# Patient Record
Sex: MALE | Race: WHITE | NOT HISPANIC OR LATINO | Employment: OTHER | URBAN - METROPOLITAN AREA
[De-identification: names, ages, dates, MRNs, and addresses within clinical notes are randomized per-mention and may not be internally consistent; named-entity substitution may affect disease eponyms.]

---

## 2018-12-30 ENCOUNTER — APPOINTMENT (OUTPATIENT)
Dept: RADIOLOGY | Facility: CLINIC | Age: 58
End: 2018-12-30
Payer: COMMERCIAL

## 2018-12-30 ENCOUNTER — OFFICE VISIT (OUTPATIENT)
Dept: URGENT CARE | Facility: CLINIC | Age: 58
End: 2018-12-30
Payer: COMMERCIAL

## 2018-12-30 VITALS
DIASTOLIC BLOOD PRESSURE: 65 MMHG | RESPIRATION RATE: 18 BRPM | SYSTOLIC BLOOD PRESSURE: 138 MMHG | OXYGEN SATURATION: 99 % | HEART RATE: 70 BPM | TEMPERATURE: 100.9 F

## 2018-12-30 DIAGNOSIS — R05.9 COUGH: ICD-10-CM

## 2018-12-30 DIAGNOSIS — R68.89 FLU-LIKE SYMPTOMS: Primary | ICD-10-CM

## 2018-12-30 PROCEDURE — 87631 RESP VIRUS 3-5 TARGETS: CPT | Performed by: PHYSICIAN ASSISTANT

## 2018-12-30 PROCEDURE — 99204 OFFICE O/P NEW MOD 45 MIN: CPT | Performed by: PHYSICIAN ASSISTANT

## 2018-12-30 PROCEDURE — 71046 X-RAY EXAM CHEST 2 VIEWS: CPT

## 2018-12-30 RX ORDER — LOSARTAN POTASSIUM 50 MG/1
50 TABLET ORAL DAILY
COMMUNITY
End: 2019-02-13

## 2018-12-30 RX ORDER — OSELTAMIVIR PHOSPHATE 75 MG/1
75 CAPSULE ORAL 2 TIMES DAILY
Qty: 10 CAPSULE | Refills: 0 | Status: SHIPPED | OUTPATIENT
Start: 2018-12-30 | End: 2019-01-04

## 2018-12-30 NOTE — PROGRESS NOTES
[address] [facility]    NAME: Kristina Wilson is a 59 y o  male  : 1960    MRN: [id number]  DATE: 2018  TIME: 2:49 PM    Assessment and Plan   Flu-like symptoms [R68 89]  1  Flu-like symptoms  oseltamivir (TAMIFLU) 75 mg capsule    INFLUENZA A/B AND RSV, PCR   2  Cough  XR chest pa & lateral       Patient Instructions   Patient Instructions   Symptoms appear flu-like  Chest x-ray appears negative for any pulmonary disease  Will follow up with radiologist report when available  Start Tamiflu  Flu swab sent to the lab  Recommend symptomatic treatment with ibuprofen Tylenol for fever  Can take Mucinex or DayQuil NyQuil to help with symptoms  Follow up with PCP over the next week  If symptoms are worsening go to the emergency room  Chief Complaint     Chief Complaint   Patient presents with    Cough    Fever       History of Present Illness   69-year-old male here with cough, fever, body aches and chills for the last 2 days  Symptoms started yesterday afternoon  Patient reports that he has been exposed to a sick person for the last 4-6 days  Now feels like he is coming down with something himself  Cough is dry nonproductive  Review of Systems   Review of Systems   Constitutional: Positive for chills, fatigue and fever  Negative for activity change, appetite change, diaphoresis and unexpected weight change  HENT: Positive for congestion, sinus pressure and sore throat  Negative for ear pain, hearing loss, sneezing and tinnitus  Eyes: Negative for photophobia, redness and visual disturbance  Respiratory: Positive for cough  Negative for apnea, chest tightness, shortness of breath, wheezing and stridor  Cardiovascular: Negative for chest pain, palpitations and leg swelling  Gastrointestinal: Negative for abdominal distention, abdominal pain, blood in stool, constipation, diarrhea, nausea and vomiting     Endocrine: Negative for cold intolerance, heat intolerance, polydipsia, polyphagia and polyuria  Genitourinary: Negative for difficulty urinating, dysuria, flank pain, hematuria and urgency  Musculoskeletal: Positive for myalgias  Negative for arthralgias, back pain, gait problem, neck pain and neck stiffness  Skin: Negative for rash and wound  Neurological: Negative for dizziness, tremors, seizures, syncope, weakness, light-headedness, numbness and headaches  Hematological: Negative for adenopathy  Does not bruise/bleed easily  Psychiatric/Behavioral: Negative for agitation, behavioral problems, confusion and decreased concentration  The patient is not nervous/anxious  All other systems reviewed and are negative  Current Medications     Current Outpatient Prescriptions:     losartan (COZAAR) 50 mg tablet, Take 25 mg by mouth daily, Disp: , Rfl:     oseltamivir (TAMIFLU) 75 mg capsule, Take 1 capsule (75 mg total) by mouth 2 (two) times a day for 5 days, Disp: 10 capsule, Rfl: 0    Current Allergies     Allergies as of 12/30/2018 - Reviewed 12/30/2018   Allergen Reaction Noted    Penicillins Rash 12/30/2018          The following portions of the patient's history were reviewed and updated as appropriate: allergies, current medications, past family history, past medical history, past social history, past surgical history and problem list    No past medical history on file  No past surgical history on file  No family history on file  Social History     Social History    Marital status: /Civil Union     Spouse name: N/A    Number of children: N/A    Years of education: N/A     Occupational History    Not on file  Social History Main Topics    Smoking status: Not on file    Smokeless tobacco: Not on file    Alcohol use Not on file    Drug use: Unknown    Sexual activity: Not on file     Other Topics Concern    Not on file     Social History Narrative    No narrative on file     Medications have been verified      Objective   BP 138/65   Pulse 70   Temp (!) 100 9 °F (38 3 °C)   Resp 18   SpO2 99%      Physical Exam   Physical Exam   Constitutional: He appears well-developed and well-nourished  No distress  HENT:   Head: Normocephalic  Right Ear: Tympanic membrane and external ear normal    Left Ear: Tympanic membrane and external ear normal    Nose: Mucosal edema and rhinorrhea present  Mouth/Throat: Posterior oropharyngeal erythema present  No oropharyngeal exudate  Neck: Normal range of motion  Neck supple  Cardiovascular: Normal rate, regular rhythm and normal heart sounds  No murmur heard  Pulmonary/Chest: Effort normal  No respiratory distress  He has wheezes in the left lower field  He has no rales  Abdominal: Soft  Bowel sounds are normal  There is no tenderness  Musculoskeletal: Normal range of motion  Lymphadenopathy:     He has no cervical adenopathy  Skin: Skin is warm  No rash noted  Nursing note and vitals reviewed

## 2018-12-30 NOTE — PATIENT INSTRUCTIONS
Symptoms appear flu-like  Chest x-ray appears negative for any pulmonary disease  Will follow up with radiologist report when available  Start Tamiflu  Flu swab sent to the lab  Recommend symptomatic treatment with ibuprofen Tylenol for fever  Can take Mucinex or DayQuil NyQuil to help with symptoms  Follow up with PCP over the next week  If symptoms are worsening go to the emergency room

## 2018-12-31 LAB
FLUAV AG SPEC QL: DETECTED
FLUBV AG SPEC QL: ABNORMAL
RSV B RNA SPEC QL NAA+PROBE: ABNORMAL

## 2019-01-10 ENCOUNTER — HOSPITAL ENCOUNTER (INPATIENT)
Facility: HOSPITAL | Age: 59
LOS: 5 days | Discharge: HOME | DRG: 391 | End: 2019-01-16
Attending: EMERGENCY MEDICINE | Admitting: COLON & RECTAL SURGERY
Payer: COMMERCIAL

## 2019-01-10 ENCOUNTER — TRANSCRIBE ORDERS (OUTPATIENT)
Dept: LAB | Facility: HOSPITAL | Age: 59
End: 2019-01-10

## 2019-01-10 ENCOUNTER — TRANSCRIBE ORDERS (OUTPATIENT)
Dept: ADMINISTRATIVE | Facility: HOSPITAL | Age: 59
End: 2019-01-10

## 2019-01-10 ENCOUNTER — APPOINTMENT (OUTPATIENT)
Dept: LAB | Facility: HOSPITAL | Age: 59
DRG: 391 | End: 2019-01-10
Payer: COMMERCIAL

## 2019-01-10 ENCOUNTER — HOSPITAL ENCOUNTER (OUTPATIENT)
Dept: RADIOLOGY | Facility: HOSPITAL | Age: 59
Discharge: HOME | DRG: 391 | End: 2019-01-10
Attending: INTERNAL MEDICINE
Payer: COMMERCIAL

## 2019-01-10 DIAGNOSIS — R11.10 VOMITING, INTRACTABILITY OF VOMITING NOT SPECIFIED, PRESENCE OF NAUSEA NOT SPECIFIED, UNSPECIFIED VOMITING TYPE: ICD-10-CM

## 2019-01-10 DIAGNOSIS — R10.9 ABDOMINAL PAIN: ICD-10-CM

## 2019-01-10 DIAGNOSIS — R19.4 FREQUENT BOWEL MOVEMENTS: Primary | ICD-10-CM

## 2019-01-10 DIAGNOSIS — R10.9 ABDOMINAL PAIN, UNSPECIFIED ABDOMINAL LOCATION: Primary | ICD-10-CM

## 2019-01-10 DIAGNOSIS — K56.609 SBO (SMALL BOWEL OBSTRUCTION) (HCC): Primary | ICD-10-CM

## 2019-01-10 DIAGNOSIS — D72.829 LEUKOCYTOSIS: ICD-10-CM

## 2019-01-10 DIAGNOSIS — D64.9 ANEMIA, UNSPECIFIED TYPE: ICD-10-CM

## 2019-01-10 DIAGNOSIS — R11.2 NAUSEA & VOMITING: ICD-10-CM

## 2019-01-10 DIAGNOSIS — K52.9 COLITIS, ACUTE: ICD-10-CM

## 2019-01-10 DIAGNOSIS — R10.9 ABDOMINAL PAIN, UNSPECIFIED ABDOMINAL LOCATION: ICD-10-CM

## 2019-01-10 DIAGNOSIS — K63.0 ABSCESS OF SIGMOID COLON: ICD-10-CM

## 2019-01-10 DIAGNOSIS — K65.1 MESENTERIC ABSCESS (HCC): ICD-10-CM

## 2019-01-10 DIAGNOSIS — R19.4 CHANGE IN BOWEL HABIT: ICD-10-CM

## 2019-01-10 DIAGNOSIS — R19.4 FREQUENT BOWEL MOVEMENTS: ICD-10-CM

## 2019-01-10 DIAGNOSIS — D64.9 ANEMIA, UNSPECIFIED TYPE: Primary | ICD-10-CM

## 2019-01-10 LAB
ALBUMIN SERPL BCP-MCNC: 2.9 G/DL (ref 3.5–5)
ALP SERPL-CCNC: 86 U/L (ref 46–116)
ALT SERPL W P-5'-P-CCNC: 100 U/L (ref 12–78)
AMYLASE SERPL-CCNC: 36 IU/L (ref 25–115)
ANION GAP SERPL CALCULATED.3IONS-SCNC: 9 MMOL/L (ref 4–13)
AST SERPL W P-5'-P-CCNC: 40 U/L (ref 5–45)
BASOPHILS # BLD AUTO: 0.08 THOUSANDS/ΜL (ref 0–0.1)
BASOPHILS NFR BLD AUTO: 0 % (ref 0–1)
BILIRUB DIRECT SERPL-MCNC: 0.22 MG/DL (ref 0–0.2)
BILIRUB SERPL-MCNC: 0.67 MG/DL (ref 0.2–1)
BUN SERPL-MCNC: 8 MG/DL (ref 5–25)
CALCIUM SERPL-MCNC: 8.7 MG/DL (ref 8.3–10.1)
CHLORIDE SERPL-SCNC: 98 MMOL/L (ref 100–108)
CO2 SERPL-SCNC: 24 MMOL/L (ref 21–32)
CREAT SERPL-MCNC: 0.81 MG/DL (ref 0.6–1.3)
EOSINOPHIL # BLD AUTO: 0.07 THOUSAND/ΜL (ref 0–0.61)
EOSINOPHIL NFR BLD AUTO: 0 % (ref 0–6)
ERYTHROCYTE [DISTWIDTH] IN BLOOD BY AUTOMATED COUNT: 13.1 % (ref 11.6–15.1)
ERYTHROCYTE [DISTWIDTH] IN BLOOD BY AUTOMATED COUNT: 13.2 % (ref 11.6–15.1)
GFR SERPL CREATININE-BSD FRML MDRD: 97 ML/MIN/1.73SQ M
GLUCOSE P FAST SERPL-MCNC: 107 MG/DL (ref 65–99)
HCT VFR BLD AUTO: 39 % (ref 36.5–49.3)
HCT VFR BLD AUTO: 41.4 % (ref 36.5–49.3)
HGB BLD-MCNC: 13.3 G/DL (ref 12–17)
HGB BLD-MCNC: 14.4 G/DL (ref 12–17)
IMM GRANULOCYTES # BLD AUTO: 0.44 THOUSAND/UL (ref 0–0.2)
IMM GRANULOCYTES NFR BLD AUTO: 2 % (ref 0–2)
LIPASE SERPL-CCNC: 109 U/L (ref 73–393)
LYMPHOCYTES # BLD AUTO: 1.87 THOUSANDS/ΜL (ref 0.6–4.47)
LYMPHOCYTES NFR BLD AUTO: 6 % (ref 14–44)
MAGNESIUM SERPL-MCNC: 2.5 MG/DL (ref 1.6–2.6)
MCH RBC QN AUTO: 32.3 PG (ref 26.8–34.3)
MCH RBC QN AUTO: 33 PG (ref 26.8–34.3)
MCHC RBC AUTO-ENTMCNC: 34.1 G/DL (ref 31.4–37.4)
MCHC RBC AUTO-ENTMCNC: 34.8 G/DL (ref 31.4–37.4)
MCV RBC AUTO: 95 FL (ref 82–98)
MCV RBC AUTO: 95 FL (ref 82–98)
MONOCYTES # BLD AUTO: 2.05 THOUSAND/ΜL (ref 0.17–1.22)
MONOCYTES NFR BLD AUTO: 7 % (ref 4–12)
NEUTROPHILS # BLD AUTO: 25.28 THOUSANDS/ΜL (ref 1.85–7.62)
NEUTS SEG NFR BLD AUTO: 85 % (ref 43–75)
NRBC BLD AUTO-RTO: 0 /100 WBCS
PLATELET # BLD AUTO: 718 THOUSANDS/UL (ref 149–390)
PLATELET # BLD AUTO: 725 THOUSANDS/UL (ref 149–390)
PMV BLD AUTO: 8.6 FL (ref 8.9–12.7)
PMV BLD AUTO: 8.8 FL (ref 8.9–12.7)
POTASSIUM SERPL-SCNC: 3.5 MMOL/L (ref 3.5–5.3)
PROT SERPL-MCNC: 6.8 G/DL (ref 6.4–8.2)
RBC # BLD AUTO: 4.12 MILLION/UL (ref 3.88–5.62)
RBC # BLD AUTO: 4.37 MILLION/UL (ref 3.88–5.62)
SODIUM SERPL-SCNC: 131 MMOL/L (ref 136–145)
WBC # BLD AUTO: 29.79 THOUSAND/UL (ref 4.31–10.16)
WBC # BLD AUTO: 30.47 THOUSAND/UL (ref 4.31–10.16)

## 2019-01-10 PROCEDURE — 36415 COLL VENOUS BLD VENIPUNCTURE: CPT

## 2019-01-10 PROCEDURE — 71260 CT THORAX DX C+: CPT

## 2019-01-10 PROCEDURE — 83690 ASSAY OF LIPASE: CPT

## 2019-01-10 PROCEDURE — 85610 PROTHROMBIN TIME: CPT | Performed by: EMERGENCY MEDICINE

## 2019-01-10 PROCEDURE — 99285 EMERGENCY DEPT VISIT HI MDM: CPT

## 2019-01-10 PROCEDURE — 85025 COMPLETE CBC W/AUTO DIFF WBC: CPT | Performed by: EMERGENCY MEDICINE

## 2019-01-10 PROCEDURE — 83735 ASSAY OF MAGNESIUM: CPT

## 2019-01-10 PROCEDURE — 85730 THROMBOPLASTIN TIME PARTIAL: CPT | Performed by: EMERGENCY MEDICINE

## 2019-01-10 PROCEDURE — 82150 ASSAY OF AMYLASE: CPT

## 2019-01-10 PROCEDURE — 84145 PROCALCITONIN (PCT): CPT | Performed by: EMERGENCY MEDICINE

## 2019-01-10 PROCEDURE — 96374 THER/PROPH/DIAG INJ IV PUSH: CPT

## 2019-01-10 PROCEDURE — 80053 COMPREHEN METABOLIC PANEL: CPT

## 2019-01-10 PROCEDURE — 87040 BLOOD CULTURE FOR BACTERIA: CPT | Performed by: EMERGENCY MEDICINE

## 2019-01-10 PROCEDURE — 74177 CT ABD & PELVIS W/CONTRAST: CPT

## 2019-01-10 PROCEDURE — 80053 COMPREHEN METABOLIC PANEL: CPT | Performed by: EMERGENCY MEDICINE

## 2019-01-10 PROCEDURE — 83605 ASSAY OF LACTIC ACID: CPT | Performed by: EMERGENCY MEDICINE

## 2019-01-10 PROCEDURE — 96375 TX/PRO/DX INJ NEW DRUG ADDON: CPT

## 2019-01-10 PROCEDURE — 96361 HYDRATE IV INFUSION ADD-ON: CPT

## 2019-01-10 PROCEDURE — 85027 COMPLETE CBC AUTOMATED: CPT

## 2019-01-10 PROCEDURE — 82248 BILIRUBIN DIRECT: CPT

## 2019-01-10 RX ORDER — ONDANSETRON 2 MG/ML
4 INJECTION INTRAMUSCULAR; INTRAVENOUS ONCE
Status: COMPLETED | OUTPATIENT
Start: 2019-01-10 | End: 2019-01-10

## 2019-01-10 RX ORDER — MORPHINE SULFATE 4 MG/ML
4 INJECTION, SOLUTION INTRAMUSCULAR; INTRAVENOUS ONCE
Status: COMPLETED | OUTPATIENT
Start: 2019-01-10 | End: 2019-01-10

## 2019-01-10 RX ORDER — 0.9 % SODIUM CHLORIDE 0.9 %
3 VIAL (ML) INJECTION AS NEEDED
Status: DISCONTINUED | OUTPATIENT
Start: 2019-01-10 | End: 2019-01-16 | Stop reason: HOSPADM

## 2019-01-10 RX ADMIN — MORPHINE SULFATE 4 MG: 4 INJECTION INTRAVENOUS at 23:41

## 2019-01-10 RX ADMIN — ONDANSETRON 4 MG: 2 INJECTION INTRAMUSCULAR; INTRAVENOUS at 23:39

## 2019-01-10 RX ADMIN — SODIUM CHLORIDE 1000 ML: 0.9 INJECTION, SOLUTION INTRAVENOUS at 23:41

## 2019-01-10 RX ADMIN — IOHEXOL 100 ML: 350 INJECTION, SOLUTION INTRAVENOUS at 19:35

## 2019-01-11 ENCOUNTER — APPOINTMENT (INPATIENT)
Dept: RADIOLOGY | Facility: HOSPITAL | Age: 59
DRG: 391 | End: 2019-01-11
Payer: COMMERCIAL

## 2019-01-11 PROBLEM — K63.0 ABSCESS OF SIGMOID COLON: Status: ACTIVE | Noted: 2019-01-11

## 2019-01-11 LAB
ALBUMIN SERPL BCP-MCNC: 3 G/DL (ref 3.5–5)
ALP SERPL-CCNC: 82 U/L (ref 46–116)
ALT SERPL W P-5'-P-CCNC: 95 U/L (ref 12–78)
ANION GAP SERPL CALCULATED.3IONS-SCNC: 9 MMOL/L (ref 4–13)
ANION GAP SERPL CALCULATED.3IONS-SCNC: 9 MMOL/L (ref 4–13)
APTT PPP: 31 SECONDS (ref 26–38)
AST SERPL W P-5'-P-CCNC: 40 U/L (ref 5–45)
BASOPHILS # BLD AUTO: 0.08 THOUSANDS/ΜL (ref 0–0.1)
BASOPHILS NFR BLD AUTO: 0 % (ref 0–1)
BILIRUB SERPL-MCNC: 0.68 MG/DL (ref 0.2–1)
BUN SERPL-MCNC: 7 MG/DL (ref 5–25)
BUN SERPL-MCNC: 9 MG/DL (ref 5–25)
CALCIUM SERPL-MCNC: 7.7 MG/DL (ref 8.3–10.1)
CALCIUM SERPL-MCNC: 8.6 MG/DL (ref 8.3–10.1)
CHLORIDE SERPL-SCNC: 100 MMOL/L (ref 100–108)
CHLORIDE SERPL-SCNC: 95 MMOL/L (ref 100–108)
CO2 SERPL-SCNC: 24 MMOL/L (ref 21–32)
CO2 SERPL-SCNC: 26 MMOL/L (ref 21–32)
CREAT SERPL-MCNC: 0.8 MG/DL (ref 0.6–1.3)
CREAT SERPL-MCNC: 0.86 MG/DL (ref 0.6–1.3)
EOSINOPHIL # BLD AUTO: 0.11 THOUSAND/ΜL (ref 0–0.61)
EOSINOPHIL NFR BLD AUTO: 0 % (ref 0–6)
ERYTHROCYTE [DISTWIDTH] IN BLOOD BY AUTOMATED COUNT: 13.2 % (ref 11.6–15.1)
GFR SERPL CREATININE-BSD FRML MDRD: 94 ML/MIN/1.73SQ M
GFR SERPL CREATININE-BSD FRML MDRD: 97 ML/MIN/1.73SQ M
GLUCOSE SERPL-MCNC: 89 MG/DL (ref 65–140)
GLUCOSE SERPL-MCNC: 90 MG/DL (ref 65–140)
HCT VFR BLD AUTO: 35.7 % (ref 36.5–49.3)
HGB BLD-MCNC: 11.9 G/DL (ref 12–17)
IMM GRANULOCYTES # BLD AUTO: 0.37 THOUSAND/UL (ref 0–0.2)
IMM GRANULOCYTES NFR BLD AUTO: 1 % (ref 0–2)
INR PPP: 1.1 (ref 0.86–1.17)
LACTATE SERPL-SCNC: 1.3 MMOL/L (ref 0.5–2)
LYMPHOCYTES # BLD AUTO: 1.77 THOUSANDS/ΜL (ref 0.6–4.47)
LYMPHOCYTES NFR BLD AUTO: 7 % (ref 14–44)
MAGNESIUM SERPL-MCNC: 2.1 MG/DL (ref 1.6–2.6)
MCH RBC QN AUTO: 32.2 PG (ref 26.8–34.3)
MCHC RBC AUTO-ENTMCNC: 33.3 G/DL (ref 31.4–37.4)
MCV RBC AUTO: 97 FL (ref 82–98)
MONOCYTES # BLD AUTO: 2.23 THOUSAND/ΜL (ref 0.17–1.22)
MONOCYTES NFR BLD AUTO: 9 % (ref 4–12)
NEUTROPHILS # BLD AUTO: 21.64 THOUSANDS/ΜL (ref 1.85–7.62)
NEUTS SEG NFR BLD AUTO: 83 % (ref 43–75)
NRBC BLD AUTO-RTO: 0 /100 WBCS
PLATELET # BLD AUTO: 646 THOUSANDS/UL (ref 149–390)
PMV BLD AUTO: 9.1 FL (ref 8.9–12.7)
POTASSIUM SERPL-SCNC: 3.4 MMOL/L (ref 3.5–5.3)
POTASSIUM SERPL-SCNC: 3.5 MMOL/L (ref 3.5–5.3)
PROCALCITONIN SERPL-MCNC: 0.08 NG/ML
PROT SERPL-MCNC: 6.7 G/DL (ref 6.4–8.2)
PROTHROMBIN TIME: 14.3 SECONDS (ref 11.8–14.2)
RBC # BLD AUTO: 3.7 MILLION/UL (ref 3.88–5.62)
SODIUM SERPL-SCNC: 130 MMOL/L (ref 136–145)
SODIUM SERPL-SCNC: 133 MMOL/L (ref 136–145)
WBC # BLD AUTO: 26.2 THOUSAND/UL (ref 4.31–10.16)

## 2019-01-11 PROCEDURE — 87505 NFCT AGENT DETECTION GI: CPT | Performed by: SURGERY

## 2019-01-11 PROCEDURE — 0W9J30Z DRAINAGE OF PELVIC CAVITY WITH DRAINAGE DEVICE, PERCUTANEOUS APPROACH: ICD-10-PCS | Performed by: RADIOLOGY

## 2019-01-11 PROCEDURE — C1729 CATH, DRAINAGE: HCPCS

## 2019-01-11 PROCEDURE — 87205 SMEAR GRAM STAIN: CPT | Performed by: SURGERY

## 2019-01-11 PROCEDURE — 99152 MOD SED SAME PHYS/QHP 5/>YRS: CPT

## 2019-01-11 PROCEDURE — 49406 IMAGE CATH FLUID PERI/RETRO: CPT | Performed by: RADIOLOGY

## 2019-01-11 PROCEDURE — 99152 MOD SED SAME PHYS/QHP 5/>YRS: CPT | Performed by: RADIOLOGY

## 2019-01-11 PROCEDURE — 85025 COMPLETE CBC W/AUTO DIFF WBC: CPT | Performed by: SURGERY

## 2019-01-11 PROCEDURE — 87493 C DIFF AMPLIFIED PROBE: CPT | Performed by: SURGERY

## 2019-01-11 PROCEDURE — 87077 CULTURE AEROBIC IDENTIFY: CPT | Performed by: SURGERY

## 2019-01-11 PROCEDURE — 87186 SC STD MICRODIL/AGAR DIL: CPT | Performed by: SURGERY

## 2019-01-11 PROCEDURE — 87075 CULTR BACTERIA EXCEPT BLOOD: CPT | Performed by: SURGERY

## 2019-01-11 PROCEDURE — 87070 CULTURE OTHR SPECIMN AEROBIC: CPT | Performed by: SURGERY

## 2019-01-11 PROCEDURE — C1769 GUIDE WIRE: HCPCS

## 2019-01-11 PROCEDURE — 49406 IMAGE CATH FLUID PERI/RETRO: CPT

## 2019-01-11 PROCEDURE — 80048 BASIC METABOLIC PNL TOTAL CA: CPT | Performed by: SURGERY

## 2019-01-11 PROCEDURE — 99153 MOD SED SAME PHYS/QHP EA: CPT

## 2019-01-11 PROCEDURE — 83735 ASSAY OF MAGNESIUM: CPT | Performed by: SURGERY

## 2019-01-11 RX ORDER — POTASSIUM CHLORIDE 14.9 MG/ML
20 INJECTION INTRAVENOUS ONCE
Status: COMPLETED | OUTPATIENT
Start: 2019-01-11 | End: 2019-01-11

## 2019-01-11 RX ORDER — HYDROMORPHONE HCL/PF 1 MG/ML
0.5 SYRINGE (ML) INJECTION
Status: DISCONTINUED | OUTPATIENT
Start: 2019-01-11 | End: 2019-01-12

## 2019-01-11 RX ORDER — ONDANSETRON 2 MG/ML
4 INJECTION INTRAMUSCULAR; INTRAVENOUS EVERY 4 HOURS PRN
Status: DISCONTINUED | OUTPATIENT
Start: 2019-01-11 | End: 2019-01-16 | Stop reason: HOSPADM

## 2019-01-11 RX ORDER — FENTANYL CITRATE 50 UG/ML
INJECTION, SOLUTION INTRAMUSCULAR; INTRAVENOUS CODE/TRAUMA/SEDATION MEDICATION
Status: COMPLETED | OUTPATIENT
Start: 2019-01-11 | End: 2019-01-11

## 2019-01-11 RX ORDER — ASPIRIN 81 MG/1
81 TABLET ORAL DAILY
COMMUNITY
End: 2019-02-13

## 2019-01-11 RX ORDER — SODIUM CHLORIDE 9 MG/ML
125 INJECTION, SOLUTION INTRAVENOUS CONTINUOUS
Status: DISCONTINUED | OUTPATIENT
Start: 2019-01-11 | End: 2019-01-11

## 2019-01-11 RX ORDER — HYDROMORPHONE HCL/PF 1 MG/ML
1 SYRINGE (ML) INJECTION EVERY 4 HOURS PRN
Status: DISCONTINUED | OUTPATIENT
Start: 2019-01-11 | End: 2019-01-12

## 2019-01-11 RX ORDER — HYDROMORPHONE HYDROCHLORIDE 4 MG/ML
INJECTION, SOLUTION INTRAMUSCULAR; INTRAVENOUS; SUBCUTANEOUS CODE/TRAUMA/SEDATION MEDICATION
Status: COMPLETED | OUTPATIENT
Start: 2019-01-11 | End: 2019-01-11

## 2019-01-11 RX ORDER — CEFAZOLIN SODIUM 2 G/50ML
2000 SOLUTION INTRAVENOUS EVERY 8 HOURS
Status: DISCONTINUED | OUTPATIENT
Start: 2019-01-11 | End: 2019-01-12

## 2019-01-11 RX ORDER — HEPARIN SODIUM 5000 [USP'U]/ML
5000 INJECTION, SOLUTION INTRAVENOUS; SUBCUTANEOUS EVERY 8 HOURS SCHEDULED
Status: DISCONTINUED | OUTPATIENT
Start: 2019-01-11 | End: 2019-01-16 | Stop reason: HOSPADM

## 2019-01-11 RX ORDER — NICOTINE 21 MG/24HR
1 PATCH, TRANSDERMAL 24 HOURS TRANSDERMAL DAILY
Status: DISCONTINUED | OUTPATIENT
Start: 2019-01-11 | End: 2019-01-12

## 2019-01-11 RX ORDER — MIDAZOLAM HYDROCHLORIDE 1 MG/ML
INJECTION INTRAMUSCULAR; INTRAVENOUS CODE/TRAUMA/SEDATION MEDICATION
Status: COMPLETED | OUTPATIENT
Start: 2019-01-11 | End: 2019-01-11

## 2019-01-11 RX ORDER — DEXTROSE, SODIUM CHLORIDE, AND POTASSIUM CHLORIDE 5; .9; .15 G/100ML; G/100ML; G/100ML
75 INJECTION INTRAVENOUS CONTINUOUS
Status: DISCONTINUED | OUTPATIENT
Start: 2019-01-11 | End: 2019-01-16

## 2019-01-11 RX ADMIN — FENTANYL CITRATE 50 MCG: 50 INJECTION, SOLUTION INTRAMUSCULAR; INTRAVENOUS at 10:22

## 2019-01-11 RX ADMIN — HEPARIN SODIUM 5000 UNITS: 5000 INJECTION INTRAVENOUS; SUBCUTANEOUS at 13:49

## 2019-01-11 RX ADMIN — CEFAZOLIN SODIUM 2000 MG: 2 SOLUTION INTRAVENOUS at 08:06

## 2019-01-11 RX ADMIN — METRONIDAZOLE 500 MG: 500 INJECTION, SOLUTION INTRAVENOUS at 09:05

## 2019-01-11 RX ADMIN — CEFAZOLIN SODIUM 2000 MG: 2 SOLUTION INTRAVENOUS at 23:56

## 2019-01-11 RX ADMIN — CEFAZOLIN SODIUM 2000 MG: 2 SOLUTION INTRAVENOUS at 15:56

## 2019-01-11 RX ADMIN — HYDROMORPHONE HYDROCHLORIDE 1 MG: 4 INJECTION, SOLUTION INTRAMUSCULAR; INTRAVENOUS; SUBCUTANEOUS at 11:22

## 2019-01-11 RX ADMIN — METRONIDAZOLE 500 MG: 500 INJECTION, SOLUTION INTRAVENOUS at 16:39

## 2019-01-11 RX ADMIN — FENTANYL CITRATE 50 MCG: 50 INJECTION, SOLUTION INTRAMUSCULAR; INTRAVENOUS at 10:39

## 2019-01-11 RX ADMIN — MIDAZOLAM 1 MG: 1 INJECTION INTRAMUSCULAR; INTRAVENOUS at 10:31

## 2019-01-11 RX ADMIN — METRONIDAZOLE 500 MG: 500 INJECTION, SOLUTION INTRAVENOUS at 00:29

## 2019-01-11 RX ADMIN — MIDAZOLAM 1 MG: 1 INJECTION INTRAMUSCULAR; INTRAVENOUS at 10:22

## 2019-01-11 RX ADMIN — FENTANYL CITRATE 50 MCG: 50 INJECTION, SOLUTION INTRAMUSCULAR; INTRAVENOUS at 10:31

## 2019-01-11 RX ADMIN — SODIUM CHLORIDE 1000 ML: 0.9 INJECTION, SOLUTION INTRAVENOUS at 01:41

## 2019-01-11 RX ADMIN — POTASSIUM CHLORIDE 20 MEQ: 200 INJECTION, SOLUTION INTRAVENOUS at 12:30

## 2019-01-11 RX ADMIN — MIDAZOLAM 1 MG: 1 INJECTION INTRAMUSCULAR; INTRAVENOUS at 10:45

## 2019-01-11 RX ADMIN — HEPARIN SODIUM 5000 UNITS: 5000 INJECTION INTRAVENOUS; SUBCUTANEOUS at 21:22

## 2019-01-11 RX ADMIN — FENTANYL CITRATE 50 MCG: 50 INJECTION, SOLUTION INTRAMUSCULAR; INTRAVENOUS at 10:45

## 2019-01-11 RX ADMIN — MIDAZOLAM 1 MG: 1 INJECTION INTRAMUSCULAR; INTRAVENOUS at 10:39

## 2019-01-11 RX ADMIN — SODIUM CHLORIDE 125 ML/HR: 0.9 INJECTION, SOLUTION INTRAVENOUS at 00:30

## 2019-01-11 RX ADMIN — HYDROMORPHONE HYDROCHLORIDE 0.5 MG: 1 INJECTION, SOLUTION INTRAMUSCULAR; INTRAVENOUS; SUBCUTANEOUS at 05:11

## 2019-01-11 RX ADMIN — HEPARIN SODIUM 5000 UNITS: 5000 INJECTION INTRAVENOUS; SUBCUTANEOUS at 05:10

## 2019-01-11 RX ADMIN — DEXTROSE, SODIUM CHLORIDE, AND POTASSIUM CHLORIDE 125 ML/HR: 5; .9; .15 INJECTION INTRAVENOUS at 12:29

## 2019-01-11 RX ADMIN — CEFAZOLIN SODIUM 2000 MG: 2 SOLUTION INTRAVENOUS at 01:40

## 2019-01-11 NOTE — PROGRESS NOTES
- Hyponatremia being treated with maintenance IV fluids of D5Nss+Kcl  - Hypokalemia repleted  - Blood cxs pending  - C-diff and stool cxs pending  - Awaiting IR consult for drainage pelvic abscess

## 2019-01-11 NOTE — ED ATTENDING ATTESTATION
Andrea Hooker MD, saw and evaluated the patient  I have discussed the patient with the resident/non-physician practitioner and agree with the resident's/non-physician practitioner's findings, Plan of Care, and MDM as documented in the resident's/non-physician practitioner's note, except where noted  All available labs and Radiology studies were reviewed  At this point I agree with the current assessment done in the Emergency Department  I have conducted an independent evaluation of this patient a history and physical is as follows:      Critical Care Time  CritCare Time    Procedures     60 yo male with sbo, sigmoid abscess, colitis dx on outpatient ct scan  Pt with few weeks of cough, fever, bodyaches, started on tamiflu for influenza a then developed abdominal pain, n/v, constipation  Pt taking stool softeners  No fever currently  No hx of abdominal surgery  pmh htn  Vss, afebrile, lungs cta, rrr, abdomen soft tender diffusely, no rebound, no guarding  Labs, septic workup, pain meds, discuss with surgery

## 2019-01-11 NOTE — PROGRESS NOTES
Progress Note - Colorectal Surgery   Pierre Godfrey 59 y o  male MRN: [id number]  Unit/Bed#: MS [facility] Encounter: [id number]    Assessment:  60 yo M with robel-sigmoid abscess as well as soft tissue mass adjacent to collection, with secondary SBO    Plan:  F/U AM CBC  IR consult ordered- hopeful interventional drainage of abscess  NPO, NS @ 125  Continue Ancef, Flagyl  F/U stool studies, C diff  PRN pain control      Subjective/Objective   Chief Complaint:     Subjective: Feels somewhat better with pain medication  Still feels distended, denies flatus  No BMs since admission  No nausea  Objective:     Blood pressure 132/77, pulse 67, temperature 100 2 °F (37 9 °C), temperature source Oral, resp  rate 20, height 6' 3" (1 905 m), weight 82 4 kg (181 lb 10 5 oz), SpO2 98 %  ,Body mass index is 22 71 kg/m²  Intake/Output Summary (Last 24 hours) at 01/11/19 0601  Last data filed at 01/11/19 0437   Gross per 24 hour   Intake             3150 ml   Output              400 ml   Net             2750 ml       Invasive Devices     Peripheral Intravenous Line            Peripheral IV 01/10/19 Left Antecubital less than 1 day              Physical Exam:   Gen: A&O, NAD  Cardio: RRR  Lungs: CTAB  Abd: Soft, distended in lower abdomen, tender suprapubically       Lab, Imaging and other studies:  CBC:   Lab Results   Component Value Date    WBC 29 79 (H) 01/10/2019    HGB 13 3 01/10/2019    HCT 39 0 01/10/2019    MCV 95 01/10/2019     (H) 01/10/2019    MCH 32 3 01/10/2019    MCHC 34 1 01/10/2019    RDW 13 2 01/10/2019    MPV 8 8 (L) 01/10/2019    NRBC 0 01/10/2019   , CMP:   Lab Results   Component Value Date    SODIUM 130 (L) 01/10/2019    K 3 4 (L) 01/10/2019    CL 95 (L) 01/10/2019    CO2 26 01/10/2019    BUN 9 01/10/2019    CREATININE 0 86 01/10/2019    CALCIUM 8 6 01/10/2019    AST 40 01/10/2019    ALT 95 (H) 01/10/2019    ALKPHOS 82 01/10/2019    EGFR 94 01/10/2019     VTE Pharmacologic Prophylaxis: Heparin  VTE Mechanical Prophylaxis: sequential compression device

## 2019-01-11 NOTE — PLAN OF CARE
Problem: Nutrition/Hydration-ADULT  Goal: Nutrient/Hydration intake appropriate for improving, restoring or maintaining nutritional needs  Monitor and assess patient's nutrition/hydration status for malnutrition (ex- brittle hair, bruises, dry skin, pale skin and conjunctiva, muscle wasting, smooth red tongue, and disorientation)  Collaborate with interdisciplinary team and initiate plan and interventions as ordered  Monitor patient's weight and dietary intake as ordered or per policy  Utilize nutrition screening tool and intervene per policy  Determine patient's food preferences and provide high-protein, high-caloric foods as appropriate       INTERVENTIONS:  - Monitor oral intake, urinary output, labs, and treatment plans  - Assess nutrition and hydration status and recommend course of action  - Evaluate amount of meals eaten  - Assist patient with eating if necessary   - Allow adequate time for meals  - Recommend/ encourage appropriate diets, oral nutritional supplements, and vitamin/mineral supplements  - Order, calculate, and assess calorie counts as needed  - Recommend, monitor, and adjust tube feedings and TPN/PPN based on assessed needs  - Assess need for intravenous fluids  - Provide specific nutrition/hydration education as appropriate  - Include patient/family/caregiver in decisions related to nutrition   Outcome: Progressing      Problem: INFECTION - ADULT  Goal: Absence or prevention of progression during hospitalization  INTERVENTIONS:  - Assess and monitor for signs and symptoms of infection  - Monitor lab/diagnostic results  - Monitor all insertion sites, i e  indwelling lines, tubes, and drains  - Monitor endotracheal (as able) and nasal secretions for changes in amount and color  - [place] appropriate cooling/warming therapies per order  - Administer medications as ordered  - Instruct and encourage patient and family to use good hand hygiene technique  - Identify and instruct in appropriate isolation precautions for identified infection/condition  Outcome: Progressing    Goal: Absence of fever/infection during neutropenic period  INTERVENTIONS:  - Monitor WBC  - Implement neutropenic guidelines  Outcome: Progressing      Problem: GASTROINTESTINAL - ADULT  Goal: Maintains or returns to baseline bowel function  INTERVENTIONS:  - Assess bowel function  - Encourage oral fluids to ensure adequate hydration  - Administer IV fluids as ordered to ensure adequate hydration  - Administer ordered medications as needed  - Encourage mobilization and activity  - Nutrition services referral to assist patient with appropriate food choices  Outcome: Progressing    Goal: Maintains adequate nutritional intake  INTERVENTIONS:  - Monitor percentage of each meal consumed  - Identify factors contributing to decreased intake, treat as appropriate  - Assist with meals as needed  - Monitor I&O, WT and lab values  - Obtain nutrition services referral as needed  Outcome: Progressing

## 2019-01-11 NOTE — BRIEF OP NOTE (RAD/CATH)
Abscess Drainage Procedure Note    PATIENT NAME: Melchor Larsen  : 1960  MRN: [id number]     Pre-op Diagnosis:   1  SBO (small bowel obstruction) ([address] )    2  Colitis, acute    3  Mesenteric abscess ([address] )    4  Abdominal pain    5  Nausea & vomiting    6  Leukocytosis      Post-op Diagnosis:   1  SBO (small bowel obstruction) ([address] )    2  Colitis, acute    3  Mesenteric abscess ([facility] [address] )    4  Abdominal pain    5  Nausea & vomiting    6   Leukocytosis        Surgeon:   Dina Hicks MD  Assistants:     No qualified resident was available, Resident is only observing    Estimated Blood Loss: None  Findings: 10 Fr abscess drain placed into presacral collection with aspiration of 225 ml of purulent fluid    Specimens: Fluid for culture    Complications:  none    Anesthesia: Conscious sedation and Local    Dina Hicks MD     Date: 2019  Time: 10:55 AM

## 2019-01-11 NOTE — UTILIZATION REVIEW
[address]  Utilization Review Department  Phone: [phone]; Fax [phone]  [email or web address]  ATTENTION: Please call with any questions or concerns to [phone]  and carefully listen to the prompts so that you are directed to the right person  Send all requests for admission clinical reviews, approved or denied determinations and any other requests to fax [phone]  All voicemails are confidential   ===================================================================  Initial Clinical Review    Admission: Date/Time/Statement: 1/11/19 @ 0013  Orders Placed This Encounter   Procedures    Inpatient Admission     Standing Status:   Standing     Number of Occurrences:   1     Order Specific Question:   Admitting Physician     Answer:   Tasha Friend     Order Specific Question:   Level of Care     Answer:   Med Surg [16]     Order Specific Question:   Bed request comments     Answer:   MS 4 if possible     Order Specific Question:   Estimated length of stay     Answer:   More than 2 Midnights     Order Specific Question:   Certification     Answer:   I certify that inpatient services are medically necessary for this patient for a duration of greater than two midnights  See H&P and MD Progress Notes for additional information about the patient's course of treatment  ED: Date/Time/Mode of Arrival:   ED Arrival Information     Expected Arrival Acuity Means of Arrival Escorted By Service Admission Type    - 1/10/2019 22:26 Emergent Walk-In [facility] Colorectal Emergency    Arrival Complaint    Abnormal CT scan        Chief Complaint:   Chief Complaint   Patient presents with    Abdominal Pain     Pt reports abdominal pain, fever, cough, chills, body aches and nausea for 10 days   Pt had CT scan done today and PCP told to come to ED     History of Illness:   Hannah Tomas is a 59 y o  male with PMH HTN presents with intermittent abdominal pain, diarrhea, and generally feeling unwell for the past 10 days  He states that he was diagnosed with the flu on December 29th, along with several other members of his family, these members of his family got diarrhea with her illness, so when he had GI upset he was not surprised  However, he continued to feel unwell over the next 2 weeks, experiencing nausea, intermittent diarrhea, lower abdominal pain and bloating, loss of appetite, and low energy  He obtained an outpatient CAT scan and lab work today, and was instructed to come to the emergency department when he had a white count of 30, and CT scan showing 10 x 8 cm robel sigmoid abscess along with robel sigmoid soft tissue mass vs inflammation, and small-bowel obstruction with transition point in the pelvis, likely secondary to the inflammatory process there  ED Vital Signs:   ED Triage Vitals   Temperature Pulse Respirations Blood Pressure SpO2   01/10/19 2236 01/10/19 2236 01/10/19 2236 01/10/19 2236 01/10/19 2236   99 3 °F (37 4 °C) 74 16 144/72 96 %      Temp Source Heart Rate Source Patient Position - Orthostatic VS BP Location FiO2 (%)   01/10/19 2236 01/10/19 2236 01/11/19 0027 01/10/19 2236 --   Oral Monitor Lying Left arm       Pain Score       01/10/19 2236       6        Wt Readings from Last 1 Encounters:   01/11/19 82 4 kg (181 lb 10 5 oz)   Pertinent Labs/Diagnostic Test Results:     WBC 29 79 (H) 01/10/2019     HGB 13 3 01/10/2019     HCT 39 0 01/10/2019      (H) 01/10/2019       SODIUM 130 (L) 01/10/2019     K 3 4 (L) 01/10/2019     CL 95 (L) 01/10/2019     CO2 26 01/10/2019     BUN 9 01/10/2019     CREATININE 0 86 01/10/2019     CALCIUM 8 6 01/10/2019     AST 40 01/10/2019     ALT 95 (H) 01/10/2019     ALKPHOS 82 01/10/2019     EGFR 94 01/10/2019   CT abd/pel:  Findings suggest sigmoid colitis  10 3 x 8 4 cm rim-enhancing fluid collection with air-fluid level in the perisigmoidal mesenteric could suggest an abscess   There is an adjacent 5 7 x 3 9 cm anterior left-sided soft tissue mass which could be part of the inflamed sigmoid although neoplasm cannot be completely excluded    Dilated loops of small bowel suggesting small bowel obstruction with transition point in the pelvis    Surgical consult recommended  ED Treatment:   Medication Administration from 01/10/2019 2226 to 01/11/2019 0054       Date/Time Order Dose Route Action Action by Comments     01/11/2019 0029 sodium chloride 0 9 % bolus 1,000 mL 0 mL Intravenous Stopped Efren Pierce RN      01/10/2019 2341 sodium chloride 0 9 % bolus 1,000 mL 1,000 mL Intravenous [address] Efren Pierce RN      01/10/2019 2341 morphine (PF) 4 mg/mL injection 4 mg 4 mg Intravenous Given Efren Pierce RN      01/10/2019 2339 ondansetron (ZOFRAN) injection 4 mg 4 mg Intravenous Given Efren Pierce RN      01/11/2019 0030 sodium chloride 0 9 % infusion 125 mL/hr Intravenous [address] Efren Pierce RN      01/11/2019 0029 metroNIDAZOLE (FLAGYL) IVPB (premix) 500 mg 500 mg Intravenous New Bag Efren Pierce RN         Past Medical/Surgical History:   Past Medical History:   Diagnosis Date    Hypertension      Admitting Diagnosis: Leukocytosis [D72 829]  SBO (small bowel obstruction) ([facility] [address] ) [K56 609]  Abdominal pain [R10 9]  Nausea & vomiting [R11 2]  Mesenteric abscess (HCC) [K65 1]  Colitis, acute [K52 9]  Age/Sex: 59 y o  male  Assessment/Plan:   Assessment:  60 yo M with sigmoid colitis and large pelvic abscess, possibly due to perforated diverticulitis which has since sealed with resultant abscess formation  Additional soft tissue mass versus inflammation in pelvis concerning for malignancy, however may be associated with the inflammation in the area  Bowel obstruction likely secondary to pelvic process  Leukocytosis however other labs within normal limits and vitals stable      Plan:  1   Pelvic abscess              - IR consult              - IV antibiotics: Ancef/Flagyl              - Fluid resuscitation additional 1L bolus now              - NPO w/ IVF              - If fails to improve with above therapy, may need surgical intervention  Hopeful to avoid in this acutely inflamed state               - PRN pain control     2   Leukocytosis              - Antibiotics as above              - Check C diff, stool studies/culture     3  Pelvic mass/inflammation                 - Will need f/u c-scope in approx 4-6 wks  Admission Orders:  Scheduled Meds:   Current Facility-Administered Medications:  cefazolin 2,000 mg Intravenous Q8H Chayito Aiken MD Last Rate: 2,000 mg (01/11/19 0806)   dextrose 5 % and sodium chloride 0 9 % with KCl 20 mEq/L 125 mL/hr Intravenous Continuous Uma Nam PA-C    diphenhydrAMINE (BENADRYL) IVPB 50 mg Intravenous Once Chayito Aiken MD Last Rate: Stopped (01/11/19 0153)   heparin (porcine) 5,000 Units Subcutaneous Q8H [address] Chayito Aiken MD    HYDROmorphone 0 5 mg Intravenous Q3H PRN Chayito Aiken MD    HYDROmorphone 1 mg Intravenous Q4H PRN Chayito Aiken MD    metroNIDAZOLE 500 mg Intravenous Q8H Chayito Aiken MD Last Rate: 500 mg (01/11/19 0905)   nicotine 1 patch Transdermal Daily Chayito Aiken MD    ondansetron 4 mg Intravenous Q4H PRN Chayito Aiken MD    potassium chloride 20 mEq Intravenous Once Uma Nam PA-C    sodium chloride (PF) 3 mL Intravenous PRN James Leal MD      Continuous Infusions:   dextrose 5 % and sodium chloride 0 9 % with KCl 20 mEq/L 125 mL/hr     NPO  Up and OOB as tolerated  IR guided aspiration drainage with tube: pending  Angel SCDs

## 2019-01-11 NOTE — H&P
H&P Exam - Colorectal Surgery   Kristina Wilson 59 y o  male MRN: [id number]  Unit/Bed#: ED 03 Encounter: [id number]    Assessment/Plan     Assessment:  60 yo M with sigmoid colitis and large pelvic abscess, possibly due to perforated diverticulitis which has since sealed with resultant abscess formation  Additional soft tissue mass versus inflammation in pelvis concerning for malignancy, however may be associated with the inflammation in the area  Bowel obstruction likely secondary to pelvic process  Leukocytosis however other labs within normal limits and vitals stable  Plan:  1  Pelvic abscess   - IR consult   - IV antibiotics: Ancef/Flagyl   - Fluid resuscitation additional 1L bolus now   - NPO w/ IVF   - If fails to improve with above therapy, may need surgical intervention  Hopeful to avoid in this acutely inflamed state    - PRN pain control    2  Leukocytosis   - Antibiotics as above   - Check C diff, stool studies/culture    3  Pelvic mass/inflammation    - Will need f/u c-scope in approx 4-6 wks    History of Present Illness     HPI:  Kristina Wilson is a 59 y o  male with PMH HTN presents with intermittent abdominal pain, diarrhea, and generally feeling unwell for the past 10 days  He states that he was diagnosed with the flu on December 29th, along with several other members of his family, these members of his family got diarrhea with her illness, so when he had GI upset he was not surprised  However, he continued to feel unwell over the next 2 weeks, experiencing nausea, intermittent diarrhea, lower abdominal pain and bloating, loss of appetite, and low energy    He obtained an outpatient CAT scan and lab work today, and was instructed to come to the emergency department when he had a white count of 30, and CT scan showing 10 x 8 cm robel sigmoid abscess along with robel sigmoid soft tissue mass vs inflammation, and small-bowel obstruction with transition point in the pelvis, likely secondary to the inflammatory process there  Last C scope was approx 8-10 yrs ago as a normal screening scope  No family hx of colon ca or IBD  Review of Systems   Constitutional: Positive for activity change, appetite change, chills and fatigue  Negative for fever  HENT: Negative  Eyes: Negative  Respiratory: Positive for choking  Cardiovascular: Negative  Gastrointestinal: Positive for abdominal distention, abdominal pain, constipation, diarrhea, nausea and vomiting  Negative for blood in stool  Endocrine: Negative  Genitourinary: Negative for decreased urine volume and difficulty urinating  Musculoskeletal: Negative  Skin: Negative  Neurological: Positive for weakness  Negative for dizziness, light-headedness and headaches  Hematological: Negative  Historical Information   Past Medical History:   Diagnosis Date    Hypertension      History reviewed  No pertinent surgical history  Social History   History   Alcohol Use No     History   Drug Use No     History   Smoking Status    Current Every Day Smoker    Packs/day: 0 50    Types: Cigarettes   Smokeless Tobacco    Never Used     Family History: History reviewed  No pertinent family history  Meds/Allergies   PTA meds:   Prior to Admission Medications   Prescriptions Last Dose Informant Patient Reported?  Taking?   losartan (COZAAR) 50 mg tablet   Yes No   Sig: Take 25 mg by mouth daily      Facility-Administered Medications: None     Allergies   Allergen Reactions    Penicillins Rash       Objective   First Vitals:   Blood Pressure: 144/72 (01/10/19 2236)  Pulse: 74 (01/10/19 2236)  Temperature: 99 3 °F (37 4 °C) (01/10/19 2236)  Temp Source: Oral (01/10/19 2236)  Respirations: 16 (01/10/19 2236)  Height: 6' 3" (190 5 cm) (01/10/19 2239)  Weight - Scale: 79 4 kg (175 lb) (01/10/19 2239)  SpO2: 96 % (01/10/19 2236)    Current Vitals:   Blood Pressure: 153/76 (01/10/19 2311)  Pulse: 67 (01/10/19 2311)  Temperature: 99 3 °F (37 4 °C) (01/10/19 2236)  Temp Source: Oral (01/10/19 2236)  Respirations: 18 (01/10/19 2311)  Height: 6' 3" (190 5 cm) (01/10/19 2239)  Weight - Scale: 79 4 kg (175 lb) (01/10/19 2239)  SpO2: 98 % (01/10/19 2311)      Intake/Output Summary (Last 24 hours) at 01/11/19 0014  Last data filed at 01/10/19 2341   Gross per 24 hour   Intake             1000 ml   Output                0 ml   Net             1000 ml       Invasive Devices     Peripheral Intravenous Line            Peripheral IV 01/10/19 Left Antecubital less than 1 day                Physical Exam   Constitutional: Vital signs are normal  He appears ill  No distress  Eyes: Pupils are equal, round, and reactive to light  Neck: Normal range of motion  Cardiovascular: Normal rate, regular rhythm and intact distal pulses  Pulmonary/Chest: Effort normal and breath sounds normal  No respiratory distress  He has no wheezes  Abdominal: Soft  He exhibits distension  There is tenderness  There is no rebound and no guarding  Lower abdomen distension with tympany  Tender in this area but no peritoneal signs   Musculoskeletal: Normal range of motion  Skin: Skin is warm and dry  He is not diaphoretic  Psychiatric: He has a normal mood and affect   His behavior is normal        Lab Results:   CBC:   Lab Results   Component Value Date    WBC 29 79 (H) 01/10/2019    HGB 13 3 01/10/2019    HCT 39 0 01/10/2019    MCV 95 01/10/2019     (H) 01/10/2019    MCH 32 3 01/10/2019    MCHC 34 1 01/10/2019    RDW 13 2 01/10/2019    MPV 8 8 (L) 01/10/2019    NRBC 0 01/10/2019   , CMP:   Lab Results   Component Value Date    SODIUM 130 (L) 01/10/2019    K 3 4 (L) 01/10/2019    CL 95 (L) 01/10/2019    CO2 26 01/10/2019    BUN 9 01/10/2019    CREATININE 0 86 01/10/2019    CALCIUM 8 6 01/10/2019    AST 40 01/10/2019    ALT 95 (H) 01/10/2019    ALKPHOS 82 01/10/2019    EGFR 94 01/10/2019   , Coagulation: No results found for: PT, INR, APTT  Imaging: I have personally reviewed pertinent reports  No orders to display     FINDINGS:     CHEST     LUNGS:  Right middle lobe subpleural anterior interstitial change is possibly chronic  There is no tracheal or endobronchial lesion      PLEURA:  Unremarkable      HEART/GREAT VESSELS:  Unremarkable for patient's age      MEDIASTINUM AND LEON:  Unremarkable      CHEST WALL AND LOWER NECK:   Unremarkable      ABDOMEN     LIVER/BILIARY TREE:  Segment 7 indeterminate 9 mm hypodense structure, correlate with abdominal/liver ultrasound      GALLBLADDER:  No calcified gallstones  No pericholecystic inflammatory change      SPLEEN:  Unremarkable        PANCREAS:  Unremarkable      ADRENAL GLANDS:  Unremarkable      KIDNEYS/URETERS:  Unremarkable  No hydronephrosis      STOMACH AND BOWEL:  Dilated loops of small bowel suggesting small bowel obstruction with transition point in the pelvis      Findings suggest sigmoid colitis  10 3 x 8 4 cm fluid collection with air-fluid level in the perisigmoidal mesenteric could suggest an abscess  There is an adjacent 5 7 x 3 9 cm anterior left-sided soft tissue mass which could be part of the inflamed   sigmoid although neoplasm cannot be completely excluded         APPENDIX:  No findings to suggest appendicitis      ABDOMINOPELVIC CAVITY: No evidence of free air  Prominent retroperitoneal lymph nodes in the periaortic and aortocaval region  VESSELS:  Unremarkable for patient's age      PELVIS     REPRODUCTIVE ORGANS:  Unremarkable for patient's age      URINARY BLADDER:  Unremarkable      ABDOMINAL WALL/INGUINAL REGIONS:  Unremarkable      OSSEOUS STRUCTURES:  Bilateral L5 pars defect with grade 1 anterolisthesis of L5 on S1      IMPRESSION:     Findings suggest sigmoid colitis  10 3 x 8 4 cm rim-enhancing fluid collection with air-fluid level in the perisigmoidal mesenteric could suggest an abscess   There is an adjacent 5 7 x 3 9 cm anterior left-sided soft tissue mass which could be part of   the inflamed sigmoid although neoplasm cannot be completely excluded      Dilated loops of small bowel suggesting small bowel obstruction with transition point in the pelvis      Surgical consult recommended  EKG, Pathology, and Other Studies: I have personally reviewed pertinent reports  Code Status: No Order  Advance Directive and Living Will:      Power of :    POLST:      Counseling / Coordination of Care  Total floor / unit time spent today 45 minutes  Greater than 50% of total time was spent with the patient and / or family counseling and / or coordination of care  A description of the counseling / coordination of care: Nia Harmon

## 2019-01-11 NOTE — ED PROVIDER NOTES
History  Chief Complaint   Patient presents with    Abdominal Pain     Pt reports abdominal pain, fever, cough, chills, body aches and nausea for 10 days  Pt had CT scan done today and PCP told to come to ED     This is a 76-year-old male with a history of hypertension who presents with small-bowel obstruction, abscess, and sigmoid colitis found on CT scan  The patient states that right before [place], he began experiencing symptoms which included fever, cough, and diffuse muscle aches  He was ultimately diagnosed with the flu and started on Tamiflu  On December 31st, the patient began experiencing severe abdominal pain with intermittent nonbloody, nonbilious vomiting  The patient states that his last good bowel movement was prior to Christmas  Since this time, he has been experiencing small, watery bowel movements  The patient continued to have episodes of abdominal pain and vomiting  Patient's wife is a PA for Dr Gemma Way, will ultimately sent the patient for a CAT scan today  A CT scan revealed Findings suggest sigmoid colitis  10 3 x 8 4 cm rim-enhancing fluid collection with air-fluid level in the perisigmoidal mesenteric could suggest an abscess  There is an adjacent 5 7 x 3 9 cm anterior left-sided soft tissue mass which could be part of the inflamed sigmoid although neoplasm cannot be completely excluded  Dilated loops of small bowel suggesting small bowel obstruction with transition point in the pelvis    The radiologist called the patient told come to the emergency department  The patient denies any history of Crohn's disease, ulcerative colitis, abdominal surgeries  Denies fever/chills, lightheadedness/dizziness, numbness/weakness, headache, change in vision, URI symptoms, neck pain, chest pain, palpitations, shortness of breath, cough, back pain, flank pain, diarrhea, hematochezia, melena, dysuria, hematuria              Prior to Admission Medications   Prescriptions Last Dose Informant Patient Reported? Taking?   losartan (COZAAR) 50 mg tablet   Yes No   Sig: Take 25 mg by mouth daily      Facility-Administered Medications: None       Past Medical History:   Diagnosis Date    Hypertension        History reviewed  No pertinent surgical history  History reviewed  No pertinent family history  I have reviewed and agree with the history as documented  Social History   Substance Use Topics    Smoking status: Current Every Day Smoker     Packs/day: 0 50     Types: Cigarettes    Smokeless tobacco: Never Used    Alcohol use No        Review of Systems   Constitutional: Negative for chills, fatigue and fever  HENT: Negative for rhinorrhea, sore throat and trouble swallowing  Eyes: Negative for photophobia and visual disturbance  Respiratory: Negative for cough, chest tightness and shortness of breath  Cardiovascular: Negative for chest pain, palpitations and leg swelling  Gastrointestinal: Positive for abdominal pain, nausea and vomiting  Negative for blood in stool and diarrhea  Endocrine: Negative for polyuria  Genitourinary: Negative for dysuria, flank pain and hematuria  Musculoskeletal: Negative for back pain and neck pain  Skin: Negative for color change and rash  Allergic/Immunologic: Negative for immunocompromised state  Neurological: Negative for dizziness, weakness, light-headedness, numbness and headaches  All other systems reviewed and are negative  Physical Exam  ED Triage Vitals [01/10/19 2236]   Temperature Pulse Respirations Blood Pressure SpO2   99 3 °F (37 4 °C) 74 16 144/72 96 %      Temp Source Heart Rate Source Patient Position - Orthostatic VS BP Location FiO2 (%)   Oral Monitor -- Left arm --      Pain Score       6           Orthostatic Vital Signs  Vitals:    01/10/19 2236 01/10/19 2311   BP: 144/72 153/76   Pulse: 74 67       Physical Exam   Constitutional: Vital signs are normal  He appears well-developed and well-nourished   He is cooperative  No distress  HENT:   Mouth/Throat: Uvula is midline and oropharynx is clear and moist    Eyes: Pupils are equal, round, and reactive to light  Conjunctivae, EOM and lids are normal    Neck: Trachea normal  No thyroid mass and no thyromegaly present  Cardiovascular: Normal rate, regular rhythm, normal heart sounds, intact distal pulses and normal pulses  No murmur heard  Pulmonary/Chest: Effort normal and breath sounds normal    Abdominal: Soft  Normal appearance and bowel sounds are normal  He exhibits distension  There is generalized tenderness and tenderness in the left lower quadrant  There is no rebound, no guarding, no CVA tenderness and negative Tapia's sign  Neurological: He is alert  Skin: Skin is warm, dry and intact  Psychiatric: He has a normal mood and affect   His speech is normal and behavior is normal  Thought content normal        ED Medications  Medications   sodium chloride (PF) 0 9 % injection 3 mL (not administered)   sodium chloride 0 9 % bolus 1,000 mL (1,000 mL Intravenous New Bag 1/10/19 2341)   sodium chloride 0 9 % bolus 1,000 mL (not administered)   morphine (PF) 4 mg/mL injection 4 mg (4 mg Intravenous Given 1/10/19 2341)   ondansetron (ZOFRAN) injection 4 mg (4 mg Intravenous Given 1/10/19 2339)       Diagnostic Studies  Results Reviewed     Procedure Component Value Units Date/Time    CBC and differential [[id number]]  (Abnormal) Collected:  01/10/19 2336    Lab Status:  Final result Specimen:  Blood from Arm, Left Updated:  01/10/19 2351     WBC 29 79 (H) Thousand/uL      RBC 4 12 Million/uL      Hemoglobin 13 3 g/dL      Hematocrit 39 0 %      MCV 95 fL      MCH 32 3 pg      MCHC 34 1 g/dL      RDW 13 2 %      MPV 8 8 (L) fL      Platelets 557 (H) Thousands/uL      nRBC 0 /100 WBCs      Neutrophils Relative 85 (H) %      Immat GRANS % 2 %      Lymphocytes Relative 6 (L) %      Monocytes Relative 7 %      Eosinophils Relative 0 %      Basophils Relative 0 % Neutrophils Absolute 25 28 (H) Thousands/µL      Immature Grans Absolute 0 44 (H) Thousand/uL      Lymphocytes Absolute 1 87 Thousands/µL      Monocytes Absolute 2 05 (H) Thousand/µL      Eosinophils Absolute 0 07 Thousand/µL      Basophils Absolute 0 08 Thousands/µL     Lactic Acid x2 [[id number]] Collected:  01/10/19 2336    Lab Status: In process Specimen:  Blood from Arm, Left Updated:  01/10/19 2349    Procalcitonin [[id number]] Collected:  01/10/19 2336    Lab Status: In process Specimen:  Blood from Arm, Left Updated:  01/10/19 2348    Protime-INR [[id number]] Collected:  01/10/19 2336    Lab Status: In process Specimen:  Blood from Arm, Left Updated:  01/10/19 2347    APTT [[id number]] Collected:  01/10/19 2336    Lab Status: In process Specimen:  Blood from Arm, Left Updated:  01/10/19 2347    Comprehensive metabolic panel [[id number]] Collected:  01/10/19 2336    Lab Status: In process Specimen:  Blood from Arm, Left Updated:  01/10/19 2347    Blood culture #2 [[id number]] Collected:  01/10/19 2336    Lab Status: In process Specimen:  Blood from Arm, Left Updated:  01/10/19 2347    Blood culture #1 [[id number]] Collected:  01/10/19 2342    Lab Status: In process Specimen:  Blood from Arm, Right Updated:  01/10/19 2347    Lactic Acid x2 [[id number]]     Lab Status:  No result Specimen:  Blood                  No orders to display         Procedures  Procedures      Phone Consults  ED Phone Contact    ED Course                               MDM  Number of Diagnoses or Management Options  Diagnosis management comments: I will order the sepsis labs  IV fluids and morphine for pain  Zofran for nausea  Consult to Colorectal surgery      CritCare Time    Disposition  Final diagnoses:   SBO (small bowel obstruction) (HCC)   Colitis, acute   Mesenteric abscess (HCC)   Abdominal pain   Nausea & vomiting   Leukocytosis     Time reflects when diagnosis was documented in both MDM as applicable and the Disposition within this note     Time User Action Codes Description Comment    1/10/2019 11:59 PM Ankush Ubaldo Add [K56 609] SBO (small bowel obstruction) ([facility] [address] )     1/10/2019 11:59 PM Alvia Scarlet P Add [K52 9] Colitis, acute     1/10/2019 11:59 PM Ankush Ubaldo Add [T81 49XA] Postprocedural intraabdominal abscess     1/10/2019 11:59 PM Ankush Ubaldo Remove [T81 49XA] Postprocedural intraabdominal abscess     1/10/2019 11:59 PM Alvia Scarlet P Add [K65 1] Mesenteric abscess ([facility] [address] )     1/10/2019 11:59 PM Alvia Scarlet P Add [R10 9] Abdominal pain     1/10/2019 11:59 PM Alvia Scarlet P Add [R11 2] Nausea & vomiting     1/11/2019 12:00 AM Ankush Ubaldo Add [D72 829] Leukocytosis       ED Disposition     ED Disposition Condition Comment    Admit  Case was discussed with white surgery resident and the patient's admission status was agreed to be Admission Status: inpatient status to the service of Dr Pearl Luke   Follow-up Information    None         Patient's Medications   Discharge Prescriptions    No medications on file     No discharge procedures on file  ED Provider  Attending physically available and evaluated Barbra Gottron I managed the patient along with the ED Attending      Electronically Signed by         Judge Julito MD  01/11/19 2877

## 2019-01-12 LAB
ANION GAP SERPL CALCULATED.3IONS-SCNC: 9 MMOL/L (ref 4–13)
BASOPHILS # BLD AUTO: 0.1 THOUSANDS/ΜL (ref 0–0.1)
BASOPHILS # BLD MANUAL: 0 THOUSAND/UL (ref 0–0.1)
BASOPHILS NFR BLD AUTO: 0 % (ref 0–1)
BASOPHILS NFR MAR MANUAL: 0 % (ref 0–1)
BUN SERPL-MCNC: 10 MG/DL (ref 5–25)
C DIFF TOX GENS STL QL NAA+PROBE: NORMAL
CALCIUM SERPL-MCNC: 7.1 MG/DL (ref 8.3–10.1)
CAMPYLOBACTER DNA SPEC NAA+PROBE: NORMAL
CEA SERPL-MCNC: 2 NG/ML (ref 0–3)
CHLORIDE SERPL-SCNC: 102 MMOL/L (ref 100–108)
CO2 SERPL-SCNC: 22 MMOL/L (ref 21–32)
CREAT SERPL-MCNC: 0.93 MG/DL (ref 0.6–1.3)
EOSINOPHIL # BLD AUTO: 0.05 THOUSAND/ΜL (ref 0–0.61)
EOSINOPHIL # BLD MANUAL: 0 THOUSAND/UL (ref 0–0.4)
EOSINOPHIL NFR BLD AUTO: 0 % (ref 0–6)
EOSINOPHIL NFR BLD MANUAL: 0 % (ref 0–6)
ERYTHROCYTE [DISTWIDTH] IN BLOOD BY AUTOMATED COUNT: 13.5 % (ref 11.6–15.1)
ERYTHROCYTE [DISTWIDTH] IN BLOOD BY AUTOMATED COUNT: 13.8 % (ref 11.6–15.1)
GFR SERPL CREATININE-BSD FRML MDRD: 89 ML/MIN/1.73SQ M
GLUCOSE SERPL-MCNC: 129 MG/DL (ref 65–140)
HCT VFR BLD AUTO: 29.2 % (ref 36.5–49.3)
HCT VFR BLD AUTO: 30.9 % (ref 36.5–49.3)
HGB BLD-MCNC: 10.1 G/DL (ref 12–17)
HGB BLD-MCNC: 10.5 G/DL (ref 12–17)
IMM GRANULOCYTES # BLD AUTO: >0.5 THOUSAND/UL (ref 0–0.2)
IMM GRANULOCYTES NFR BLD AUTO: 2 % (ref 0–2)
LYMPHOCYTES # BLD AUTO: 0.8 THOUSAND/UL (ref 0.6–4.47)
LYMPHOCYTES # BLD AUTO: 1.03 THOUSANDS/ΜL (ref 0.6–4.47)
LYMPHOCYTES # BLD AUTO: 2 % (ref 14–44)
LYMPHOCYTES NFR BLD AUTO: 3 % (ref 14–44)
MAGNESIUM SERPL-MCNC: 1.8 MG/DL (ref 1.6–2.6)
MCH RBC QN AUTO: 32.1 PG (ref 26.8–34.3)
MCH RBC QN AUTO: 32.9 PG (ref 26.8–34.3)
MCHC RBC AUTO-ENTMCNC: 34 G/DL (ref 31.4–37.4)
MCHC RBC AUTO-ENTMCNC: 34.6 G/DL (ref 31.4–37.4)
MCV RBC AUTO: 95 FL (ref 82–98)
MCV RBC AUTO: 95 FL (ref 82–98)
MONOCYTES # BLD AUTO: 0.4 THOUSAND/UL (ref 0–1.22)
MONOCYTES # BLD AUTO: 1.31 THOUSAND/ΜL (ref 0.17–1.22)
MONOCYTES NFR BLD AUTO: 4 % (ref 4–12)
MONOCYTES NFR BLD: 1 % (ref 4–12)
NEUTROPHILS # BLD AUTO: 30.76 THOUSANDS/ΜL (ref 1.85–7.62)
NEUTROPHILS # BLD MANUAL: 38.85 THOUSAND/UL (ref 1.85–7.62)
NEUTS BAND NFR BLD MANUAL: 1 % (ref 0–8)
NEUTS SEG NFR BLD AUTO: 91 % (ref 43–75)
NEUTS SEG NFR BLD AUTO: 96 % (ref 43–75)
NRBC BLD AUTO-RTO: 0 /100 WBCS
NRBC BLD AUTO-RTO: 0 /100 WBCS
PHOSPHATE SERPL-MCNC: 2.2 MG/DL (ref 2.3–4.1)
PLATELET # BLD AUTO: 464 THOUSANDS/UL (ref 149–390)
PLATELET # BLD AUTO: 504 THOUSANDS/UL (ref 149–390)
PLATELET BLD QL SMEAR: ABNORMAL
PMV BLD AUTO: 8.8 FL (ref 8.9–12.7)
PMV BLD AUTO: 9 FL (ref 8.9–12.7)
POTASSIUM SERPL-SCNC: 3.5 MMOL/L (ref 3.5–5.3)
RBC # BLD AUTO: 3.07 MILLION/UL (ref 3.88–5.62)
RBC # BLD AUTO: 3.27 MILLION/UL (ref 3.88–5.62)
RBC MORPH BLD: NORMAL
SALMONELLA DNA SPEC QL NAA+PROBE: NORMAL
SHIGA TOXIN STX GENE SPEC NAA+PROBE: NORMAL
SHIGELLA DNA SPEC QL NAA+PROBE: NORMAL
SODIUM SERPL-SCNC: 133 MMOL/L (ref 136–145)
WBC # BLD AUTO: 33.94 THOUSAND/UL (ref 4.31–10.16)
WBC # BLD AUTO: 40.05 THOUSAND/UL (ref 4.31–10.16)

## 2019-01-12 PROCEDURE — 85007 BL SMEAR W/DIFF WBC COUNT: CPT | Performed by: PHYSICIAN ASSISTANT

## 2019-01-12 PROCEDURE — 99254 IP/OBS CNSLTJ NEW/EST MOD 60: CPT | Performed by: INTERNAL MEDICINE

## 2019-01-12 PROCEDURE — 80048 BASIC METABOLIC PNL TOTAL CA: CPT | Performed by: PHYSICIAN ASSISTANT

## 2019-01-12 PROCEDURE — 84100 ASSAY OF PHOSPHORUS: CPT | Performed by: PHYSICIAN ASSISTANT

## 2019-01-12 PROCEDURE — 82378 CARCINOEMBRYONIC ANTIGEN: CPT | Performed by: PHYSICIAN ASSISTANT

## 2019-01-12 PROCEDURE — 83735 ASSAY OF MAGNESIUM: CPT | Performed by: PHYSICIAN ASSISTANT

## 2019-01-12 PROCEDURE — 85027 COMPLETE CBC AUTOMATED: CPT | Performed by: PHYSICIAN ASSISTANT

## 2019-01-12 PROCEDURE — 85025 COMPLETE CBC W/AUTO DIFF WBC: CPT | Performed by: SURGERY

## 2019-01-12 RX ORDER — OXYCODONE HYDROCHLORIDE 5 MG/1
5 TABLET ORAL EVERY 4 HOURS PRN
Status: DISCONTINUED | OUTPATIENT
Start: 2019-01-12 | End: 2019-01-14

## 2019-01-12 RX ORDER — HYDROMORPHONE HCL/PF 1 MG/ML
0.5 SYRINGE (ML) INJECTION
Status: DISCONTINUED | OUTPATIENT
Start: 2019-01-12 | End: 2019-01-16 | Stop reason: HOSPADM

## 2019-01-12 RX ORDER — HYDROMORPHONE HCL/PF 1 MG/ML
1 SYRINGE (ML) INJECTION EVERY 4 HOURS PRN
Status: DISCONTINUED | OUTPATIENT
Start: 2019-01-12 | End: 2019-01-16 | Stop reason: HOSPADM

## 2019-01-12 RX ORDER — OXYCODONE HYDROCHLORIDE 10 MG/1
10 TABLET ORAL EVERY 4 HOURS PRN
Status: DISCONTINUED | OUTPATIENT
Start: 2019-01-12 | End: 2019-01-14

## 2019-01-12 RX ORDER — POTASSIUM CHLORIDE 20 MEQ/1
40 TABLET, EXTENDED RELEASE ORAL ONCE
Status: COMPLETED | OUTPATIENT
Start: 2019-01-12 | End: 2019-01-12

## 2019-01-12 RX ADMIN — HEPARIN SODIUM 5000 UNITS: 5000 INJECTION INTRAVENOUS; SUBCUTANEOUS at 13:49

## 2019-01-12 RX ADMIN — DEXTROSE, SODIUM CHLORIDE, AND POTASSIUM CHLORIDE 125 ML/HR: 5; .9; .15 INJECTION INTRAVENOUS at 02:15

## 2019-01-12 RX ADMIN — DEXTROSE, SODIUM CHLORIDE, AND POTASSIUM CHLORIDE 125 ML/HR: 5; .9; .15 INJECTION INTRAVENOUS at 22:03

## 2019-01-12 RX ADMIN — METRONIDAZOLE 500 MG: 500 INJECTION, SOLUTION INTRAVENOUS at 09:12

## 2019-01-12 RX ADMIN — DEXTROSE, SODIUM CHLORIDE, AND POTASSIUM CHLORIDE 125 ML/HR: 5; .9; .15 INJECTION INTRAVENOUS at 12:43

## 2019-01-12 RX ADMIN — HEPARIN SODIUM 5000 UNITS: 5000 INJECTION INTRAVENOUS; SUBCUTANEOUS at 05:40

## 2019-01-12 RX ADMIN — HEPARIN SODIUM 5000 UNITS: 5000 INJECTION INTRAVENOUS; SUBCUTANEOUS at 22:02

## 2019-01-12 RX ADMIN — CEFAZOLIN SODIUM 2000 MG: 2 SOLUTION INTRAVENOUS at 08:15

## 2019-01-12 RX ADMIN — VANCOMYCIN HYDROCHLORIDE 125 MG: 500 INJECTION, POWDER, LYOPHILIZED, FOR SOLUTION INTRAVENOUS at 22:02

## 2019-01-12 RX ADMIN — POTASSIUM CHLORIDE 40 MEQ: 1500 TABLET, EXTENDED RELEASE ORAL at 09:14

## 2019-01-12 RX ADMIN — OXYCODONE HYDROCHLORIDE 5 MG: 5 TABLET ORAL at 22:02

## 2019-01-12 RX ADMIN — OXYCODONE HYDROCHLORIDE 10 MG: 10 TABLET ORAL at 06:33

## 2019-01-12 RX ADMIN — METRONIDAZOLE 500 MG: 500 INJECTION, SOLUTION INTRAVENOUS at 00:48

## 2019-01-12 RX ADMIN — OXYCODONE HYDROCHLORIDE 5 MG: 5 TABLET ORAL at 12:57

## 2019-01-12 RX ADMIN — METRONIDAZOLE 500 MG: 500 INJECTION, SOLUTION INTRAVENOUS at 15:48

## 2019-01-12 RX ADMIN — CEFTRIAXONE SODIUM 1000 MG: 10 INJECTION, POWDER, FOR SOLUTION INTRAVENOUS at 16:42

## 2019-01-12 NOTE — PROGRESS NOTES
Progress Note - Colorectal Surgery   Darlyn Larson 59 y o  male MRN: [id number]  Unit/Bed#: MS [facility] Encounter: [id number]    Assessment:  58 yo M with robel-sigmoid abscess as well as soft tissue mass adjacent to collection, with secondary SBO    - WBC 40  - Cx gram + cocci in pairs and chains  - JUAN PABLO 120 ml since placement    Plan:  Continue clears w/ IVF  PRN pain control  On Ancef/Flagyl- tailor appropriate for culture data  May need to change if WBC continues to increase  Flush drain daily per IR- or more often if tubing appears occluded  Will need outpatient scope      Subjective/Objective   Chief Complaint:     Subjective: Having pain this AM- both at drain site and in lower abdomen  No nausea  Continues to have liquid BMs    Objective:     Blood pressure 107/56, pulse 76, temperature 99 °F (37 2 °C), temperature source Oral, resp  rate 18, height 6' 3" (1 905 m), weight 82 4 kg (181 lb 10 5 oz), SpO2 96 %  ,Body mass index is 22 71 kg/m²  Intake/Output Summary (Last 24 hours) at 01/12/19 1091  Last data filed at 01/12/19 0601   Gross per 24 hour   Intake             2200 ml   Output              715 ml   Net             1485 ml       Invasive Devices     Peripheral Intravenous Line            Peripheral IV 01/11/19 Right Forearm less than 1 day          Drain            Closed/Suction Drain Right Back Bulb 10 Fr  less than 1 day                Physical Exam:   Gen: A&O, NAD  Cardio: RRR  Lungs: CTAB  Abd: Soft but distended in lower abdomen   JUAN PABLO with brownish drainage in tubing, sango-purulent in bulb    Lab, Imaging and other studies:  CBC:   Lab Results   Component Value Date    WBC 40 05 (HH) 01/12/2019    HGB 10 5 (L) 01/12/2019    HCT 30 9 (L) 01/12/2019    MCV 95 01/12/2019     (H) 01/12/2019    MCH 32 1 01/12/2019    MCHC 34 0 01/12/2019    RDW 13 5 01/12/2019    MPV 9 0 01/12/2019   , CMP:   Lab Results   Component Value Date    SODIUM 133 (L) 01/12/2019    K 3 5 01/12/2019     01/12/2019    CO2 22 01/12/2019    BUN 10 01/12/2019    CREATININE 0 93 01/12/2019    CALCIUM 7 1 (L) 01/12/2019    EGFR 89 01/12/2019     VTE Pharmacologic Prophylaxis: Heparin  VTE Mechanical Prophylaxis: sequential compression device

## 2019-01-12 NOTE — CONSULTS
Consultation - Infectious Disease   Pelon Maximo 59 y o  male MRN: [id number]  Unit/Bed#: -01 Encounter: [id number]      IMPRESSION & RECOMMENDATIONS:   Impression/Recommendations: This is a 59 y o  male, otherwise healthy, developed influenza 2 weeks ago but has persistent GI symptoms, and now found to have sigmoid colitis with adjacent abscess  Patient is status post drainage of abscess in IR  1  Intra-abdominal/pelvic abscess, adjacent to sigmoid colon, with evidence of sigmoid colitis  Statistically, diverticulitis with abscess is most likely  However, patient has no known history of diverticulosis or prior episodes of diverticulitis  We need to consider the possibility of perforated colon mass or cancer  At this point, will continue systemic antibiotic and drainage  Once colonic inflammation is improved, patient will most likely need colon resection  Thus far, abscess culture is pending but Gram stain with GPC in pairs and chains  Patient has low risk of drug-resistant due to lack of antibiotic use  Cefazolin is probably adequate  However, given worsening leukocytosis, will broaden antibiotic regimen to ceftriaxone  Will continue Flagyl for anaerobic coverage  Hopefully, patient will do well with conservative approach with systemic antibiotic and drainage and not need urgent surgical drainage/resection  Change IV cefazolin to IV ceftriaxone  Continue Flagyl for anaerobic coverage  Monitor abdominal pain  Continue abscess drainage per surgery and IR  2  Worsening leukocytosis  Patient had abscess drained just 24 hours ago  I suspect his WBC will decrease her next few days  Regardless, will broaden antibiotic regimen, as in above, pending abscess culture  Patient remains systemically well, without systemic toxicity  Admission blood cultures remain negative  Antibiotic changes in above  Monitor WBC  3  Recent influenza  Patient completed Tamiflu course    At present, he has no respiratory symptoms  4  PCN allergy, with rash in the distant past   He is tolerating cephalosporin well  Will stay away from penicillin for now  However, if he really needs it, we may be able to challenge him with penicillin  Discussed with patient in detail regarding the above plan  Discussed with patient's wife in detail  Thank you for this consultation  We will follow along with you  HISTORY OF PRESENT ILLNESS:  Reason for Consult:  Pelvic abscess  HPI: Kai Cook is a 59 y o  male otherwise healthy, developed a fever with minimal respiratory symptoms but had lower abdominal pain and diarrhea approximately 2 weeks ago  He was diagnosed with influenza and treated with Tamiflu  Fever and respiratory symptoms resolved  However, patient has persistent lower abdominal pain and diarrhea with malaise  Outpatient abdomen/pelvis CT showed a pelvic abscess adjacent to sigmoid colon  The patient was directed to the ER on 01/10  He was admitted  Patient was placed on cefazolin/Flagyl  Yesterday, abscess was drained in IR  Despite drainage and antibiotic, patient's WBC continues to increase  For these reasons, we are asked to evaluate the patient  At present, patient has lower abdominal pain is unchanged  He continues to have low volume diarrhea  Low-grade fever persists but no further chills  Patient has no further respiratory symptoms  Patient denies chronic GI symptoms  He states that he thinks he has intolerance to meet tenderizer  He occasionally has diarrhea with eating meat at restaurants, but never at home  No prior history of diverticulitis or diverticulosis  Patient had colonoscopy approximately 10 years ago with polyps  No weight loss or anorexia prior to current illness  REVIEW OF SYSTEMS:  A complete 12 point system-based review was done  Except for what is noted in HPI above, ROS of systems is otherwise negative      PAST MEDICAL HISTORY:  Past Medical History:   Diagnosis Date    Hypertension      Past Surgical History:   Procedure Laterality Date    CT GUIDED PERC DRAINAGE CATHETER PLACEMENT  2019     Problem list reviewed  FAMILY HISTORY:  Non-contributory    SOCIAL HISTORY:  History   Alcohol Use No     History   Drug Use No     History   Smoking Status    Current Every Day Smoker    Packs/day: 0 50    Types: Cigarettes   Smokeless Tobacco    Never Used       ALLERGIES:  Allergies   Allergen Reactions    Penicillins Rash       MEDICATIONS:  All current active medications have been reviewed  Patient is currently on cefazolin/Flagyl  PHYSICAL EXAM:  Vitals:  Temp:  [98 5 °F (36 9 °C)-100 3 °F (37 9 °C)] 99 4 °F (37 4 °C)  HR:  [72-93] 72  Resp:  [18] 18  BP: (102-118)/(52-59) 105/52  SpO2:  [94 %-98 %] 96 %  Temp (24hrs), Av 3 °F (37 4 °C), Min:98 5 °F (36 9 °C), Max:100 3 °F (37 9 °C)  Current: Temperature: 99 4 °F (37 4 °C)     Physical Exam:  General:  Well-nourished, well-developed, in no acute distress  Awake, alert and oriented x 3  Eyes:  Conjunctive clear with no hemorrhages or effusions  Oropharynx:  No ulcers, no lesions, pharynx benign, no tonsillitis  Neck:  Supple, no lymphadenopathy, no mass, nontender  Lungs:  Expansion symmetric, no rales, no wheezing, no accessory muscle use  Cardiac:  Regular rate and rhythm, normal S1, normal S2, no murmurs  Abdomen:  Soft, mildly distended, mild to moderate LLQ tenderness, no HSM  Drain purulent  Extremities:  No edema, no erythema, nontender  No ulcers  Skin:  No rashes, no ulcers  Neurological:  Moves all four extremities spontaneously, sensation grossly intact    LABS, IMAGING, & OTHER STUDIES:  Lab Results:  I have personally reviewed pertinent labs      Results from last 7 days  Lab Units 19  0450 19  0501 01/10/19  2336 01/10/19  1712   POTASSIUM mmol/L 3 5 3 5 3 4* 3 5   CHLORIDE mmol/L 102 100 95* 98*   CO2 mmol/L 22 24 26 24   BUN mg/dL 10 7 9 8   CREATININE mg/dL 0  93 0 80 0 86 0 81   EGFR ml/min/1 73sq m 89 97 94 97   CALCIUM mg/dL 7 1* 7 7* 8 6 8 7   AST U/L  --   --  40 40   ALT U/L  --   --  95* 100*   ALK PHOS U/L  --   --  82 86       Results from last 7 days  Lab Units 01/12/19  1234 01/12/19  0450 01/11/19  0501   WBC Thousand/uL 33 94* 40 05* 26 20*   HEMOGLOBIN g/dL 10 1* 10 5* 11 9*   PLATELETS Thousands/uL 464* 504* 646*       Results from last 7 days  Lab Units 01/11/19  2029 01/11/19  1109 01/10/19  2342 01/10/19  2336   BLOOD CULTURE   --   --  No Growth at 24 hrs  No Growth at 24 hrs  GRAM STAIN RESULT   --  4+ Polys  4+ Gram positive cocci in pairs and chains  --   --    C DIFF TOXIN B  NEGATIVE for C difficle toxin by PCR    --   --   --        Imaging Studies:   I have personally reviewed pertinent imaging study reports and images in PACS  Chest/abdomen/pelvis CT reviewed personally  There is sigmoid colitis with adjacent abscess in soft tissue mass  Small bowel dilation  EKG, Pathology, and Other Studies:   I have personally reviewed pertinent reports

## 2019-01-13 LAB
ANION GAP SERPL CALCULATED.3IONS-SCNC: 9 MMOL/L (ref 4–13)
BASOPHILS # BLD AUTO: 0.03 THOUSANDS/ΜL (ref 0–0.1)
BASOPHILS NFR BLD AUTO: 0 % (ref 0–1)
BUN SERPL-MCNC: 6 MG/DL (ref 5–25)
CALCIUM SERPL-MCNC: 7.3 MG/DL (ref 8.3–10.1)
CHLORIDE SERPL-SCNC: 104 MMOL/L (ref 100–108)
CO2 SERPL-SCNC: 22 MMOL/L (ref 21–32)
CREAT SERPL-MCNC: 0.66 MG/DL (ref 0.6–1.3)
EOSINOPHIL # BLD AUTO: 0.13 THOUSAND/ΜL (ref 0–0.61)
EOSINOPHIL NFR BLD AUTO: 1 % (ref 0–6)
ERYTHROCYTE [DISTWIDTH] IN BLOOD BY AUTOMATED COUNT: 13.7 % (ref 11.6–15.1)
GFR SERPL CREATININE-BSD FRML MDRD: 105 ML/MIN/1.73SQ M
GLUCOSE SERPL-MCNC: 110 MG/DL (ref 65–140)
HCT VFR BLD AUTO: 30.2 % (ref 36.5–49.3)
HGB BLD-MCNC: 10.1 G/DL (ref 12–17)
IMM GRANULOCYTES # BLD AUTO: 0.19 THOUSAND/UL (ref 0–0.2)
IMM GRANULOCYTES NFR BLD AUTO: 1 % (ref 0–2)
LYMPHOCYTES # BLD AUTO: 0.92 THOUSANDS/ΜL (ref 0.6–4.47)
LYMPHOCYTES NFR BLD AUTO: 5 % (ref 14–44)
MAGNESIUM SERPL-MCNC: 2.2 MG/DL (ref 1.6–2.6)
MCH RBC QN AUTO: 31.7 PG (ref 26.8–34.3)
MCHC RBC AUTO-ENTMCNC: 33.4 G/DL (ref 31.4–37.4)
MCV RBC AUTO: 95 FL (ref 82–98)
MONOCYTES # BLD AUTO: 0.83 THOUSAND/ΜL (ref 0.17–1.22)
MONOCYTES NFR BLD AUTO: 5 % (ref 4–12)
NEUTROPHILS # BLD AUTO: 15.38 THOUSANDS/ΜL (ref 1.85–7.62)
NEUTS SEG NFR BLD AUTO: 88 % (ref 43–75)
NRBC BLD AUTO-RTO: 0 /100 WBCS
PLATELET # BLD AUTO: 449 THOUSANDS/UL (ref 149–390)
PMV BLD AUTO: 9 FL (ref 8.9–12.7)
POTASSIUM SERPL-SCNC: 3.4 MMOL/L (ref 3.5–5.3)
RBC # BLD AUTO: 3.19 MILLION/UL (ref 3.88–5.62)
SODIUM SERPL-SCNC: 135 MMOL/L (ref 136–145)
WBC # BLD AUTO: 17.48 THOUSAND/UL (ref 4.31–10.16)

## 2019-01-13 PROCEDURE — 83735 ASSAY OF MAGNESIUM: CPT | Performed by: SURGERY

## 2019-01-13 PROCEDURE — 80048 BASIC METABOLIC PNL TOTAL CA: CPT | Performed by: SURGERY

## 2019-01-13 PROCEDURE — 99232 SBSQ HOSP IP/OBS MODERATE 35: CPT | Performed by: INTERNAL MEDICINE

## 2019-01-13 PROCEDURE — 85025 COMPLETE CBC W/AUTO DIFF WBC: CPT | Performed by: SURGERY

## 2019-01-13 RX ORDER — POTASSIUM CHLORIDE 20 MEQ/1
40 TABLET, EXTENDED RELEASE ORAL 2 TIMES DAILY
Status: COMPLETED | OUTPATIENT
Start: 2019-01-13 | End: 2019-01-13

## 2019-01-13 RX ADMIN — DEXTROSE, SODIUM CHLORIDE, AND POTASSIUM CHLORIDE 125 ML/HR: 5; .9; .15 INJECTION INTRAVENOUS at 10:00

## 2019-01-13 RX ADMIN — HEPARIN SODIUM 5000 UNITS: 5000 INJECTION INTRAVENOUS; SUBCUTANEOUS at 05:29

## 2019-01-13 RX ADMIN — HEPARIN SODIUM 5000 UNITS: 5000 INJECTION INTRAVENOUS; SUBCUTANEOUS at 14:21

## 2019-01-13 RX ADMIN — SODIUM CHLORIDE 1000 ML: 0.9 INJECTION, SOLUTION INTRAVENOUS at 14:21

## 2019-01-13 RX ADMIN — HEPARIN SODIUM 5000 UNITS: 5000 INJECTION INTRAVENOUS; SUBCUTANEOUS at 21:46

## 2019-01-13 RX ADMIN — DEXTROSE, SODIUM CHLORIDE, AND POTASSIUM CHLORIDE 125 ML/HR: 5; .9; .15 INJECTION INTRAVENOUS at 01:37

## 2019-01-13 RX ADMIN — POTASSIUM CHLORIDE 40 MEQ: 1500 TABLET, EXTENDED RELEASE ORAL at 17:39

## 2019-01-13 RX ADMIN — OXYCODONE HYDROCHLORIDE 5 MG: 5 TABLET ORAL at 16:18

## 2019-01-13 RX ADMIN — CEFTRIAXONE SODIUM 1000 MG: 10 INJECTION, POWDER, FOR SOLUTION INTRAVENOUS at 14:20

## 2019-01-13 RX ADMIN — OXYCODONE HYDROCHLORIDE 5 MG: 5 TABLET ORAL at 08:30

## 2019-01-13 RX ADMIN — VANCOMYCIN HYDROCHLORIDE 125 MG: 500 INJECTION, POWDER, LYOPHILIZED, FOR SOLUTION INTRAVENOUS at 21:46

## 2019-01-13 RX ADMIN — DEXTROSE, SODIUM CHLORIDE, AND POTASSIUM CHLORIDE 125 ML/HR: 5; .9; .15 INJECTION INTRAVENOUS at 19:29

## 2019-01-13 RX ADMIN — METRONIDAZOLE 500 MG: 500 INJECTION, SOLUTION INTRAVENOUS at 08:29

## 2019-01-13 RX ADMIN — METRONIDAZOLE 500 MG: 500 INJECTION, SOLUTION INTRAVENOUS at 16:12

## 2019-01-13 RX ADMIN — VANCOMYCIN HYDROCHLORIDE 125 MG: 500 INJECTION, POWDER, LYOPHILIZED, FOR SOLUTION INTRAVENOUS at 08:30

## 2019-01-13 RX ADMIN — METRONIDAZOLE 500 MG: 500 INJECTION, SOLUTION INTRAVENOUS at 00:44

## 2019-01-13 RX ADMIN — POTASSIUM CHLORIDE 40 MEQ: 1500 TABLET, EXTENDED RELEASE ORAL at 08:29

## 2019-01-13 NOTE — PROGRESS NOTES
Progress Note - Colorectal Surgery   Rebeca Weldon 59 y o  male MRN: [id number]  [facility] Encounter: [id number]    Assessment:  58 yo M with robel-sigmoid abscess as well as soft tissue mass adjacent to collection, with secondary SBO  S/P IR drainage    - WBC down to 17 5  - JUAN PABLO 75     Plan:  Clears w/ IVF  Switched to Ceftriaxone per ID- recs appreciated  F/U culture finalization- prelim gram + cocci pairs/chains  Flush drain q8 instead of daily- low ouput given size of collection, may be getting clogged in tubing  OOB, ambulation  PRN pain control    Subjective/Objective   Chief Complaint:     Subjective: Feels better than yesterday  Still with lower abdominal pain and distension but improved  No flatus but no nausea  Still having liquid BMs but becoming more "regular"    Objective:     Blood pressure 101/59, pulse 74, temperature 99 3 °F (37 4 °C), temperature source Oral, resp  rate 20, height 6' 3" (1 905 m), weight 79 4 kg (175 lb), SpO2 96 %  ,Body mass index is 21 87 kg/m²  Intake/Output Summary (Last 24 hours) at 01/13/19 0735  Last data filed at 01/13/19 0501   Gross per 24 hour   Intake          2568 25 ml   Output              305 ml   Net          2263 25 ml       Invasive Devices     Peripheral Intravenous Line            Peripheral IV 01/13/19 Right; Lower Forearm less than 1 day          Drain            Closed/Suction Drain Right Back Bulb 10 Fr  1 day                Physical Exam:   Gen: Resting, NAD  Cardio: RRR  Lungs: CTAB  Abd: Soft, distended in lower abdomen, moderately tender   JUAN PABLO with seropurulent fluid      Lab, Imaging and other studies:  CBC:   Lab Results   Component Value Date    WBC 17 48 (H) 01/13/2019    HGB 10 1 (L) 01/13/2019    HCT 30 2 (L) 01/13/2019    MCV 95 01/13/2019     (H) 01/13/2019    MCH 31 7 01/13/2019    MCHC 33 4 01/13/2019    RDW 13 7 01/13/2019    MPV 9 0 01/13/2019    NRBC 0 01/13/2019   , CMP:   Lab Results   Component Value Date    SODIUM 135 (L) 01/13/2019    K 3 4 (L) 01/13/2019     01/13/2019    CO2 22 01/13/2019    BUN 6 01/13/2019    CREATININE 0 66 01/13/2019    CALCIUM 7 3 (L) 01/13/2019    EGFR 105 01/13/2019     VTE Pharmacologic Prophylaxis: Heparin  VTE Mechanical Prophylaxis: sequential compression device

## 2019-01-13 NOTE — PLAN OF CARE
GASTROINTESTINAL - ADULT     Maintains or returns to baseline bowel function Progressing     Maintains adequate nutritional intake Progressing        INFECTION - ADULT     Absence or prevention of progression during hospitalization Progressing     Absence of fever/infection during neutropenic period Progressing        Nutrition/Hydration-ADULT     Nutrient/Hydration intake appropriate for improving, restoring or maintaining nutritional needs Progressing

## 2019-01-13 NOTE — PROGRESS NOTES
Progress Note - Infectious Disease   Pierre Godfrey 59 y o  male MRN: [id number]  Unit/Bed#: [facility] 824-01 Encounter: [id number]      Impression/Recommendations: This is a 59 y o  male, otherwise healthy, developed influenza 2 weeks ago but has persistent GI symptoms, and now found to have sigmoid colitis with adjacent abscess  Patient is status post drainage of abscess in IR      1  Intra-abdominal/pelvic abscess, adjacent to sigmoid colon, with evidence of sigmoid colitis  Statistically, diverticulitis with abscess is most likely  However, patient has no known history of diverticulosis or prior episodes of diverticulitis  We need to consider the possibility of perforated colon mass or cancer  At this point, will continue systemic antibiotic and drainage  Once colonic inflammation is improved, patient will most likely need colon resection  Patient is clinically improved on IV antibiotics  Drainage culture appears polymicrobial, with final results pending  Hopefully, patient will do well with conservative approach with systemic antibiotic and drainage and not need urgent surgical drainage/resection  Continue IV ceftriaxone/p o  Flagyl  Follow-up on abscess culture  Monitor abdominal pain  Continue abscess drainage per surgery and IR      2  Worsening leukocytosis  WBC finally decreasing  Patient remains systemically well, without systemic toxicity  Admission blood cultures remain negative  Antibiotic changes in above  Monitor WBC      3  Recent influenza  Patient completed Tamiflu course  At present, he has no respiratory symptoms      4  PCN allergy, with rash in the distant past   He is tolerating cephalosporin well  Will stay away from penicillin for now  However, if he really needs it, we may be able to challenge him with penicillin      Discussed with patient in detail regarding the above plan      Antibiotics:  Ceftriaxone/Flagyl  Post drainage # 2     Subjective:  Patient feels better  Abdominal pain improved  Temperature is down  No further chills  He is tolerating antibiotics well  No nausea, vomiting or diarrhea  Objective:  Vitals:  Temp:  [98 4 °F (36 9 °C)-99 3 °F (37 4 °C)] 98 6 °F (37 °C)  HR:  [65-74] 65  Resp:  [16-20] 18  BP: ()/(53-60) 90/53  SpO2:  [96 %-97 %] 96 %  Temp (24hrs), Av 8 °F (37 1 °C), Min:98 4 °F (36 9 °C), Max:99 3 °F (37 4 °C)  Current: Temperature: 98 6 °F (37 °C)    Physical Exam:     General: Awake, alert, cooperative, no distress  Neck:  Supple  No mass  No lymphadenopathy  Lungs: Expansion symmetric, no rales, no wheezing, respirations unlabored  Heart:  Regular rate and rhythm, S1 and S2 normal, no murmur  Abdomen: Soft, nondistended, improved LLQ tenderness, bowel sounds active all four quadrants,        no masses, no organomegaly  Drain seropurulent  Extremities: No edema  No erythema/warmth  No ulcer  Nontender to palpation  Skin:  No rash  Neuro: Moves all extremities  Invasive Devices     Peripheral Intravenous Line            Peripheral IV 19 Right; Lower Forearm less than 1 day          Drain            Closed/Suction Drain Right Back Bulb 10 Fr  2 days                Labs studies:   I have personally reviewed pertinent labs      Results from last 7 days  Lab Units 19  0611 19  0450 19  0501 01/10/19  2336 01/10/19  1712   POTASSIUM mmol/L 3 4* 3 5 3 5 3 4* 3 5   CHLORIDE mmol/L 104 102 100 95* 98*   CO2 mmol/L 22 22 24 26 24   BUN mg/dL 6 10 7 9 8   CREATININE mg/dL 0 66 0 93 0 80 0 86 0 81   EGFR ml/min/1 73sq m 105 89 97 94 97   CALCIUM mg/dL 7 3* 7 1* 7 7* 8 6 8 7   AST U/L  --   --   --  40 40   ALT U/L  --   --   --  95* 100*   ALK PHOS U/L  --   --   --  82 86       Results from last 7 days  Lab Units 19  0611 19  1234 19  0450   WBC Thousand/uL 17 48* 33 94* 40 05*   HEMOGLOBIN g/dL 10 1* 10 1* 10 5*   PLATELETS Thousands/uL 449* 464* 504*       Results from last 7 days  Lab Units 01/11/19 2029 01/11/19  1109 01/10/19  2342 01/10/19  2336   BLOOD CULTURE   --   --  No Growth at 48 hrs  No Growth at 48 hrs  GRAM STAIN RESULT   --  4+ Polys  4+ Gram positive cocci in pairs and chains  --   --    BODY FLUID CULTURE, STERILE   --  4+ Growth of Escherichia coli*  4+ Growth of Enterobacter aerogenes*  4+ Growth of   --   --    C DIFF TOXIN B  NEGATIVE for C difficle toxin by PCR    --   --   --        Imaging Studies:   I have personally reviewed pertinent imaging study reports and images in PACS  EKG, Pathology, and Other Studies:   I have personally reviewed pertinent reports

## 2019-01-13 NOTE — SOCIAL WORK
CM met with patient and explained cm role  Pt alert and oriented  Pt reports he lives with his wife and   in a 2 story home w/4 zain  Prior to admission pt was independent all ADL" s  Pt denies  And DME's  VNA, and SNF   Pt reports being independent PTA, reports good support, he drives, and will transport home with family for d/c  Pts pharmacy is [facility]    Pt denies hx/admission for drug/etoh and psych/mental health  When medically clear family will transport home  Cm offered vna for drain care and declined at this time        CM reviewed d/c planning process including the following: identifying help at home, patient preference for d/c planning needs, Discharge Lounge, [facility] Meds to Bed program, availability of treatment team to discuss questions or concerns patient and/or family may have regarding understanding medications and recognizing signs and symptoms once discharged   CM also encouraged patient to follow up with all recommended appointments after discharge   Patient advised of importance for patient and family to participate in managing patients medical well being

## 2019-01-14 LAB
ANION GAP SERPL CALCULATED.3IONS-SCNC: 7 MMOL/L (ref 4–13)
BASOPHILS # BLD AUTO: 0.04 THOUSANDS/ΜL (ref 0–0.1)
BASOPHILS NFR BLD AUTO: 0 % (ref 0–1)
BUN SERPL-MCNC: 4 MG/DL (ref 5–25)
CALCIUM SERPL-MCNC: 7.5 MG/DL (ref 8.3–10.1)
CHLORIDE SERPL-SCNC: 106 MMOL/L (ref 100–108)
CO2 SERPL-SCNC: 23 MMOL/L (ref 21–32)
CREAT SERPL-MCNC: 0.62 MG/DL (ref 0.6–1.3)
EOSINOPHIL # BLD AUTO: 0.1 THOUSAND/ΜL (ref 0–0.61)
EOSINOPHIL NFR BLD AUTO: 1 % (ref 0–6)
ERYTHROCYTE [DISTWIDTH] IN BLOOD BY AUTOMATED COUNT: 13.9 % (ref 11.6–15.1)
GFR SERPL CREATININE-BSD FRML MDRD: 108 ML/MIN/1.73SQ M
GLUCOSE SERPL-MCNC: 114 MG/DL (ref 65–140)
HCT VFR BLD AUTO: 31.3 % (ref 36.5–49.3)
HGB BLD-MCNC: 10.4 G/DL (ref 12–17)
IMM GRANULOCYTES # BLD AUTO: 0.11 THOUSAND/UL (ref 0–0.2)
IMM GRANULOCYTES NFR BLD AUTO: 1 % (ref 0–2)
LYMPHOCYTES # BLD AUTO: 1.45 THOUSANDS/ΜL (ref 0.6–4.47)
LYMPHOCYTES NFR BLD AUTO: 12 % (ref 14–44)
MAGNESIUM SERPL-MCNC: 2.1 MG/DL (ref 1.6–2.6)
MCH RBC QN AUTO: 31.7 PG (ref 26.8–34.3)
MCHC RBC AUTO-ENTMCNC: 33.2 G/DL (ref 31.4–37.4)
MCV RBC AUTO: 95 FL (ref 82–98)
MONOCYTES # BLD AUTO: 0.91 THOUSAND/ΜL (ref 0.17–1.22)
MONOCYTES NFR BLD AUTO: 7 % (ref 4–12)
NEUTROPHILS # BLD AUTO: 9.87 THOUSANDS/ΜL (ref 1.85–7.62)
NEUTS SEG NFR BLD AUTO: 79 % (ref 43–75)
NRBC BLD AUTO-RTO: 0 /100 WBCS
PLATELET # BLD AUTO: 421 THOUSANDS/UL (ref 149–390)
PMV BLD AUTO: 8.9 FL (ref 8.9–12.7)
POTASSIUM SERPL-SCNC: 4.1 MMOL/L (ref 3.5–5.3)
RBC # BLD AUTO: 3.28 MILLION/UL (ref 3.88–5.62)
SODIUM SERPL-SCNC: 136 MMOL/L (ref 136–145)
WBC # BLD AUTO: 12.48 THOUSAND/UL (ref 4.31–10.16)

## 2019-01-14 PROCEDURE — 80048 BASIC METABOLIC PNL TOTAL CA: CPT | Performed by: SURGERY

## 2019-01-14 PROCEDURE — 85025 COMPLETE CBC W/AUTO DIFF WBC: CPT | Performed by: SURGERY

## 2019-01-14 PROCEDURE — 99232 SBSQ HOSP IP/OBS MODERATE 35: CPT | Performed by: INTERNAL MEDICINE

## 2019-01-14 PROCEDURE — 83735 ASSAY OF MAGNESIUM: CPT | Performed by: SURGERY

## 2019-01-14 RX ORDER — OXYCODONE HYDROCHLORIDE 5 MG/1
2.5 TABLET ORAL EVERY 4 HOURS PRN
Status: DISCONTINUED | OUTPATIENT
Start: 2019-01-14 | End: 2019-01-16 | Stop reason: HOSPADM

## 2019-01-14 RX ORDER — ACETAMINOPHEN 325 MG/1
650 TABLET ORAL EVERY 6 HOURS SCHEDULED
Status: DISCONTINUED | OUTPATIENT
Start: 2019-01-14 | End: 2019-01-16 | Stop reason: HOSPADM

## 2019-01-14 RX ADMIN — METRONIDAZOLE 500 MG: 500 INJECTION, SOLUTION INTRAVENOUS at 01:11

## 2019-01-14 RX ADMIN — ACETAMINOPHEN 650 MG: 325 TABLET, FILM COATED ORAL at 15:11

## 2019-01-14 RX ADMIN — METRONIDAZOLE 500 MG: 500 INJECTION, SOLUTION INTRAVENOUS at 08:19

## 2019-01-14 RX ADMIN — DEXTROSE, SODIUM CHLORIDE, AND POTASSIUM CHLORIDE 125 ML/HR: 5; .9; .15 INJECTION INTRAVENOUS at 04:03

## 2019-01-14 RX ADMIN — HEPARIN SODIUM 5000 UNITS: 5000 INJECTION INTRAVENOUS; SUBCUTANEOUS at 05:18

## 2019-01-14 RX ADMIN — HEPARIN SODIUM 5000 UNITS: 5000 INJECTION INTRAVENOUS; SUBCUTANEOUS at 15:11

## 2019-01-14 RX ADMIN — DEXTROSE, SODIUM CHLORIDE, AND POTASSIUM CHLORIDE 100 ML/HR: 5; .9; .15 INJECTION INTRAVENOUS at 14:03

## 2019-01-14 RX ADMIN — CEFTRIAXONE SODIUM 1000 MG: 10 INJECTION, POWDER, FOR SOLUTION INTRAVENOUS at 15:11

## 2019-01-14 RX ADMIN — VANCOMYCIN HYDROCHLORIDE 125 MG: 500 INJECTION, POWDER, LYOPHILIZED, FOR SOLUTION INTRAVENOUS at 08:19

## 2019-01-14 RX ADMIN — ACETAMINOPHEN 650 MG: 325 TABLET, FILM COATED ORAL at 17:26

## 2019-01-14 RX ADMIN — OXYCODONE HYDROCHLORIDE 5 MG: 5 TABLET ORAL at 05:15

## 2019-01-14 RX ADMIN — HEPARIN SODIUM 5000 UNITS: 5000 INJECTION INTRAVENOUS; SUBCUTANEOUS at 21:36

## 2019-01-14 RX ADMIN — METRONIDAZOLE 500 MG: 500 INJECTION, SOLUTION INTRAVENOUS at 16:28

## 2019-01-14 RX ADMIN — VANCOMYCIN HYDROCHLORIDE 125 MG: 500 INJECTION, POWDER, LYOPHILIZED, FOR SOLUTION INTRAVENOUS at 21:36

## 2019-01-14 NOTE — PLAN OF CARE
DISCHARGE PLANNING     Discharge to home or other facility with appropriate resources Progressing        DISCHARGE PLANNING - CARE MANAGEMENT     Discharge to post-acute care or home with appropriate resources Progressing        GASTROINTESTINAL - ADULT     Maintains or returns to baseline bowel function Progressing     Maintains adequate nutritional intake Progressing        INFECTION - ADULT     Absence or prevention of progression during hospitalization Progressing     Absence of fever/infection during neutropenic period Progressing        Knowledge Deficit     Patient/family/caregiver demonstrates understanding of disease process, treatment plan, medications, and discharge instructions Progressing        Nutrition/Hydration-ADULT     Nutrient/Hydration intake appropriate for improving, restoring or maintaining nutritional needs Progressing        PAIN - ADULT     Verbalizes/displays adequate comfort level or baseline comfort level Progressing        Potential for Falls     Patient will remain free of falls Progressing

## 2019-01-14 NOTE — UTILIZATION REVIEW
Initial Clinical Review     Admission: Date/Time/Statement: 1/11/19 @ 0013        Orders Placed This Encounter   Procedures    Inpatient Admission       Standing Status:   Standing       Number of Occurrences:   1       Order Specific Question:   Admitting Physician       Answer:   Tayler Copeland [[id number]]       Order Specific Question:   Level of Care       Answer:   Med Surg [16]       Order Specific Question:   Bed request comments       Answer:   MS 4 if possible       Order Specific Question:   Estimated length of stay       Answer:   More than 2 Midnights       Order Specific Question:   Certification       Answer:   I certify that inpatient services are medically necessary for this patient for a duration of greater than two midnights  See H&P and MD Progress Notes for additional information about the patient's course of treatment       ED: Date/Time/Mode of Arrival:             ED Arrival Information      Expected Arrival Acuity Means of Arrival Escorted By Service Admission Type     - 1/10/2019 22:26 Emergent Walk-In Family [facility] Colorectal Emergency     Arrival Complaint     Abnormal CT scan          Chief Complaint:        Chief Complaint   Patient presents with    Abdominal Pain       Pt reports abdominal pain, fever, cough, chills, body aches and nausea for 10 days  Pt had CT scan done today and PCP told to come to ED      History of Illness:   Kirill Georges a 59 y  o  male with PMH HTN presents with intermittent abdominal pain, diarrhea, and generally feeling unwell for the past 10 days  [facility] states that he was diagnosed with the flu on December 29th, along with several other members of his family, these members of his family got diarrhea with her illness, so when he had GI upset he was not surprised  Moreno Stevenson, he continued to feel unwell over the next 2 weeks, experiencing nausea, intermittent diarrhea, lower abdominal pain and bloating, loss of appetite, and low energy   He obtained an outpatient CAT scan and lab work today, and was instructed to come to the emergency department when he had a white count of 30, and CT scan showing 10 x 8 cm robel sigmoid abscess along with robel sigmoid soft tissue mass vs inflammation, and small-bowel obstruction with transition point in the pelvis, likely secondary to the inflammatory process there  ED Vital Signs:            ED Triage Vitals   Temperature Pulse Respirations Blood Pressure SpO2   01/10/19 2236 01/10/19 2236 01/10/19 2236 01/10/19 2236 01/10/19 2236   99 3 °F (37 4 °C) 74 16 144/72 96 %       Temp Source Heart Rate Source Patient Position - Orthostatic VS BP Location FiO2 (%)   01/10/19 2236 01/10/19 2236 01/11/19 0027 01/10/19 2236 --   Oral Monitor Lying Left arm         Pain Score           01/10/19 2236           6                Wt Readings from Last 1 Encounters:   01/11/19 82 4 kg (181 lb 10 5 oz)   Pertinent Labs/Diagnostic Test Results:     WBC 29 79 (H) 01/10/2019     HGB 13 3 01/10/2019     HCT 39 0 01/10/2019      (H) 01/10/2019        SODIUM 130 (L) 01/10/2019     K 3 4 (L) 01/10/2019     CL 95 (L) 01/10/2019     CO2 26 01/10/2019     BUN 9 01/10/2019     CREATININE 0 86 01/10/2019     CALCIUM 8 6 01/10/2019     AST 40 01/10/2019     ALT 95 (H) 01/10/2019     ALKPHOS 82 01/10/2019     EGFR 94 01/10/2019   CT abd/pel:  Findings suggest sigmoid colitis  10 3 x 8 4 cm rim-enhancing fluid collection with air-fluid level in the perisigmoidal mesenteric could suggest an abscess  There is an adjacent 5 7 x 3 9 cm anterior left-sided soft tissue mass which could be part of the inflamed sigmoid although neoplasm cannot be completely excluded    Dilated loops of small bowel suggesting small bowel obstruction with transition point in the pelvis    Surgical consult recommended    ED Treatment:   Medication Administration from 01/10/2019 2226 to 01/11/2019 0054        Date/Time Order Dose Route Action Action by Comments       01/11/2019 9759 sodium chloride 0 9 % bolus 1,000 mL 0 mL Intravenous Stopped Tanya Mcintosh, ROSE MARY         01/10/2019 2341 sodium chloride 0 9 % bolus 1,000 mL 1,000 mL Intravenous New Bag Tanya Mcintosh, ROSE MARY         01/10/2019 2341 morphine (PF) 4 mg/mL injection 4 mg 4 mg Intravenous Given Tanya Mcintosh, ROSE MARY         01/10/2019 2339 ondansetron (ZOFRAN) injection 4 mg 4 mg Intravenous Given Tanya Mcintosh, ROSE MARY         01/11/2019 0030 sodium chloride 0 9 % infusion 125 mL/hr Intravenous New Bag Tanya Mcintosh, ROSE MARY         01/11/2019 0029 metroNIDAZOLE (FLAGYL) IVPB (premix) 500 mg 500 mg Intravenous New Bag Tanya Mcintosh RN            Past Medical/Surgical History:        Past Medical History:   Diagnosis Date    Hypertension        Admitting Diagnosis: Leukocytosis [D72 829]  SBO (small bowel obstruction) (HCC) [K56 609]  Abdominal pain [R10 9]  Nausea & vomiting [R11 2]  Mesenteric abscess (HCC) [K65 1]  Colitis, acute [K52 9]  Age/Sex: 59 y o  male  Assessment/Plan:   Assessment:  60 yo M with sigmoid colitis and large pelvic abscess, possibly due to perforated diverticulitis which has since sealed with resultant abscess formation   Additional soft tissue mass versus inflammation in pelvis concerning for malignancy, however may be associated with the inflammation in the area   Bowel obstruction likely secondary to pelvic process   Leukocytosis however other labs within normal limits and vitals stable      Plan:  1  Pelvic abscess              - IR consult              - IV antibiotics: Ancef/Flagyl              - Fluid resuscitation additional 1L bolus now              - NPO w/ IVF              - If fails to improve with above therapy, may need surgical intervention  Hopeful to avoid in this acutely inflamed state               - PRN pain control     2   Leukocytosis              - Antibiotics as above              - Check C diff, stool studies/culture     3  Pelvic mass/inflammation                 - Will need f/u c-scope in approx 4-6 wks  Admission Orders:  Scheduled Meds:   Current Facility-Administered Medications:  cefazolin 2,000 mg Intravenous Q8H Sascha Leach MD Last Rate: 2,000 mg (01/11/19 0806)   dextrose 5 % and sodium chloride 0 9 % with KCl 20 mEq/L 125 mL/hr Intravenous Continuous Uma Nam PA-C     diphenhydrAMINE (BENADRYL) IVPB 50 mg Intravenous Once Sascha Leach MD Last Rate: Stopped (01/11/19 0153)   heparin (porcine) 5,000 Units Subcutaneous Q8H [address] Sascha Leach MD     HYDROmorphone 0 5 mg Intravenous Q3H PRN Sascha Leach MD     HYDROmorphone 1 mg Intravenous Q4H PRN Sascha Leach MD     metroNIDAZOLE 500 mg Intravenous Q8H Sascha Leach MD Last Rate: 500 mg (01/11/19 0905)   nicotine 1 patch Transdermal Daily Sascha Leach MD     ondansetron 4 mg Intravenous Q4H PRN Sascha Leach MD     potassium chloride 20 mEq Intravenous Once mUa Nam PA-C     sodium chloride (PF) 3 mL Intravenous PRN Guerda Turner MD        Continuous Infusions:   dextrose 5 % and sodium chloride 0 9 % with KCl 20 mEq/L 125 mL/hr      NPO  Up and OOB as tolerated  IR guided aspiration drainage with tube: pending  Angel SCDs

## 2019-01-14 NOTE — PROGRESS NOTES
Progress Note - Infectious Disease   Papa Mclaughlin 59 y o  male MRN: [id number]  Unit/Bed#: [facility] 824-01 Encounter: [id number]      Impression/Recommendations:  1  Intra-abdominal/pelvic abscess, adjacent to sigmoid colon, with evidence of sigmoid colitis   Statistically, diverticulitis with abscess is most likely  [place], patient has no known history of diverticulosis or prior episodes of diverticulitis   We need to consider the possibility of perforated colon mass or cancer   At this point, will continue systemic antibiotic and drainage   Once colonic inflammation is improved, patient will most likely need colon resection  Patient is clinically improved on IV antibiotics  Drainage culture appears polymicrobial, with final results pending, thus far growing E coli and enterobacter   Hopefully, patient will do well with conservative approach with systemic antibiotic and drainage and not need urgent surgical drainage/resection  Continue IV ceftriaxone/p o  Flagyl  Follow-up on final abscess culture  Monitor abdominal pain  Continue abscess drainage per surgery and IR  Anticipate transition to p o  Antibiotic in 24 hours, if susceptibilities supports this decision  Treat x 14 days total, another 11 days      2  Worsening leukocytosis  WBC decreasing, near normalized   Patient remains systemically well, without systemic toxicity   Admission blood cultures remain negative  Antibiotic changes in above  Monitor WBC      3  Recent influenza   Patient completed Tamiflu course   At present, he has no respiratory symptoms      4  PCN allergy, with rash in the distant past  [facility] is tolerating cephalosporin well   Will stay away from penicillin for now  [place] Null, if he really needs it, we may be able to challenge him with penicillin      Discussed with patient in detail regarding the above plan      Antibiotics:  Ceftriaxone/Flagyl  Post drainage # 3     Subjective:  Patient feels reasonably well    Abdominal pain improved, well controlled now  Temperature is down, low-grade overnight  No further chills  He is tolerating antibiotics well  No nausea, vomiting or diarrhea  Objective:  Vitals:  Temp:  [98 7 °F (37 1 °C)-99 6 °F (37 6 °C)] 99 6 °F (37 6 °C)  HR:  [57-71] 57  Resp:  [18-20] 18  BP: (115-123)/(72-79) 116/79  SpO2:  [95 %-96 %] 96 %  Temp (24hrs), Av °F (37 2 °C), Min:98 7 °F (37 1 °C), Max:99 6 °F (37 6 °C)  Current: Temperature: 99 6 °F (37 6 °C)    Physical Exam:     General: Awake, alert, cooperative, no distress  Neck:  Supple  No mass  No lymphadenopathy  Lungs: Expansion symmetric, no rales, no wheezing, respirations unlabored  Heart:  Regular rate and rhythm, S1 and S2 normal, no murmur  Abdomen: Soft, nondistended, improved lower abdominal tenderness, bowel sounds active all four quadrants,        no masses, no organomegaly  Drain serous  Extremities: No edema  No erythema/warmth  No ulcer  Nontender to palpation  Skin:  No rash  Neuro: Moves all extremities  Invasive Devices     Peripheral Intravenous Line            Peripheral IV 19 Right; Lower Forearm 1 day          Drain            Closed/Suction Drain Right Back Bulb 10 Fr  3 days                Labs studies:   I have personally reviewed pertinent labs  Results from last 7 days  Lab Units 19  0534 19  0611 19  0450  01/10/19  2336 01/10/19  1712   POTASSIUM mmol/L 4 1 3 4* 3 5  < > 3 4* 3 5   CHLORIDE mmol/L 106 104 102  < > 95* 98*   CO2 mmol/L 23 22 22  < > 26 24   BUN mg/dL 4* 6 10  < > 9 8   CREATININE mg/dL 0 62 0 66 0 93  < > 0 86 0 81   EGFR ml/min/1 73sq m 108 105 89  < > 94 97   CALCIUM mg/dL 7 5* 7 3* 7 1*  < > 8 6 8 7   AST U/L  --   --   --   --  40 40   ALT U/L  --   --   --   --  95* 100*   ALK PHOS U/L  --   --   --   --  82 86   < > = values in this interval not displayed      Results from last 7 days  Lab Units 19  0534 19  0611 19  1234   WBC Thousand/uL 12 48* 17 48* 33 94*   HEMOGLOBIN g/dL 10 4* 10 1* 10 1*   PLATELETS Thousands/uL 421* 449* 464*       Results from last 7 days  Lab Units 01/11/19 2029 01/11/19  1109 01/10/19  2342 01/10/19  2336   BLOOD CULTURE   --   --  No Growth at 72 hrs  No Growth at 72 hrs  GRAM STAIN RESULT   --  4+ Polys  4+ Gram positive cocci in pairs and chains  --   --    BODY FLUID CULTURE, STERILE   --  4+ Growth of Escherichia coli*  4+ Growth of Enterobacter aerogenes*  4+ Growth of   --   --    C DIFF TOXIN B  NEGATIVE for C difficle toxin by PCR    --   --   --        Imaging Studies:   I have personally reviewed pertinent imaging study reports and images in PACS  EKG, Pathology, and Other Studies:   I have personally reviewed pertinent reports

## 2019-01-14 NOTE — PROGRESS NOTES
Progress Note - Colorectal   Rebeca Weldon 59 y o  male MRN: [id number]  [facility] Encounter: [id number]      Objective:  Patient miserable this morning  Still having lower abdominal pain  Did have a bowel movement  Denies nausea, vomiting  Still with 7 to 8/10 mid and lower abdominal pain  Awaiting a m  Labs, white count did come down to 17,000 yesterday  Patient is out of bed, ambulating  IR drain serosanguineous  Patient appears to be on oral vancomycin along with the ceftriaxone and Flagyl  His C diff and stool cultures are negative, and his CEA level was 2 0    3600 + 2 UA's  35 IR drain  3 bowel movements    Blood pressure 123/76, pulse 71, temperature 98 7 °F (37 1 °C), temperature source Oral, resp  rate 20, height 6' 3" (1 905 m), weight 79 4 kg (175 lb), SpO2 95 %  ,Body mass index is 21 87 kg/m²  Intake/Output Summary (Last 24 hours) at 01/14/19 0520  Last data filed at 01/14/19 0432   Gross per 24 hour   Intake             3250 ml   Output             3635 ml   Net             -385 ml       Invasive Devices     Peripheral Intravenous Line            Peripheral IV 01/13/19 Right; Lower Forearm 1 day          Drain            Closed/Suction Drain Right Back Bulb 10 Fr  2 days                Physical Exam:   Abdomen:  Soft, appears slightly distended, generalized abdominal tenderness more in the lower quadrants, no masses palpable, IR drain seropurulent  Extremities:  No pedal edema, no calf tenderness    Lab, Imaging and other studies:  Pending  VTE Pharmacologic Prophylaxis: Heparin  VTE Mechanical Prophylaxis: sequential compression device    Assessment:  Sigmoid colitis  Pelvic abscess   Small-bowel obstruction    Plan:  1  Check am labs, follow WBC  2  OOB and ambulating  3  Continue antibiotics per Dr Sousa Mems  4  Continue SQH for DVT prophylaxis  5  ? Add toast/crackers   6   Decrease IV fluids to 100 ml/hr since urine output > 3 5 liters

## 2019-01-15 LAB
ANION GAP SERPL CALCULATED.3IONS-SCNC: 8 MMOL/L (ref 4–13)
BACTERIA SPEC ANAEROBE CULT: NORMAL
BACTERIA SPEC BFLD CULT: ABNORMAL
BASOPHILS # BLD AUTO: 0.07 THOUSANDS/ΜL (ref 0–0.1)
BASOPHILS NFR BLD AUTO: 1 % (ref 0–1)
BUN SERPL-MCNC: 5 MG/DL (ref 5–25)
CALCIUM SERPL-MCNC: 7.8 MG/DL (ref 8.3–10.1)
CHLORIDE SERPL-SCNC: 109 MMOL/L (ref 100–108)
CO2 SERPL-SCNC: 22 MMOL/L (ref 21–32)
CREAT SERPL-MCNC: 0.61 MG/DL (ref 0.6–1.3)
EOSINOPHIL # BLD AUTO: 0.17 THOUSAND/ΜL (ref 0–0.61)
EOSINOPHIL NFR BLD AUTO: 2 % (ref 0–6)
ERYTHROCYTE [DISTWIDTH] IN BLOOD BY AUTOMATED COUNT: 13.9 % (ref 11.6–15.1)
GFR SERPL CREATININE-BSD FRML MDRD: 109 ML/MIN/1.73SQ M
GLUCOSE SERPL-MCNC: 107 MG/DL (ref 65–140)
GRAM STN SPEC: ABNORMAL
GRAM STN SPEC: ABNORMAL
HCT VFR BLD AUTO: 33.2 % (ref 36.5–49.3)
HGB BLD-MCNC: 11.2 G/DL (ref 12–17)
IMM GRANULOCYTES # BLD AUTO: 0.11 THOUSAND/UL (ref 0–0.2)
IMM GRANULOCYTES NFR BLD AUTO: 1 % (ref 0–2)
LYMPHOCYTES # BLD AUTO: 1.88 THOUSANDS/ΜL (ref 0.6–4.47)
LYMPHOCYTES NFR BLD AUTO: 18 % (ref 14–44)
MCH RBC QN AUTO: 32.5 PG (ref 26.8–34.3)
MCHC RBC AUTO-ENTMCNC: 33.7 G/DL (ref 31.4–37.4)
MCV RBC AUTO: 96 FL (ref 82–98)
MONOCYTES # BLD AUTO: 0.94 THOUSAND/ΜL (ref 0.17–1.22)
MONOCYTES NFR BLD AUTO: 9 % (ref 4–12)
NEUTROPHILS # BLD AUTO: 7.32 THOUSANDS/ΜL (ref 1.85–7.62)
NEUTS SEG NFR BLD AUTO: 69 % (ref 43–75)
NRBC BLD AUTO-RTO: 0 /100 WBCS
PLATELET # BLD AUTO: 482 THOUSANDS/UL (ref 149–390)
PMV BLD AUTO: 9.9 FL (ref 8.9–12.7)
POTASSIUM SERPL-SCNC: 4.2 MMOL/L (ref 3.5–5.3)
RBC # BLD AUTO: 3.45 MILLION/UL (ref 3.88–5.62)
SODIUM SERPL-SCNC: 139 MMOL/L (ref 136–145)
WBC # BLD AUTO: 10.49 THOUSAND/UL (ref 4.31–10.16)

## 2019-01-15 PROCEDURE — 80048 BASIC METABOLIC PNL TOTAL CA: CPT | Performed by: PHYSICIAN ASSISTANT

## 2019-01-15 PROCEDURE — 85025 COMPLETE CBC W/AUTO DIFF WBC: CPT | Performed by: PHYSICIAN ASSISTANT

## 2019-01-15 PROCEDURE — 99232 SBSQ HOSP IP/OBS MODERATE 35: CPT | Performed by: INTERNAL MEDICINE

## 2019-01-15 RX ADMIN — VANCOMYCIN HYDROCHLORIDE 125 MG: 500 INJECTION, POWDER, LYOPHILIZED, FOR SOLUTION INTRAVENOUS at 21:26

## 2019-01-15 RX ADMIN — ACETAMINOPHEN 650 MG: 325 TABLET, FILM COATED ORAL at 06:42

## 2019-01-15 RX ADMIN — DEXTROSE, SODIUM CHLORIDE, AND POTASSIUM CHLORIDE 100 ML/HR: 5; .9; .15 INJECTION INTRAVENOUS at 02:16

## 2019-01-15 RX ADMIN — ACETAMINOPHEN 650 MG: 325 TABLET, FILM COATED ORAL at 23:29

## 2019-01-15 RX ADMIN — DEXTROSE, SODIUM CHLORIDE, AND POTASSIUM CHLORIDE 75 ML/HR: 5; .9; .15 INJECTION INTRAVENOUS at 14:09

## 2019-01-15 RX ADMIN — ACETAMINOPHEN 650 MG: 325 TABLET, FILM COATED ORAL at 11:30

## 2019-01-15 RX ADMIN — METRONIDAZOLE 500 MG: 500 INJECTION, SOLUTION INTRAVENOUS at 08:14

## 2019-01-15 RX ADMIN — HEPARIN SODIUM 5000 UNITS: 5000 INJECTION INTRAVENOUS; SUBCUTANEOUS at 06:36

## 2019-01-15 RX ADMIN — VANCOMYCIN HYDROCHLORIDE 125 MG: 500 INJECTION, POWDER, LYOPHILIZED, FOR SOLUTION INTRAVENOUS at 08:15

## 2019-01-15 RX ADMIN — METRONIDAZOLE 500 MG: 500 INJECTION, SOLUTION INTRAVENOUS at 23:33

## 2019-01-15 RX ADMIN — METRONIDAZOLE 500 MG: 500 INJECTION, SOLUTION INTRAVENOUS at 00:24

## 2019-01-15 RX ADMIN — ACETAMINOPHEN 650 MG: 325 TABLET, FILM COATED ORAL at 00:23

## 2019-01-15 RX ADMIN — CEFTRIAXONE SODIUM 1000 MG: 10 INJECTION, POWDER, FOR SOLUTION INTRAVENOUS at 14:09

## 2019-01-15 RX ADMIN — HEPARIN SODIUM 5000 UNITS: 5000 INJECTION INTRAVENOUS; SUBCUTANEOUS at 14:12

## 2019-01-15 RX ADMIN — HEPARIN SODIUM 5000 UNITS: 5000 INJECTION INTRAVENOUS; SUBCUTANEOUS at 21:26

## 2019-01-15 RX ADMIN — ACETAMINOPHEN 650 MG: 325 TABLET, FILM COATED ORAL at 17:01

## 2019-01-15 RX ADMIN — METRONIDAZOLE 500 MG: 500 INJECTION, SOLUTION INTRAVENOUS at 16:55

## 2019-01-15 NOTE — PROGRESS NOTES
Spoke with Dr Juan Samayoa that patient saying his antibiotics were going to be switched to oral form but is still ordered IV  His note from today says "Continue IV ceftriaxone/p o   Flagyl "  Per Dr Juan Samayoa, continue antibiotics as IV for now until patient discharged

## 2019-01-15 NOTE — UTILIZATION REVIEW
Continued Stay Review    Date: 1/15/19    Vital Signs: /79 (BP Location: Left arm)   Pulse 55   Temp (!) 97 4 °F (36 3 °C) (Oral)   Resp 16   Ht 6' 3" (1 905 m)   Wt 79 4 kg (175 lb)   SpO2 98%   BMI 21 87 kg/m²      Assessment/Plan:     1/15 Colorectal Surgery Progress Note  Sigmoid colitis  Pelvic abscess   Small-bowel obstruction  Less tender, less distended, still with some LLQ and mid abd tenderness,  IR drain = serosanguineous drainage        Plan:  1  May have jelly, butter, peanut butter on the toast  2  Continue IV fluids until he is taking more in by mouth  3  Continue drain care  4  Continue recommendations per Dr Ceron Force  5  Out of bed and ambulate halls more  6  Check labs in white count this morning  ___________________________  1/15 ID Progress Note  1  Intra-abdominal/pelvic abscess, adjacent to sigmoid colon, with evidence of sigmoid colitis   Statistically, diverticulitis with abscess is most likely  Liudmila Males, patient has no known history of diverticulosis or prior episodes of diverticulitis   We need to consider the possibility of perforated colon mass or cancer   At this point, will continue systemic antibiotic and drainage   Once colonic inflammation is improved, patient will most likely need colon resection   Patient is clinically improved on IV antibiotics   Drainage culture appears polymicrobial, with final results pending, thus far growing E coli and enterobacter   Hopefully, patient will do well with conservative approach with systemic antibiotic and drainage and not need urgent surgical drainage/resection  Continue IV ceftriaxone/p o   Flagyl  Follow-up on final abscess culture  Monitor abdominal pain  Continue abscess drainage per surgery and IR  Transition to p  O  Cefdinir/Flagyl at discharge   Treat x 14 days total, another 10 days      2   Worsening leukocytosis   WBC has normalized   Patient remains systemically well, without systemic toxicity   Admission blood cultures remain negative  Antibiotic changes in above  Monitor WBC      Medications:   Scheduled Meds:   Current Facility-Administered Medications:  acetaminophen 650 mg Oral Q6H [address]    cefTRIAXone 1,000 mg Intravenous Q24H Last Rate: 1,000 mg (01/15/19 1409)   dextrose 5 % and sodium chloride 0 9 % with KCl 20 mEq/L 75 mL/hr Intravenous Continuous Last Rate: 75 mL/hr (01/15/19 1409)   diphenhydrAMINE (BENADRYL) IVPB 50 mg Intravenous Once Last Rate: Stopped (01/11/19 0153)   heparin (porcine) 5,000 Units Subcutaneous Q8H [address]    HYDROmorphone 0 5 mg Intravenous Q3H PRN    HYDROmorphone 1 mg Intravenous Q4H PRN    metroNIDAZOLE 500 mg Intravenous Q8H Last Rate: Stopped (01/15/19 0844)   ondansetron 4 mg Intravenous Q4H PRN    oxyCODONE 2 5 mg Oral Q4H PRN    sodium chloride (PF) 3 mL Intravenous PRN    vancomycin 125 mg Oral Q12H [address]      Continuous Infusions:   dextrose 5 % and sodium chloride 0 9 % with KCl 20 mEq/L 75 mL/hr Last Rate: 75 mL/hr (01/15/19 1409)     PRN Meds: HYDROmorphone    HYDROmorphone    ondansetron    oxyCODONE    sodium chloride (PF)     Pertinent Labs/Diagnostic Results:   Cl 109  Calixto 7 8  WBC 10 49  RBC 3 45  H/H 11 2/33 2  Platelets 747    Age/Sex: 59 y o  male     Discharge Plan: should be able to return home with wife      [address] Utilization Review Department  Phone: [phone]; Fax [phone]  [email or web address]  ATTENTION: Please call with any questions or concerns to [phone]  and carefully listen to the prompts so that you are directed to the right person  Send all requests for admission clinical reviews, approved or denied determinations and any other requests to fax [phone]   All voicemails are confidential

## 2019-01-15 NOTE — PROGRESS NOTES
Progress Note - Infectious Disease   Ro Warner 59 y o  male MRN: [id number]  Unit/Bed#: [facility] 824-01 Encounter: [id number]      Impression/Recommendations:  1  Intra-abdominal/pelvic abscess, adjacent to sigmoid colon, with evidence of sigmoid colitis   Statistically, diverticulitis with abscess is most likely  [place], patient has no known history of diverticulosis or prior episodes of diverticulitis   We need to consider the possibility of perforated colon mass or cancer   At this point, will continue systemic antibiotic and drainage   Once colonic inflammation is improved, patient will most likely need colon resection   Patient is clinically improved on IV antibiotics   Drainage culture appears polymicrobial, with final results pending, thus far growing E coli and enterobacter   Hopefully, patient will do well with conservative approach with systemic antibiotic and drainage and not need urgent surgical drainage/resection  Continue IV ceftriaxone/p o   Flagyl  Follow-up on final abscess culture  Monitor abdominal pain  Continue abscess drainage per surgery and IR  Transition to p  O  Cefdinir/Flagyl at discharge   Treat x 14 days total, another 10 days      2  Worsening leukocytosis   WBC has normalized   Patient remains systemically well, without systemic toxicity   Admission blood cultures remain negative  Antibiotic changes in above  Monitor WBC      3  Recent influenza   Patient completed Tamiflu course   At present, he has no respiratory symptoms      4  PCN allergy, with rash in the distant past  [facility] is tolerating cephalosporin well   Will stay away from penicillin for now  [place], if he really needs it, we may be able to challenge him with penicillin      Discussed with patient in detail regarding the above plan  Discharge plan per surgery service      Antibiotics:  Ceftriaxone/Flagyl  Post drainage # 4     Subjective:  Patient feels better    Abdominal pain improved, well controlled now   Temperature is down, low-grade overnight   No further chills  He is tolerating antibiotics well   No nausea, vomiting or diarrhea  Objective:  Vitals:  Temp:  [97 4 °F (36 3 °C)-99 2 °F (37 3 °C)] 97 4 °F (36 3 °C)  HR:  [55-62] 55  Resp:  [16-18] 16  BP: (115-136)/(66-79) 136/79  SpO2:  [97 %-98 %] 98 %  Temp (24hrs), Av 1 °F (36 7 °C), Min:97 4 °F (36 3 °C), Max:99 2 °F (37 3 °C)  Current: Temperature: (!) 97 4 °F (36 3 °C)    Physical Exam:     General: Awake, alert, cooperative, no distress  Neck:  Supple  No mass  No lymphadenopathy  Lungs: Expansion symmetric, no rales, no wheezing, respirations unlabored  Heart:  Regular rate and rhythm, S1 and S2 normal, no murmur  Abdomen: Soft, nondistended, non-tender, bowel sounds active all four quadrants,        no masses, no organomegaly  Drain not grossly purulent  Extremities: No edema  No erythema/warmth  No ulcer  Nontender to palpation  Skin:  No rash  Neuro: Moves all extremities  Invasive Devices     Peripheral Intravenous Line            Peripheral IV 19 Right; Lower Forearm 2 days    Peripheral IV 19 Left Forearm less than 1 day          Drain            Closed/Suction Drain Right Back Bulb 10 Fr  4 days                Labs studies:   I have personally reviewed pertinent labs  Results from last 7 days  Lab Units 01/15/19  0534 19  0534 19  0611  01/10/19  2336 01/10/19  1712   POTASSIUM mmol/L 4 2 4 1 3 4*  < > 3 4* 3 5   CHLORIDE mmol/L 109* 106 104  < > 95* 98*   CO2 mmol/L 22 23 22  < > 26 24   BUN mg/dL 5 4* 6  < > 9 8   CREATININE mg/dL 0 61 0 62 0 66  < > 0 86 0 81   EGFR ml/min/1 73sq m 109 108 105  < > 94 97   CALCIUM mg/dL 7 8* 7 5* 7 3*  < > 8 6 8 7   AST U/L  --   --   --   --  40 40   ALT U/L  --   --   --   --  95* 100*   ALK PHOS U/L  --   --   --   --  82 86   < > = values in this interval not displayed      Results from last 7 days  Lab Units 01/15/19  0534 19  0534 01/13/19  0611   WBC Thousand/uL 10 49* 12 48* 17 48*   HEMOGLOBIN g/dL 11 2* 10 4* 10 1*   PLATELETS Thousands/uL 482* 421* 449*       Results from last 7 days  Lab Units 01/11/19 2029 01/11/19  1109 01/10/19  2342 01/10/19  2336   BLOOD CULTURE   --   --  No Growth After 4 Days  No Growth After 4 Days  GRAM STAIN RESULT   --  4+ Polys  4+ Gram positive cocci in pairs and chains  --   --    BODY FLUID CULTURE, STERILE   --  4+ Growth of Escherichia coli*  4+ Growth of Enterobacter aerogenes*  4+ Growth of   --   --    C DIFF TOXIN B  NEGATIVE for C difficle toxin by PCR    --   --   --        Imaging Studies:   I have personally reviewed pertinent imaging study reports and images in PACS  EKG, Pathology, and Other Studies:   I have personally reviewed pertinent reports

## 2019-01-15 NOTE — PROGRESS NOTES
Progress Note - Colorectal   Zackery Hernandez 59 y o  male MRN: [id number]  Unit/Bed#: [facility] 824-01 Encounter: [id number]      Objective:  Patient states he feels slightly better at this morning  Only had toast 1 time yesterday otherwise stayed on clear liquids  Oral intake only 120 mL  Having bowel movements, states abdominal pain is less, prefers to Tylenol 650 mg Q 6 as scheduled hardly using the oxycodone  There is no nausea, no vomiting  The IR drain is stays basically a serosanguineous  Patient is ambulating the halls not often though  No real appetite  2700 UA  30 drain  3 BM's      Blood pressure 115/66, pulse 59, temperature 99 2 °F (37 3 °C), temperature source Oral, resp  rate 16, height 6' 3" (1 905 m), weight 79 4 kg (175 lb), SpO2 98 %  ,Body mass index is 21 87 kg/m²  Intake/Output Summary (Last 24 hours) at 01/15/19 0517  Last data filed at 01/15/19 0321   Gross per 24 hour   Intake             1130 ml   Output             2730 ml   Net            -1600 ml       Invasive Devices     Peripheral Intravenous Line            Peripheral IV 01/13/19 Right; Lower Forearm 2 days    Peripheral IV 01/14/19 Left Forearm less than 1 day          Drain            Closed/Suction Drain Right Back Bulb 10 Fr  3 days                Physical Exam:   Abdomen:  Appears slightly less distended this morning, feels less tender, still with left lower quadrant and mid abdominal tenderness, IR drain serosanguineous  Extremities:  No pedal edema, no calf tenderness    Lab, Imaging and other studies:  Pending  VTE Pharmacologic Prophylaxis: Heparin  VTE Mechanical Prophylaxis: sequential compression device    Assessment:  Sigmoid colitis  Pelvic abscess   Small-bowel obstruction    Plan:  1  May have jelly, butter, peanut butter on the toast  2  Continue IV fluids until he is taking more in by mouth  3  Continue drain care  4  Continue recommendations per Dr Ronna Monsivais  5  Out of bed and ambulate halls more  6   Check labs in white count this morning

## 2019-01-16 VITALS
HEART RATE: 51 BPM | OXYGEN SATURATION: 98 % | HEIGHT: 75 IN | WEIGHT: 175 LBS | BODY MASS INDEX: 21.76 KG/M2 | TEMPERATURE: 97.8 F | DIASTOLIC BLOOD PRESSURE: 72 MMHG | RESPIRATION RATE: 16 BRPM | SYSTOLIC BLOOD PRESSURE: 157 MMHG

## 2019-01-16 PROBLEM — K63.0 ABSCESS OF SIGMOID COLON: Status: RESOLVED | Noted: 2019-01-11 | Resolved: 2019-01-16

## 2019-01-16 LAB
BACTERIA BLD CULT: NORMAL
BACTERIA BLD CULT: NORMAL

## 2019-01-16 PROCEDURE — 99232 SBSQ HOSP IP/OBS MODERATE 35: CPT | Performed by: INTERNAL MEDICINE

## 2019-01-16 RX ORDER — CEFDINIR 300 MG/1
300 CAPSULE ORAL EVERY 12 HOURS SCHEDULED
Qty: 18 CAPSULE | Refills: 0 | Status: SHIPPED | OUTPATIENT
Start: 2019-01-16 | End: 2019-01-25

## 2019-01-16 RX ORDER — TRAMADOL HYDROCHLORIDE 50 MG/1
50 TABLET ORAL EVERY 6 HOURS PRN
Qty: 20 TABLET | Refills: 0
Start: 2019-01-16 | End: 2019-01-16

## 2019-01-16 RX ORDER — ACETAMINOPHEN 325 MG/1
TABLET ORAL
Qty: 30 TABLET | Refills: 0
Start: 2019-01-16 | End: 2021-11-05 | Stop reason: ALTCHOICE

## 2019-01-16 RX ORDER — CEFDINIR 300 MG/1
300 CAPSULE ORAL EVERY 12 HOURS SCHEDULED
Status: DISCONTINUED | OUTPATIENT
Start: 2019-01-16 | End: 2019-01-16 | Stop reason: HOSPADM

## 2019-01-16 RX ORDER — CEFDINIR 300 MG/1
1000 CAPSULE ORAL EVERY 24 HOURS
Qty: 27 CAPSULE | Refills: 0 | Status: SHIPPED | OUTPATIENT
Start: 2019-01-16 | End: 2019-01-16

## 2019-01-16 RX ORDER — OXYCODONE HYDROCHLORIDE 5 MG/1
2.5 TABLET ORAL EVERY 4 HOURS PRN
Qty: 20 TABLET | Refills: 0
Start: 2019-01-16 | End: 2019-01-16 | Stop reason: HOSPADM

## 2019-01-16 RX ORDER — TRAMADOL HYDROCHLORIDE 50 MG/1
50 TABLET ORAL EVERY 6 HOURS PRN
Qty: 20 TABLET | Refills: 0 | Status: SHIPPED | OUTPATIENT
Start: 2019-01-16 | End: 2019-01-26

## 2019-01-16 RX ORDER — METRONIDAZOLE 500 MG/1
500 TABLET ORAL EVERY 8 HOURS SCHEDULED
Qty: 27 TABLET | Refills: 0 | Status: SHIPPED | OUTPATIENT
Start: 2019-01-16 | End: 2019-01-25

## 2019-01-16 RX ORDER — VANCOMYCIN HYDROCHLORIDE 125 MG/1
125 CAPSULE ORAL EVERY 12 HOURS
Qty: 24 CAPSULE | Refills: 0 | Status: SHIPPED | OUTPATIENT
Start: 2019-01-16 | End: 2019-01-16 | Stop reason: HOSPADM

## 2019-01-16 RX ADMIN — VANCOMYCIN HYDROCHLORIDE 125 MG: 500 INJECTION, POWDER, LYOPHILIZED, FOR SOLUTION INTRAVENOUS at 08:15

## 2019-01-16 RX ADMIN — ACETAMINOPHEN 650 MG: 325 TABLET, FILM COATED ORAL at 11:58

## 2019-01-16 RX ADMIN — HEPARIN SODIUM 5000 UNITS: 5000 INJECTION INTRAVENOUS; SUBCUTANEOUS at 05:28

## 2019-01-16 RX ADMIN — DEXTROSE, SODIUM CHLORIDE, AND POTASSIUM CHLORIDE 75 ML/HR: 5; .9; .15 INJECTION INTRAVENOUS at 05:26

## 2019-01-16 RX ADMIN — METRONIDAZOLE 500 MG: 500 INJECTION, SOLUTION INTRAVENOUS at 08:15

## 2019-01-16 RX ADMIN — ACETAMINOPHEN 650 MG: 325 TABLET, FILM COATED ORAL at 05:28

## 2019-01-16 RX ADMIN — CEFDINIR 300 MG: 300 CAPSULE ORAL at 11:58

## 2019-01-16 NOTE — PLAN OF CARE
DISCHARGE PLANNING     Discharge to home or other facility with appropriate resources Completed        DISCHARGE PLANNING - CARE MANAGEMENT     Discharge to post-acute care or home with appropriate resources Completed        GASTROINTESTINAL - ADULT     Maintains or returns to baseline bowel function Completed     Maintains adequate nutritional intake Completed        INFECTION - ADULT     Absence or prevention of progression during hospitalization Completed     Absence of fever/infection during neutropenic period Completed        Knowledge Deficit     Patient/family/caregiver demonstrates understanding of disease process, treatment plan, medications, and discharge instructions Completed        Nutrition/Hydration-ADULT     Nutrient/Hydration intake appropriate for improving, restoring or maintaining nutritional needs Completed        PAIN - ADULT     Verbalizes/displays adequate comfort level or baseline comfort level Completed        Potential for Falls     Patient will remain free of falls Completed

## 2019-01-16 NOTE — PROGRESS NOTES
Progress Note - Infectious Disease   Maritza Lantigua 59 y o  male MRN: [id number]  Unit/Bed#: [facility] 824-01 Encounter: [id number]      Impression/Recommendations:  1  Intra-abdominal/pelvic abscess, adjacent to sigmoid colon, with evidence of sigmoid colitis   Statistically, diverticulitis with abscess is most likely  Liudmila Males, patient has no known history of diverticulosis or prior episodes of diverticulitis   We need to consider the possibility of perforated colon mass or cancer   At this point, will continue systemic antibiotic and drainage   Once colonic inflammation is improved, patient will most likely need colon resection   Patient is clinically improved on IV antibiotics   Drainage culture appears polymicrobial, with final results pending, thus far growing E coli and enterobacter   Hopefully, patient will do well with conservative approach with systemic antibiotic and drainage and not need urgent surgical drainage/resection  Change antibiotic to p o  Cefdinir/Flagyl  Monitor abdominal pain  Continue abscess drainage per surgery and IR  Treat x 14 days total, another 9 days      2  Worsening leukocytosis   WBC has normalized   Patient remains systemically well, without systemic toxicity   Admission blood cultures remain negative  Antibiotic changes in above  Monitor WBC      3  Recent influenza   Patient completed Tamiflu course   At present, he has no respiratory symptoms      4  PCN allergy, with rash in the distant past  [facility] Poser is tolerating cephalosporin well   Will stay away from penicillin for now  Liudmila Males, if he really needs it, we may be able to challenge him with penicillin      5  Diarrhea on admission  This has resolved  This is most likely secondary to pelvic abscess above  Although epic flag patient for history of C diff colitis, patient denies it  Therefore, no further need for p o  Vancomycin  Discontinue p o   Vancomycin    Discussed with patient in detail regarding the above plan   Discussed with surgery service  Plan for discharge today noted      Antibiotics:  Ceftriaxone/Flagyl  Post drainage # 5     Subjective:  Patient feels better  Abdominal pain improved, minimal, mainly at drain site  Temperature is down, low-grade overnight   No further chills  He is tolerating antibiotics well   No nausea, vomiting or diarrhea  Objective:  Vitals:  Temp:  [97 8 °F (36 6 °C)-98 5 °F (36 9 °C)] 97 8 °F (36 6 °C)  HR:  [51-54] 51  Resp:  [16] 16  BP: (133-157)/(72-79) 157/72  SpO2:  [98 %] 98 %  Temp (24hrs), Av 1 °F (36 7 °C), Min:97 8 °F (36 6 °C), Max:98 5 °F (36 9 °C)  Current: Temperature: 97 8 °F (36 6 °C)    Physical Exam:     General: Awake, alert, cooperative, no distress  Neck:  Supple  No mass  No lymphadenopathy  Lungs: Expansion symmetric, no rales, no wheezing, respirations unlabored  Heart:  Regular rate and rhythm, S1 and S2 normal, no murmur  Abdomen: Soft, nondistended, minimal RLQ tenderness, bowel sounds active all four quadrants,        no masses, no organomegaly  Drain serous  Extremities: No edema  No erythema/warmth  No ulcer  Nontender to palpation  Skin:  No rash  Neuro: Moves all extremities  Invasive Devices     Peripheral Intravenous Line            Peripheral IV 19 Right; Lower Forearm 3 days    Peripheral IV 19 Left Forearm 1 day          Drain            Closed/Suction Drain Right Back Bulb 10 Fr  4 days                Labs studies:   I have personally reviewed pertinent labs      Results from last 7 days  Lab Units 01/15/19  0534 19  0534 19  0611  01/10/19  2336 01/10/19  1712   POTASSIUM mmol/L 4 2 4 1 3 4*  < > 3 4* 3 5   CHLORIDE mmol/L 109* 106 104  < > 95* 98*   CO2 mmol/L 22 23 22  < > 26 24   BUN mg/dL 5 4* 6  < > 9 8   CREATININE mg/dL 0 61 0 62 0 66  < > 0 86 0 81   EGFR ml/min/1 73sq m 109 108 105  < > 94 97   CALCIUM mg/dL 7 8* 7 5* 7 3*  < > 8 6 8 7   AST U/L  --   --   --   --  40 40   ALT U/L  -- --   --   --  95* 100*   ALK PHOS U/L  --   --   --   --  82 86   < > = values in this interval not displayed  Results from last 7 days  Lab Units 01/15/19  0534 01/14/19  0534 01/13/19  0611   WBC Thousand/uL 10 49* 12 48* 17 48*   HEMOGLOBIN g/dL 11 2* 10 4* 10 1*   PLATELETS Thousands/uL 482* 421* 449*       Results from last 7 days  Lab Units 01/11/19 2029 01/11/19  1109 01/10/19  2342 01/10/19  2336   BLOOD CULTURE   --   --  No Growth After 5 Days  No Growth After 5 Days  GRAM STAIN RESULT   --  4+ Polys  4+ Gram positive cocci in pairs and chains  --   --    BODY FLUID CULTURE, STERILE   --  4+ Growth of Escherichia coli*  4+ Growth of Enterobacter aerogenes*  4+ Growth of   --   --    C DIFF TOXIN B  NEGATIVE for C difficle toxin by PCR    --   --   --        Imaging Studies:   I have personally reviewed pertinent imaging study reports and images in PACS  EKG, Pathology, and Other Studies:   I have personally reviewed pertinent reports

## 2019-01-16 NOTE — PROGRESS NOTES
Pt instructed on how to empty ir drain; pt verbally understands at this time; also, notified waylon hernandez rn from ir regarding flushes; waylon will send up prescription for flushes;

## 2019-01-16 NOTE — NURSING NOTE
Dc instructions reviewed with patient; re reviewed ir drain care; printed out information given to patient on newly prescribed meds and drain care; supplies given to patient for site care; pt verbally understands at this time; per Kaveh Leslie, wife will be in for teaching of site care; wife currently not present at this time, informed on coming [address] [facility] rn

## 2019-01-16 NOTE — PROGRESS NOTES
Progress Note - Colorectal   Rebeca Weldon 59 y o  male MRN: [id number]  Unit/Bed#: [facility] 824-01 Encounter: [id number]      Assessment:  59 y o  M with perisigmoid abscess and soft tissue mass with secondary SBO  Feeling "good" this morning  Tolerated regular diet well yesterday  Felt "really full" all day, but denies any nausea or vomiting  Mild pedal edema, as per wife, yesterday  Compression stockings applied last p m  This morning, no edema noted  Advised pt to get out of bed and walk more  IR drain - 30 mL     Plan:  Discharge to home today with IR drain  Case management for drain care - wife will do it  Transition to PO Cefdinir/Flagyl through 01/25          Objective:    Blood pressure 141/72, pulse (!) 54, temperature 98 1 °F (36 7 °C), temperature source Oral, resp  rate 16, height 6' 3" (1 905 m), weight 79 4 kg (175 lb), SpO2 98 %  ,Body mass index is 21 87 kg/m²  Intake/Output Summary (Last 24 hours) at 01/16/19 0528  Last data filed at 01/16/19 0300   Gross per 24 hour   Intake          2389 17 ml   Output             2905 ml   Net          -515 83 ml       Invasive Devices     Peripheral Intravenous Line            Peripheral IV 01/13/19 Right; Lower Forearm 3 days    Peripheral IV 01/14/19 Left Forearm 1 day          Drain            Closed/Suction Drain Right Back Bulb 10 Fr  4 days                Physical Exam: General appearance: alert and oriented, in no acute distress  Head: Normocephalic, without obvious abnormality, atraumatic  Lungs: clear to auscultation bilaterally  Heart: regular rate and rhythm, S1, S2 normal, no murmur, click, rub or gallop  Abdomen: normal findings: bowel sounds normal and soft, no guarding or rebound tenderness   and abnormal findings:  mildly TTP and distension improved but still present  Extremities: extremities normal, warm and well-perfused; no cyanosis, clubbing, or edema  Skin: Skin color, texture, turgor normal  No rashes or lesions    Lab, Imaging and other studies:  CBC: No results found for: WBC, HGB, HCT, MCV, PLT, ADJUSTEDWBC, MCH, MCHC, RDW, MPV, NRBC  VTE Pharmacologic Prophylaxis: Heparin  VTE Mechanical Prophylaxis: sequential compression device

## 2019-01-16 NOTE — DISCHARGE SUMMARY
Discharge Summary - Colorectal Surgery   Emilia Lemus 59 y o  male MRN: [id number]  [facility] Encounter: [id number]        Admitting Diagnosis: Sigmoid colitis, pelvic abscess, small bowel obstruction    Admit Date: 01/10/2019    Discharge Diagnosis: Sigmoid colitis, pelvic abscess, small bowel obstruction    Discharge Date: 01/16/2019    HPI: 59 y o  M presented on 01/10/2019 to the emergency department at [facility] after a CT suspicious for pelvic abscess and small bowel obstruction and a white count of 30, which were done due to him experiencing abdominal pain, nausea, diarrhea, and generally feeling unwell for the past 10 days  Procedures Performed: 01/11/2019 CT-guided 10 [place] drain placement per IR      Hospital Course: Drain placement on 01/11/2019 went without complication  He tolerated a clear liquids diet well after the procedure, so was progressed to toast and crackers on 01/14/2019, then low-residue diet on 01/15/2019  No nausea or vomiting  He started passing flatus and having bowel movements, and his abdominal pain was well controlled  Infectious disease is managing his antibiotics after discharge  The IR drain will remain in place at discharge, and patient will follow up with Edwin Way MD for another colonoscopy  Complications: none      Condition at Discharge: good     Discharge instructions/Information to patient and family:   See after visit summary for information provided to patient and family  Provisions for Follow-Up Care:  See after visit summary for information related to follow-up care and any pertinent home health orders  Disposition: Home    Planned Readmission: No    Discharge Statement   I spent 30 minutes discharging the patient  This time was spent on the day of discharge  I had direct contact with the patient on the day of discharge  Additional documentation is required if more than 30 minutes were spent on discharge       Discharge Medications:  See after visit summary for reconciled discharge medications provided to patient and family

## 2019-01-16 NOTE — DISCHARGE INSTRUCTIONS
TUBE CARE INSTRUCTIONS    Care after your procedure:    Resume your normal diet  Small sips of flat soda will help with nausea  1  The properly functioning catheter should be forward flushed once (1x) daily with 10ml of normal saline using clean technique  You will be given a prescription for flushes  To flush the tube, clean both connections with alcohol swab  Twist off the drainage bag/ bulb  tubing and twist the saline syringe into the drainage tube and flush  Remove the syringe and twist the drainage bag / bulb tubing tubing back on     2  The drainage bag/bulb may be emptied as necessary  Keep a record of the amount of fluid you drain from your tube  This should be done with clean technique as well  3  A fresh dressing should be applied daily over the tube insertion site  4  As the tube is secured to the skin with only a suture,try not to pull on your tube  Tub baths are not permitted  Showers are permitted if the patient's skin entry site is prevented from getting wet  Similarly, washcloth "baths" are acceptable  Contact Interventional Radiology at [phone] Felisa PATIENTS: Contact Interventional Radiology at [phone]) Prasanna Johnson PATIENTS: Contact Interventional Radiology at [phone]) if:    1  Leakage or large amounts of liquid around the catheter  2  Fever of 101 degrees lasting several hours without other obvious cause (such as sore throat, flu, etc)  3  Persistent nausea or vomiting  4  Diminished drainage, which may be associated with pressure or pain  Or when the     drainage from your tube is less than 10mls for 48 hours  5  Catheter pulled back or falls out  The following pharmacies carry the flush syringes         [facility]                     [facility]  [address]                         [address] [facility]  Phone [phone]            Phone [phone]   [place] [address]                                [phone]  [address] [facility] [address]KS                      [address] [facility]  Phone [phone]            Phone [phone]                      Keenan Brian                                                                                                          [phone]  [facility]  [facility] [address]    [address]  Phone [phone]

## 2019-01-16 NOTE — PROGRESS NOTES
Pt wanting to shower at home; instructed by Jacob lópez, he may shower at home; pt is to remove the dressing first and replace it after he is finished; pt instructed and verbally understands

## 2019-01-17 ENCOUNTER — HOSPITAL ENCOUNTER (OUTPATIENT)
Dept: RADIOLOGY | Facility: HOSPITAL | Age: 59
Discharge: HOME | End: 2019-01-17
Admitting: RADIOLOGY
Payer: COMMERCIAL

## 2019-01-17 DIAGNOSIS — L02.91 ABSCESS: ICD-10-CM

## 2019-01-17 PROCEDURE — 76080 X-RAY EXAM OF FISTULA: CPT | Performed by: RADIOLOGY

## 2019-01-17 PROCEDURE — 76080 X-RAY EXAM OF FISTULA: CPT

## 2019-01-17 PROCEDURE — 49424 ASSESS CYST CONTRAST INJECT: CPT

## 2019-01-17 PROCEDURE — 49424 ASSESS CYST CONTRAST INJECT: CPT | Performed by: RADIOLOGY

## 2019-01-17 RX ADMIN — IOHEXOL 5 ML: 300 INJECTION, SOLUTION INTRAVENOUS at 12:03

## 2019-01-25 NOTE — PROGRESS NOTES
Colon and Rectal Surgery   Darlyn Larson 59 y o  male MRN: [id number]   Encounter: [id number]  01/28/19   9:22 AM      HPI  Darlyn Larson is a 59 y o  male who presents today for evaluation of sigmoid colitis, pelvic abscess, small bowel obstruction  He was seen in the ER on 1/10/2019 after a CT suspicious for pelvic abscess and small bowel obstruction  The reason for the CT was because the patient experiencing abdominal pain, nausea, and diarrhea  IR placed a drain for the pelvic abscess on 1/11/2019  He had an IR tube check on 1/17/2019, this showed significant improvement in pelvic abscess  There is a small residual cavity but no fistula to the bowel is seen and output has been clear  He states since leaving the hospital he has had minimal drainage  He states on Friday he started experiencing right upper quandrant abdominal pain  On Saturday the abdominal pain worsened and he decided to go the emergency room  He had a CT of the abdomen and pelvis on 1/26/2019 this showed interval complete drainage of the pelvic abscess with percutaneous catheter  Minimal/subtle pericolonic fat stranding at the splenic flecture of uncertain significance  No visible intrinsic bowel abnormality in that area  He states the abdominal pain is now has subsided, but still uncomfortable  He has daily bowel movements with soft formed stool   He denies rectal bleeding, change in bowel, or nausea/vomiting       Historical Information   Past Medical History:   Diagnosis Date    Hypertension      Past Surgical History:   Procedure Laterality Date    CT GUIDED PERC DRAINAGE CATHETER PLACEMENT  1/11/2019       Meds/Allergies       Current Outpatient Prescriptions:     acetaminophen (TYLENOL) 325 mg tablet, OTC, Disp: 30 tablet, Rfl: 0    ascorbic acid (VITAMIN C) 500 mg tablet, Take 500 mg by mouth daily, Disp: , Rfl:     aspirin (ECOTRIN LOW STRENGTH) 81 mg EC tablet, Take 81 mg by mouth daily, Disp: , Rfl:     losartan (COZAAR) 50 mg tablet, Take 50 mg by mouth daily  , Disp: , Rfl:     multivitamin (THERAGRAN) TABS, Take 1 tablet by mouth daily, Disp: , Rfl:       Allergies   Allergen Reactions    Penicillins Rash         Social History   History   Alcohol Use No     History   Drug Use No     History   Smoking Status    Former Smoker    Packs/day: 0 50    Types: Cigarettes   Smokeless Tobacco    Never Used         Family History: No family history on file  Review of Systems   Constitutional: Negative  HENT: Negative  Eyes: Negative  Respiratory: Negative  Cardiovascular: Negative  Gastrointestinal: Positive for abdominal pain  Endocrine: Negative  Genitourinary: Negative  Musculoskeletal: Negative  Skin: Negative  Allergic/Immunologic: Negative  Neurological: Negative  Hematological: Negative  Psychiatric/Behavioral: Negative  Objective     Current Vitals:   Vitals:    01/28/19 0846   Weight: 80 3 kg (177 lb)   Height: 6' 3" (1 905 m)         Physical Exam:  General:no distress  Eyes:perrla/eomi  ENT:moist mucus membranes  Neck:supple  Pulm:no increased work of breathing, clear bilateral  CV:sinus rhythm  Abdomen:soft,nontender,drain in place, scant  Rectal:deferred to colonoscopy  Extremities:no edema  Lymphatics:no neck/axillary/groin lymphadenopathy    ASSESSMENT:    Diverticulitis abscess, cannot rule out colonic mass or cancer process as discussed with him at hospitalization, complicated abscess by size parameters even if benign, recommended/discussed laparoscopic sigmoid colectomy, possible low anterior resection      We dicussed laparoscopic possible open sigmoid colectomy, possible low anterior resection in a face-to-face, personal, informed consent process, the benefits, alternatives,   risks including not limited to bleeding, infection, risks of anesthesia, open surgery, DVT/PE, heart attack, stroke, death, damage to local structures(e g  ureter, duodenum),anastomotic leak requiring reoperation, temporary versus permanent stoma  He understood these risks and wished to proceed        PLAN:  -Laparoscopic possible open sigmoid colectomy, possible low anterior resection, tentative 2/21/19 with day prior colonoscopy as overdue, will request Urology stents  -Boost/Ensure 2-3x/day, continue IR drain, continue tobacco cessation  -Will request PCP clearance

## 2019-01-25 NOTE — H&P (VIEW-ONLY)
Colon and Rectal Surgery   Melchor Larsen 59 y o  male MRN: [id number]   Encounter: [id number]  01/28/19   9:22 AM      HPI  Melchor Larsen is a 59 y o  male who presents today for evaluation of sigmoid colitis, pelvic abscess, small bowel obstruction  He was seen in the ER on 1/10/2019 after a CT suspicious for pelvic abscess and small bowel obstruction  The reason for the CT was because the patient experiencing abdominal pain, nausea, and diarrhea  IR placed a drain for the pelvic abscess on 1/11/2019  He had an IR tube check on 1/17/2019, this showed significant improvement in pelvic abscess  There is a small residual cavity but no fistula to the bowel is seen and output has been clear  He states since leaving the hospital he has had minimal drainage  He states on Friday he started experiencing right upper quandrant abdominal pain  On Saturday the abdominal pain worsened and he decided to go the emergency room  He had a CT of the abdomen and pelvis on 1/26/2019 this showed interval complete drainage of the pelvic abscess with percutaneous catheter  Minimal/subtle pericolonic fat stranding at the splenic flecture of uncertain significance  No visible intrinsic bowel abnormality in that area  He states the abdominal pain is now has subsided, but still uncomfortable  He has daily bowel movements with soft formed stool   He denies rectal bleeding, change in bowel, or nausea/vomiting       Historical Information   Past Medical History:   Diagnosis Date    Hypertension      Past Surgical History:   Procedure Laterality Date    CT GUIDED PERC DRAINAGE CATHETER PLACEMENT  1/11/2019       Meds/Allergies       Current Outpatient Prescriptions:     acetaminophen (TYLENOL) 325 mg tablet, OTC, Disp: 30 tablet, Rfl: 0    ascorbic acid (VITAMIN C) 500 mg tablet, Take 500 mg by mouth daily, Disp: , Rfl:     aspirin (ECOTRIN LOW STRENGTH) 81 mg EC tablet, Take 81 mg by mouth daily, Disp: , Rfl:     losartan (COZAAR) 50 mg tablet, Take 50 mg by mouth daily  , Disp: , Rfl:     multivitamin (THERAGRAN) TABS, Take 1 tablet by mouth daily, Disp: , Rfl:       Allergies   Allergen Reactions    Penicillins Rash         Social History   History   Alcohol Use No     History   Drug Use No     History   Smoking Status    Former Smoker    Packs/day: 0 50    Types: Cigarettes   Smokeless Tobacco    Never Used         Family History: No family history on file  Review of Systems   Constitutional: Negative  HENT: Negative  Eyes: Negative  Respiratory: Negative  Cardiovascular: Negative  Gastrointestinal: Positive for abdominal pain  Endocrine: Negative  Genitourinary: Negative  Musculoskeletal: Negative  Skin: Negative  Allergic/Immunologic: Negative  Neurological: Negative  Hematological: Negative  Psychiatric/Behavioral: Negative  Objective     Current Vitals:   Vitals:    01/28/19 0846   Weight: 80 3 kg (177 lb)   Height: 6' 3" (1 905 m)         Physical Exam:  General:no distress  Eyes:perrla/eomi  ENT:moist mucus membranes  Neck:supple  Pulm:no increased work of breathing, clear bilateral  CV:sinus rhythm  Abdomen:soft,nontender,drain in place, scant  Rectal:deferred to colonoscopy  Extremities:no edema  Lymphatics:no neck/axillary/groin lymphadenopathy    ASSESSMENT:    Diverticulitis abscess, cannot rule out colonic mass or cancer process as discussed with him at hospitalization, complicated abscess by size parameters even if benign, recommended/discussed laparoscopic sigmoid colectomy, possible low anterior resection      We dicussed laparoscopic possible open sigmoid colectomy, possible low anterior resection in a face-to-face, personal, informed consent process, the benefits, alternatives,   risks including not limited to bleeding, infection, risks of anesthesia, open surgery, DVT/PE, heart attack, stroke, death, damage to local structures(e g  ureter, duodenum),anastomotic leak requiring reoperation, temporary versus permanent stoma  He understood these risks and wished to proceed        PLAN:  -Laparoscopic possible open sigmoid colectomy, possible low anterior resection, tentative 2/21/19 with day prior colonoscopy as overdue, will request Urology stents  -Boost/Ensure 2-3x/day, continue IR drain, continue tobacco cessation  -Will request PCP clearance

## 2019-01-25 NOTE — H&P (VIEW-ONLY)
Colon and Rectal Surgery   Kai Cook 59 y o  male MRN: [id number]   Encounter: [id number]  01/28/19   9:22 AM      HPI  Kai Cook is a 59 y o  male who presents today for evaluation of sigmoid colitis, pelvic abscess, small bowel obstruction  He was seen in the ER on 1/10/2019 after a CT suspicious for pelvic abscess and small bowel obstruction  The reason for the CT was because the patient experiencing abdominal pain, nausea, and diarrhea  IR placed a drain for the pelvic abscess on 1/11/2019  He had an IR tube check on 1/17/2019, this showed significant improvement in pelvic abscess  There is a small residual cavity but no fistula to the bowel is seen and output has been clear  He states since leaving the hospital he has had minimal drainage  He states on Friday he started experiencing right upper quandrant abdominal pain  On Saturday the abdominal pain worsened and he decided to go the emergency room  He had a CT of the abdomen and pelvis on 1/26/2019 this showed interval complete drainage of the pelvic abscess with percutaneous catheter  Minimal/subtle pericolonic fat stranding at the splenic flecture of uncertain significance  No visible intrinsic bowel abnormality in that area  He states the abdominal pain is now has subsided, but still uncomfortable  He has daily bowel movements with soft formed stool   He denies rectal bleeding, change in bowel, or nausea/vomiting       Historical Information   Past Medical History:   Diagnosis Date    Hypertension      Past Surgical History:   Procedure Laterality Date    CT GUIDED PERC DRAINAGE CATHETER PLACEMENT  1/11/2019       Meds/Allergies       Current Outpatient Prescriptions:     acetaminophen (TYLENOL) 325 mg tablet, OTC, Disp: 30 tablet, Rfl: 0    ascorbic acid (VITAMIN C) 500 mg tablet, Take 500 mg by mouth daily, Disp: , Rfl:     aspirin (ECOTRIN LOW STRENGTH) 81 mg EC tablet, Take 81 mg by mouth daily, Disp: , Rfl:     losartan (COZAAR) 50 mg tablet, Take 50 mg by mouth daily  , Disp: , Rfl:     multivitamin (THERAGRAN) TABS, Take 1 tablet by mouth daily, Disp: , Rfl:       Allergies   Allergen Reactions    Penicillins Rash         Social History   History   Alcohol Use No     History   Drug Use No     History   Smoking Status    Former Smoker    Packs/day: 0 50    Types: Cigarettes   Smokeless Tobacco    Never Used         Family History: No family history on file  Review of Systems   Constitutional: Negative  HENT: Negative  Eyes: Negative  Respiratory: Negative  Cardiovascular: Negative  Gastrointestinal: Positive for abdominal pain  Endocrine: Negative  Genitourinary: Negative  Musculoskeletal: Negative  Skin: Negative  Allergic/Immunologic: Negative  Neurological: Negative  Hematological: Negative  Psychiatric/Behavioral: Negative  Objective     Current Vitals:   Vitals:    01/28/19 0846   Weight: 80 3 kg (177 lb)   Height: 6' 3" (1 905 m)         Physical Exam:  General:no distress  Eyes:perrla/eomi  ENT:moist mucus membranes  Neck:supple  Pulm:no increased work of breathing, clear bilateral  CV:sinus rhythm  Abdomen:soft,nontender,drain in place, scant  Rectal:deferred to colonoscopy  Extremities:no edema  Lymphatics:no neck/axillary/groin lymphadenopathy    ASSESSMENT:    Diverticulitis abscess, cannot rule out colonic mass or cancer process as discussed with him at hospitalization, complicated abscess by size parameters even if benign, recommended/discussed laparoscopic sigmoid colectomy, possible low anterior resection      We dicussed laparoscopic possible open sigmoid colectomy, possible low anterior resection in a face-to-face, personal, informed consent process, the benefits, alternatives,   risks including not limited to bleeding, infection, risks of anesthesia, open surgery, DVT/PE, heart attack, stroke, death, damage to local structures(e g  ureter, duodenum),anastomotic leak requiring reoperation, temporary versus permanent stoma  He understood these risks and wished to proceed        PLAN:  -Laparoscopic possible open sigmoid colectomy, possible low anterior resection, tentative 2/21/19 with day prior colonoscopy as overdue, will request Urology stents  -Boost/Ensure 2-3x/day, continue IR drain, continue tobacco cessation  -Will request PCP clearance

## 2019-01-26 ENCOUNTER — HOSPITAL ENCOUNTER (EMERGENCY)
Facility: HOSPITAL | Age: 59
Discharge: HOME | End: 2019-01-26
Attending: EMERGENCY MEDICINE | Admitting: EMERGENCY MEDICINE
Payer: COMMERCIAL

## 2019-01-26 ENCOUNTER — APPOINTMENT (EMERGENCY)
Dept: RADIOLOGY | Facility: HOSPITAL | Age: 59
End: 2019-01-26
Payer: COMMERCIAL

## 2019-01-26 VITALS
BODY MASS INDEX: 22.13 KG/M2 | HEIGHT: 75 IN | DIASTOLIC BLOOD PRESSURE: 80 MMHG | RESPIRATION RATE: 16 BRPM | HEART RATE: 58 BPM | OXYGEN SATURATION: 97 % | TEMPERATURE: 97.9 F | SYSTOLIC BLOOD PRESSURE: 132 MMHG | WEIGHT: 178 LBS

## 2019-01-26 DIAGNOSIS — R10.9 RIGHT SIDED ABDOMINAL PAIN: Primary | ICD-10-CM

## 2019-01-26 LAB
ALBUMIN SERPL BCP-MCNC: 3 G/DL (ref 3.5–5)
ALP SERPL-CCNC: 75 U/L (ref 46–116)
ALT SERPL W P-5'-P-CCNC: 22 U/L (ref 12–78)
ANION GAP SERPL CALCULATED.3IONS-SCNC: 9 MMOL/L (ref 4–13)
AST SERPL W P-5'-P-CCNC: 11 U/L (ref 5–45)
BASOPHILS # BLD AUTO: 0.1 THOUSANDS/ΜL (ref 0–0.1)
BASOPHILS NFR BLD AUTO: 1 % (ref 0–1)
BILIRUB SERPL-MCNC: 0.38 MG/DL (ref 0.2–1)
BILIRUB UR QL STRIP: NEGATIVE
BUN SERPL-MCNC: 8 MG/DL (ref 5–25)
CALCIUM SERPL-MCNC: 8.5 MG/DL (ref 8.3–10.1)
CHLORIDE SERPL-SCNC: 103 MMOL/L (ref 100–108)
CLARITY UR: CLEAR
CO2 SERPL-SCNC: 25 MMOL/L (ref 21–32)
COLOR UR: YELLOW
COLOR, POC: YELLOW
CREAT SERPL-MCNC: 0.75 MG/DL (ref 0.6–1.3)
EOSINOPHIL # BLD AUTO: 0.18 THOUSAND/ΜL (ref 0–0.61)
EOSINOPHIL NFR BLD AUTO: 2 % (ref 0–6)
ERYTHROCYTE [DISTWIDTH] IN BLOOD BY AUTOMATED COUNT: 14.7 % (ref 11.6–15.1)
GFR SERPL CREATININE-BSD FRML MDRD: 100 ML/MIN/1.73SQ M
GLUCOSE SERPL-MCNC: 91 MG/DL (ref 65–140)
GLUCOSE UR STRIP-MCNC: NEGATIVE MG/DL
HCT VFR BLD AUTO: 35.2 % (ref 36.5–49.3)
HGB BLD-MCNC: 11.5 G/DL (ref 12–17)
HGB UR QL STRIP.AUTO: NEGATIVE
IMM GRANULOCYTES # BLD AUTO: 0.03 THOUSAND/UL (ref 0–0.2)
IMM GRANULOCYTES NFR BLD AUTO: 0 % (ref 0–2)
KETONES UR STRIP-MCNC: ABNORMAL MG/DL
LEUKOCYTE ESTERASE UR QL STRIP: NEGATIVE
LIPASE SERPL-CCNC: 146 U/L (ref 73–393)
LYMPHOCYTES # BLD AUTO: 2.44 THOUSANDS/ΜL (ref 0.6–4.47)
LYMPHOCYTES NFR BLD AUTO: 29 % (ref 14–44)
MCH RBC QN AUTO: 31.8 PG (ref 26.8–34.3)
MCHC RBC AUTO-ENTMCNC: 32.7 G/DL (ref 31.4–37.4)
MCV RBC AUTO: 97 FL (ref 82–98)
MONOCYTES # BLD AUTO: 0.82 THOUSAND/ΜL (ref 0.17–1.22)
MONOCYTES NFR BLD AUTO: 10 % (ref 4–12)
NEUTROPHILS # BLD AUTO: 4.92 THOUSANDS/ΜL (ref 1.85–7.62)
NEUTS SEG NFR BLD AUTO: 58 % (ref 43–75)
NITRITE UR QL STRIP: NEGATIVE
NRBC BLD AUTO-RTO: 0 /100 WBCS
PH UR STRIP.AUTO: 6 [PH] (ref 4.5–8)
PLATELET # BLD AUTO: 641 THOUSANDS/UL (ref 149–390)
PMV BLD AUTO: 8.6 FL (ref 8.9–12.7)
POTASSIUM SERPL-SCNC: 3.8 MMOL/L (ref 3.5–5.3)
PROT SERPL-MCNC: 6.4 G/DL (ref 6.4–8.2)
PROT UR STRIP-MCNC: NEGATIVE MG/DL
RBC # BLD AUTO: 3.62 MILLION/UL (ref 3.88–5.62)
SODIUM SERPL-SCNC: 137 MMOL/L (ref 136–145)
SP GR UR STRIP.AUTO: <=1.005 (ref 1–1.03)
UROBILINOGEN UR QL STRIP.AUTO: 0.2 E.U./DL
WBC # BLD AUTO: 8.49 THOUSAND/UL (ref 4.31–10.16)

## 2019-01-26 PROCEDURE — 85025 COMPLETE CBC W/AUTO DIFF WBC: CPT | Performed by: EMERGENCY MEDICINE

## 2019-01-26 PROCEDURE — 74177 CT ABD & PELVIS W/CONTRAST: CPT

## 2019-01-26 PROCEDURE — 80053 COMPREHEN METABOLIC PANEL: CPT | Performed by: EMERGENCY MEDICINE

## 2019-01-26 PROCEDURE — 96360 HYDRATION IV INFUSION INIT: CPT

## 2019-01-26 PROCEDURE — 81003 URINALYSIS AUTO W/O SCOPE: CPT

## 2019-01-26 PROCEDURE — 83690 ASSAY OF LIPASE: CPT | Performed by: EMERGENCY MEDICINE

## 2019-01-26 PROCEDURE — 96361 HYDRATE IV INFUSION ADD-ON: CPT

## 2019-01-26 PROCEDURE — 36415 COLL VENOUS BLD VENIPUNCTURE: CPT | Performed by: EMERGENCY MEDICINE

## 2019-01-26 PROCEDURE — 99284 EMERGENCY DEPT VISIT MOD MDM: CPT

## 2019-01-26 RX ORDER — ASCORBIC ACID 500 MG
500 TABLET ORAL DAILY
COMMUNITY

## 2019-01-26 RX ORDER — DIPHENOXYLATE HYDROCHLORIDE AND ATROPINE SULFATE 2.5; .025 MG/1; MG/1
1 TABLET ORAL DAILY
COMMUNITY
End: 2021-11-05 | Stop reason: ALTCHOICE

## 2019-01-26 RX ADMIN — SODIUM CHLORIDE 1000 ML: 0.9 INJECTION, SOLUTION INTRAVENOUS at 19:43

## 2019-01-26 RX ADMIN — IOHEXOL 100 ML: 350 INJECTION, SOLUTION INTRAVENOUS at 19:27

## 2019-01-26 NOTE — ED ATTENDING ATTESTATION
Maria Elena Devi MD, saw and evaluated the patient  I have discussed the patient with the resident/non-physician practitioner and agree with the resident's/non-physician practitioner's findings, Plan of Care, and MDM as documented in the resident's/non-physician practitioner's note, except where noted  All available labs and Radiology studies were reviewed  At this point I agree with the current assessment done in the Emergency Department  I have conducted an independent evaluation of this patient a history and physical is as follows:  Pt co r lower abd pain Pt has drain of abscess of unknown source Pt had r sided abd pain  10 days later seen here and abscess noted Pt had drain and iv antibiotics Pt finished antibiotics at home today  Past 4 - 5 days of worsening pain very localized to r Pt states pain is intermittent worse with movement and walking Pt has had sweats no measured fevers  Urine is dark past couple of days no dysuria   PE: alert nad heart reg lungs clear abd soft tender r side ext nad neuro nonfocal MDM: will do ct abd check labs    Critical Care Time  CritCare Time    Procedures

## 2019-01-27 NOTE — DISCHARGE INSTRUCTIONS
Abdominal Pain   WHAT YOU NEED TO KNOW:   Abdominal pain can be dull, achy, or sharp  You may have pain in one area of your abdomen, or in your entire abdomen  Your pain may be caused by a condition such as constipation, food sensitivity or poisoning, infection, or a blockage  Abdominal pain can also be from a hernia, appendicitis, or an ulcer  Liver, gallbladder, or kidney conditions can also cause abdominal pain  The cause of your abdominal pain may be unknown  DISCHARGE INSTRUCTIONS:   Return to the emergency department if:   · You have new chest pain or shortness of breath  · You have pulsing pain in your upper abdomen or lower back that suddenly becomes constant  · Your pain is in the right lower abdominal area and worsens with movement  · You have a fever over 100 4°F (38°C) or shaking chills  · You are vomiting and cannot keep food or liquids down  · Your pain does not improve or gets worse over the next 8 to 12 hours  · You see blood in your vomit or bowel movements, or they look black and tarry  · Your skin or the whites of your eyes turn yellow  · You are a woman and have a large amount of vaginal bleeding that is not your monthly period  Contact your healthcare provider if:   · You have pain in your lower back  · You are a man and have pain in your testicles  · You have pain when you urinate  · You have questions or concerns about your condition or care  Follow up with your healthcare provider within 24 hours or as directed:  Write down your questions so you remember to ask them during your visits  Medicines:   · Medicines  may be given to calm your stomach and prevent vomiting or to decrease pain  Ask how to take pain medicine safely  · Take your medicine as directed  Contact your healthcare provider if you think your medicine is not helping or if you have side effects  Tell him of her if you are allergic to any medicine   Keep a list of the medicines, vitamins, and herbs you take  Include the amounts, and when and why you take them  Bring the list or the pill bottles to follow-up visits  Carry your medicine list with you in case of an emergency  © 2017 [address] [employer] Information is for End User's use only and may not be sold, redistributed or otherwise used for commercial purposes  All illustrations and images included in CareNotes® are the copyrighted property of [employer]  or [employer]  The above information is an  only  It is not intended as medical advice for individual conditions or treatments  Talk to your doctor, nurse or pharmacist before following any medical regimen to see if it is safe and effective for you

## 2019-01-27 NOTE — CONSULTS
Consultation - Colorectal Surgery   Chani Briscoe 59 y o  male MRN: [id number]  Unit/Bed#: [facility] Encounter: [id number]    Assessment/Plan     Assessment:  58M w/sigmoid colitis and associated pelvic abscess s/p IR drain placement who presents with abdominal pain  Plan:  - CT shows resolution of abscess, no source for increased pain  - prn analgesia, patient still has tramadol at home  - will follow up as scheduled with Dr Montse Reynoso in the office on Monday morning      History of Present Illness     HPI:  Chani Briscoe is a 59 y o  male who presents with abdominal pain in the right lower quadrant  Patient was admitted to the colorectal surgery service from 1/10-1/16 with a pelvic abscess and associated small bowel obstruction  IR was consulted and performed a CT-guided drain placement on 01/11  Patient did well immediately following this and was discharged home in stable condition to follow up with Dr Ajay Manuel in the office  He presents today for evaluation of continued abdominal pain  He describes the pain as being located in the right lower quadrant  He recently finished his antibiotic course as instructed by Infectious Disease  His drain has been putting out very minimal purulent fluid  He denies fever/chills, chest pain, shortness of breath, nausea/vomiting, intolerance to p o , constipation, diarrhea, blood in stool, lightheadedness, dizziness  He has otherwise been well  Consults    Review of Systems   Constitutional: Negative for chills and fever  HENT: Negative  Eyes: Negative  Respiratory: Negative  Negative for cough and shortness of breath  Cardiovascular: Negative  Negative for chest pain and palpitations  Gastrointestinal: Positive for abdominal pain  Negative for blood in stool, constipation, diarrhea, nausea and vomiting  Endocrine: Negative  Genitourinary: Negative  Musculoskeletal: Negative  Skin: Negative  Allergic/Immunologic: Negative  Neurological: Negative  Hematological: Negative  Psychiatric/Behavioral: Negative  Historical Information   Past Medical History:   Diagnosis Date    Hypertension      Past Surgical History:   Procedure Laterality Date    CT GUIDED PERC DRAINAGE CATHETER PLACEMENT  1/11/2019     Social History   History   Alcohol Use No     History   Drug Use No     History   Smoking Status    Former Smoker    Packs/day: 0 50    Types: Cigarettes   Smokeless Tobacco    Never Used     Family History: non-contributory    Meds/Allergies   all current active meds have been reviewed  Allergies   Allergen Reactions    Penicillins Rash       Objective   First Vitals:   Blood Pressure: 155/82 (01/26/19 1822)  Pulse: 60 (01/26/19 1822)  Temperature: 97 9 °F (36 6 °C) (01/26/19 1822)  Temp Source: Oral (01/26/19 1822)  Respirations: 18 (01/26/19 1822)  Height: 6' 3" (190 5 cm) (01/26/19 1822)  Weight - Scale: 80 7 kg (178 lb) (01/26/19 1822)  SpO2: 99 % (01/26/19 1822)    Current Vitals:   Blood Pressure: 155/82 (01/26/19 1822)  Pulse: 60 (01/26/19 1822)  Temperature: 97 9 °F (36 6 °C) (01/26/19 1822)  Temp Source: Oral (01/26/19 1822)  Respirations: 18 (01/26/19 1822)  Height: 6' 3" (190 5 cm) (01/26/19 1822)  Weight - Scale: 80 7 kg (178 lb) (01/26/19 1822)  SpO2: 99 % (01/26/19 1822)    No intake or output data in the 24 hours ending 01/26/19 2143    Invasive Devices     Peripheral Intravenous Line            Peripheral IV 01/26/19 Left Antecubital less than 1 day          Drain            Closed/Suction Drain Right Back Bulb 10 Fr  15 days                Physical Exam   Constitutional: He is oriented to person, place, and time  He appears well-developed and well-nourished  No distress  HENT:   Head: Normocephalic and atraumatic  Eyes: Pupils are equal, round, and reactive to light  EOM are normal  No scleral icterus  Neck: No JVD present  Cardiovascular: Normal rate, regular rhythm and normal heart sounds  Pulmonary/Chest: Effort normal and breath sounds normal  No stridor  No respiratory distress  Abdominal: Soft  He exhibits no distension  There is tenderness (RLQ)  There is no rebound and no guarding  JUAN PABLO in place with minimal dark brown fluid in bulb   Musculoskeletal: He exhibits no edema  Neurological: He is alert and oriented to person, place, and time  No cranial nerve deficit  Skin: Skin is warm and dry  He is not diaphoretic  Psychiatric: He has a normal mood and affect  Lab Results:   I have personally reviewed pertinent lab results  , CBC:   Lab Results   Component Value Date    WBC 8 49 01/26/2019    HGB 11 5 (L) 01/26/2019    HCT 35 2 (L) 01/26/2019    MCV 97 01/26/2019     (H) 01/26/2019    MCH 31 8 01/26/2019    MCHC 32 7 01/26/2019    RDW 14 7 01/26/2019    MPV 8 6 (L) 01/26/2019    NRBC 0 01/26/2019   , CMP:   Lab Results   Component Value Date    SODIUM 137 01/26/2019    K 3 8 01/26/2019     01/26/2019    CO2 25 01/26/2019    BUN 8 01/26/2019    CREATININE 0 75 01/26/2019    CALCIUM 8 5 01/26/2019    AST 11 01/26/2019    ALT 22 01/26/2019    ALKPHOS 75 01/26/2019    EGFR 100 01/26/2019     Imaging: I have personally reviewed pertinent reports  Ct Abdomen Pelvis W Contrast    Result Date: 1/26/2019  Impression: Interval complete drainage of the pelvic abscess with percutaneous catheter  Minimal/subtle pericolonic fat stranding at the splenic flecture of uncertain significance  No visible intrinsic bowel abnormality in that area  Workstation performed: [id number]     EKG, Pathology, and Other Studies: I have personally reviewed pertinent reports  Counseling / Coordination of Care  Total floor / unit time spent today 30 minutes  Greater than 50% of total time was spent with the patient and / or family counseling and / or coordination of care

## 2019-01-27 NOTE — ED PROVIDER NOTES
History  Chief Complaint   Patient presents with    Abdominal Pain     Pt c/o right quadrant pain  Pt with drain in place and scheduled for colon resection and recently had an abscess in abdomen     58 y/o male presents to the ED for right lower abd pain  Patient states that he was seen here a month ago for similar pain - diagnosed with SBO with sigmoid colitis and abscess of unknown source- he had IR drainage at that time and was discharged home with cefdinir and flagyl, which he completed today  He reports that he has had worsening right lower abd pain for the last 5 days  States that pain is intermittent, sharp, and worse with movement  Lying still improves the pain  He has taken tylenol without any relief  He states that he still has the IR drain, which has not been draining anything out for over 1 week  He states that there is mild purulent drainage around the site, which is unchanged from prior  Denies any redness or swelling of the site  He denies any n/v, d/c, blood in his stool, cp, sob, or f/c  States that his urine is tea colored and foul smelling  No dysuria or frequency  He states that he is scheduled to see his colorectal surgeon on Monday for possible colonoscopy and surgery  History provided by:  Patient  Abdominal Pain   Pain location:  RLQ  Pain quality: sharp    Pain radiates to:  Does not radiate  Pain severity:  Moderate  Onset quality:  Gradual  Timing:  Constant  Progression:  Worsening  Chronicity:  New  Context: previous surgery    Relieved by:  None tried  Worsened by:  Nothing  Ineffective treatments:  None tried  Associated symptoms: no chest pain, no chills, no constipation, no cough, no diarrhea, no dysuria, no fever, no hematuria, no nausea, no shortness of breath, no sore throat and no vomiting    Risk factors: has not had multiple surgeries        Prior to Admission Medications   Prescriptions Last Dose Informant Patient Reported?  Taking?   acetaminophen (TYLENOL) 325 mg tablet   No No   Sig: OTC   ascorbic acid (VITAMIN C) 500 mg tablet   Yes Yes   Sig: Take 500 mg by mouth daily   aspirin (ECOTRIN LOW STRENGTH) 81 mg EC tablet Past Week at Unknown time  Yes Yes   Sig: Take 81 mg by mouth daily   cefdinir (OMNICEF) 300 mg capsule   No No   Sig: Take 1 capsule (300 mg total) by mouth every 12 (twelve) hours for 9 days   losartan (COZAAR) 50 mg tablet Past Week at Unknown time  Yes Yes   Sig: Take 50 mg by mouth daily     metroNIDAZOLE (FLAGYL) 500 mg tablet   No No   Sig: Take 1 tablet (500 mg total) by mouth every 8 (eight) hours for 9 days   multivitamin (THERAGRAN) TABS   Yes Yes   Sig: Take 1 tablet by mouth daily   traMADol (ULTRAM) 50 mg tablet   No No   Sig: Take 1 tablet (50 mg total) by mouth every 6 (six) hours as needed for moderate pain for up to 10 days      Facility-Administered Medications: None       Past Medical History:   Diagnosis Date    Hypertension        Past Surgical History:   Procedure Laterality Date    CT GUIDED PERC DRAINAGE CATHETER PLACEMENT  1/11/2019       History reviewed  No pertinent family history  I have reviewed and agree with the history as documented  Social History   Substance Use Topics    Smoking status: Former Smoker     Packs/day: 0 50     Types: Cigarettes    Smokeless tobacco: Never Used    Alcohol use No        Review of Systems   Constitutional: Negative for chills and fever  HENT: Negative for congestion, ear pain and sore throat  Eyes: Negative for pain and visual disturbance  Respiratory: Negative for cough, shortness of breath and wheezing  Cardiovascular: Negative for chest pain and leg swelling  Gastrointestinal: Positive for abdominal pain  Negative for constipation, diarrhea, nausea and vomiting  Genitourinary: Negative for dysuria, frequency, hematuria and urgency  Musculoskeletal: Negative for neck pain and neck stiffness  Skin: Negative for rash and wound     Neurological: Negative for weakness, numbness and headaches  Psychiatric/Behavioral: Negative for agitation and confusion  All other systems reviewed and are negative  Physical Exam  ED Triage Vitals [01/26/19 1822]   Temperature Pulse Respirations Blood Pressure SpO2   97 9 °F (36 6 °C) 60 18 155/82 99 %      Temp Source Heart Rate Source Patient Position - Orthostatic VS BP Location FiO2 (%)   Oral Monitor Sitting Left arm --      Pain Score       9           Orthostatic Vital Signs  Vitals:    01/26/19 1822 01/26/19 2200   BP: 155/82 132/80   Pulse: 60 58   Patient Position - Orthostatic VS: Sitting        Physical Exam   Constitutional: He is oriented to person, place, and time  He appears well-developed and well-nourished  HENT:   Head: Normocephalic and atraumatic  Mouth/Throat: Oropharynx is clear and moist    Eyes: Pupils are equal, round, and reactive to light  EOM are normal    Neck: Normal range of motion  Neck supple  Cardiovascular: Normal rate and regular rhythm  Pulmonary/Chest: Effort normal and breath sounds normal  No respiratory distress  He has no wheezes  He has no rales  He exhibits no tenderness  Abdominal: Soft  Bowel sounds are normal  He exhibits no distension  There is tenderness in the right lower quadrant and left lower quadrant  There is no rigidity, no rebound and no guarding  IR drain located in R buttocks-  No drainage noted to site- no erythema or swelling noted  Musculoskeletal: Normal range of motion  Neurological: He is alert and oriented to person, place, and time  No focal deficits   Skin: Skin is warm and dry  Nursing note and vitals reviewed        ED Medications  Medications   sodium chloride 0 9 % bolus 1,000 mL (0 mL Intravenous Stopped 1/26/19 2143)   iohexol (OMNIPAQUE) 350 MG/ML injection (MULTI-DOSE) 100 mL (100 mL Intravenous Given 1/26/19 1927)       Diagnostic Studies  Results Reviewed     Procedure Component Value Units Date/Time    POCT urinalysis dipstick [[id number]] (Normal) Resulted:  01/26/19 2052    Lab Status:  Final result Specimen:  Urine Updated:  01/26/19 2124     Color, UA yellow    ED Urine Macroscopic [[id number]]  (Abnormal) Collected:  01/26/19 2054    Lab Status:  Final result Specimen:  Urine Updated:  01/26/19 2052     Color, UA Yellow     Clarity, UA Clear     pH, UA 6 0     Leukocytes, UA Negative     Nitrite, UA Negative     Protein, UA Negative mg/dl      Glucose, UA Negative mg/dl      Ketones, UA Trace (A) mg/dl      Urobilinogen, UA 0 2 E U /dl      Bilirubin, UA Negative     Blood, UA Negative     Specific Gravity, UA <=1 005    NarrativeGloriann Ast RESULT    CMP [[id number]]  (Abnormal) Collected:  01/26/19 1854    Lab Status:  Final result Specimen:  Blood from Arm, Left Updated:  01/26/19 1916     Sodium 137 mmol/L      Potassium 3 8 mmol/L      Chloride 103 mmol/L      CO2 25 mmol/L      ANION GAP 9 mmol/L      BUN 8 mg/dL      Creatinine 0 75 mg/dL      Glucose 91 mg/dL      Calcium 8 5 mg/dL      AST 11 U/L      ALT 22 U/L      Alkaline Phosphatase 75 U/L      Total Protein 6 4 g/dL      Albumin 3 0 (L) g/dL      Total Bilirubin 0 38 mg/dL      eGFR 100 ml/min/1 73sq m     Narrative:         National Kidney Disease Education Program recommendations are as follows:  GFR calculation is accurate only with a steady state creatinine  Chronic Kidney disease less than 60 ml/min/1 73 sq  meters  Kidney failure less than 15 ml/min/1 73 sq  meters      Lipase [[id number]]  (Normal) Collected:  01/26/19 1854    Lab Status:  Final result Specimen:  Blood from Arm, Left Updated:  01/26/19 1916     Lipase 146 u/L     CBC and differential [[id number]]  (Abnormal) Collected:  01/26/19 1854    Lab Status:  Final result Specimen:  Blood from Arm, Left Updated:  01/26/19 1901     WBC 8 49 Thousand/uL      RBC 3 62 (L) Million/uL      Hemoglobin 11 5 (L) g/dL      Hematocrit 35 2 (L) %      MCV 97 fL      MCH 31 8 pg      MCHC 32 7 g/dL      RDW 14 7 %      MPV 8 6 (L) fL      Platelets 425 (H) Thousands/uL      nRBC 0 /100 WBCs      Neutrophils Relative 58 %      Immat GRANS % 0 %      Lymphocytes Relative 29 %      Monocytes Relative 10 %      Eosinophils Relative 2 %      Basophils Relative 1 %      Neutrophils Absolute 4 92 Thousands/µL      Immature Grans Absolute 0 03 Thousand/uL      Lymphocytes Absolute 2 44 Thousands/µL      Monocytes Absolute 0 82 Thousand/µL      Eosinophils Absolute 0 18 Thousand/µL      Basophils Absolute 0 10 Thousands/µL                  CT abdomen pelvis w contrast   Final Result by Kalie Musa MD (01/26 1948)      Interval complete drainage of the pelvic abscess with percutaneous catheter  Minimal/subtle pericolonic fat stranding at the splenic flecture of uncertain significance  No visible intrinsic bowel abnormality in that area  Workstation performed: [id number]               Procedures  Procedures      Phone Consults  ED Phone Contact    ED Course  ED Course as of Jan 28 1951   Sat Jan 26, 2019 1911 Shukri machuca paged and will evaluate patient                                 MDM  Number of Diagnoses or Management Options  Right sided abdominal pain: new and requires workup  Diagnosis management comments: Patient with R lower abd pain- recent hx of abscess with IR drainage- will get labs, ua, ct abd/ pel  Patient offered pain meds but does not want any at this time  Spoke with Shukri machuca, who saw patient- states he is okay for discharge  Patient reevaluated and feels improved  Patient updated on results of tests  Discharge instructions given including follow-up, and return precautions  Patient demonstrates verbal understanding and agrees with plan         Amount and/or Complexity of Data Reviewed  Clinical lab tests: ordered and reviewed  Tests in the radiology section of CPT®: ordered and reviewed  Tests in the medicine section of CPT®: ordered and reviewed  Discussion of test results with the performing providers: yes  Decide to obtain previous medical records or to obtain history from someone other than the patient: yes  Obtain history from someone other than the patient: yes  Review and summarize past medical records: yes  Discuss the patient with other providers: yes  Independent visualization of images, tracings, or specimens: yes    Patient Progress  Patient progress: improved    CritCare Time    Disposition  Final diagnoses:   Right sided abdominal pain     Time reflects when diagnosis was documented in both MDM as applicable and the Disposition within this note     Time User Action Codes Description Comment    1/26/2019 10:03 PM Alhaji Smith Add [R10 9] Right sided abdominal pain       ED Disposition     ED Disposition Condition Comment    Discharge  [facility] discharge to home/self care  Condition at discharge: Good        Follow-up Information     Follow up With Specialties Details Why Contact Info Additional Information    Junior Haley MD Colon and Rectal Surgery Go to on Monday, as scheduled  [address] [facility]  [facility] Emergency Medicine Go to immediately for any new or worsening symptoms    [address]  [phone]  ED, [address], [address] [place] [address], [phone]          Discharge Medication List as of 1/26/2019 10:05 PM      CONTINUE these medications which have NOT CHANGED    Details   acetaminophen (TYLENOL) 325 mg tablet OTC, No Print      ascorbic acid (VITAMIN C) 500 mg tablet Take 500 mg by mouth daily, Historical Med      aspirin (ECOTRIN LOW STRENGTH) 81 mg EC tablet Take 81 mg by mouth daily, Historical Med      losartan (COZAAR) 50 mg tablet Take 50 mg by mouth daily  , Historical Med      multivitamin (THERAGRAN) TABS Take 1 tablet by mouth daily, Historical Med      traMADol (ULTRAM) 50 mg tablet Take 1 tablet (50 mg total) by mouth every 6 (six) hours as needed for moderate pain for up to 10 days, Starting Wed 1/16/2019, Until Sat 1/26/2019, Print         STOP taking these medications       cefdinir (OMNICEF) 300 mg capsule Comments:   Reason for Stopping:         metroNIDAZOLE (FLAGYL) 500 mg tablet Comments:   Reason for Stopping:             No discharge procedures on file  ED Provider  Attending physically available and evaluated Rebeca Weldon I managed the patient along with the ED Attending      Electronically Signed by         Kiah Carey DO  01/1960

## 2019-01-28 ENCOUNTER — OFFICE VISIT (OUTPATIENT)
Dept: SURGERY | Facility: HOSPITAL | Age: 59
End: 2019-01-28
Payer: COMMERCIAL

## 2019-01-28 VITALS — HEIGHT: 75 IN | WEIGHT: 177 LBS | BODY MASS INDEX: 22.01 KG/M2

## 2019-01-28 DIAGNOSIS — K57.20 DIVERTICULITIS OF LARGE INTESTINE WITH ABSCESS WITHOUT BLEEDING: ICD-10-CM

## 2019-01-28 DIAGNOSIS — K65.1 INTRA-ABDOMINAL ABSCESS (HCC): Primary | ICD-10-CM

## 2019-01-28 PROCEDURE — 99215 OFFICE O/P EST HI 40 MIN: CPT | Performed by: COLON & RECTAL SURGERY

## 2019-01-28 RX ORDER — NEOMYCIN SULFATE 500 MG/1
TABLET ORAL
Qty: 2 TABLET | Refills: 0 | Status: SHIPPED | OUTPATIENT
Start: 2019-01-28 | End: 2019-02-24 | Stop reason: HOSPADM

## 2019-01-28 RX ORDER — CEFAZOLIN SODIUM 2 G/50ML
2000 SOLUTION INTRAVENOUS ONCE
Status: CANCELLED | OUTPATIENT
Start: 2019-01-28 | End: 2019-01-28

## 2019-01-28 RX ORDER — HEPARIN SODIUM 5000 [USP'U]/ML
5000 INJECTION, SOLUTION INTRAVENOUS; SUBCUTANEOUS ONCE
Status: CANCELLED | OUTPATIENT
Start: 2019-02-21

## 2019-01-28 RX ORDER — POLYETHYLENE GLYCOL 3350 17 G/17G
255 POWDER, FOR SOLUTION ORAL ONCE
Status: CANCELLED | OUTPATIENT
Start: 2019-01-28 | End: 2019-01-28

## 2019-01-28 RX ORDER — METRONIDAZOLE 500 MG/1
TABLET ORAL
Qty: 2 TABLET | Refills: 0 | Status: SHIPPED | OUTPATIENT
Start: 2019-01-28 | End: 2019-02-24 | Stop reason: HOSPADM

## 2019-01-28 NOTE — PATIENT INSTRUCTIONS
ASSESSMENT:    Diverticulitis abscess, cannot rule out colonic mass or cancer process as discussed with him at hospitalization, complicated abscess by size parameters even if benign, recommended/discussed laparoscopic sigmoid colectomy, possible low anterior resection  We dicussed laparoscopic possible open sigmoid colectomy, possible low anterior resection in a face-to-face, personal, informed consent process, the benefits, alternatives,   risks including not limited to bleeding, infection, risks of anesthesia, open surgery, DVT/PE, heart attack, stroke, death, damage to local structures(e g  ureter, duodenum),anastomotic leak requiring reoperation, temporary versus permanent stoma  He understood these risks and wished to proceed        PLAN:  -Laparoscopic possible open sigmoid colectomy, possible low anterior resection, tentative 2/21/19 with day prior colonoscopy as overdue, will request Urology stents  -Boost/Ensure 2-3x/day, continue IR drain, continue tobacco cessation  -Will request PCP clearance

## 2019-02-08 ENCOUNTER — CLINICAL SUPPORT (OUTPATIENT)
Dept: URGENT CARE | Facility: CLINIC | Age: 59
End: 2019-02-08
Payer: COMMERCIAL

## 2019-02-08 ENCOUNTER — APPOINTMENT (OUTPATIENT)
Dept: LAB | Facility: CLINIC | Age: 59
End: 2019-02-08
Payer: COMMERCIAL

## 2019-02-08 ENCOUNTER — TELEPHONE (OUTPATIENT)
Dept: PREADMISSION TESTING | Facility: HOSPITAL | Age: 59
End: 2019-02-08

## 2019-02-08 DIAGNOSIS — K65.1 INTRA-ABDOMINAL ABSCESS (HCC): ICD-10-CM

## 2019-02-08 LAB
ABO GROUP BLD: NORMAL
BLD GP AB SCN SERPL QL: NEGATIVE
EST. AVERAGE GLUCOSE BLD GHB EST-MCNC: 120 MG/DL
HBA1C MFR BLD: 5.8 % (ref 4.2–6.3)
RH BLD: POSITIVE
SPECIMEN EXPIRATION DATE: NORMAL

## 2019-02-08 PROCEDURE — 93005 ELECTROCARDIOGRAM TRACING: CPT

## 2019-02-08 PROCEDURE — 83036 HEMOGLOBIN GLYCOSYLATED A1C: CPT

## 2019-02-08 PROCEDURE — 86901 BLOOD TYPING SEROLOGIC RH(D): CPT

## 2019-02-08 PROCEDURE — 86900 BLOOD TYPING SEROLOGIC ABO: CPT

## 2019-02-08 PROCEDURE — 86850 RBC ANTIBODY SCREEN: CPT

## 2019-02-08 PROCEDURE — 36415 COLL VENOUS BLD VENIPUNCTURE: CPT

## 2019-02-11 LAB
ATRIAL RATE: 54 BPM
P AXIS: 68 DEGREES
PR INTERVAL: 198 MS
QRS AXIS: 1 DEGREES
QRSD INTERVAL: 88 MS
QT INTERVAL: 414 MS
QTC INTERVAL: 392 MS
T WAVE AXIS: 55 DEGREES
VENTRICULAR RATE: 54 BPM

## 2019-02-11 PROCEDURE — 93010 ELECTROCARDIOGRAM REPORT: CPT | Performed by: INTERNAL MEDICINE

## 2019-02-13 ENCOUNTER — TELEPHONE (OUTPATIENT)
Dept: PREADMISSION TESTING | Facility: HOSPITAL | Age: 59
End: 2019-02-13

## 2019-02-13 NOTE — PRE-PROCEDURE INSTRUCTIONS
Pre-Surgery Instructions:   Medication Instructions    acetaminophen (TYLENOL) 325 mg tablet Instructed patient per Anesthesia Guidelines   ascorbic acid (VITAMIN C) 500 mg tablet Instructed patient per Anesthesia Guidelines   metroNIDAZOLE (FLAGYL) 500 mg tablet Instructed patient per Anesthesia Guidelines   multivitamin (THERAGRAN) TABS Instructed patient per Anesthesia Guidelines   neomycin (MYCIFRADIN) 500 mg tablet Instructed patient per Anesthesia Guidelines  Continue to take this medication on your normal schedule  If this is an oral medication and you take it in the morning, then you may take this medicine with a sip of water

## 2019-02-13 NOTE — PRE-PROCEDURE INSTRUCTIONS
Pre-Surgery Instructions:   Medication Instructions    acetaminophen (TYLENOL) 325 mg tablet Instructed patient per Anesthesia Guidelines   ascorbic acid (VITAMIN C) 500 mg tablet Instructed patient per Anesthesia Guidelines   metroNIDAZOLE (FLAGYL) 500 mg tablet Instructed patient per Anesthesia Guidelines   multivitamin (THERAGRAN) TABS Instructed patient per Anesthesia Guidelines   neomycin (MYCIFRADIN) 500 mg tablet Instructed patient per Anesthesia Guidelines

## 2019-02-14 ENCOUNTER — ANESTHESIA EVENT (OUTPATIENT)
Dept: PERIOP | Facility: HOSPITAL | Age: 59
DRG: 329 | End: 2019-02-14
Payer: COMMERCIAL

## 2019-02-19 ENCOUNTER — TELEPHONE (OUTPATIENT)
Dept: UROLOGY | Facility: AMBULATORY SURGERY CENTER | Age: 59
End: 2019-02-19

## 2019-02-19 ENCOUNTER — ANESTHESIA EVENT (OUTPATIENT)
Dept: GASTROENTEROLOGY | Facility: HOSPITAL | Age: 59
End: 2019-02-19
Payer: COMMERCIAL

## 2019-02-19 NOTE — ANESTHESIA PREPROCEDURE EVALUATION
Review of Systems/Medical History  Patient summary reviewed  Chart reviewed  No history of anesthetic complications     Cardiovascular  Exercise tolerance (METS): >4,  Hypertension (discontinued medications after weight loss) controlled, Valvular heart disease (h/o murmur as a child) , H/O Heart Murmur,    Pulmonary  Smoker (quit 1/2/19) ex-smoker  ,        GI/Hepatic    Intestinal obstruction (h/o obstruction in January), Bowel prep  Comment: diverticulitis          Endo/Other     GYN       Hematology   Musculoskeletal       Neurology   Psychology           Physical Exam    Airway    Mallampati score: I  TM Distance: >3 FB  Neck ROM: full     Dental   No notable dental hx     Cardiovascular  Rhythm: regular, Rate: normal, Cardiovascular exam normal    Pulmonary  Pulmonary exam normal Breath sounds clear to auscultation,     Other Findings        Anesthesia Plan  ASA Score- 2     Anesthesia Type- IV sedation with anesthesia with ASA Monitors  Additional Monitors:   Airway Plan:         Plan Factors-    Induction- intravenous  Postoperative Plan-     Informed Consent- Anesthetic plan and risks discussed with patient  I personally reviewed this patient with the CRNA  Discussed and agreed on the Anesthesia Plan with the CRNA  Jarred Butcher

## 2019-02-20 ENCOUNTER — ANESTHESIA (OUTPATIENT)
Dept: GASTROENTEROLOGY | Facility: HOSPITAL | Age: 59
End: 2019-02-20
Payer: COMMERCIAL

## 2019-02-20 ENCOUNTER — HOSPITAL ENCOUNTER (OUTPATIENT)
Facility: HOSPITAL | Age: 59
Setting detail: OUTPATIENT SURGERY
Discharge: HOME | End: 2019-02-20
Attending: COLON & RECTAL SURGERY | Admitting: COLON & RECTAL SURGERY
Payer: COMMERCIAL

## 2019-02-20 VITALS
TEMPERATURE: 97.1 F | WEIGHT: 170 LBS | BODY MASS INDEX: 21.14 KG/M2 | HEIGHT: 75 IN | DIASTOLIC BLOOD PRESSURE: 82 MMHG | SYSTOLIC BLOOD PRESSURE: 138 MMHG | HEART RATE: 55 BPM | RESPIRATION RATE: 20 BRPM | OXYGEN SATURATION: 100 %

## 2019-02-20 DIAGNOSIS — K57.20 DIVERTICULITIS OF LARGE INTESTINE WITH ABSCESS WITHOUT BLEEDING: ICD-10-CM

## 2019-02-20 PROCEDURE — 45380 COLONOSCOPY AND BIOPSY: CPT | Performed by: COLON & RECTAL SURGERY

## 2019-02-20 PROCEDURE — 88305 TISSUE EXAM BY PATHOLOGIST: CPT | Performed by: PATHOLOGY

## 2019-02-20 RX ORDER — LIDOCAINE HYDROCHLORIDE 10 MG/ML
INJECTION, SOLUTION INFILTRATION; PERINEURAL AS NEEDED
Status: DISCONTINUED | OUTPATIENT
Start: 2019-02-20 | End: 2019-02-20 | Stop reason: SURG

## 2019-02-20 RX ORDER — SODIUM CHLORIDE 9 MG/ML
125 INJECTION, SOLUTION INTRAVENOUS CONTINUOUS
Status: DISCONTINUED | OUTPATIENT
Start: 2019-02-20 | End: 2019-02-20 | Stop reason: HOSPADM

## 2019-02-20 RX ORDER — PROPOFOL 10 MG/ML
INJECTION, EMULSION INTRAVENOUS AS NEEDED
Status: DISCONTINUED | OUTPATIENT
Start: 2019-02-20 | End: 2019-02-20 | Stop reason: SURG

## 2019-02-20 RX ORDER — LOSARTAN POTASSIUM 50 MG/1
40 TABLET ORAL DAILY
COMMUNITY
End: 2021-11-05 | Stop reason: ALTCHOICE

## 2019-02-20 RX ADMIN — PROPOFOL 20 MG: 10 INJECTION, EMULSION INTRAVENOUS at 07:58

## 2019-02-20 RX ADMIN — PROPOFOL 40 MG: 10 INJECTION, EMULSION INTRAVENOUS at 07:45

## 2019-02-20 RX ADMIN — LIDOCAINE HYDROCHLORIDE 50 MG: 10 INJECTION, SOLUTION INFILTRATION; PERINEURAL at 07:45

## 2019-02-20 RX ADMIN — PROPOFOL 10 MG: 10 INJECTION, EMULSION INTRAVENOUS at 08:02

## 2019-02-20 RX ADMIN — PROPOFOL 30 MG: 10 INJECTION, EMULSION INTRAVENOUS at 07:46

## 2019-02-20 RX ADMIN — SODIUM CHLORIDE 125 ML/HR: 0.9 INJECTION, SOLUTION INTRAVENOUS at 07:11

## 2019-02-20 RX ADMIN — PROPOFOL 20 MG: 10 INJECTION, EMULSION INTRAVENOUS at 07:53

## 2019-02-20 NOTE — DISCHARGE INSTRUCTIONS
OPERATIVE REPORT  PATIENT NAME: Hannah Tomas    :  1960  MRN: [id number]  Pt Location: [facility] ROOM 03    SURGERY DATE: 2019    Surgeon(s) and Role:     * Lorin Oconnor MD - Primary    Operative Findings:  -Diminutive descending and rectal polyps removed cold biopsy  -Extraluminal phlegmon at sigmoid, no intraluminal disease/tumor or lesion    Recommendations:  -Light toast/eggs, ensure okay early today, PO preop antibiotics, NPO after midnight  -3 year recall colonoscopy    Preop Diagnosis:  Diverticulitis of large intestine with abscess without bleeding [K57 20]  Personal history polyps    Post-Op Diagnosis Codes:     * Diverticulitis of large intestine with abscess without bleeding [K57 20]    Procedure(s) (LRB):  COLONOSCOPY (N/A)w/cold biopsy    Specimen(s):  ID Type Source Tests Collected by Time Destination   1 : descending; cold forcep Tissue Polyp, Colorectal TISSUE EXAM Lorin Oconnor MD 2019 0802    2 : rectal; cold forcep Tissue Polyp, Colorectal TISSUE EXAM Lorin Oconnor MD 2019 0805        Estimated Blood Loss:   Minimal    Drains:  Closed/Suction Drain Right Back Bulb 10 Fr  (Active)   Number of days: 40       Anesthesia Type:   IV Sedation with Anesthesia    Operative Indications:  Diverticulitis of large intestine with abscess without bleeding [A75 78]    Complications:   None    Procedure and Technique:  After appropriate identification, patient was placed in left lateral decubitus position, timeout was taken per protocol and after mac sedation was induced digital rectal examination revealed normal rectal examination  Pediatric colonoscope was introduced and advanced without difficulty to cecum identified by appendiceal orifice and ileocecal valve, photo taken  Scope was then removed with careful mucosal evaluation and visualization, retroflexion rectum  Prep was adequate with moderate amounts of suctionable fluid     Cecal intubation time 7 minutes withdrawal time 11 minutes       I was present for the entire procedure    Patient Disposition:  PACU     SIGNATURE: Abrahan Pearl MD  DATE: February 20, 2019  TIME: 8:07 AM

## 2019-02-20 NOTE — OP NOTE
OPERATIVE REPORT  PATIENT NAME: Luz Blas    :  1960  MRN: [id number]  Pt Location: [facility] ROOM 03    SURGERY DATE: 2019    Surgeon(s) and Role:     * Lizett Land MD - Primary    Operative Findings:  -Diminutive descending and rectal polyps removed cold biopsy  -Extraluminal phlegmon at sigmoid, no intraluminal disease/tumor or lesion    Recommendations:  -Light toast/eggs, ensure okay early today, PO preop antibiotics, NPO after midnight  -3 year recall colonoscopy    Preop Diagnosis:  Diverticulitis of large intestine with abscess without bleeding [K57 20]  Personal history polyps    Post-Op Diagnosis Codes:     * Diverticulitis of large intestine with abscess without bleeding [K57 20]    Procedure(s) (LRB):  COLONOSCOPY (N/A)w/cold biopsy    Specimen(s):  ID Type Source Tests Collected by Time Destination   1 : descending; cold forcep Tissue Polyp, Colorectal TISSUE EXAM Lizett Land MD 2019 0802    2 : rectal; cold forcep Tissue Polyp, Colorectal TISSUE EXAM Lizett Land MD 2019 0805        Estimated Blood Loss:   Minimal    Drains:  Closed/Suction Drain Right Back Bulb 10 Fr  (Active)   Number of days: 40       Anesthesia Type:   IV Sedation with Anesthesia    Operative Indications:  Diverticulitis of large intestine with abscess without bleeding [O93 68]    Complications:   None    Procedure and Technique:  After appropriate identification, patient was placed in left lateral decubitus position, timeout was taken per protocol and after mac sedation was induced digital rectal examination revealed normal rectal examination  Pediatric colonoscope was introduced and advanced without difficulty to cecum identified by appendiceal orifice and ileocecal valve, photo taken  Scope was then removed with careful mucosal evaluation and visualization, retroflexion rectum  Prep was adequate with moderate amounts of suctionable fluid     Cecal intubation time 7 minutes withdrawal time 11 minutes       I was present for the entire procedure    Patient Disposition:  PACU     SIGNATURE: Lizett Land MD  DATE: February 20, 2019  TIME: 8:07 AM

## 2019-02-20 NOTE — ANESTHESIA POSTPROCEDURE EVALUATION
Post-Op Assessment Note    CV Status:  Stable  Pain Score: 0    Pain management: adequate     Mental Status:  Sleepy   PONV Controlled:  None   Airway Patency:  Patent   Post Op Vitals Reviewed: Yes      Staff: CRNA           /73 (02/20/19 0810)    Temp     Pulse 58 (02/20/19 0810)   Resp 20 (02/20/19 0810)    SpO2 98 % (02/20/19 0810)

## 2019-02-21 ENCOUNTER — HOSPITAL ENCOUNTER (INPATIENT)
Facility: HOSPITAL | Age: 59
LOS: 3 days | Discharge: HOME | DRG: 329 | End: 2019-02-24
Attending: COLON & RECTAL SURGERY | Admitting: COLON & RECTAL SURGERY
Payer: COMMERCIAL

## 2019-02-21 ENCOUNTER — ANESTHESIA (OUTPATIENT)
Dept: PERIOP | Facility: HOSPITAL | Age: 59
DRG: 329 | End: 2019-02-21
Payer: COMMERCIAL

## 2019-02-21 DIAGNOSIS — K57.20 DIVERTICULITIS OF LARGE INTESTINE WITH ABSCESS WITHOUT BLEEDING: ICD-10-CM

## 2019-02-21 DIAGNOSIS — K65.1 INTRA-ABDOMINAL ABSCESS (HCC): ICD-10-CM

## 2019-02-21 DIAGNOSIS — Z90.49 S/P LAPAROSCOPIC-ASSISTED SIGMOIDECTOMY: Primary | ICD-10-CM

## 2019-02-21 LAB
ABO GROUP BLD: NORMAL
BLD GP AB SCN SERPL QL: NEGATIVE
GLUCOSE SERPL-MCNC: 135 MG/DL (ref 65–140)
RH BLD: POSITIVE
SPECIMEN EXPIRATION DATE: NORMAL

## 2019-02-21 PROCEDURE — 15860 IV NJX TST VASC FLO FLAP/GRF: CPT | Performed by: COLON & RECTAL SURGERY

## 2019-02-21 PROCEDURE — 94760 N-INVAS EAR/PLS OXIMETRY 1: CPT

## 2019-02-21 PROCEDURE — 44603 SUTURE SMALL INTESTINE: CPT | Performed by: COLON & RECTAL SURGERY

## 2019-02-21 PROCEDURE — 45330 DIAGNOSTIC SIGMOIDOSCOPY: CPT | Performed by: COLON & RECTAL SURGERY

## 2019-02-21 PROCEDURE — 52005 CYSTO W/URTRL CATHJ: CPT | Performed by: UROLOGY

## 2019-02-21 PROCEDURE — 4A1BXSH MONITORING OF GASTROINTESTINAL VASCULAR PERFUSION USING INDOCYANINE GREEN DYE, EXTERNAL APPROACH: ICD-10-PCS | Performed by: COLON & RECTAL SURGERY

## 2019-02-21 PROCEDURE — 94762 N-INVAS EAR/PLS OXIMTRY CONT: CPT

## 2019-02-21 PROCEDURE — C1769 GUIDE WIRE: HCPCS | Performed by: UROLOGY

## 2019-02-21 PROCEDURE — 88307 TISSUE EXAM BY PATHOLOGIST: CPT | Performed by: PATHOLOGY

## 2019-02-21 PROCEDURE — 82948 REAGENT STRIP/BLOOD GLUCOSE: CPT

## 2019-02-21 PROCEDURE — 86850 RBC ANTIBODY SCREEN: CPT | Performed by: COLON & RECTAL SURGERY

## 2019-02-21 PROCEDURE — 0DJD8ZZ INSPECTION OF LOWER INTESTINAL TRACT, VIA NATURAL OR ARTIFICIAL OPENING ENDOSCOPIC: ICD-10-PCS | Performed by: COLON & RECTAL SURGERY

## 2019-02-21 PROCEDURE — 0DTN0ZZ RESECTION OF SIGMOID COLON, OPEN APPROACH: ICD-10-PCS | Performed by: COLON & RECTAL SURGERY

## 2019-02-21 PROCEDURE — 0DQ80ZZ REPAIR SMALL INTESTINE, OPEN APPROACH: ICD-10-PCS | Performed by: COLON & RECTAL SURGERY

## 2019-02-21 PROCEDURE — 86901 BLOOD TYPING SEROLOGIC RH(D): CPT | Performed by: COLON & RECTAL SURGERY

## 2019-02-21 PROCEDURE — 87086 URINE CULTURE/COLONY COUNT: CPT | Performed by: UROLOGY

## 2019-02-21 PROCEDURE — 86900 BLOOD TYPING SEROLOGIC ABO: CPT | Performed by: COLON & RECTAL SURGERY

## 2019-02-21 RX ORDER — ONDANSETRON 2 MG/ML
4 INJECTION INTRAMUSCULAR; INTRAVENOUS ONCE AS NEEDED
Status: DISCONTINUED | OUTPATIENT
Start: 2019-02-21 | End: 2019-02-21 | Stop reason: HOSPADM

## 2019-02-21 RX ORDER — OXYCODONE HYDROCHLORIDE AND ACETAMINOPHEN 5; 325 MG/1; MG/1
1 TABLET ORAL EVERY 4 HOURS PRN
Status: DISCONTINUED | OUTPATIENT
Start: 2019-02-21 | End: 2019-02-23

## 2019-02-21 RX ORDER — SODIUM CHLORIDE 9 MG/ML
INJECTION, SOLUTION INTRAVENOUS CONTINUOUS PRN
Status: DISCONTINUED | OUTPATIENT
Start: 2019-02-21 | End: 2019-02-21 | Stop reason: SURG

## 2019-02-21 RX ORDER — POLYETHYLENE GLYCOL 3350 17 G/17G
255 POWDER, FOR SOLUTION ORAL ONCE
Status: DISCONTINUED | OUTPATIENT
Start: 2019-02-21 | End: 2019-02-21

## 2019-02-21 RX ORDER — FENTANYL CITRATE/PF 50 MCG/ML
50 SYRINGE (ML) INJECTION
Status: COMPLETED | OUTPATIENT
Start: 2019-02-21 | End: 2019-02-21

## 2019-02-21 RX ORDER — MEPERIDINE HYDROCHLORIDE 25 MG/ML
12.5 INJECTION INTRAMUSCULAR; INTRAVENOUS; SUBCUTANEOUS ONCE AS NEEDED
Status: DISCONTINUED | OUTPATIENT
Start: 2019-02-21 | End: 2019-02-21 | Stop reason: HOSPADM

## 2019-02-21 RX ORDER — PROPOFOL 10 MG/ML
INJECTION, EMULSION INTRAVENOUS AS NEEDED
Status: DISCONTINUED | OUTPATIENT
Start: 2019-02-21 | End: 2019-02-21 | Stop reason: SURG

## 2019-02-21 RX ORDER — ACETAMINOPHEN 325 MG/1
650 TABLET ORAL EVERY 6 HOURS PRN
Status: DISCONTINUED | OUTPATIENT
Start: 2019-02-21 | End: 2019-02-23

## 2019-02-21 RX ORDER — ONDANSETRON 2 MG/ML
4 INJECTION INTRAMUSCULAR; INTRAVENOUS EVERY 4 HOURS PRN
Status: DISCONTINUED | OUTPATIENT
Start: 2019-02-21 | End: 2019-02-24 | Stop reason: HOSPADM

## 2019-02-21 RX ORDER — SODIUM CHLORIDE, SODIUM LACTATE, POTASSIUM CHLORIDE, CALCIUM CHLORIDE 600; 310; 30; 20 MG/100ML; MG/100ML; MG/100ML; MG/100ML
75 INJECTION, SOLUTION INTRAVENOUS CONTINUOUS
Status: DISCONTINUED | OUTPATIENT
Start: 2019-02-21 | End: 2019-02-21

## 2019-02-21 RX ORDER — ALBUMIN, HUMAN INJ 5% 5 %
SOLUTION INTRAVENOUS CONTINUOUS PRN
Status: DISCONTINUED | OUTPATIENT
Start: 2019-02-21 | End: 2019-02-21 | Stop reason: SURG

## 2019-02-21 RX ORDER — HYDROMORPHONE HCL/PF 1 MG/ML
0.5 SYRINGE (ML) INJECTION
Status: DISCONTINUED | OUTPATIENT
Start: 2019-02-21 | End: 2019-02-21 | Stop reason: HOSPADM

## 2019-02-21 RX ORDER — MORPHINE SULFATE 4 MG/ML
2 INJECTION, SOLUTION INTRAMUSCULAR; INTRAVENOUS
Status: DISCONTINUED | OUTPATIENT
Start: 2019-02-21 | End: 2019-02-21

## 2019-02-21 RX ORDER — MAGNESIUM HYDROXIDE 1200 MG/15ML
LIQUID ORAL AS NEEDED
Status: DISCONTINUED | OUTPATIENT
Start: 2019-02-21 | End: 2019-02-21 | Stop reason: HOSPADM

## 2019-02-21 RX ORDER — ONDANSETRON 2 MG/ML
INJECTION INTRAMUSCULAR; INTRAVENOUS AS NEEDED
Status: DISCONTINUED | OUTPATIENT
Start: 2019-02-21 | End: 2019-02-21 | Stop reason: SURG

## 2019-02-21 RX ORDER — DEXMEDETOMIDINE HYDROCHLORIDE 100 UG/ML
INJECTION, SOLUTION INTRAVENOUS AS NEEDED
Status: DISCONTINUED | OUTPATIENT
Start: 2019-02-21 | End: 2019-02-21 | Stop reason: SURG

## 2019-02-21 RX ORDER — LOSARTAN POTASSIUM 50 MG/1
50 TABLET ORAL DAILY
Status: DISCONTINUED | OUTPATIENT
Start: 2019-02-21 | End: 2019-02-24 | Stop reason: HOSPADM

## 2019-02-21 RX ORDER — HEPARIN SODIUM 5000 [USP'U]/ML
5000 INJECTION, SOLUTION INTRAVENOUS; SUBCUTANEOUS ONCE
Status: COMPLETED | OUTPATIENT
Start: 2019-02-21 | End: 2019-02-21

## 2019-02-21 RX ORDER — SODIUM CHLORIDE, SODIUM LACTATE, POTASSIUM CHLORIDE, CALCIUM CHLORIDE 600; 310; 30; 20 MG/100ML; MG/100ML; MG/100ML; MG/100ML
100 INJECTION, SOLUTION INTRAVENOUS CONTINUOUS
Status: DISCONTINUED | OUTPATIENT
Start: 2019-02-21 | End: 2019-02-21

## 2019-02-21 RX ORDER — SODIUM CHLORIDE 9 MG/ML
125 INJECTION, SOLUTION INTRAVENOUS CONTINUOUS
Status: DISCONTINUED | OUTPATIENT
Start: 2019-02-21 | End: 2019-02-22

## 2019-02-21 RX ORDER — EPHEDRINE SULFATE 50 MG/ML
INJECTION, SOLUTION INTRAVENOUS AS NEEDED
Status: DISCONTINUED | OUTPATIENT
Start: 2019-02-21 | End: 2019-02-21 | Stop reason: SURG

## 2019-02-21 RX ORDER — MAGNESIUM SULFATE 500 MG/ML
VIAL (ML) INJECTION AS NEEDED
Status: DISCONTINUED | OUTPATIENT
Start: 2019-02-21 | End: 2019-02-21 | Stop reason: SURG

## 2019-02-21 RX ORDER — LIDOCAINE HYDROCHLORIDE 10 MG/ML
INJECTION, SOLUTION INFILTRATION; PERINEURAL AS NEEDED
Status: DISCONTINUED | OUTPATIENT
Start: 2019-02-21 | End: 2019-02-21 | Stop reason: SURG

## 2019-02-21 RX ORDER — DOCUSATE SODIUM 100 MG/1
100 CAPSULE, LIQUID FILLED ORAL 2 TIMES DAILY PRN
Status: DISCONTINUED | OUTPATIENT
Start: 2019-02-21 | End: 2019-02-24 | Stop reason: HOSPADM

## 2019-02-21 RX ORDER — GLYCOPYRROLATE 0.2 MG/ML
INJECTION INTRAMUSCULAR; INTRAVENOUS AS NEEDED
Status: DISCONTINUED | OUTPATIENT
Start: 2019-02-21 | End: 2019-02-21 | Stop reason: SURG

## 2019-02-21 RX ORDER — HYDROMORPHONE HCL/PF 1 MG/ML
0.5 SYRINGE (ML) INJECTION ONCE
Status: COMPLETED | OUTPATIENT
Start: 2019-02-21 | End: 2019-02-21

## 2019-02-21 RX ORDER — HYDROMORPHONE HCL/PF 1 MG/ML
SYRINGE (ML) INJECTION AS NEEDED
Status: DISCONTINUED | OUTPATIENT
Start: 2019-02-21 | End: 2019-02-21 | Stop reason: SURG

## 2019-02-21 RX ORDER — FENTANYL CITRATE 50 UG/ML
INJECTION, SOLUTION INTRAMUSCULAR; INTRAVENOUS AS NEEDED
Status: DISCONTINUED | OUTPATIENT
Start: 2019-02-21 | End: 2019-02-21 | Stop reason: SURG

## 2019-02-21 RX ORDER — HEPARIN SODIUM 5000 [USP'U]/ML
5000 INJECTION, SOLUTION INTRAVENOUS; SUBCUTANEOUS EVERY 8 HOURS SCHEDULED
Status: DISCONTINUED | OUTPATIENT
Start: 2019-02-21 | End: 2019-02-24 | Stop reason: HOSPADM

## 2019-02-21 RX ORDER — ROCURONIUM BROMIDE 10 MG/ML
INJECTION, SOLUTION INTRAVENOUS AS NEEDED
Status: DISCONTINUED | OUTPATIENT
Start: 2019-02-21 | End: 2019-02-21 | Stop reason: SURG

## 2019-02-21 RX ORDER — KETAMINE HYDROCHLORIDE 50 MG/ML
INJECTION, SOLUTION, CONCENTRATE INTRAMUSCULAR; INTRAVENOUS AS NEEDED
Status: DISCONTINUED | OUTPATIENT
Start: 2019-02-21 | End: 2019-02-21 | Stop reason: SURG

## 2019-02-21 RX ORDER — MIDAZOLAM HYDROCHLORIDE 1 MG/ML
INJECTION INTRAMUSCULAR; INTRAVENOUS AS NEEDED
Status: DISCONTINUED | OUTPATIENT
Start: 2019-02-21 | End: 2019-02-21 | Stop reason: SURG

## 2019-02-21 RX ADMIN — DEXMEDETOMIDINE HYDROCHLORIDE 0.2 MCG/KG/HR: 100 INJECTION, SOLUTION INTRAVENOUS at 08:01

## 2019-02-21 RX ADMIN — ROCURONIUM BROMIDE 50 MG: 10 INJECTION INTRAVENOUS at 07:37

## 2019-02-21 RX ADMIN — KETAMINE HYDROCHLORIDE 30 MG: 50 INJECTION, SOLUTION INTRAMUSCULAR; INTRAVENOUS at 07:37

## 2019-02-21 RX ADMIN — ROCURONIUM BROMIDE 20 MG: 10 INJECTION INTRAVENOUS at 08:35

## 2019-02-21 RX ADMIN — FENTANYL CITRATE 50 MCG: 50 INJECTION, SOLUTION INTRAMUSCULAR; INTRAVENOUS at 11:05

## 2019-02-21 RX ADMIN — Medication 1000 MG: at 07:30

## 2019-02-21 RX ADMIN — HYDROMORPHONE HYDROCHLORIDE 0.5 MG: 1 INJECTION, SOLUTION INTRAMUSCULAR; INTRAVENOUS; SUBCUTANEOUS at 08:46

## 2019-02-21 RX ADMIN — ROCURONIUM BROMIDE 10 MG: 10 INJECTION INTRAVENOUS at 10:12

## 2019-02-21 RX ADMIN — LIDOCAINE HYDROCHLORIDE 50 MG: 10 INJECTION, SOLUTION INFILTRATION; PERINEURAL at 07:37

## 2019-02-21 RX ADMIN — GLYCOPYRROLATE 0.6 MG: 0.2 INJECTION, SOLUTION INTRAMUSCULAR; INTRAVENOUS at 10:34

## 2019-02-21 RX ADMIN — SODIUM CHLORIDE, SODIUM LACTATE, POTASSIUM CHLORIDE, AND CALCIUM CHLORIDE: .6; .31; .03; .02 INJECTION, SOLUTION INTRAVENOUS at 07:30

## 2019-02-21 RX ADMIN — HYDROMORPHONE HYDROCHLORIDE: 10 INJECTION, SOLUTION INTRAMUSCULAR; INTRAVENOUS; SUBCUTANEOUS at 11:28

## 2019-02-21 RX ADMIN — SODIUM CHLORIDE, SODIUM LACTATE, POTASSIUM CHLORIDE, AND CALCIUM CHLORIDE 75 ML/HR: .6; .31; .03; .02 INJECTION, SOLUTION INTRAVENOUS at 07:06

## 2019-02-21 RX ADMIN — HYDROMORPHONE HYDROCHLORIDE 0.5 MG: 1 INJECTION, SOLUTION INTRAMUSCULAR; INTRAVENOUS; SUBCUTANEOUS at 10:47

## 2019-02-21 RX ADMIN — EPHEDRINE SULFATE 5 MG: 50 INJECTION, SOLUTION INTRAMUSCULAR; INTRAVENOUS; SUBCUTANEOUS at 10:19

## 2019-02-21 RX ADMIN — SODIUM CHLORIDE 125 ML/HR: 0.9 INJECTION, SOLUTION INTRAVENOUS at 12:45

## 2019-02-21 RX ADMIN — HEPARIN SODIUM 5000 UNITS: 5000 INJECTION INTRAVENOUS; SUBCUTANEOUS at 16:19

## 2019-02-21 RX ADMIN — NEOSTIGMINE METHYLSULFATE 4 MG: 1 INJECTION, SOLUTION INTRAMUSCULAR; INTRAVENOUS; SUBCUTANEOUS at 10:34

## 2019-02-21 RX ADMIN — Medication 1000 MG: at 08:30

## 2019-02-21 RX ADMIN — KETAMINE HYDROCHLORIDE 10 MG: 50 INJECTION, SOLUTION INTRAMUSCULAR; INTRAVENOUS at 10:13

## 2019-02-21 RX ADMIN — HEPARIN SODIUM 5000 UNITS: 5000 INJECTION INTRAVENOUS; SUBCUTANEOUS at 21:11

## 2019-02-21 RX ADMIN — FENTANYL CITRATE 50 MCG: 50 INJECTION, SOLUTION INTRAMUSCULAR; INTRAVENOUS at 09:02

## 2019-02-21 RX ADMIN — PROPOFOL 30 MG: 10 INJECTION, EMULSION INTRAVENOUS at 10:12

## 2019-02-21 RX ADMIN — HEPARIN SODIUM 5000 UNITS: 5000 INJECTION, SOLUTION INTRAVENOUS; SUBCUTANEOUS at 07:01

## 2019-02-21 RX ADMIN — HYDROMORPHONE HYDROCHLORIDE 0.5 MG: 1 INJECTION, SOLUTION INTRAMUSCULAR; INTRAVENOUS; SUBCUTANEOUS at 11:40

## 2019-02-21 RX ADMIN — PROPOFOL 160 MG: 10 INJECTION, EMULSION INTRAVENOUS at 07:37

## 2019-02-21 RX ADMIN — HYDROMORPHONE HYDROCHLORIDE 0.5 MG: 1 INJECTION, SOLUTION INTRAMUSCULAR; INTRAVENOUS; SUBCUTANEOUS at 12:15

## 2019-02-21 RX ADMIN — FENTANYL CITRATE 150 MCG: 50 INJECTION, SOLUTION INTRAMUSCULAR; INTRAVENOUS at 07:37

## 2019-02-21 RX ADMIN — ROCURONIUM BROMIDE 30 MG: 10 INJECTION INTRAVENOUS at 08:59

## 2019-02-21 RX ADMIN — ONDANSETRON 4 MG: 2 INJECTION INTRAMUSCULAR; INTRAVENOUS at 10:14

## 2019-02-21 RX ADMIN — MAGNESIUM SULFATE HEPTAHYDRATE 2 G: 500 INJECTION, SOLUTION INTRAMUSCULAR; INTRAVENOUS at 08:55

## 2019-02-21 RX ADMIN — SODIUM CHLORIDE: 0.9 INJECTION, SOLUTION INTRAVENOUS at 07:46

## 2019-02-21 RX ADMIN — METRONIDAZOLE 500 MG: 500 INJECTION, SOLUTION INTRAVENOUS at 07:43

## 2019-02-21 RX ADMIN — DEXAMETHASONE SODIUM PHOSPHATE 5 MG: 10 INJECTION INTRAMUSCULAR; INTRAVENOUS at 08:03

## 2019-02-21 RX ADMIN — FENTANYL CITRATE 50 MCG: 50 INJECTION, SOLUTION INTRAMUSCULAR; INTRAVENOUS at 11:13

## 2019-02-21 RX ADMIN — EPHEDRINE SULFATE 5 MG: 50 INJECTION, SOLUTION INTRAMUSCULAR; INTRAVENOUS; SUBCUTANEOUS at 07:38

## 2019-02-21 RX ADMIN — DEXMEDETOMIDINE HYDROCHLORIDE 4 MCG: 100 INJECTION, SOLUTION INTRAVENOUS at 07:37

## 2019-02-21 RX ADMIN — MIDAZOLAM 2 MG: 1 INJECTION INTRAMUSCULAR; INTRAVENOUS at 07:30

## 2019-02-21 RX ADMIN — ALBUMIN (HUMAN): 12.5 SOLUTION INTRAVENOUS at 08:29

## 2019-02-21 RX ADMIN — PHENYLEPHRINE HYDROCHLORIDE 20 MCG/MIN: 10 INJECTION INTRAVENOUS at 08:07

## 2019-02-21 RX ADMIN — KETAMINE HYDROCHLORIDE 10 MG: 50 INJECTION, SOLUTION INTRAMUSCULAR; INTRAVENOUS at 09:18

## 2019-02-21 RX ADMIN — SODIUM CHLORIDE 125 ML/HR: 0.9 INJECTION, SOLUTION INTRAVENOUS at 21:11

## 2019-02-21 NOTE — ANESTHESIA POSTPROCEDURE EVALUATION
Post-Op Assessment Note    CV Status:  Stable  Pain Score: 6    Pain management: adequate     Mental Status:  Alert and awake   Hydration Status:  Stable   PONV Controlled:  None   Airway Patency:  Patent   Post Op Vitals Reviewed: Yes      Staff: Anesthesiologist           /86 (02/21/19 1055)    Temp 98 5 °F (36 9 °C) (02/21/19 1055)    Pulse 70 (02/21/19 1055)   Resp 16 (02/21/19 1055)    SpO2 100 % (02/21/19 1055)

## 2019-02-21 NOTE — PLAN OF CARE
Problem: Potential for Falls  Goal: Patient will remain free of falls  Description  INTERVENTIONS:  - Assess patient frequently for physical needs  -  Identify cognitive and physical deficits and behaviors that affect risk of falls    -  [place] fall precautions as indicated by assessment   - Educate patient/family on patient safety including physical limitations  - Instruct patient to call for assistance with activity based on assessment  - Modify environment to reduce risk of injury  - Consider OT/PT consult to assist with strengthening/mobility  Outcome: Progressing

## 2019-02-21 NOTE — ANESTHESIA PROCEDURE NOTES
Arterial Line Insertion  Performed by: Yefri Sanabria MD  Authorized by: Yefri Sanabria MD   Consent: Verbal consent obtained  Written consent obtained  Risks and benefits: risks, benefits and alternatives were discussed  Consent given by: patient  Patient identity confirmed: arm band  Preparation: Patient was prepped and draped in the usual sterile fashion  Indications: hemodynamic monitoring  Orientation:  Right  Location: radial artery  Sedation:  Patient sedated: procedure performed under GA      Procedure Details:  Needle gauge: 20  Seldinger technique: Seldinger technique used  Number of attempts: 1    Post-procedure:  Post-procedure: dressing applied  Waveform: good waveform and waveform confirmed  Post-procedure CNS: normal  Patient tolerance: Patient tolerated the procedure well with no immediate complications

## 2019-02-21 NOTE — PERIOPERATIVE NURSING NOTE
Pt c/o earlier of severe urgency to void  Haines intact and draining sm amts bloody urine  Dr Yanet Evans here and made aware and also OR nurse here and made Dr Natalie Jackson also aware

## 2019-02-21 NOTE — OP NOTE
OPERATIVE REPORT  PATIENT NAME: Barbra Gottron    :  1960  MRN: [id number]  Pt Location: [facility] ROOM 10    SURGERY DATE: 2019    Surgeon(s) and Role:  Panel 1:     * Sharri Dutton MD - Primary  Panel 2:     * Stephanie Blunt MD - Primary     * Grey George - Assisting    Preop Diagnosis:  Intra-abdominal abscess ([address] ) [K65 1]  Diverticulitis of large intestine with abscess without bleeding [K57 20]    Post-Op Diagnosis Codes:     * Intra-abdominal abscess ([address] ) [K65 1]     * Diverticulitis of large intestine with abscess without bleeding [K57 20]    Procedure(s)   CYSTOSCOPY, INSERTION BILATERAL STENTS URETERAL (N/A)  DIAGNOSTIC LAPAROSCOPY  LAPAROSCOPIC HAND-ASSISTED SIGMOID COLECTOMY,  Lysis of small bowel adhesions  REPAIR OF UNAVOIDABLE SEROSAL ADHESION to Pelvic phlegmon  INTRAOPERATIVE FLUORESCENCE ANGIOGRAPHY (SPY)  FLEXIBLE SIGMOIDOSCOPY    Specimen(s):  ID Type Source Tests Collected by Time Destination   1 : sigmoid colectomy Tissue Large Intestine, Sigmoid Colon TISSUE EXAM Stephanie Blunt MD 2019 0259    A :  Urine Urine, Cystoscopic URINE CULTURE Sharri Dutton MD 2019 0801        Estimated Blood Loss:   50 mL    Drains:  NG/OG/Enteral Tube Orogastric 16 Fr Center mouth (Active)   Number of days: 0       Urethral Catheter Latex 18 Fr  (Active)   Number of days: 0       Ureteral Drain/Stent Left ureter 5 Fr  (Active)   Number of days: 0       [REMOVED] Closed/Suction Drain Right Back Bulb 10 Fr  (Removed)   Dressing Status Clean;Dry; Intact 2019  7:13 AM   Drainage Appearance None 2019  7:13 AM   Status To bulb suction 2019  7:13 AM   Number of days: 41       [REMOVED] Ureteral Drain/Stent Right ureter 5 Fr   (Removed)   Number of days: 0     Anesthesia Type:   General    Operative Indications:  Intra-abdominal abscess ([address] ) [K65 1]  Diverticulitis of large intestine with abscess without bleeding     Operative Findings:  -EEA 29 colorectal anastomosis to approximately 15 cm, negative bubble leak test, excellent SPY angiographic imaging   -Phlegmon in pelvis with adherence to small bowel, IR drain located  Unavoidable serosal defect from small bowel adhesions to pelvis repaired with 3-0 Vicryl, partial thickness  Complications:   None     Procedure and Technique:  Domonique Baird is a 24-year-old male with perforated diverticulitis status post abscess drainage, discussed in scheduled for elective surgery as he did well with non operative management      We discussed/recommended a laparoscopic left hemicolectomy, possible anterior resection and risks including not limited to bleeding, infection, risks of anesthesia, open surgery, DVT/PE, heart attack, stroke, death, damage to local structures including ureter and duodenum, and anastomotic leak requiring reoperation, temporary versus permanent stoma  He understood these in a face-to-face, personal, informed consent process detailing the benefits, alternatives, and risks, signed informed consent wished to proceed      He was brought to the operating room where general endotracheal anesthesia was induced without event  Haines was placed under sterile conditions per Dr Jorgito Wood after cysto/stent placement separately dictated,placed in modified lithotomy position with attention to joints and bony extremities on PINK PAD,received 5000 units of subcutaneous heparin prior to operation, Venodynes were on and running throughout the procedure,received cefazolin and Flagyl prior to skin incision      He was bottom betadine prepped, abdomen chlorhexidine sterile prepped after electric clipping, and after appropriate drying time Ioban and sterile draped   After timeout was taken per protocol procedure began       5mm port was placed in the right upper quadrant after incision with 15 blade, placed with optiview technique visualizing all layers of abdomen, entering peritoneal cavity without injury, 15mm CO2 pneumoperitoneum introduced      Diagnostic laparoscopy revealed no obvious peritoneal or liver disease, omental adhesions to small bowel  2 additional 5 mm trocars were placed in the right lower quadrant and through suprapubic line, noting that this was incorporated for eventual incision      All thin clear and omental adhesions were dissected free with Enseal energy device  Bowel was swept cephalad with patient in Trendelenburg position to expose left colon  There were obvious small bowel adhesions to pelvis and hand port assistance was required for extraction at the end so this was performed at this point      Hand port was placed in the Pfannenstiel location for eventual extraction incision and continuation      7 cm transverse incision suprapubic in a Pfannenstiel fashion  Subcutaneous tissues were entered with electrocautery to the fascia  The fascia was incised transversely and scored avoiding the rectus sheath  Fascial flaps were raised off of the rectus sheath musculature  The sheath was entered in the midline exposing the peritoneal cavity which was entered      GelPort device was placed and 15 mm CO2 pneumoperitoneum was insufflated       Using hand assistance, the dissection was continued cephalad along the white line of Toldt, there was redundant descending colon and the splenic flexure was not formally mobilized  Distal in the pelvis the IR drain was located with adherent small bowel, this was gently freed, and the previously prepped out IR drain was removed off the field by circulating nurse and documented intact with its suture  EnSeal energy device was then used under direct visualization and palpation of the bilateral ureters to take the mesentery that was directly related to the phlegmon and area of sigmoid perforation from previous/scar    Once this was freed up the GelPort cap was removed and a blue load contour was used to resect the descending/sigmoid junction and again at the sacral promontory where the tenia were splayed on obvious proximal rectum, soft area, with green load  The SPY fluorescence angiography was also brought onto the field after being sterilely prepped  The descending colon for proposed anastomosis was prepped with focused lasers and indocyanine green injected by anesthesia  Fluorescence angiography was then performed with lights off and showed good blood flow to the distal segment  This was incised proximal to the end      The 2-0 Prolene pursestring suture was then placed and the EEA 29 anvil was placed and before being doubly tied      I then went below as below perineal  placed the EEA 29 handle after placing sizers to the proximal rectum under laparoscopic visualization  Once this was deployed with pin through the middle of the staple line with orange flash showing, above  with hand assistance placed anvil with audible click and without twist   Closed and fired after appropriate compression time under direct laparoscopic vision  The above  then placed irrigation and proximal colon finger clamp in pelvis while I placed flexible sigmoidoscope while pelvis was filled with irrigation  The anastomosis was visualized at approximately 15 cm with good circumferential bleeding points and no hemorrhage  This was insufflated and desufflated and 3 cycles with no bubbles and good insufflation and desufflation on laparoscopic view  I then went above after changing gown and gloves as per the colon bundle, irrigation undertaken and ran clean       Redraped the bottom, changed gown and gloves to provide a clean field for closure which was undertaken      Fascia was then closed after correct counts in the following fashion, peritoneum was closed vertically with running locked 2-0 Vicryl suture  This was followed by 0 Vicryl interrupted sutures to reapproximate the rectus sheath  Followed by #1 PDS from either side of the fascial closure   4-0 Monocryl on the skin with Histoacryl for the incision and remaining port sites after additional glove change      All sponge needle instrument, RF Wand counts were correct I was present and scrubbed for the entirety of the procedure  Ureteral stents were removed intact by me at conclusion of case       Patient Disposition:  PACU     SIGNATURE: Ivett Bush MD  DATE: February 21, 2019  TIME: 10:45 AM

## 2019-02-21 NOTE — H&P
H/o sigmoid polyps and colits with pelvic abscess s/p draining  Now for sigmoid colectomy with Dr Aparna Stephens  Cystoscopy with pre-op ureteral stents requested

## 2019-02-21 NOTE — ANESTHESIA PREPROCEDURE EVALUATION
Review of Systems/Medical History  Patient summary reviewed  Chart reviewed  No history of anesthetic complications     Cardiovascular  EKG reviewed, Exercise tolerance (METS): >4,  Hypertension ,    Pulmonary  Smoker (quit 1/2/19) ex-smoker  , Recent URI (influenza 12/2018) resolved,        GI/Hepatic    Bowel prep (colonoscopy 2/20)  Comment: Confirmed NPO appropriate  Diverticulitis with intraabdominal abscess, s/p IR drainage     Negative  ROS        Endo/Other  Negative endo/other ROS      GYN       Hematology  Anemia ,     Musculoskeletal  Negative musculoskeletal ROS        Neurology    Headaches (migraines),    Psychology   Negative psychology ROS              Physical Exam    Airway    Mallampati score: II  TM Distance: <3 FB  Neck ROM: full     Dental   No notable dental hx     Cardiovascular  Rhythm: regular, Rate: normal, Cardiovascular exam normal    Pulmonary  Pulmonary exam normal Breath sounds clear to auscultation,     Other Findings        Anesthesia Plan  ASA Score- 2     Anesthesia Type- general with ASA Monitors  Additional Monitors: arterial line  Airway Plan: ETT  Plan Factors-    Induction- intravenous  Postoperative Plan- Plan for postoperative opioid use  Planned trial extubation    Informed Consent- Anesthetic plan and risks discussed with patient and spouse              Lab Results   Component Value Date    WBC 8 49 01/26/2019    HGB 11 5 (L) 01/26/2019    HCT 35 2 (L) 01/26/2019    MCV 97 01/26/2019     (H) 01/26/2019     Lab Results   Component Value Date    CALCIUM 8 5 01/26/2019    K 3 8 01/26/2019    CO2 25 01/26/2019     01/26/2019    BUN 8 01/26/2019    CREATININE 0 75 01/26/2019     Lab Results   Component Value Date    ALT 22 01/26/2019    AST 11 01/26/2019    ALKPHOS 75 01/26/2019     Lab Results   Component Value Date    INR 1 10 01/10/2019    PROTIME 14 3 (H) 01/10/2019     Lab Results   Component Value Date    HGBA1C 5 8 02/08/2019

## 2019-02-21 NOTE — RESPIRATORY THERAPY NOTE
RT Protocol Note  Junior Sigala 62 y o  male MRN: [id number]  Unit/Bed#: [facility] 801-01 Encounter: [id number]    Assessment    Active Problems:    Intra-abdominal abscess ([address] )    Diverticulitis of large intestine with abscess without bleeding      Home Pulmonary Medications:    Home Devices/Therapy: (none)    Past Medical History:   Diagnosis Date    Hypertension      Social History     Socioeconomic History    Marital status: /Civil Union     Spouse name: None    Number of children: None    Years of education: None    Highest education level: None   Occupational History    None   Social Needs    Financial resource strain: None    Food insecurity:     Worry: None     Inability: None    Transportation needs:     Medical: None     Non-medical: None   Tobacco Use    Smoking status: Former Smoker     Packs/day: 0 50     Types: Cigarettes     Last attempt to quit: 2019     Years since quittin 1    Smokeless tobacco: Never Used   Substance and Sexual Activity    Alcohol use: No    Drug use: No    Sexual activity: None   Lifestyle    Physical activity:     Days per week: None     Minutes per session: None    Stress: None   Relationships    Social connections:     Talks on phone: None     Gets together: None     Attends [employer] service: None     Active member of club or organization: None     Attends meetings of clubs or organizations: None     Relationship status: None    Intimate partner violence:     Fear of current or ex partner: None     Emotionally abused: None     Physically abused: None     Forced sexual activity: None   Other Topics Concern    None   Social History Narrative    None       Subjective         Objective    Physical Exam:   Assessment Type: Assess only  General Appearance: Awake, Alert  Respiratory Pattern: Normal  Chest Assessment: Chest expansion symmetrical  Bilateral Breath Sounds: Clear(few crackles)  Cough: None  O2 Device: ETCO2 NC    Vitals:  Blood pressure 148/70, pulse 61, temperature 98 2 °F (36 8 °C), temperature source Oral, resp  rate 16, height 6' 3" (1 905 m), weight 77 1 kg (170 lb), SpO2 97 %  Imaging and other studies: I have personally reviewed pertinent reports        O2 Device: ETCO2 NC     Plan    Respiratory Plan: No distress/Pulmonary history, Discontinue Protocol        Resp Comments: pt assessed at bedside for respiratory protocol, dx; abdominal abscess, pt appears comfortable and in no distress, has not had respiratory medications greater than 20years, O2 sat on 2lpm 98%, no pulmonary history, 40pack year smoker, quit 1 month ago, no respiratory intervention needed at this time, will order IS Q1 hour wihile awake and DC respiratory protocol

## 2019-02-22 LAB
ANION GAP SERPL CALCULATED.3IONS-SCNC: 7 MMOL/L (ref 4–13)
BASOPHILS # BLD AUTO: 0.04 THOUSANDS/ΜL (ref 0–0.1)
BASOPHILS NFR BLD AUTO: 0 % (ref 0–1)
BUN SERPL-MCNC: 10 MG/DL (ref 5–25)
CALCIUM SERPL-MCNC: 8 MG/DL (ref 8.3–10.1)
CHLORIDE SERPL-SCNC: 105 MMOL/L (ref 100–108)
CO2 SERPL-SCNC: 26 MMOL/L (ref 21–32)
CREAT SERPL-MCNC: 0.85 MG/DL (ref 0.6–1.3)
EOSINOPHIL # BLD AUTO: 0.01 THOUSAND/ΜL (ref 0–0.61)
EOSINOPHIL NFR BLD AUTO: 0 % (ref 0–6)
ERYTHROCYTE [DISTWIDTH] IN BLOOD BY AUTOMATED COUNT: 14.3 % (ref 11.6–15.1)
GFR SERPL CREATININE-BSD FRML MDRD: 94 ML/MIN/1.73SQ M
GLUCOSE SERPL-MCNC: 95 MG/DL (ref 65–140)
HCT VFR BLD AUTO: 37.6 % (ref 36.5–49.3)
HGB BLD-MCNC: 12.2 G/DL (ref 12–17)
IMM GRANULOCYTES # BLD AUTO: 0.04 THOUSAND/UL (ref 0–0.2)
IMM GRANULOCYTES NFR BLD AUTO: 0 % (ref 0–2)
LYMPHOCYTES # BLD AUTO: 1.91 THOUSANDS/ΜL (ref 0.6–4.47)
LYMPHOCYTES NFR BLD AUTO: 16 % (ref 14–44)
MAGNESIUM SERPL-MCNC: 2.3 MG/DL (ref 1.6–2.6)
MCH RBC QN AUTO: 31 PG (ref 26.8–34.3)
MCHC RBC AUTO-ENTMCNC: 32.4 G/DL (ref 31.4–37.4)
MCV RBC AUTO: 96 FL (ref 82–98)
MONOCYTES # BLD AUTO: 1.35 THOUSAND/ΜL (ref 0.17–1.22)
MONOCYTES NFR BLD AUTO: 11 % (ref 4–12)
NEUTROPHILS # BLD AUTO: 8.61 THOUSANDS/ΜL (ref 1.85–7.62)
NEUTS SEG NFR BLD AUTO: 73 % (ref 43–75)
NRBC BLD AUTO-RTO: 0 /100 WBCS
PLATELET # BLD AUTO: 296 THOUSANDS/UL (ref 149–390)
PMV BLD AUTO: 9.9 FL (ref 8.9–12.7)
POTASSIUM SERPL-SCNC: 3.7 MMOL/L (ref 3.5–5.3)
RBC # BLD AUTO: 3.93 MILLION/UL (ref 3.88–5.62)
SODIUM SERPL-SCNC: 138 MMOL/L (ref 136–145)
WBC # BLD AUTO: 11.96 THOUSAND/UL (ref 4.31–10.16)

## 2019-02-22 PROCEDURE — G8988 SELF CARE GOAL STATUS: HCPCS

## 2019-02-22 PROCEDURE — 99232 SBSQ HOSP IP/OBS MODERATE 35: CPT | Performed by: NURSE PRACTITIONER

## 2019-02-22 PROCEDURE — G8978 MOBILITY CURRENT STATUS: HCPCS

## 2019-02-22 PROCEDURE — 97166 OT EVAL MOD COMPLEX 45 MIN: CPT

## 2019-02-22 PROCEDURE — 85025 COMPLETE CBC W/AUTO DIFF WBC: CPT | Performed by: SURGERY

## 2019-02-22 PROCEDURE — 83735 ASSAY OF MAGNESIUM: CPT | Performed by: SURGERY

## 2019-02-22 PROCEDURE — 94760 N-INVAS EAR/PLS OXIMETRY 1: CPT

## 2019-02-22 PROCEDURE — G8987 SELF CARE CURRENT STATUS: HCPCS

## 2019-02-22 PROCEDURE — 80048 BASIC METABOLIC PNL TOTAL CA: CPT | Performed by: SURGERY

## 2019-02-22 PROCEDURE — G8989 SELF CARE D/C STATUS: HCPCS

## 2019-02-22 PROCEDURE — G8979 MOBILITY GOAL STATUS: HCPCS

## 2019-02-22 PROCEDURE — 97163 PT EVAL HIGH COMPLEX 45 MIN: CPT

## 2019-02-22 RX ORDER — DEXTROSE, SODIUM CHLORIDE, AND POTASSIUM CHLORIDE 5; .45; .15 G/100ML; G/100ML; G/100ML
125 INJECTION INTRAVENOUS CONTINUOUS
Status: DISCONTINUED | OUTPATIENT
Start: 2019-02-22 | End: 2019-02-24

## 2019-02-22 RX ORDER — POTASSIUM CHLORIDE 14.9 MG/ML
20 INJECTION INTRAVENOUS
Status: DISCONTINUED | OUTPATIENT
Start: 2019-02-22 | End: 2019-02-22

## 2019-02-22 RX ORDER — POTASSIUM CHLORIDE 20 MEQ/1
20 TABLET, EXTENDED RELEASE ORAL ONCE
Status: COMPLETED | OUTPATIENT
Start: 2019-02-22 | End: 2019-02-22

## 2019-02-22 RX ADMIN — HEPARIN SODIUM 5000 UNITS: 5000 INJECTION INTRAVENOUS; SUBCUTANEOUS at 06:13

## 2019-02-22 RX ADMIN — POTASSIUM CHLORIDE 20 MEQ: 1500 TABLET, EXTENDED RELEASE ORAL at 14:31

## 2019-02-22 RX ADMIN — POTASSIUM CHLORIDE 20 MEQ: 200 INJECTION, SOLUTION INTRAVENOUS at 11:21

## 2019-02-22 RX ADMIN — HEPARIN SODIUM 5000 UNITS: 5000 INJECTION INTRAVENOUS; SUBCUTANEOUS at 21:13

## 2019-02-22 RX ADMIN — DEXTROSE, SODIUM CHLORIDE, AND POTASSIUM CHLORIDE 125 ML/HR: 5; .45; .15 INJECTION INTRAVENOUS at 06:13

## 2019-02-22 RX ADMIN — DEXTROSE, SODIUM CHLORIDE, AND POTASSIUM CHLORIDE 125 ML/HR: 5; .45; .15 INJECTION INTRAVENOUS at 21:13

## 2019-02-22 RX ADMIN — HEPARIN SODIUM 5000 UNITS: 5000 INJECTION INTRAVENOUS; SUBCUTANEOUS at 14:35

## 2019-02-22 RX ADMIN — ACETAMINOPHEN 650 MG: 325 TABLET, FILM COATED ORAL at 07:56

## 2019-02-22 NOTE — PROGRESS NOTES
Progress Note - Colorectal Surgery   Juan Carlos Madi 62 y o  male MRN: [id number]  Unit/Bed#: [facility] 801-01 Encounter: [id number]    Assessment/Plan:  62 y o  male with diverticulitis status post laparoscopic sigmoid resection Radha Fortune, 2/21) with cystoscopy and bilateral ureteral stents with subsequent removal ([place])  Advanced to clear liquid diet  Switch [email or web address] com to D5 1/2NS 20K @ 100  Continue PCA  We nasal cannula as tolerated  Discontinue Haines  Follow-up urine culture  Out of bed/ambulate  DVT prophylaxis      Subjective/Objective     Subjective:  No acute events overnight  Patient complaining of lower abdominal pain that he feels is well controlled, states he has used PCA about twice overnight  Denies flatus/bowel movement  Denies fever chills nausea vomiting chest pain or shortness of breath  Objective:     Vitals: Blood pressure 146/75, pulse 64, temperature 97 7 °F (36 5 °C), temperature source Oral, resp  rate 16, height 6' 3" (1 905 m), weight 78 2 kg (172 lb 6 4 oz), SpO2 99 %  ,Body mass index is 21 55 kg/m²  I/O       02/20 0701 - 02/21 0700 02/21 0701 - 02/22 0700    P  O   0    I V  (mL/kg)  3720 (47 6)    IV Piggyback  250    Total Intake(mL/kg)  3970 (50 8)    Urine (mL/kg/hr)  1130 (0 6)    Blood  50    Total Output  1180    Net  +2790                Physical Exam:  GEN: NAD  HEENT: MMM  Nasal cannula in place  CV: RRR  Lung: Normal effort  Ab: Soft, tender in the lower abdomen/ND  Pfannenstiel incision clean dry and intact  Extrem: No CCE  Neuro:  A+Ox3    Lab, Imaging and other studies: CBC with diff: No results found for: WBC, HGB, HCT, MCV, PLT, ADJUSTEDWBC, MCH, MCHC, RDW, MPV, NRBC, BMP/CMP: No results found for: SODIUM, K, CL, CO2, ANIONGAP, BUN, CREATININE, GLUCOSE, CALCIUM, AST, ALT, ALKPHOS, PROT, BILITOT, EGFR  VTE Pharmacologic Prophylaxis: Heparin  VTE Mechanical Prophylaxis: sequential compression device

## 2019-02-22 NOTE — PROGRESS NOTES
UROLOGY PROGRESS NOTE   Patient Identifiers: Sherrie Hilton (MRN [id number])  Date of Service: 2/22/2019    Subjective:     24 HR EVENTS:   no significant events  Patient has  Patient awake alert oriented x3 this morning  Haines catheter removed patient should void  Patient denies issues or concerns overnight  Complains of mild incisional pain  PCA covering pain well  Tolerating clear liquid diet this morning      Objective:     VITALS:    Vitals:    02/22/19 0813   BP:    Pulse:    Resp:    Temp:    SpO2: 99%       INS & OUTS:  I/O last 24 hours:   In: 4932 5 [I V :4682 5; IV Piggyback:250]  Out: 1630 [Urine:1580; Blood:50]    LABS:  Lab Results   Component Value Date    HGB 12 2 02/22/2019    HCT 37 6 02/22/2019    WBC 11 96 (H) 02/22/2019     02/22/2019       Lab Results   Component Value Date    K 3 7 02/22/2019     02/22/2019    CO2 26 02/22/2019    BUN 10 02/22/2019    CREATININE 0 85 02/22/2019    CALCIUM 8 0 (L) 02/22/2019       INPATIENT MEDS:    Current Facility-Administered Medications:     acetaminophen (TYLENOL) tablet 650 mg, 650 mg, Oral, Q6H PRN, Molinda Im, 650 mg at 02/22/19 0756    dextrose 5 % and sodium chloride 0 45 % with KCl 20 mEq/L infusion, 125 mL/hr, Intravenous, Continuous, Uma Nam PA-C, Last Rate: 125 mL/hr at 02/22/19 0613, 125 mL/hr at 02/22/19 2883    docusate sodium (COLACE) capsule 100 mg, 100 mg, Oral, BID PRN, Molinda Im    heparin (porcine) subcutaneous injection 5,000 Units, 5,000 Units, Subcutaneous, Q8H [facility], 5,000 Units at 02/22/19 5443 **AND** Platelet count, , , Once, Myriam Contreras    HYDROmorphone (DILAUDID) 1 mg/mL 50 mL PCA, , Intravenous, Continuous, Myriam Contreras    losartan (COZAAR) tablet 50 mg, 50 mg, Oral, Daily, Myriam Contreras    ondansetron (ZOFRAN) injection 4 mg, 4 mg, Intravenous, Q4H PRN, Myriam Contreras    oxyCODONE-acetaminophen (PERCOCET) 5-325 mg per tablet 1 tablet, 1 tablet, Oral, Q4H PRN, Myriam biswas chloride 20 mEq IVPB (premix), 20 mEq, Intravenous, Q2H, Uma Nam PA-C    Physical Exam:     GEN: alert and oriented x 3    RESP: breathing comfortably with no accessory muscle use    CARDIO: Regular rate and rhythm, S1S2 present or without murmur or extra heart sounds  ABD: soft, appropriately tender to palpation, non-distended   INCISION:   Low transverse suprapubic abdominal incision well-approximated no redness or drainage noted  Laparoscopic sites unremarkable  EXT: no significant peripheral edema   MAN:   None    Assessment:     Preoperative ureteral stent  · Postop day 1  Bilateral ureteral stent insertion/ subsequent removal and cystoscopy  · Urine output has been fine overnight clear yellow urine  Man catheter removed this morning;  patient due to void    Diverticulitis  · Postop day 1  Status post laparoscopic sigmoid resection   · Managed by colorectal surgery  · Low transverse suprapubic abdominal incision well-approximated, no drainage or redness noted  Right sided abdominal laparoscopic sites well-approximated no redness or drainage noted  · Pain reports pain well controlled with PCA      RN participated in rounds with AP  Plan of care discussed with patient and RN  Will sign of patient care at this time as there are no other urologic needs  Should further evaluation of urological needs arise, please do not hesitate to contact us      SERGIO Figueroa

## 2019-02-22 NOTE — SOCIAL WORK
Cm met with patient to explain Cm role and discuss discharge planning  Patient lives with wife Audra Fan in 2 story home with 4STE and 1st floor set up  Patient's emergency contact is his wife Audra Fan [phone] and does not have POA/living will  Patient reported being independent with ADLS PTA and does not use any DME at this time but has DME on hand if needed  Patient is not on 02 at home but is on 02 at [facility]  Patient denied SNF, had [facility]NA 15 years ago, and denied mental health or ETOH treatment in the past   Patient drives and works  Pharmacy: [facility] at the [address] off [address] but is requesting [facility] upon discharge  PCP: Cheryl Carrillo  Patient's wife to transport home once stable  Cm following  CM reviewed d/c planning process including the following: identifying help at home, patient preference for d/c planning needs, Discharge Lounge, [facility] Meds to Bed program, availability of treatment team to discuss questions or concerns patient and/or family may have regarding understanding medications and recognizing signs and symptoms once discharged  CM also encouraged patient to follow up with all recommended appointments after discharge  Patient advised of importance for patient and family to participate in managing patients medical well being

## 2019-02-22 NOTE — OCCUPATIONAL THERAPY NOTE
[address]  Evaluation     Patient Name: Kai Cook  EPMYU'F Date: 2/22/2019  Problem List  Patient Active Problem List   Diagnosis    Intra-abdominal abscess ([address] )    Diverticulitis of large intestine with abscess without bleeding     Past Medical History  Past Medical History:   Diagnosis Date    Hypertension      Past Surgical History  Past Surgical History:   Procedure Laterality Date    COLONOSCOPY      CT GUIDED PERC DRAINAGE CATHETER PLACEMENT  1/11/2019    ELBOW SURGERY      KNEE SURGERY      OH COLONOSCOPY FLX DX W/COLLJ SPEC WHEN PFRMD N/A 2/20/2019    Procedure: COLONOSCOPY;  Surgeon: Jason Tena MD;  Location: [facility]; Service: Colorectal             02/22/19 0908   Note Type   Note type Eval only   Restrictions/Precautions   Weight Bearing Precautions Per Order No   Other Precautions Pain; Fall Risk;O2;Multiple lines   Pain Assessment   Pain Assessment 0-10   Pain Score 3  (7 upon position changes)   Pain Type Acute pain;Surgical pain   Pain Location Abdomen   Hospital Pain Intervention(s) Ambulation/increased activity;Repositioned   Response to Interventions Tolerated   Home Living   Type of [address] [place] [address] One level;1/2 bath on main level; Able to live on main level with bedroom/bathroom;Stairs to enter with rails  (2 ANDERSON)   Bathroom Shower/Tub Tub/shower unit   Additional Comments Denies use of AD/DME PTA   Prior Function   Level of [place] Independent with ADLs and functional mobility   Lives With Spouse  (wife; works at [employer])   Aren Help From Family;Friend(s)   [address] in the last 6 months 0   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)   Lifestyle   Autonomy Independent with [address] [facility], IADLS, and driving PTA   Reciprocal Relationships lives with wife    Service to Others Works full-time as dentist   Intrinsic Gratification Enjoys working   Subjective   Subjective "it hurts when I get up"   ADL Where Assessed Chair   Eating Assistance 7  Independent   Grooming Assistance 5  Supervision/Setup   Grooming Deficit Supervision/safety; Increased time to complete   UB Bathing Assistance 5  Supervision/Setup   UB Bathing Deficit Supervision/safety; Increased time to complete   LB Bathing Assistance 4  Minimal Assistance   LB Bathing Deficit Increased time to complete;Right lower leg including foot; Left lower leg including foot;Supervision/safety   UB Dressing Assistance 6  Modified independent   UB Dressing Deficit Increased time to complete   LB Dressing Assistance 5  Supervision/Setup   LB Dressing Deficit Supervision/safety; Increased time to complete;Pull up over hips   Toileting Assistance  4  Minimal Assistance   Toileting Deficit Supervison/safety; Increased time to complete   Functional Assistance 5  Supervision/Setup   Functional Deficit Supervision/safety; Increased time to complete   Bed Mobility   Additional Comments Pt received OOB sitting in recliner   Transfers   Sit to Stand 5  Supervision   Additional items Increased time required;Verbal cues   Stand to Sit 5  Supervision   Additional items Increased time required;Verbal cues   Functional Mobility   Functional Mobility 5  Supervision   Additional Comments VCs to keep RW on the ground   Additional items Rolling walker   Balance   Static Sitting Good   Dynamic Sitting Good   Static Standing Fair +   Dynamic Standing Fair   Ambulatory Fair   Activity Tolerance   Activity Tolerance Patient tolerated treatment well   Nurse Made Aware yes   RUE Assessment   RUE Assessment WFL   LUE Assessment   LUE Assessment WFL   Cognition   Overall Cognitive Status WFL   Arousal/Participation Alert; Responsive; Cooperative   Attention Within functional limits   Orientation Level Oriented X4   Memory Within functional limits   Following Commands Follows all commands and directions without difficulty   Assessment   Limitation Decreased ADL status; Decreased endurance;Decreased high-level ADLs   Prognosis Good   Assessment Pt is a 61 yo male admitted due to intra-abdominal abscess; diverticulitis of large intestine  Pt has a PMH of hypertension  Pt lives in a 2 story house with his wife who works as an NP and can assist as needed when home  PTA pt drove and worked full-time as a dentist  Pt was independent with ADLs and IADLs PTA  Pt states he has been living on the 1st floor and can continue doing so upon d/c  Currently, pt is  Supervision with dressing,grooming, UB bathing, and functional mobility and min assist with LB bathing and toileting  2* fatigue, sitting/standing balance, risk of falls, strength, actvitiy tolerance/endurance, pain, and limited home support  At this time, from the OT standpoint pt is recommended home with family support upon d/c  Pt does not have acute OT needs at this time  D/c OT  Pt is okay to d/c when medically cleared       Recommendation   OT Discharge Recommendation Home with family support   OT - OK to Discharge Yes  (when medically cleared)   Barthel Index   Feeding 0   Bathing 0   Grooming Score 5   Dressing Score 10   Bladder Score 10   Bowels Score 10   Toilet Use Score 5   Transfers (Bed/Chair) Score 10   Mobility (Level Surface) Score 10   Stairs Score 0   Barthel Index Score 60   Modified [place] Scale   Modified [place] Scale 3      OSCAR Paerdes

## 2019-02-22 NOTE — PLAN OF CARE
Problem: DISCHARGE PLANNING - CARE MANAGEMENT  Goal: Discharge to post-acute care or home with appropriate resources  Description  INTERVENTIONS:  - Conduct assessment to determine patient/family and health care team treatment goals, and need for post-acute services based on payer coverage, community resources, and patient preferences, and barriers to discharge  - Address psychosocial, clinical, and financial barriers to discharge as identified in assessment in conjunction with the patient/family and health care team  - Arrange appropriate level of post-acute services according to patient?s   needs and preference and payer coverage in collaboration with the physician and health care team  - Communicate with and update the patient/family, physician, and health care team regarding progress on the discharge plan  - Arrange appropriate transportation to post-acute venues  - Anticipated for patient to discharge home with necessary services pending medical team recommendations  Outcome: Progressing

## 2019-02-22 NOTE — PHYSICAL THERAPY NOTE
PHYSICAL THERAPY EVALUATION  NAME: René Friend  AGE:   62 y o  MRN:  [id number]  ADMIT DX: Intra-abdominal abscess ([address] ) [K65 1]  Diverticulitis of large intestine with abscess without bleeding [K57 20]    PMH:   Past Medical History:   Diagnosis Date    Hypertension      LENGTH OF STAY: 1       19 0909   Pain Assessment   Pain Assessment 0-10   Pain Score 7   Pain Type Acute pain;Surgical pain   Pain Location Abdomen   Hospital Pain Intervention(s) Ambulation/increased activity;Repositioned   Response to Interventions tolerated   Home Living   Type of [address] [facility] Two level; Able to live on main level with bedroom/bathroom;Stairs to enter with rails;1/2 bath on main level  (2 ANDERSON; recently sleeping on 1st floor recliner)   Additional Comments Ambulates independently without AD at baseline  Prior Function   Level of [place] Independent with ADLs and functional mobility   Lives With Spouse  (NP works for [employer])   [id number] in the last 6 months 0   Vocational Full time employment  (dentist)   Restrictions/Precautions   [place] Bearing Precautions Per Order No   Other Precautions Multiple lines;O2;Fall Risk;Pain  (PCA pump, capnography, 2 L O2 NC)   General   Family/Caregiver Present No   Cognition   Overall Cognitive Status WFL   Arousal/Participation Cooperative   Orientation Level Oriented X4   Memory Within functional limits   Following Commands Follows all commands and directions without difficulty   Comments Pt identified by name and       RLE Assessment   RLE Assessment X   Strength RLE   RLE Overall Strength 4+/5  (functionally)   LLE Assessment   LLE Assessment X   Strength LLE   LLE Overall Strength 4+/5  (functionally)   Bed Mobility   Supine to Sit Unable to assess  (OOB in chair pre/post session)   Transfers   Sit to Stand 5  Supervision   Additional items Increased time required;Verbal cues   Stand to Sit 5  Supervision Additional items Increased time required;Verbal cues  (increased pain)   Ambulation/Elevation   Gait pattern Decreased foot clearance; Excessively slow; Short stride   Gait Assistance 5  Supervision   Additional items Verbal cues   Assistive Device Rolling walker   Distance 220` x1   Balance   Static Sitting Normal   Dynamic Sitting Good   Static Standing Fair +   Dynamic Standing Fair   Ambulatory Fair   Endurance Deficit   Endurance Deficit Yes   Endurance Deficit Description decreased gait speed, reports decreased endurance   Activity Tolerance   Activity Tolerance Patient limited by fatigue;Patient tolerated treatment well   Nurse Made Aware Per RN Nadeen Morales, pt appropriate to evaluate   Assessment   Prognosis Good   Problem List Decreased endurance; Impaired balance;Decreased mobility; Decreased safety awareness;Pain   Goals   Patient Goals Pt would like to go home  STG Expiration Date 03/03/19   Short Term Goal #1 Pt will be able to: (1) perform bed mobility with mod I (2) perform sit to stand with mod I (3) ambulate at least 300` with mod I and least restrictive AD if any needed (4) negotiate at least 2 stairs with mod I and use of rail (5) initiate HEP    Treatment Day 0   Plan   Treatment/Interventions Functional transfer training;LE strengthening/ROM; Elevations; Therapeutic exercise; Endurance training;Patient/family training;Equipment eval/education; Bed mobility;Gait training   PT Frequency Other (Comment)  (3-5x/week; most likely D/C in 1-2 sessions)   Recommendation   Recommendation Home with family support   Equipment Recommended   (will most likely progress off of RW)   Barthel Index   Feeding 0   Bathing 5   Grooming Score 5   Dressing Score 10   Bladder Score 10   Bowels Score 10   Toilet Use Score 5   Transfers (Bed/Chair) Score 10   Mobility (Level Surface) Score 10   Stairs Score 0   Barthel Index Score 65       Assessment: Pt is a 62 y o  male seen for PT evaluation s/p admit to [facility] on 2/21/2019 w/ Diverticulitis of large intestine with abscess without bleeding  Order placed for PT  Comorbidities affecting pt's physical performance at time of assessment listed above  Personal factors affecting pt at time of IE include: steps to enter environment, multi-level environment and limited home support  Prior to admission, pt was was independent w/ all functional mobility w/ out AD, lived in multi-level home, had 2 ANDERSON (+) railing and lived with wife (works during the day)  Upon evaluation: Pt requires supervision for sit to stand and supervision for ambulation with RW  Decreased safety awareness with RW use despite education to keep RW on floor  Pt currently reports that RW makes ambulation easier, however will most likely progress to ambulating without AD soon  (Please find full objective findings from PT assessment regarding body systems outlined above)  Impairments and limitations also listed above, especially due to  impaired balance, decreased endurance, gait deviations, pain, decreased activity tolerance and decreased safety awareness  The following objective measures performed on IE also reveal limitations: Barthel Index 65/100  Pt's clinical presentation is currently unstable/unpredictable seen in pt's presentation of multiple lines including PCA pump/capnography and decreased safety awareness  Pt to benefit from continued skilled PT tx while in hospital and upon DC to address deficits as defined above and maximize level of functional mobility  From PT/mobility standpoint, recommendation at time of d/c would be home with family support pending progress in order to maximize pt's functional independence and consistency w/ mobility in order to facilitate return to PLOF  Recommend 1-2 follow up sessions to trial ambulation without AD as well as stair negotiation        Foreign Mendez, PT,DPT

## 2019-02-22 NOTE — SOCIAL WORK
Cm reviewed patient during care coordination rounds with [facility]  Patient not stable for discharge today  Patient is P01 sigmoid resection  Patient is on clears, IVF, and lin was discontinued  Patient to be weaned off 02 as tolerated  PCA pump  Patient anticipated for possible discharge Sunday vs Monday pending medical clearance  Patient's wife to transport once stable  Cm following

## 2019-02-22 NOTE — UTILIZATION REVIEW
Initial Clinical Review    Age/Sex: 62 y o  male     Surgery Date: 2/21/19    Procedure:   Surgeon(s) and Role:  Panel 1:     * Sylvia Chaudhari MD - Primary  Panel 2:     * Shaniqua Lopez MD - Primary     * Simone Madden - Assisting     Preop Diagnosis:  Intra-ab[address] [address] ) [K65 1]  Diverticulitis of large intestine with abscess without bleeding [K57 20]     Post-Op Diagnosis Codes:     * Intra-abdominal abscess ([facility] [address] ) [K65 1]     * Diverticulitis of large intestine with abscess without bleeding [K57 20]     Procedure(s)   CYSTOSCOPY, INSERTION BILATERAL STENTS URETERAL (N/A)  DIAGNOSTIC LAPAROSCOPY  LAPAROSCOPIC HAND-ASSISTED SIGMOID COLECTOMY,  Lysis of small bowel adhesions  REPAIR OF UNAVOIDABLE SEROSAL ADHESION to Pelvic phlegmon  INTRAOPERATIVE FLUORESCENCE ANGIOGRAPHY (SPY)  FLEXIBLE SIGMOIDOSCOPY     Operative Findings:  -EEA 29 colorectal anastomosis to approximately 15 cm, negative bubble leak test, excellent SPY angiographic imaging   -Phlegmon in pelvis with adherence to small bowel, IR drain located  Unavoidable serosal defect from small bowel adhesions to pelvis repaired with 3-0 Vicryl, partial thickness      Anesthesia: GENERAL     Admission Orders: Date/Time/Statement: 2/21/19 @ 1056   Orders Placed This Encounter   Procedures    Inpatient Admission     Standing Status:   Standing     Number of Occurrences:   1     Order Specific Question:   Admitting Physician     Answer:   Jose Roberto Woo     Order Specific Question:   Level of Care     Answer:   Med Surg [16]     Order Specific Question:   Estimated length of stay     Answer:   More than 2 Midnights     Order Specific Question:   Certification     Answer:   I certify that inpatient services are medically necessary for this patient for a duration of greater than two midnights  See H&P and MD Progress Notes for additional information about the patient's course of treatment       Vital Signs: /77 (BP Location: Right arm) Pulse 60   Temp 98 2 °F (36 8 °C) (Oral)   Resp 16   Ht 6' 3" (1 905 m)   Wt 78 2 kg (172 lb 6 4 oz)   SpO2 98%   BMI 21 55 kg/m²   Diet:        Diet Orders   (From admission, onward)            Start     Ordered    02/22/19 0605  Diet Surgical; Clear Liquid; No Carbonation  Diet effective now     Question Answer Comment   Diet Type Surgical    Surgical Clear Liquid    Other Restriction(s): No Carbonation    RD to adjust diet per protocol? Yes        02/22/19 0605        Mobility: UP AS CHEVY    DVT Prophylaxis: SCDs, HEPARIN SQ Q 8 HR     Pain Control:   Continuous Infusions:  dextrose 5 % and sodium chloride 0 45 % with KCl 20 mEq/L 125 mL/hr Last Rate: 125 mL/hr (02/22/19 1608)   HYDROmorphone  PCA    IN USE       PRN Meds:   acetaminophen    docusate sodium    ondansetron    oxyCODONE-acetaminophen    Network Utilization Review Department  Phone: [phone]; Fax [phone]  [email or web address]  ATTENTION: Please call with any questions or concerns to [phone]  and carefully listen to the prompts so that you are directed to the right person  Send all requests for admission clinical reviews, approved or denied determinations and any other requests to fax [phone]   All voicemails are confidential

## 2019-02-22 NOTE — PLAN OF CARE
Problem: PHYSICAL THERAPY ADULT  Goal: Performs mobility at highest level of function for planned discharge setting  See evaluation for individualized goals  Description  Treatment/Interventions: Functional transfer training, LE strengthening/ROM, Elevations, Therapeutic exercise, Endurance training, Patient/family training, Equipment eval/education, Bed mobility, Gait training  Equipment Recommended: (will most likely progress off of RW)       See flowsheet documentation for full assessment, interventions and recommendations  Note:   Prognosis: Good  Problem List: Decreased endurance, Impaired balance, Decreased mobility, Decreased safety awareness, Pain  Assessment: Pt is a 62 y o  male seen for PT evaluation s/p admit to [facility] on 2/21/2019 w/ Diverticulitis of large intestine with abscess without bleeding  Order placed for PT  Comorbidities affecting pt's physical performance at time of assessment listed above  Personal factors affecting pt at time of IE include: steps to enter environment, multi-level environment and limited home support  Prior to admission, pt was was independent w/ all functional mobility w/ out AD, lived in multi-level home, had 2 [address]ERSON (+) railing and lived with wife (works during the day)  Upon evaluation: Pt requires supervision for sit to stand and supervision for ambulation with RW  Decreased safety awareness with RW use despite education to keep RW on floor  Pt currently reports that RW makes ambulation easier, however will most likely progress to ambulating without AD soon  (Please find full objective findings from PT assessment regarding body systems outlined above)  Impairments and limitations also listed above, especially due to  impaired balance, decreased endurance, gait deviations, pain, decreased activity tolerance and decreased safety awareness  The following objective measures performed on IE also reveal limitations: Barthel Index 65/100   Pt's clinical presentation is currently unstable/unpredictable seen in pt's presentation of multiple lines including PCA pump/capnography and decreased safety awareness  Pt to benefit from continued skilled PT tx while in hospital and upon DC to address deficits as defined above and maximize level of functional mobility  From PT/mobility standpoint, recommendation at time of d/c would be home with family support pending progress in order to maximize pt's functional independence and consistency w/ mobility in order to facilitate return to PLOF  Recommend 1-2 follow up sessions to trial ambulation without AD as well as stair negotiation  Recommendation: Home with family support          See flowsheet documentation for full assessment

## 2019-02-22 NOTE — OP NOTE
OPERATIVE REPORT  PATIENT NAME: Kristina Wilson    :  1960  MRN: [id number]  Pt Location: [facility] OR ROOM 10    SURGERY DATE: 2019    Surgeon(s) and Role:  Panel 1:     * Elias Santos MD - Primary      Preop Diagnosis:  Intra-abdominal abscess ([address] ) [K65 1]  Diverticulitis of large intestine with abscess without bleeding [K57 20]    Post-Op Diagnosis Codes:     * Intra-abdominal abscess ([address] ) [K65 1]     * Diverticulitis of large intestine with abscess without bleeding [K57 20]    Procedure:    Cystoscopy with bilateral ureteral stent insertion    Specimen(s):  ID Type Source Tests Collected by Time Destination   1 : sigmoid colectomy Tissue Large Intestine, Sigmoid Colon TISSUE EXAM Grace Cross MD 2019 [id number]    A :  Urine Urine, Cystoscopic URINE CULTURE Elias Santos MD 2019 0801        Estimated Blood Loss:   50 mL    Drains:  Urethral Catheter Latex 18 Fr  (Active)   Site Assessment Clean;Skin intact 2019  4:26 PM   Collection Container Standard drainage bag 2019  4:26 PM   Securement Method Securing device (Describe) 2019  4:26 PM   Output (mL) 200 mL 2019  4:45 PM   Number of days: 0       [REMOVED] NG/OG/Enteral Tube Orogastric 16 Fr Center mouth (Removed)   Number of days: 0       [REMOVED] Ureteral Drain/Stent Left ureter 5 Fr  (Removed)   Number of days: 0       [REMOVED] Ureteral Drain/Stent Right ureter 5 Fr  (Removed)   Number of days: 0       Anesthesia Type:   General    Operative Indications:  Intra-abdominal abscess ([address] ) [K65 1]  Diverticulitis of large intestine with abscess without bleeding [K57 20]      Operative Findings:  Normal bladder, standard insertion of bilateral ureteral stents    Complications:   none    Procedure and Technique:  Margi Dawson is a 68-year-old male known to Dr Rocco Mc with diverticulitis with abscess  He presents today to undergo sigmoid colectomy  Preoperative ureteral stents are requested    Risk and benefits of the procedure were discussed and reviewed  Informed consent was obtained  The patient was brought to the operating room on 2/21/19  After the smooth induction of general endotracheal anesthesia, the patient was placed in the dorsal lithotomy position  His genitalia was prepped and draped in a sterile fashion  Intravenous antibiotics were administered  A timeout was performed with all members of the operative team confirming the patient's identity and procedure to be performed  A 22 Citizen of [place] rigid cystoscope with 30° lens was inserted  The bladder was thoroughly inspected  Both ureteral orifices were visualized with clear efflux of urine  A urine culture was obtained  The bladder wall was thoroughly inspected and was normal     Bilateral 5 [place] open-ended catheters were placed under direct vision into each respective renal pelvis  Catheters were placed without difficulty  The bladder was left full the cystoscope was removed  An 25 Citizen of [place] Haines catheter was inserted and a 5 [place] open-ended ureteral stents were backloaded into the distal aspect of the Haines catheter  The Haines catheter and bilateral 5 [place] open-ended catheters were then connected to gravity drainage and secured to the patient's inner thigh with tape  We see the dictation of Dr Jazmyn Gómez for additional details regarding his colon resection      Patient Disposition:  Per Dr Madeline Lara: Fernanda Bates MD  DATE: February 21, 2019  TIME: 8:50 PM

## 2019-02-23 LAB
ANION GAP SERPL CALCULATED.3IONS-SCNC: 7 MMOL/L (ref 4–13)
BACTERIA UR CULT: NORMAL
BASOPHILS # BLD AUTO: 0.04 THOUSANDS/ΜL (ref 0–0.1)
BASOPHILS NFR BLD AUTO: 0 % (ref 0–1)
BUN SERPL-MCNC: 5 MG/DL (ref 5–25)
CALCIUM SERPL-MCNC: 8.4 MG/DL (ref 8.3–10.1)
CHLORIDE SERPL-SCNC: 104 MMOL/L (ref 100–108)
CO2 SERPL-SCNC: 25 MMOL/L (ref 21–32)
CREAT SERPL-MCNC: 0.6 MG/DL (ref 0.6–1.3)
EOSINOPHIL # BLD AUTO: 0.1 THOUSAND/ΜL (ref 0–0.61)
EOSINOPHIL NFR BLD AUTO: 1 % (ref 0–6)
ERYTHROCYTE [DISTWIDTH] IN BLOOD BY AUTOMATED COUNT: 14.5 % (ref 11.6–15.1)
GFR SERPL CREATININE-BSD FRML MDRD: 109 ML/MIN/1.73SQ M
GLUCOSE SERPL-MCNC: 106 MG/DL (ref 65–140)
HCT VFR BLD AUTO: 34.6 % (ref 36.5–49.3)
HGB BLD-MCNC: 11.2 G/DL (ref 12–17)
IMM GRANULOCYTES # BLD AUTO: 0.06 THOUSAND/UL (ref 0–0.2)
IMM GRANULOCYTES NFR BLD AUTO: 1 % (ref 0–2)
LYMPHOCYTES # BLD AUTO: 1.95 THOUSANDS/ΜL (ref 0.6–4.47)
LYMPHOCYTES NFR BLD AUTO: 18 % (ref 14–44)
MCH RBC QN AUTO: 30.8 PG (ref 26.8–34.3)
MCHC RBC AUTO-ENTMCNC: 32.4 G/DL (ref 31.4–37.4)
MCV RBC AUTO: 95 FL (ref 82–98)
MONOCYTES # BLD AUTO: 1.27 THOUSAND/ΜL (ref 0.17–1.22)
MONOCYTES NFR BLD AUTO: 12 % (ref 4–12)
NEUTROPHILS # BLD AUTO: 7.35 THOUSANDS/ΜL (ref 1.85–7.62)
NEUTS SEG NFR BLD AUTO: 68 % (ref 43–75)
NRBC BLD AUTO-RTO: 0 /100 WBCS
PLATELET # BLD AUTO: 273 THOUSANDS/UL (ref 149–390)
PMV BLD AUTO: 9.7 FL (ref 8.9–12.7)
POTASSIUM SERPL-SCNC: 3.8 MMOL/L (ref 3.5–5.3)
RBC # BLD AUTO: 3.64 MILLION/UL (ref 3.88–5.62)
SODIUM SERPL-SCNC: 136 MMOL/L (ref 136–145)
WBC # BLD AUTO: 10.77 THOUSAND/UL (ref 4.31–10.16)

## 2019-02-23 PROCEDURE — 85025 COMPLETE CBC W/AUTO DIFF WBC: CPT | Performed by: PHYSICIAN ASSISTANT

## 2019-02-23 PROCEDURE — 80048 BASIC METABOLIC PNL TOTAL CA: CPT | Performed by: PHYSICIAN ASSISTANT

## 2019-02-23 RX ORDER — ACETAMINOPHEN 325 MG/1
975 TABLET ORAL EVERY 6 HOURS SCHEDULED
Status: DISCONTINUED | OUTPATIENT
Start: 2019-02-23 | End: 2019-02-24 | Stop reason: HOSPADM

## 2019-02-23 RX ORDER — OXYCODONE HYDROCHLORIDE 10 MG/1
10 TABLET ORAL EVERY 4 HOURS PRN
Status: DISCONTINUED | OUTPATIENT
Start: 2019-02-23 | End: 2019-02-24 | Stop reason: HOSPADM

## 2019-02-23 RX ORDER — OXYCODONE HYDROCHLORIDE 5 MG/1
5 TABLET ORAL EVERY 4 HOURS PRN
Status: DISCONTINUED | OUTPATIENT
Start: 2019-02-23 | End: 2019-02-24 | Stop reason: HOSPADM

## 2019-02-23 RX ADMIN — HEPARIN SODIUM 5000 UNITS: 5000 INJECTION INTRAVENOUS; SUBCUTANEOUS at 21:19

## 2019-02-23 RX ADMIN — ACETAMINOPHEN 975 MG: 325 TABLET, FILM COATED ORAL at 17:28

## 2019-02-23 RX ADMIN — DEXTROSE, SODIUM CHLORIDE, AND POTASSIUM CHLORIDE 125 ML/HR: 5; .45; .15 INJECTION INTRAVENOUS at 21:19

## 2019-02-23 RX ADMIN — DEXTROSE, SODIUM CHLORIDE, AND POTASSIUM CHLORIDE 125 ML/HR: 5; .45; .15 INJECTION INTRAVENOUS at 05:33

## 2019-02-23 RX ADMIN — HEPARIN SODIUM 5000 UNITS: 5000 INJECTION INTRAVENOUS; SUBCUTANEOUS at 05:27

## 2019-02-23 RX ADMIN — DEXTROSE, SODIUM CHLORIDE, AND POTASSIUM CHLORIDE 125 ML/HR: 5; .45; .15 INJECTION INTRAVENOUS at 13:33

## 2019-02-23 RX ADMIN — HEPARIN SODIUM 5000 UNITS: 5000 INJECTION INTRAVENOUS; SUBCUTANEOUS at 15:00

## 2019-02-23 RX ADMIN — LOSARTAN POTASSIUM 50 MG: 50 TABLET, FILM COATED ORAL at 08:31

## 2019-02-23 NOTE — PROGRESS NOTES
Progress Note - Colorectal Surgery   Rajindere Courser 62 y o  male MRN: [id number]  Unit/Bed#: [facility] 801-01 Encounter: [id number]    Assessment/Plan:  62 y o  male with diverticulitis status post laparoscopic sigmoid resection Blayne Gutting, 2/21) with cystoscopy and bilateral ureteral stents with subsequent removal (T[place]arkin)  Advance to regular diet  D/c IVF  D/c PCA, continue oral regimen  Out of bed/ambulate  DVT prophylaxis      Subjective/Objective     Subjective:  No acute events overnight  Pain well controlled  Tolerating diet  Endorses BM/flatus  Objective:     Vitals: Blood pressure 138/86, pulse 65, temperature 98 8 °F (37 1 °C), temperature source Oral, resp  rate 16, height 6' 3" (1 905 m), weight 80 5 kg (177 lb 7 5 oz), SpO2 98 %  ,Body mass index is 22 18 kg/m²  I/O       02/21 0701 - 02/22 0700 02/22 0701 - 02/23 0700 02/23 0701 - 02/24 0700    P  O  0 760     I V  (mL/kg) 4682 5 (59 9) 2915 2 (36 2)     IV Piggyback 250      Total Intake(mL/kg) 4932 5 (63 1) 3675 2 (45 7)     Urine (mL/kg/hr) 1580 (0 8) 3230 (1 7)     Stool  0     Blood 50      Total Output 1630 3230     Net +3302 5 +445  2            Unmeasured Urine Occurrence  2 x     Unmeasured Stool Occurrence  1 x           Physical Exam:  GEN: NAD  HEENT: MMM  Nasal cannula in place  CV: RRR  Lung: Normal effort  Ab: Soft,LLQ mildly ttp,ND  Pfannenstiel incision clean dry and intact  Extrem: No CCE  Neuro:  A+Ox3    Lab, Imaging and other studies: CBC with diff:   Lab Results   Component Value Date    WBC 10 77 (H) 02/23/2019    HGB 11 2 (L) 02/23/2019    HCT 34 6 (L) 02/23/2019    MCV 95 02/23/2019     02/23/2019    MCH 30 8 02/23/2019    MCHC 32 4 02/23/2019    RDW 14 5 02/23/2019    MPV 9 7 02/23/2019    NRBC 0 02/23/2019   , BMP/CMP: No results found for: SODIUM, K, CL, CO2, ANIONGAP, BUN, CREATININE, GLUCOSE, CALCIUM, AST, ALT, ALKPHOS, PROT, BILITOT, EGFR  VTE Pharmacologic Prophylaxis: Heparin  VTE Mechanical Prophylaxis: sequential compression device

## 2019-02-24 VITALS
HEIGHT: 75 IN | BODY MASS INDEX: 22.26 KG/M2 | WEIGHT: 179.01 LBS | TEMPERATURE: 98 F | SYSTOLIC BLOOD PRESSURE: 122 MMHG | OXYGEN SATURATION: 100 % | DIASTOLIC BLOOD PRESSURE: 82 MMHG | RESPIRATION RATE: 16 BRPM | HEART RATE: 60 BPM

## 2019-02-24 PROBLEM — Z90.49 S/P LAPAROSCOPIC-ASSISTED SIGMOIDECTOMY: Status: ACTIVE | Noted: 2019-02-24

## 2019-02-24 LAB
ANION GAP SERPL CALCULATED.3IONS-SCNC: 7 MMOL/L (ref 4–13)
BASOPHILS # BLD AUTO: 0.05 THOUSANDS/ΜL (ref 0–0.1)
BASOPHILS NFR BLD AUTO: 1 % (ref 0–1)
BUN SERPL-MCNC: 4 MG/DL (ref 5–25)
CALCIUM SERPL-MCNC: 8.4 MG/DL (ref 8.3–10.1)
CHLORIDE SERPL-SCNC: 109 MMOL/L (ref 100–108)
CO2 SERPL-SCNC: 25 MMOL/L (ref 21–32)
CREAT SERPL-MCNC: 0.5 MG/DL (ref 0.6–1.3)
EOSINOPHIL # BLD AUTO: 0.28 THOUSAND/ΜL (ref 0–0.61)
EOSINOPHIL NFR BLD AUTO: 4 % (ref 0–6)
ERYTHROCYTE [DISTWIDTH] IN BLOOD BY AUTOMATED COUNT: 14.3 % (ref 11.6–15.1)
GFR SERPL CREATININE-BSD FRML MDRD: 117 ML/MIN/1.73SQ M
GLUCOSE SERPL-MCNC: 103 MG/DL (ref 65–140)
HCT VFR BLD AUTO: 32.5 % (ref 36.5–49.3)
HGB BLD-MCNC: 10.7 G/DL (ref 12–17)
IMM GRANULOCYTES # BLD AUTO: 0.02 THOUSAND/UL (ref 0–0.2)
IMM GRANULOCYTES NFR BLD AUTO: 0 % (ref 0–2)
LYMPHOCYTES # BLD AUTO: 2.22 THOUSANDS/ΜL (ref 0.6–4.47)
LYMPHOCYTES NFR BLD AUTO: 31 % (ref 14–44)
MCH RBC QN AUTO: 31.1 PG (ref 26.8–34.3)
MCHC RBC AUTO-ENTMCNC: 32.9 G/DL (ref 31.4–37.4)
MCV RBC AUTO: 95 FL (ref 82–98)
MONOCYTES # BLD AUTO: 0.96 THOUSAND/ΜL (ref 0.17–1.22)
MONOCYTES NFR BLD AUTO: 14 % (ref 4–12)
NEUTROPHILS # BLD AUTO: 3.56 THOUSANDS/ΜL (ref 1.85–7.62)
NEUTS SEG NFR BLD AUTO: 50 % (ref 43–75)
NRBC BLD AUTO-RTO: 0 /100 WBCS
PLATELET # BLD AUTO: 270 THOUSANDS/UL (ref 149–390)
PMV BLD AUTO: 10.4 FL (ref 8.9–12.7)
POTASSIUM SERPL-SCNC: 3.9 MMOL/L (ref 3.5–5.3)
RBC # BLD AUTO: 3.44 MILLION/UL (ref 3.88–5.62)
SODIUM SERPL-SCNC: 141 MMOL/L (ref 136–145)
WBC # BLD AUTO: 7.09 THOUSAND/UL (ref 4.31–10.16)

## 2019-02-24 PROCEDURE — 85025 COMPLETE CBC W/AUTO DIFF WBC: CPT | Performed by: SURGERY

## 2019-02-24 PROCEDURE — 80048 BASIC METABOLIC PNL TOTAL CA: CPT | Performed by: SURGERY

## 2019-02-24 RX ORDER — OXYCODONE HYDROCHLORIDE 5 MG/1
5 TABLET ORAL EVERY 4 HOURS PRN
Qty: 15 TABLET | Refills: 0 | Status: SHIPPED | OUTPATIENT
Start: 2019-02-24 | End: 2019-03-06

## 2019-02-24 RX ADMIN — OXYCODONE HYDROCHLORIDE 5 MG: 5 TABLET ORAL at 05:27

## 2019-02-24 RX ADMIN — OXYCODONE HYDROCHLORIDE 5 MG: 5 TABLET ORAL at 12:49

## 2019-02-24 RX ADMIN — ACETAMINOPHEN 975 MG: 325 TABLET, FILM COATED ORAL at 11:21

## 2019-02-24 RX ADMIN — ACETAMINOPHEN 975 MG: 325 TABLET, FILM COATED ORAL at 00:48

## 2019-02-24 RX ADMIN — ACETAMINOPHEN 975 MG: 325 TABLET, FILM COATED ORAL at 05:27

## 2019-02-24 RX ADMIN — HEPARIN SODIUM 5000 UNITS: 5000 INJECTION INTRAVENOUS; SUBCUTANEOUS at 05:28

## 2019-02-24 RX ADMIN — LOSARTAN POTASSIUM 50 MG: 50 TABLET, FILM COATED ORAL at 08:39

## 2019-02-24 NOTE — DISCHARGE INSTRUCTIONS
Please call the office when you leave to schedule an appointment to be seen in 2 weeks    Activity:    Do not lift more than 10 pounds (a gallon of milk) for 1-2 weeks post-operatively                 Walking is encouraged  Normal daily activities including climbing steps are okay  Do not engage in strenuous activity or contact sports for 4-6 weeks post-operatively    Diet:   You may return to your normal heart healthy diet  Wound Care: Your wound is closed with dissolvable stitches and glue  It is okay to shower  Wash incision gently with soap and water and pat dry  Do not soak incisions in bath water or swim for two weeks  Do not apply any creams or ointments  Pain Medication:   Please take as directed  No driving while taking narcotic pain medications    Other:  If you have questions after discharge please call the office      If you have increased pain, fever >101 5, increased drainage, redness or a bad smell at your surgery site, please call us immediately or come directly to the Emergency Room

## 2019-02-24 NOTE — PROGRESS NOTES
Progress Note - Colorectal Surgery   Rajindere Courser 62 y o  male MRN: [id number]  Unit/Bed#: [facility] 801-01 Encounter: [id number]    Assessment/Plan:  62 y o  male with diverticulitis status post laparoscopic sigmoid resection Blayne Gutting, 2/21) with cystoscopy and bilateral ureteral stents with subsequent removal ([place]arkin)  Continue regular diet  Pain control  Out of bed/ambulate  DVT prophylaxis  Anticipating discharge home today    Subjective/Objective     Subjective:  No acute events overnight  Doing well this AM  Pain well controlled  Tolerating diet  Endorses BM/flatus  Objective:     Vitals: Blood pressure 123/60, pulse 62, temperature 98 6 °F (37 °C), temperature source Oral, resp  rate 16, height 6' 3" (1 905 m), weight 80 5 kg (177 lb 7 5 oz), SpO2 99 %  ,Body mass index is 22 18 kg/m²  I/O       02/22 0701 - 02/23 0700 02/23 0701 - 02/24 0700    P  O  760 720    I V  (mL/kg) 2915 2 (36 2) 2025 (25 2)    Total Intake(mL/kg) 3675 2 (45 7) 2745 (34 1)    Urine (mL/kg/hr) 3230 (1 7) 1600 (0 8)    Stool 0 0    Total Output 3230 1600    Net +445 2 +1145          Unmeasured Urine Occurrence 2 x 2 x    Unmeasured Stool Occurrence 1 x 3 x          Physical Exam:  GEN: NAD  HEENT: MMM  CV: RRR  Lung: Normal effort  Ab: Soft/NT/ND  Pfannenstiel incision clean dry and intact  Extrem: No CCE  Neuro:  A+Ox3    Lab, Imaging and other studies: CBC with diff:   No results found for: WBC, HGB, HCT, MCV, PLT, ADJUSTEDWBC, MCH, MCHC, RDW, MPV, NRBC, BMP/CMP: No results found for: SODIUM, K, CL, CO2, ANIONGAP, BUN, CREATININE, GLUCOSE, CALCIUM, AST, ALT, ALKPHOS, PROT, BILITOT, EGFR  VTE Pharmacologic Prophylaxis: Heparin  VTE Mechanical Prophylaxis: sequential compression device

## 2019-02-24 NOTE — PLAN OF CARE
Problem: Potential for Falls  Goal: Patient will remain free of falls  Description  INTERVENTIONS:  - Assess patient frequently for physical needs  -  Identify cognitive and physical deficits and behaviors that affect risk of falls    -  [place] fall precautions as indicated by assessment   - Educate patient/family on patient safety including physical limitations  - Instruct patient to call for assistance with activity based on assessment  - Modify environment to reduce risk of injury  - Consider OT/PT consult to assist with strengthening/mobility  2/24/2019 1001 by Karla Strange RN  Outcome: Progressing  2/24/2019 0959 by Karla Strange RN  Outcome: Progressing     Problem: DISCHARGE PLANNING - CARE MANAGEMENT  Goal: Discharge to post-acute care or home with appropriate resources  Description  INTERVENTIONS:  - Conduct assessment to determine patient/family and health care team treatment goals, and need for post-acute services based on payer coverage, community resources, and patient preferences, and barriers to discharge  - Address psychosocial, clinical, and financial barriers to discharge as identified in assessment in conjunction with the patient/family and health care team  - Arrange appropriate level of post-acute services according to patient?s   needs and preference and payer coverage in collaboration with the physician and health care team  - Communicate with and update the patient/family, physician, and health care team regarding progress on the discharge plan  - Arrange appropriate transportation to post-acute venues  2/24/2019 1001 by Karla Strange RN  Outcome: Progressing  2/24/2019 0959 by Karla Strange RN  Outcome: Progressing     Problem: SKIN/TISSUE INTEGRITY - ADULT  Goal: Skin integrity remains intact  Description  INTERVENTIONS  - Identify patients at risk for skin breakdown  - Assess and monitor skin integrity  - Assess and monitor nutrition and hydration status  - Monitor labs (i e  albumin)  - Assess for incontinence   - Turn and reposition patient  - Assist with mobility/ambulation  - Relieve pressure over bony prominences  - Avoid friction and shearing  - Provide appropriate hygiene as needed including keeping skin clean and dry  - Evaluate need for skin moisturizer/barrier cream  - Collaborate with interdisciplinary team (i e  Nutrition, Rehabilitation, etc )   - Patient/family teaching  Outcome: Progressing  Goal: Incision(s), wounds(s) or drain site(s) healing without S/S of infection  Description  INTERVENTIONS  - Assess and document risk factors for skin impairment   - Assess and document dressing, incision, wound bed, drain sites and surrounding tissue  - Initiate Nutrition services consult and/or wound management as needed  Outcome: Progressing

## 2019-02-24 NOTE — DISCHARGE SUMMARY
Discharge Summary - Colorectal Surgery   Darlyn Larson 62 y o  male MRN: [id number]  Unit/Bed#: [facility] 801-01 Encounter: [id number]    Admission Date: 2/21/2019     Discharge Date: 2/24/2019    Admitting Diagnosis: Intra-abdominal abscess ([address] ) [K65 1]  Diverticulitis of large intestine with abscess without bleeding [K57 20]    Discharge Diagnosis: Same    Attending: Dr Perfecto Colon Physician(s): Urology Pérez Darnell    Procedures Performed:   CYSTOSCOPY, INSERTION BILATERAL STENTS URETERAL (N/A)    DIAGNOSTIC LAPAROSCOPY    LAPAROSCOPIC HAND-ASSISTED SIGMOID COLECTOMY,    Lysis of small bowel adhesions  REPAIR OF UNAVOIDABLE SEROSAL ADHESION to Pelvic phlegmon    INTRAOPERATIVE FLUORESCENCE ANGIOGRAPHY (SPY)    FLEXIBLE SIGMOIDOSCOPY    Pathology: Pending at the time of dictation  History of Present Illness:  Darlyn Larson is a 59 y o  male who presents today for evaluation of sigmoid colitis, pelvic abscess, small bowel obstruction      He was seen in the ER on 1/10/2019 after a CT suspicious for pelvic abscess and small bowel obstruction  The reason for the CT was because the patient experiencing abdominal pain, nausea, and diarrhea  IR placed a drain for the pelvic abscess on 1/11/2019  He had an IR tube check on 1/17/2019, this showed significant improvement in pelvic abscess  Anne Alu is a small residual cavity but no fistula to the bowel is seen and output has been clear  He states since leaving the hospital he has had minimal drainage  He states on Friday he started experiencing right upper quandrant abdominal pain  On Saturday the abdominal pain worsened and he decided to go the emergency room  He had a CT of the abdomen and pelvis on 1/26/2019 this showed interval complete drainage of the pelvic abscess with percutaneous catheter  Minimal/subtle pericolonic fat stranding at the splenic flecture of uncertain significance   No visible intrinsic bowel abnormality in that area    He states the abdominal pain is now has subsided, but still uncomfortable  He has daily bowel movements with soft formed stool  He denies rectal bleeding, change in bowel, or nausea/vomiting  Hospital Course:   Patient was taken to the operating room on 02/21/2019 with Dr Adriane Marion for the procedures mentioned above  Dr Iliana Pittman was also present for initial cystoscopy with bilateral ureteral stent insertion  Intraoperative findings included a pelvic phlegmon adherent to the small bowel as well as an unavoidable serosal defect from small-bowel adhesions to the pelvis that was repaired with 3-0 Vicryl, partial thickness  The procedure was without complication and tolerated well by the patient, who was extubated and taken to the PACU in stable condition  For further details of the procedure, please refer to the full operative report  Postoperatively, the patient did well  His diet was swiftly advance without difficulty  He was weaned from his PCA device on to an oral regimen  On postoperative day 3, he was tolerating regular diet, his pain was well controlled with minimal narcotics, he was ambulating frequently without assistance, and voiding spontaneously  The patient was discharged home 2/24 with instructions to follow up with his surgeon in the office  He was given a prescription for pain medication which was sent to his pharmacy  Condition at Discharge: good     Discharge instructions/Information to patient and family:   See after visit summary for information provided to patient and family  Provisions for Follow-Up Care:  See after visit summary for information related to follow-up care and any pertinent home health orders  Disposition: Home    Planned Readmission: No    Discharge Statement   I spent 20 minutes discharging the patient  This time was spent on the day of discharge  I had direct contact with the patient on the day of discharge   Additional documentation is required if more than 30 minutes were spent on discharge  Discharge Medications:  See after visit summary for reconciled discharge medications provided to patient and family

## 2020-10-09 ENCOUNTER — TRANSCRIBE ORDERS (OUTPATIENT)
Dept: ADMINISTRATIVE | Facility: HOSPITAL | Age: 60
End: 2020-10-09

## 2020-10-09 ENCOUNTER — APPOINTMENT (OUTPATIENT)
Dept: LAB | Facility: CLINIC | Age: 60
End: 2020-10-09
Payer: COMMERCIAL

## 2020-10-09 DIAGNOSIS — Z12.5 SPECIAL SCREENING FOR MALIGNANT NEOPLASM OF PROSTATE: ICD-10-CM

## 2020-10-09 DIAGNOSIS — E78.2 MIXED HYPERLIPIDEMIA: ICD-10-CM

## 2020-10-09 DIAGNOSIS — R51.0 ORTHOSTATIC HEADACHE: ICD-10-CM

## 2020-10-09 DIAGNOSIS — R53.1 ASTHENIA: ICD-10-CM

## 2020-10-09 DIAGNOSIS — M25.50 PAIN IN JOINT, MULTIPLE SITES: ICD-10-CM

## 2020-10-09 DIAGNOSIS — N41.9 PROSTATITIS, UNSPECIFIED PROSTATITIS TYPE: ICD-10-CM

## 2020-10-09 DIAGNOSIS — E78.2 MIXED HYPERLIPIDEMIA: Primary | ICD-10-CM

## 2020-10-09 LAB
ALBUMIN SERPL BCP-MCNC: 4.9 G/DL (ref 3.5–5)
ALP SERPL-CCNC: 54 U/L (ref 46–116)
ALT SERPL W P-5'-P-CCNC: 27 U/L (ref 12–78)
ANION GAP SERPL CALCULATED.3IONS-SCNC: 5 MMOL/L (ref 4–13)
AST SERPL W P-5'-P-CCNC: 20 U/L (ref 5–45)
BASOPHILS # BLD AUTO: 0.1 THOUSANDS/ΜL (ref 0–0.1)
BASOPHILS NFR BLD AUTO: 1 % (ref 0–1)
BILIRUB SERPL-MCNC: 0.65 MG/DL (ref 0.2–1)
BUN SERPL-MCNC: 17 MG/DL (ref 5–25)
CALCIUM SERPL-MCNC: 10.2 MG/DL (ref 8.3–10.1)
CHLORIDE SERPL-SCNC: 107 MMOL/L (ref 100–108)
CHOLEST SERPL-MCNC: 198 MG/DL (ref 50–200)
CO2 SERPL-SCNC: 28 MMOL/L (ref 21–32)
CREAT SERPL-MCNC: 1 MG/DL (ref 0.6–1.3)
EOSINOPHIL # BLD AUTO: 0.16 THOUSAND/ΜL (ref 0–0.61)
EOSINOPHIL NFR BLD AUTO: 2 % (ref 0–6)
ERYTHROCYTE [DISTWIDTH] IN BLOOD BY AUTOMATED COUNT: 12.5 % (ref 11.6–15.1)
GFR SERPL CREATININE-BSD FRML MDRD: 80 ML/MIN/1.73SQ M
GLUCOSE P FAST SERPL-MCNC: 106 MG/DL (ref 65–99)
HCT VFR BLD AUTO: 48 % (ref 36.5–49.3)
HDLC SERPL-MCNC: 78 MG/DL
HGB BLD-MCNC: 16.3 G/DL (ref 12–17)
IMM GRANULOCYTES # BLD AUTO: 0.03 THOUSAND/UL (ref 0–0.2)
IMM GRANULOCYTES NFR BLD AUTO: 0 % (ref 0–2)
LDLC SERPL CALC-MCNC: 103 MG/DL (ref 0–100)
LYMPHOCYTES # BLD AUTO: 2.38 THOUSANDS/ΜL (ref 0.6–4.47)
LYMPHOCYTES NFR BLD AUTO: 24 % (ref 14–44)
MCH RBC QN AUTO: 33.7 PG (ref 26.8–34.3)
MCHC RBC AUTO-ENTMCNC: 34 G/DL (ref 31.4–37.4)
MCV RBC AUTO: 99 FL (ref 82–98)
MONOCYTES # BLD AUTO: 0.85 THOUSAND/ΜL (ref 0.17–1.22)
MONOCYTES NFR BLD AUTO: 8 % (ref 4–12)
NEUTROPHILS # BLD AUTO: 6.59 THOUSANDS/ΜL (ref 1.85–7.62)
NEUTS SEG NFR BLD AUTO: 65 % (ref 43–75)
NONHDLC SERPL-MCNC: 120 MG/DL
NRBC BLD AUTO-RTO: 0 /100 WBCS
PLATELET # BLD AUTO: 280 THOUSANDS/UL (ref 149–390)
PMV BLD AUTO: 9.7 FL (ref 8.9–12.7)
POTASSIUM SERPL-SCNC: 4.3 MMOL/L (ref 3.5–5.3)
PROT SERPL-MCNC: 7.4 G/DL (ref 6.4–8.2)
PSA SERPL-MCNC: 0.7 NG/ML (ref 0–4)
RBC # BLD AUTO: 4.84 MILLION/UL (ref 3.88–5.62)
SODIUM SERPL-SCNC: 140 MMOL/L (ref 136–145)
T3 SERPL-MCNC: 0.6 NG/ML (ref 0.6–1.8)
T4 SERPL-MCNC: 9.6 UG/DL (ref 4.7–13.3)
TRIGL SERPL-MCNC: 86 MG/DL
TSH SERPL DL<=0.05 MIU/L-ACNC: 1.44 UIU/ML (ref 0.36–3.74)
URATE SERPL-MCNC: 4.9 MG/DL (ref 4.2–8)
WBC # BLD AUTO: 10.11 THOUSAND/UL (ref 4.31–10.16)

## 2020-10-09 PROCEDURE — G0103 PSA SCREENING: HCPCS

## 2020-10-09 PROCEDURE — 80053 COMPREHEN METABOLIC PANEL: CPT

## 2020-10-09 PROCEDURE — 84480 ASSAY TRIIODOTHYRONINE (T3): CPT

## 2020-10-09 PROCEDURE — 80061 LIPID PANEL: CPT

## 2020-10-09 PROCEDURE — 84550 ASSAY OF BLOOD/URIC ACID: CPT

## 2020-10-09 PROCEDURE — 84443 ASSAY THYROID STIM HORMONE: CPT

## 2020-10-09 PROCEDURE — 36415 COLL VENOUS BLD VENIPUNCTURE: CPT

## 2020-10-09 PROCEDURE — 85025 COMPLETE CBC W/AUTO DIFF WBC: CPT

## 2020-10-09 PROCEDURE — 84436 ASSAY OF TOTAL THYROXINE: CPT

## 2020-12-18 ENCOUNTER — HOSPITAL ENCOUNTER (INPATIENT)
Facility: HOSPITAL | Age: 60
LOS: 4 days | DRG: 481 | End: 2020-12-22
Attending: SURGERY | Admitting: SURGERY
Payer: COMMERCIAL

## 2020-12-18 ENCOUNTER — APPOINTMENT (EMERGENCY)
Dept: RADIOLOGY | Facility: HOSPITAL | Age: 60
DRG: 481 | End: 2020-12-18
Payer: COMMERCIAL

## 2020-12-18 ENCOUNTER — APPOINTMENT (INPATIENT)
Dept: RADIOLOGY | Facility: HOSPITAL | Age: 60
DRG: 481 | End: 2020-12-18
Payer: COMMERCIAL
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
## 2020-12-18 DIAGNOSIS — S72.22XA CLOSED DISPLACED SUBTROCHANTERIC FRACTURE OF LEFT FEMUR, INITIAL ENCOUNTER (HCC): Primary | ICD-10-CM

## 2020-12-18 LAB
ABO GROUP BLD: NORMAL
ANION GAP SERPL CALCULATED.3IONS-SCNC: 11 MMOL/L (ref 4–13)
APTT PPP: 25 SECONDS (ref 23–37)
BASOPHILS # BLD AUTO: 0.1 THOUSANDS/ΜL (ref 0–0.1)
BASOPHILS NFR BLD AUTO: 0 % (ref 0–1)
BLD GP AB SCN SERPL QL: NEGATIVE
BUN SERPL-MCNC: 15 MG/DL (ref 5–25)
CALCIUM SERPL-MCNC: 8.4 MG/DL (ref 8.3–10.1)
CHLORIDE SERPL-SCNC: 114 MMOL/L (ref 100–108)
CO2 SERPL-SCNC: 20 MMOL/L (ref 21–32)
CREAT SERPL-MCNC: 0.86 MG/DL (ref 0.6–1.3)
EOSINOPHIL # BLD AUTO: 0.01 THOUSAND/ΜL (ref 0–0.61)
EOSINOPHIL NFR BLD AUTO: 0 % (ref 0–6)
ERYTHROCYTE [DISTWIDTH] IN BLOOD BY AUTOMATED COUNT: 12.6 % (ref 11.6–15.1)
FLUAV RNA RESP QL NAA+PROBE: NEGATIVE
FLUBV RNA RESP QL NAA+PROBE: NEGATIVE
GFR SERPL CREATININE-BSD FRML MDRD: 93 ML/MIN/1.73SQ M
GLUCOSE SERPL-MCNC: 97 MG/DL (ref 65–140)
HCT VFR BLD AUTO: 37.3 % (ref 36.5–49.3)
HGB BLD-MCNC: 12.7 G/DL (ref 12–17)
IMM GRANULOCYTES # BLD AUTO: 0.27 THOUSAND/UL (ref 0–0.2)
IMM GRANULOCYTES NFR BLD AUTO: 1 % (ref 0–2)
INR PPP: 1.05 (ref 0.84–1.19)
LYMPHOCYTES # BLD AUTO: 0.94 THOUSANDS/ΜL (ref 0.6–4.47)
LYMPHOCYTES NFR BLD AUTO: 3 % (ref 14–44)
MCH RBC QN AUTO: 34 PG (ref 26.8–34.3)
MCHC RBC AUTO-ENTMCNC: 34 G/DL (ref 31.4–37.4)
MCV RBC AUTO: 100 FL (ref 82–98)
MONOCYTES # BLD AUTO: 1.52 THOUSAND/ΜL (ref 0.17–1.22)
MONOCYTES NFR BLD AUTO: 6 % (ref 4–12)
NEUTROPHILS # BLD AUTO: 24.43 THOUSANDS/ΜL (ref 1.85–7.62)
NEUTS SEG NFR BLD AUTO: 90 % (ref 43–75)
NRBC BLD AUTO-RTO: 0 /100 WBCS
PLATELET # BLD AUTO: 228 THOUSANDS/UL (ref 149–390)
PMV BLD AUTO: 9.5 FL (ref 8.9–12.7)
POTASSIUM SERPL-SCNC: 4.3 MMOL/L (ref 3.5–5.3)
PROTHROMBIN TIME: 13.7 SECONDS (ref 11.6–14.5)
RBC # BLD AUTO: 3.74 MILLION/UL (ref 3.88–5.62)
RH BLD: POSITIVE
RSV RNA RESP QL NAA+PROBE: NEGATIVE
SARS-COV-2 RNA RESP QL NAA+PROBE: NEGATIVE
SODIUM SERPL-SCNC: 145 MMOL/L (ref 136–145)
SPECIMEN EXPIRATION DATE: NORMAL
WBC # BLD AUTO: 27.27 THOUSAND/UL (ref 4.31–10.16)

## 2020-12-18 PROCEDURE — 86900 BLOOD TYPING SEROLOGIC ABO: CPT | Performed by: SURGERY

## 2020-12-18 PROCEDURE — 85014 HEMATOCRIT: CPT

## 2020-12-18 PROCEDURE — 82803 BLOOD GASES ANY COMBINATION: CPT

## 2020-12-18 PROCEDURE — 73501 X-RAY EXAM HIP UNI 1 VIEW: CPT

## 2020-12-18 PROCEDURE — 86850 RBC ANTIBODY SCREEN: CPT | Performed by: SURGERY

## 2020-12-18 PROCEDURE — 76604 US EXAM CHEST: CPT | Performed by: SURGERY

## 2020-12-18 PROCEDURE — NC001 PR NO CHARGE: Performed by: EMERGENCY MEDICINE

## 2020-12-18 PROCEDURE — 74177 CT ABD & PELVIS W/CONTRAST: CPT

## 2020-12-18 PROCEDURE — 84132 ASSAY OF SERUM POTASSIUM: CPT

## 2020-12-18 PROCEDURE — 85610 PROTHROMBIN TIME: CPT | Performed by: SURGERY

## 2020-12-18 PROCEDURE — 82330 ASSAY OF CALCIUM: CPT

## 2020-12-18 PROCEDURE — 99285 EMERGENCY DEPT VISIT HI MDM: CPT

## 2020-12-18 PROCEDURE — 72125 CT NECK SPINE W/O DYE: CPT

## 2020-12-18 PROCEDURE — 85025 COMPLETE CBC W/AUTO DIFF WBC: CPT | Performed by: SURGERY

## 2020-12-18 PROCEDURE — 0241U HB NFCT DS VIR RESP RNA 4 TRGT: CPT | Performed by: ORTHOPAEDIC SURGERY

## 2020-12-18 PROCEDURE — 82947 ASSAY GLUCOSE BLOOD QUANT: CPT

## 2020-12-18 PROCEDURE — 84295 ASSAY OF SERUM SODIUM: CPT

## 2020-12-18 PROCEDURE — 70450 CT HEAD/BRAIN W/O DYE: CPT

## 2020-12-18 PROCEDURE — 71260 CT THORAX DX C+: CPT

## 2020-12-18 PROCEDURE — 99221 1ST HOSP IP/OBS SF/LOW 40: CPT | Performed by: SURGERY

## 2020-12-18 PROCEDURE — 93308 TTE F-UP OR LMTD: CPT | Performed by: SURGERY

## 2020-12-18 PROCEDURE — 76705 ECHO EXAM OF ABDOMEN: CPT | Performed by: SURGERY

## 2020-12-18 PROCEDURE — 85730 THROMBOPLASTIN TIME PARTIAL: CPT | Performed by: SURGERY

## 2020-12-18 PROCEDURE — 80048 BASIC METABOLIC PNL TOTAL CA: CPT | Performed by: SURGERY

## 2020-12-18 PROCEDURE — 36415 COLL VENOUS BLD VENIPUNCTURE: CPT

## 2020-12-18 PROCEDURE — 73552 X-RAY EXAM OF FEMUR 2/>: CPT

## 2020-12-18 PROCEDURE — 86901 BLOOD TYPING SEROLOGIC RH(D): CPT | Performed by: SURGERY

## 2020-12-18 RX ORDER — KETAMINE HCL IN NACL, ISO-OSM 100MG/10ML
200 SYRINGE (ML) INJECTION ONCE
Status: COMPLETED | OUTPATIENT
Start: 2020-12-18 | End: 2020-12-18

## 2020-12-18 RX ORDER — OMEGA-3S/DHA/EPA/FISH OIL/D3 300MG-1000
400 CAPSULE ORAL DAILY
COMMUNITY
End: 2021-11-05 | Stop reason: ALTCHOICE

## 2020-12-18 RX ORDER — OXYCODONE HYDROCHLORIDE 5 MG/1
5 TABLET ORAL EVERY 4 HOURS PRN
Status: DISCONTINUED | OUTPATIENT
Start: 2020-12-18 | End: 2020-12-22 | Stop reason: HOSPADM

## 2020-12-18 RX ORDER — DOCUSATE SODIUM 100 MG/1
100 CAPSULE, LIQUID FILLED ORAL 2 TIMES DAILY
Status: DISCONTINUED | OUTPATIENT
Start: 2020-12-18 | End: 2020-12-22 | Stop reason: HOSPADM

## 2020-12-18 RX ORDER — LOSARTAN POTASSIUM 50 MG/1
80 TABLET ORAL DAILY
COMMUNITY
End: 2020-12-28 | Stop reason: HOSPADM

## 2020-12-18 RX ORDER — ACETAMINOPHEN 325 MG/1
975 TABLET ORAL EVERY 8 HOURS SCHEDULED
Status: DISCONTINUED | OUTPATIENT
Start: 2020-12-18 | End: 2020-12-22 | Stop reason: HOSPADM

## 2020-12-18 RX ORDER — AMOXICILLIN 250 MG
2 CAPSULE ORAL DAILY
Status: DISCONTINUED | OUTPATIENT
Start: 2020-12-19 | End: 2020-12-22 | Stop reason: HOSPADM

## 2020-12-18 RX ORDER — FENTANYL CITRATE 50 UG/ML
2 INJECTION, SOLUTION INTRAMUSCULAR; INTRAVENOUS ONCE
Status: COMPLETED | OUTPATIENT
Start: 2020-12-18 | End: 2020-12-18

## 2020-12-18 RX ORDER — HYDROMORPHONE HCL/PF 1 MG/ML
0.5 SYRINGE (ML) INJECTION ONCE
Status: COMPLETED | OUTPATIENT
Start: 2020-12-18 | End: 2020-12-18

## 2020-12-18 RX ORDER — DIPHENOXYLATE HYDROCHLORIDE AND ATROPINE SULFATE 2.5; .025 MG/1; MG/1
1 TABLET ORAL DAILY
COMMUNITY

## 2020-12-18 RX ORDER — CEFAZOLIN SODIUM 2 G/50ML
2000 SOLUTION INTRAVENOUS
Status: CANCELLED | OUTPATIENT
Start: 2020-12-19 | End: 2020-12-20

## 2020-12-18 RX ORDER — HYDROMORPHONE HCL/PF 1 MG/ML
0.5 SYRINGE (ML) INJECTION
Status: DISCONTINUED | OUTPATIENT
Start: 2020-12-18 | End: 2020-12-21

## 2020-12-18 RX ORDER — OXYCODONE HYDROCHLORIDE 10 MG/1
10 TABLET ORAL EVERY 4 HOURS PRN
Status: DISCONTINUED | OUTPATIENT
Start: 2020-12-18 | End: 2020-12-22 | Stop reason: HOSPADM

## 2020-12-18 RX ORDER — ONDANSETRON 2 MG/ML
4 INJECTION INTRAMUSCULAR; INTRAVENOUS EVERY 6 HOURS PRN
Status: DISCONTINUED | OUTPATIENT
Start: 2020-12-18 | End: 2020-12-22 | Stop reason: HOSPADM

## 2020-12-18 RX ORDER — SODIUM CHLORIDE, SODIUM LACTATE, POTASSIUM CHLORIDE, CALCIUM CHLORIDE 600; 310; 30; 20 MG/100ML; MG/100ML; MG/100ML; MG/100ML
75 INJECTION, SOLUTION INTRAVENOUS CONTINUOUS
Status: DISCONTINUED | OUTPATIENT
Start: 2020-12-19 | End: 2020-12-20

## 2020-12-18 RX ORDER — METHOCARBAMOL 500 MG/1
500 TABLET, FILM COATED ORAL EVERY 6 HOURS SCHEDULED
Status: DISCONTINUED | OUTPATIENT
Start: 2020-12-18 | End: 2020-12-22 | Stop reason: HOSPADM

## 2020-12-18 RX ADMIN — METHOCARBAMOL 500 MG: 500 TABLET ORAL at 21:56

## 2020-12-18 RX ADMIN — ACETAMINOPHEN 975 MG: 325 TABLET, FILM COATED ORAL at 22:00

## 2020-12-18 RX ADMIN — OXYCODONE HYDROCHLORIDE 10 MG: 10 TABLET ORAL at 21:59

## 2020-12-18 RX ADMIN — HYDROMORPHONE HYDROCHLORIDE 0.5 MG: 1 INJECTION, SOLUTION INTRAMUSCULAR; INTRAVENOUS; SUBCUTANEOUS at 21:10

## 2020-12-18 RX ADMIN — SODIUM CHLORIDE, SODIUM LACTATE, POTASSIUM CHLORIDE, AND CALCIUM CHLORIDE 75 ML/HR: .6; .31; .03; .02 INJECTION, SOLUTION INTRAVENOUS at 23:50

## 2020-12-18 RX ADMIN — IOHEXOL 100 ML: 350 INJECTION, SOLUTION INTRAVENOUS at 19:50

## 2020-12-18 RX ADMIN — HYDROMORPHONE HYDROCHLORIDE 0.5 MG: 1 INJECTION, SOLUTION INTRAMUSCULAR; INTRAVENOUS; SUBCUTANEOUS at 20:14

## 2020-12-18 RX ADMIN — Medication 200 MG: at 19:31

## 2020-12-19 ENCOUNTER — APPOINTMENT (INPATIENT)
Dept: RADIOLOGY | Facility: HOSPITAL | Age: 60
DRG: 481 | End: 2020-12-19
Payer: COMMERCIAL

## 2020-12-19 ENCOUNTER — ANESTHESIA (INPATIENT)
Dept: PERIOP | Facility: HOSPITAL | Age: 60
DRG: 481 | End: 2020-12-19
Payer: COMMERCIAL

## 2020-12-19 ENCOUNTER — ANESTHESIA EVENT (INPATIENT)
Dept: PERIOP | Facility: HOSPITAL | Age: 60
DRG: 481 | End: 2020-12-19
Payer: COMMERCIAL

## 2020-12-19 VITALS — HEART RATE: 90 BPM

## 2020-12-19 PROBLEM — F17.200 SMOKING: Status: ACTIVE | Noted: 2020-12-19

## 2020-12-19 PROBLEM — Z86.79 HISTORY OF HYPERTENSION: Status: ACTIVE | Noted: 2020-12-19

## 2020-12-19 PROBLEM — G89.11 ACUTE PAIN DUE TO TRAUMA: Status: ACTIVE | Noted: 2020-12-19

## 2020-12-19 LAB
ANION GAP SERPL CALCULATED.3IONS-SCNC: 6 MMOL/L (ref 4–13)
BUN SERPL-MCNC: 15 MG/DL (ref 5–25)
CALCIUM SERPL-MCNC: 8 MG/DL (ref 8.3–10.1)
CHLORIDE SERPL-SCNC: 110 MMOL/L (ref 100–108)
CO2 SERPL-SCNC: 24 MMOL/L (ref 21–32)
CREAT SERPL-MCNC: 0.81 MG/DL (ref 0.6–1.3)
ERYTHROCYTE [DISTWIDTH] IN BLOOD BY AUTOMATED COUNT: 12.7 % (ref 11.6–15.1)
GFR SERPL CREATININE-BSD FRML MDRD: 95 ML/MIN/1.73SQ M
GLUCOSE SERPL-MCNC: 97 MG/DL (ref 65–140)
HCT VFR BLD AUTO: 33.7 % (ref 36.5–49.3)
HGB BLD-MCNC: 11.4 G/DL (ref 12–17)
MCH RBC QN AUTO: 33.3 PG (ref 26.8–34.3)
MCHC RBC AUTO-ENTMCNC: 33.8 G/DL (ref 31.4–37.4)
MCV RBC AUTO: 99 FL (ref 82–98)
PLATELET # BLD AUTO: 202 THOUSANDS/UL (ref 149–390)
PMV BLD AUTO: 9.8 FL (ref 8.9–12.7)
POTASSIUM SERPL-SCNC: 3.9 MMOL/L (ref 3.5–5.3)
RBC # BLD AUTO: 3.42 MILLION/UL (ref 3.88–5.62)
SODIUM SERPL-SCNC: 140 MMOL/L (ref 136–145)
WBC # BLD AUTO: 16.97 THOUSAND/UL (ref 4.31–10.16)

## 2020-12-19 PROCEDURE — 0QU70KZ SUPPLEMENT LEFT UPPER FEMUR WITH NONAUTOLOGOUS TISSUE SUBSTITUTE, OPEN APPROACH: ICD-10-PCS | Performed by: ORTHOPAEDIC SURGERY

## 2020-12-19 PROCEDURE — 80048 BASIC METABOLIC PNL TOTAL CA: CPT | Performed by: SURGERY

## 2020-12-19 PROCEDURE — 73552 X-RAY EXAM OF FEMUR 2/>: CPT

## 2020-12-19 PROCEDURE — C1713 ANCHOR/SCREW BN/BN,TIS/BN: HCPCS | Performed by: ORTHOPAEDIC SURGERY

## 2020-12-19 PROCEDURE — 85027 COMPLETE CBC AUTOMATED: CPT | Performed by: SURGERY

## 2020-12-19 PROCEDURE — 0QS704Z REPOSITION LEFT UPPER FEMUR WITH INTERNAL FIXATION DEVICE, OPEN APPROACH: ICD-10-PCS | Performed by: ORTHOPAEDIC SURGERY

## 2020-12-19 PROCEDURE — 73552 X-RAY EXAM OF FEMUR 2/>: CPT | Performed by: ORTHOPAEDIC SURGERY

## 2020-12-19 PROCEDURE — 93005 ELECTROCARDIOGRAM TRACING: CPT

## 2020-12-19 PROCEDURE — 27245 TREAT THIGH FRACTURE: CPT | Performed by: ORTHOPAEDIC SURGERY

## 2020-12-19 PROCEDURE — NC001 PR NO CHARGE: Performed by: ORTHOPAEDIC SURGERY

## 2020-12-19 PROCEDURE — NC001 PR NO CHARGE: Performed by: SURGERY

## 2020-12-19 PROCEDURE — 99255 IP/OBS CONSLTJ NEW/EST HI 80: CPT | Performed by: ORTHOPAEDIC SURGERY

## 2020-12-19 PROCEDURE — 0QS706Z REPOSITION LEFT UPPER FEMUR WITH INTRAMEDULLARY INTERNAL FIXATION DEVICE, OPEN APPROACH: ICD-10-PCS | Performed by: ORTHOPAEDIC SURGERY

## 2020-12-19 PROCEDURE — 73501 X-RAY EXAM HIP UNI 1 VIEW: CPT

## 2020-12-19 PROCEDURE — 99232 SBSQ HOSP IP/OBS MODERATE 35: CPT | Performed by: SURGERY

## 2020-12-19 PROCEDURE — C1769 GUIDE WIRE: HCPCS | Performed by: ORTHOPAEDIC SURGERY

## 2020-12-19 DEVICE — 5.0MM TI LOCKING SCREW W/T25 STARDRIVE 54MM F/IM NAIL-STER
Type: IMPLANTABLE DEVICE | Site: FEMUR | Status: NON-FUNCTIONAL
Removed: 2021-11-18

## 2020-12-19 DEVICE — 1.7MM COCR CABLE WITH TI CRIMP 750MM-STERILE
Type: IMPLANTABLE DEVICE | Site: LEG | Status: NON-FUNCTIONAL
Removed: 2021-11-18

## 2020-12-19 DEVICE — 5.0MM TI LOCKING SCREW W/T25 STARDRIVE 50MM F/IM NAIL-STER
Type: IMPLANTABLE DEVICE | Site: FEMUR | Status: NON-FUNCTIONAL
Removed: 2021-11-18

## 2020-12-19 DEVICE — 10MM/130 DEG TI CANN TFNA 440MM/LEFT - STERILE
Type: IMPLANTABLE DEVICE | Site: LEG | Status: NON-FUNCTIONAL
Brand: TFN-ADVANCE
Removed: 2021-11-18

## 2020-12-19 DEVICE — TFNA FENESTRATED SCREW 105MM - STERILE
Type: IMPLANTABLE DEVICE | Site: FEMUR | Status: FUNCTIONAL
Brand: TFN-ADVANCE

## 2020-12-19 RX ORDER — ONDANSETRON 2 MG/ML
INJECTION INTRAMUSCULAR; INTRAVENOUS AS NEEDED
Status: DISCONTINUED | OUTPATIENT
Start: 2020-12-19 | End: 2020-12-19

## 2020-12-19 RX ORDER — NEOSTIGMINE METHYLSULFATE 1 MG/ML
INJECTION INTRAVENOUS AS NEEDED
Status: DISCONTINUED | OUTPATIENT
Start: 2020-12-19 | End: 2020-12-19

## 2020-12-19 RX ORDER — HYDROMORPHONE HCL/PF 1 MG/ML
SYRINGE (ML) INJECTION AS NEEDED
Status: DISCONTINUED | OUTPATIENT
Start: 2020-12-19 | End: 2020-12-19

## 2020-12-19 RX ORDER — CEFAZOLIN SODIUM 2 G/50ML
2000 SOLUTION INTRAVENOUS EVERY 8 HOURS
Status: COMPLETED | OUTPATIENT
Start: 2020-12-19 | End: 2020-12-20

## 2020-12-19 RX ORDER — ROCURONIUM BROMIDE 10 MG/ML
INJECTION, SOLUTION INTRAVENOUS AS NEEDED
Status: DISCONTINUED | OUTPATIENT
Start: 2020-12-19 | End: 2020-12-19

## 2020-12-19 RX ORDER — MAGNESIUM HYDROXIDE 1200 MG/15ML
LIQUID ORAL AS NEEDED
Status: DISCONTINUED | OUTPATIENT
Start: 2020-12-19 | End: 2020-12-19 | Stop reason: HOSPADM

## 2020-12-19 RX ORDER — FENTANYL CITRATE/PF 50 MCG/ML
25 SYRINGE (ML) INJECTION
Status: DISCONTINUED | OUTPATIENT
Start: 2020-12-19 | End: 2020-12-19

## 2020-12-19 RX ORDER — EPHEDRINE SULFATE 50 MG/ML
INJECTION INTRAVENOUS AS NEEDED
Status: DISCONTINUED | OUTPATIENT
Start: 2020-12-19 | End: 2020-12-19

## 2020-12-19 RX ORDER — PROPOFOL 10 MG/ML
INJECTION, EMULSION INTRAVENOUS AS NEEDED
Status: DISCONTINUED | OUTPATIENT
Start: 2020-12-19 | End: 2020-12-19

## 2020-12-19 RX ORDER — LIDOCAINE HYDROCHLORIDE 20 MG/ML
INJECTION, SOLUTION INFILTRATION; PERINEURAL AS NEEDED
Status: DISCONTINUED | OUTPATIENT
Start: 2020-12-19 | End: 2020-12-19

## 2020-12-19 RX ORDER — ONDANSETRON 2 MG/ML
4 INJECTION INTRAMUSCULAR; INTRAVENOUS EVERY 4 HOURS PRN
Status: DISCONTINUED | OUTPATIENT
Start: 2020-12-19 | End: 2020-12-19

## 2020-12-19 RX ORDER — GLYCOPYRROLATE 0.2 MG/ML
INJECTION INTRAMUSCULAR; INTRAVENOUS AS NEEDED
Status: DISCONTINUED | OUTPATIENT
Start: 2020-12-19 | End: 2020-12-19

## 2020-12-19 RX ORDER — CEFAZOLIN SODIUM 2 G/50ML
SOLUTION INTRAVENOUS AS NEEDED
Status: DISCONTINUED | OUTPATIENT
Start: 2020-12-19 | End: 2020-12-19

## 2020-12-19 RX ORDER — SUCCINYLCHOLINE/SOD CL,ISO/PF 100 MG/5ML
SYRINGE (ML) INTRAVENOUS AS NEEDED
Status: DISCONTINUED | OUTPATIENT
Start: 2020-12-19 | End: 2020-12-19

## 2020-12-19 RX ORDER — DEXAMETHASONE SODIUM PHOSPHATE 10 MG/ML
INJECTION, SOLUTION INTRAMUSCULAR; INTRAVENOUS AS NEEDED
Status: DISCONTINUED | OUTPATIENT
Start: 2020-12-19 | End: 2020-12-19

## 2020-12-19 RX ADMIN — LIDOCAINE HYDROCHLORIDE 3 ML: 20 INJECTION, SOLUTION INFILTRATION; PERINEURAL at 14:03

## 2020-12-19 RX ADMIN — ACETAMINOPHEN 975 MG: 325 TABLET, FILM COATED ORAL at 21:17

## 2020-12-19 RX ADMIN — METHOCARBAMOL 500 MG: 500 TABLET ORAL at 12:47

## 2020-12-19 RX ADMIN — HYDROMORPHONE HYDROCHLORIDE 0.5 MG: 1 INJECTION, SOLUTION INTRAMUSCULAR; INTRAVENOUS; SUBCUTANEOUS at 14:42

## 2020-12-19 RX ADMIN — OXYCODONE HYDROCHLORIDE 10 MG: 10 TABLET ORAL at 09:14

## 2020-12-19 RX ADMIN — HYDROMORPHONE HYDROCHLORIDE 0.5 MG: 1 INJECTION, SOLUTION INTRAMUSCULAR; INTRAVENOUS; SUBCUTANEOUS at 16:28

## 2020-12-19 RX ADMIN — GLYCOPYRROLATE 0.4 MG: 0.2 INJECTION, SOLUTION INTRAMUSCULAR; INTRAVENOUS at 17:30

## 2020-12-19 RX ADMIN — DEXAMETHASONE SODIUM PHOSPHATE 4 MG: 10 INJECTION, SOLUTION INTRAMUSCULAR; INTRAVENOUS at 14:16

## 2020-12-19 RX ADMIN — CEFAZOLIN SODIUM 2000 MG: 2 SOLUTION INTRAVENOUS at 13:58

## 2020-12-19 RX ADMIN — HYDROMORPHONE HYDROCHLORIDE 0.5 MG: 1 INJECTION, SOLUTION INTRAMUSCULAR; INTRAVENOUS; SUBCUTANEOUS at 05:35

## 2020-12-19 RX ADMIN — ONDANSETRON 4 MG: 2 INJECTION INTRAMUSCULAR; INTRAVENOUS at 14:16

## 2020-12-19 RX ADMIN — HYDROMORPHONE HYDROCHLORIDE 0.5 MG: 1 INJECTION, SOLUTION INTRAMUSCULAR; INTRAVENOUS; SUBCUTANEOUS at 12:48

## 2020-12-19 RX ADMIN — CEFAZOLIN SODIUM 2000 MG: 2 SOLUTION INTRAVENOUS at 21:17

## 2020-12-19 RX ADMIN — Medication 100 MG: at 14:52

## 2020-12-19 RX ADMIN — OXYCODONE HYDROCHLORIDE 10 MG: 10 TABLET ORAL at 20:39

## 2020-12-19 RX ADMIN — PROPOFOL 200 MG: 10 INJECTION, EMULSION INTRAVENOUS at 14:03

## 2020-12-19 RX ADMIN — Medication 25 MCG: at 18:21

## 2020-12-19 RX ADMIN — ROCURONIUM BROMIDE 10 MG: 50 INJECTION, SOLUTION INTRAVENOUS at 16:01

## 2020-12-19 RX ADMIN — METHOCARBAMOL 500 MG: 500 TABLET ORAL at 05:35

## 2020-12-19 RX ADMIN — ROCURONIUM BROMIDE 20 MG: 50 INJECTION, SOLUTION INTRAVENOUS at 14:56

## 2020-12-19 RX ADMIN — SODIUM CHLORIDE, SODIUM LACTATE, POTASSIUM CHLORIDE, AND CALCIUM CHLORIDE: .6; .31; .03; .02 INJECTION, SOLUTION INTRAVENOUS at 16:30

## 2020-12-19 RX ADMIN — SODIUM CHLORIDE, SODIUM LACTATE, POTASSIUM CHLORIDE, AND CALCIUM CHLORIDE 75 ML/HR: .6; .31; .03; .02 INJECTION, SOLUTION INTRAVENOUS at 12:51

## 2020-12-19 RX ADMIN — EPHEDRINE SULFATE 5 MG: 50 INJECTION, SOLUTION INTRAVENOUS at 15:15

## 2020-12-19 RX ADMIN — Medication 25 MCG: at 18:11

## 2020-12-19 RX ADMIN — ROCURONIUM BROMIDE 10 MG: 50 INJECTION, SOLUTION INTRAVENOUS at 15:25

## 2020-12-19 RX ADMIN — EPHEDRINE SULFATE 15 MG: 50 INJECTION, SOLUTION INTRAVENOUS at 14:31

## 2020-12-19 RX ADMIN — SODIUM CHLORIDE, SODIUM LACTATE, POTASSIUM CHLORIDE, AND CALCIUM CHLORIDE: .6; .31; .03; .02 INJECTION, SOLUTION INTRAVENOUS at 13:58

## 2020-12-19 RX ADMIN — OXYCODONE HYDROCHLORIDE 10 MG: 10 TABLET ORAL at 02:14

## 2020-12-19 RX ADMIN — ACETAMINOPHEN 975 MG: 325 TABLET, FILM COATED ORAL at 05:36

## 2020-12-19 RX ADMIN — NEOSTIGMINE METHYLSULFATE 2 MG: 1 INJECTION INTRAVENOUS at 17:30

## 2020-12-19 RX ADMIN — METHOCARBAMOL 500 MG: 500 TABLET ORAL at 23:51

## 2020-12-20 PROBLEM — D62 ACUTE BLOOD LOSS ANEMIA: Status: ACTIVE | Noted: 2020-12-20

## 2020-12-20 LAB
ABO GROUP BLD: NORMAL
ANION GAP SERPL CALCULATED.3IONS-SCNC: 5 MMOL/L (ref 4–13)
ATRIAL RATE: 65 BPM
BASOPHILS # BLD AUTO: 0.02 THOUSANDS/ΜL (ref 0–0.1)
BASOPHILS NFR BLD AUTO: 0 % (ref 0–1)
BUN SERPL-MCNC: 16 MG/DL (ref 5–25)
CALCIUM SERPL-MCNC: 8.4 MG/DL (ref 8.3–10.1)
CHLORIDE SERPL-SCNC: 102 MMOL/L (ref 100–108)
CO2 SERPL-SCNC: 28 MMOL/L (ref 21–32)
CREAT SERPL-MCNC: 0.91 MG/DL (ref 0.6–1.3)
EOSINOPHIL # BLD AUTO: 0 THOUSAND/ΜL (ref 0–0.61)
EOSINOPHIL NFR BLD AUTO: 0 % (ref 0–6)
ERYTHROCYTE [DISTWIDTH] IN BLOOD BY AUTOMATED COUNT: 12.6 % (ref 11.6–15.1)
GFR SERPL CREATININE-BSD FRML MDRD: 90 ML/MIN/1.73SQ M
GLUCOSE SERPL-MCNC: 119 MG/DL (ref 65–140)
HCT VFR BLD AUTO: 23.5 % (ref 36.5–49.3)
HCT VFR BLD AUTO: 24 % (ref 36.5–49.3)
HCT VFR BLD AUTO: 26.8 % (ref 36.5–49.3)
HGB BLD-MCNC: 8.1 G/DL (ref 12–17)
HGB BLD-MCNC: 8.2 G/DL (ref 12–17)
HGB BLD-MCNC: 9 G/DL (ref 12–17)
IMM GRANULOCYTES # BLD AUTO: 0.04 THOUSAND/UL (ref 0–0.2)
IMM GRANULOCYTES NFR BLD AUTO: 0 % (ref 0–2)
LYMPHOCYTES # BLD AUTO: 1.59 THOUSANDS/ΜL (ref 0.6–4.47)
LYMPHOCYTES NFR BLD AUTO: 13 % (ref 14–44)
MCH RBC QN AUTO: 33.1 PG (ref 26.8–34.3)
MCHC RBC AUTO-ENTMCNC: 33.6 G/DL (ref 31.4–37.4)
MCV RBC AUTO: 99 FL (ref 82–98)
MONOCYTES # BLD AUTO: 1.18 THOUSAND/ΜL (ref 0.17–1.22)
MONOCYTES NFR BLD AUTO: 10 % (ref 4–12)
NEUTROPHILS # BLD AUTO: 9.62 THOUSANDS/ΜL (ref 1.85–7.62)
NEUTS SEG NFR BLD AUTO: 77 % (ref 43–75)
NRBC BLD AUTO-RTO: 0 /100 WBCS
P AXIS: 58 DEGREES
PLATELET # BLD AUTO: 178 THOUSANDS/UL (ref 149–390)
PMV BLD AUTO: 9.8 FL (ref 8.9–12.7)
POTASSIUM SERPL-SCNC: 3.9 MMOL/L (ref 3.5–5.3)
PR INTERVAL: 196 MS
QRS AXIS: -15 DEGREES
QRSD INTERVAL: 90 MS
QT INTERVAL: 400 MS
QTC INTERVAL: 416 MS
RBC # BLD AUTO: 2.72 MILLION/UL (ref 3.88–5.62)
RH BLD: POSITIVE
SODIUM SERPL-SCNC: 135 MMOL/L (ref 136–145)
T WAVE AXIS: 49 DEGREES
VENTRICULAR RATE: 65 BPM
WBC # BLD AUTO: 12.45 THOUSAND/UL (ref 4.31–10.16)

## 2020-12-20 PROCEDURE — 97163 PT EVAL HIGH COMPLEX 45 MIN: CPT

## 2020-12-20 PROCEDURE — 99233 SBSQ HOSP IP/OBS HIGH 50: CPT | Performed by: SURGERY

## 2020-12-20 PROCEDURE — 97167 OT EVAL HIGH COMPLEX 60 MIN: CPT

## 2020-12-20 PROCEDURE — 99024 POSTOP FOLLOW-UP VISIT: CPT | Performed by: ORTHOPAEDIC SURGERY

## 2020-12-20 PROCEDURE — 85018 HEMOGLOBIN: CPT | Performed by: PHYSICIAN ASSISTANT

## 2020-12-20 PROCEDURE — 80048 BASIC METABOLIC PNL TOTAL CA: CPT | Performed by: ORTHOPAEDIC SURGERY

## 2020-12-20 PROCEDURE — 85014 HEMATOCRIT: CPT | Performed by: PHYSICIAN ASSISTANT

## 2020-12-20 PROCEDURE — 93010 ELECTROCARDIOGRAM REPORT: CPT | Performed by: INTERNAL MEDICINE

## 2020-12-20 PROCEDURE — 85025 COMPLETE CBC W/AUTO DIFF WBC: CPT | Performed by: ORTHOPAEDIC SURGERY

## 2020-12-20 RX ADMIN — HYDROMORPHONE HYDROCHLORIDE 0.5 MG: 1 INJECTION, SOLUTION INTRAMUSCULAR; INTRAVENOUS; SUBCUTANEOUS at 08:30

## 2020-12-20 RX ADMIN — ACETAMINOPHEN 975 MG: 325 TABLET, FILM COATED ORAL at 06:00

## 2020-12-20 RX ADMIN — METHOCARBAMOL 500 MG: 500 TABLET ORAL at 17:20

## 2020-12-20 RX ADMIN — OXYCODONE HYDROCHLORIDE 10 MG: 10 TABLET ORAL at 21:31

## 2020-12-20 RX ADMIN — METHOCARBAMOL 500 MG: 500 TABLET ORAL at 06:00

## 2020-12-20 RX ADMIN — OXYCODONE HYDROCHLORIDE 10 MG: 10 TABLET ORAL at 06:00

## 2020-12-20 RX ADMIN — ENOXAPARIN SODIUM 30 MG: 30 INJECTION SUBCUTANEOUS at 21:31

## 2020-12-20 RX ADMIN — METHOCARBAMOL 500 MG: 500 TABLET ORAL at 11:00

## 2020-12-20 RX ADMIN — ACETAMINOPHEN 975 MG: 325 TABLET, FILM COATED ORAL at 21:30

## 2020-12-20 RX ADMIN — OXYCODONE HYDROCHLORIDE 10 MG: 10 TABLET ORAL at 16:02

## 2020-12-20 RX ADMIN — OXYCODONE HYDROCHLORIDE 10 MG: 10 TABLET ORAL at 11:00

## 2020-12-20 RX ADMIN — CEFAZOLIN SODIUM 2000 MG: 2 SOLUTION INTRAVENOUS at 06:01

## 2020-12-20 RX ADMIN — ENOXAPARIN SODIUM 30 MG: 30 INJECTION SUBCUTANEOUS at 08:21

## 2020-12-20 RX ADMIN — HYDROMORPHONE HYDROCHLORIDE 0.5 MG: 1 INJECTION, SOLUTION INTRAMUSCULAR; INTRAVENOUS; SUBCUTANEOUS at 17:17

## 2020-12-21 LAB
ANION GAP SERPL CALCULATED.3IONS-SCNC: 7 MMOL/L (ref 4–13)
BASE EXCESS BLDA CALC-SCNC: -8 MMOL/L (ref -2–3)
BASOPHILS # BLD AUTO: 0.04 THOUSANDS/ΜL (ref 0–0.1)
BASOPHILS NFR BLD AUTO: 0 % (ref 0–1)
BUN SERPL-MCNC: 13 MG/DL (ref 5–25)
CA-I BLD-SCNC: 1.09 MMOL/L (ref 1.12–1.32)
CALCIUM SERPL-MCNC: 8.5 MG/DL (ref 8.3–10.1)
CHLORIDE SERPL-SCNC: 102 MMOL/L (ref 100–108)
CO2 SERPL-SCNC: 27 MMOL/L (ref 21–32)
CREAT SERPL-MCNC: 0.77 MG/DL (ref 0.6–1.3)
EOSINOPHIL # BLD AUTO: 0.02 THOUSAND/ΜL (ref 0–0.61)
EOSINOPHIL NFR BLD AUTO: 0 % (ref 0–6)
ERYTHROCYTE [DISTWIDTH] IN BLOOD BY AUTOMATED COUNT: 12.4 % (ref 11.6–15.1)
GFR SERPL CREATININE-BSD FRML MDRD: 97 ML/MIN/1.73SQ M
GLUCOSE SERPL-MCNC: 107 MG/DL (ref 65–140)
GLUCOSE SERPL-MCNC: 98 MG/DL (ref 65–140)
HCO3 BLDA-SCNC: 18.5 MMOL/L (ref 24–30)
HCT VFR BLD AUTO: 22.6 % (ref 36.5–49.3)
HCT VFR BLD CALC: 37 % (ref 36.5–49.3)
HGB BLD-MCNC: 7.7 G/DL (ref 12–17)
HGB BLDA-MCNC: 12.6 G/DL (ref 12–17)
IMM GRANULOCYTES # BLD AUTO: 0.04 THOUSAND/UL (ref 0–0.2)
IMM GRANULOCYTES NFR BLD AUTO: 0 % (ref 0–2)
LYMPHOCYTES # BLD AUTO: 1.46 THOUSANDS/ΜL (ref 0.6–4.47)
LYMPHOCYTES NFR BLD AUTO: 14 % (ref 14–44)
MCH RBC QN AUTO: 33.3 PG (ref 26.8–34.3)
MCHC RBC AUTO-ENTMCNC: 34.1 G/DL (ref 31.4–37.4)
MCV RBC AUTO: 98 FL (ref 82–98)
MONOCYTES # BLD AUTO: 1.02 THOUSAND/ΜL (ref 0.17–1.22)
MONOCYTES NFR BLD AUTO: 10 % (ref 4–12)
NEUTROPHILS # BLD AUTO: 8 THOUSANDS/ΜL (ref 1.85–7.62)
NEUTS SEG NFR BLD AUTO: 76 % (ref 43–75)
NRBC BLD AUTO-RTO: 0 /100 WBCS
PCO2 BLD: 20 MMOL/L (ref 21–32)
PCO2 BLD: 41.2 MM HG (ref 42–50)
PH BLD: 7.26 [PH] (ref 7.3–7.4)
PLATELET # BLD AUTO: 168 THOUSANDS/UL (ref 149–390)
PMV BLD AUTO: 10.2 FL (ref 8.9–12.7)
PO2 BLD: 57 MM HG (ref 35–45)
POTASSIUM BLD-SCNC: 4.3 MMOL/L (ref 3.5–5.3)
POTASSIUM SERPL-SCNC: 3.6 MMOL/L (ref 3.5–5.3)
RBC # BLD AUTO: 2.31 MILLION/UL (ref 3.88–5.62)
SAO2 % BLD FROM PO2: 85 % (ref 60–85)
SODIUM BLD-SCNC: 142 MMOL/L (ref 136–145)
SODIUM SERPL-SCNC: 136 MMOL/L (ref 136–145)
SPECIMEN SOURCE: ABNORMAL
WBC # BLD AUTO: 10.58 THOUSAND/UL (ref 4.31–10.16)

## 2020-12-21 PROCEDURE — 80048 BASIC METABOLIC PNL TOTAL CA: CPT | Performed by: PHYSICIAN ASSISTANT

## 2020-12-21 PROCEDURE — NC001 PR NO CHARGE: Performed by: ORTHOPAEDIC SURGERY

## 2020-12-21 PROCEDURE — 99232 SBSQ HOSP IP/OBS MODERATE 35: CPT | Performed by: SURGERY

## 2020-12-21 PROCEDURE — 99255 IP/OBS CONSLTJ NEW/EST HI 80: CPT | Performed by: PHYSICAL MEDICINE & REHABILITATION

## 2020-12-21 PROCEDURE — 85025 COMPLETE CBC W/AUTO DIFF WBC: CPT | Performed by: PHYSICIAN ASSISTANT

## 2020-12-21 RX ORDER — HYDROMORPHONE HCL/PF 1 MG/ML
0.2 SYRINGE (ML) INJECTION
Status: DISCONTINUED | OUTPATIENT
Start: 2020-12-21 | End: 2020-12-22 | Stop reason: HOSPADM

## 2020-12-21 RX ORDER — METOCLOPRAMIDE HYDROCHLORIDE 5 MG/ML
10 INJECTION INTRAMUSCULAR; INTRAVENOUS ONCE
Status: COMPLETED | OUTPATIENT
Start: 2020-12-21 | End: 2020-12-21

## 2020-12-21 RX ORDER — DICYCLOMINE HCL 20 MG
20 TABLET ORAL ONCE
Status: COMPLETED | OUTPATIENT
Start: 2020-12-21 | End: 2020-12-22

## 2020-12-21 RX ADMIN — ENOXAPARIN SODIUM 30 MG: 30 INJECTION SUBCUTANEOUS at 08:34

## 2020-12-21 RX ADMIN — ACETAMINOPHEN 975 MG: 325 TABLET, FILM COATED ORAL at 05:09

## 2020-12-21 RX ADMIN — DOCUSATE SODIUM 100 MG: 100 CAPSULE ORAL at 17:23

## 2020-12-21 RX ADMIN — ONDANSETRON 4 MG: 2 INJECTION INTRAMUSCULAR; INTRAVENOUS at 19:18

## 2020-12-21 RX ADMIN — METHOCARBAMOL 500 MG: 500 TABLET ORAL at 05:09

## 2020-12-21 RX ADMIN — METHOCARBAMOL 500 MG: 500 TABLET ORAL at 11:14

## 2020-12-21 RX ADMIN — METHOCARBAMOL 500 MG: 500 TABLET ORAL at 17:23

## 2020-12-21 RX ADMIN — ACETAMINOPHEN 975 MG: 325 TABLET, FILM COATED ORAL at 13:59

## 2020-12-21 RX ADMIN — ACETAMINOPHEN 975 MG: 325 TABLET, FILM COATED ORAL at 21:59

## 2020-12-21 RX ADMIN — ENOXAPARIN SODIUM 30 MG: 30 INJECTION SUBCUTANEOUS at 21:59

## 2020-12-21 RX ADMIN — METOCLOPRAMIDE 10 MG: 5 INJECTION, SOLUTION INTRAMUSCULAR; INTRAVENOUS at 20:20

## 2020-12-21 RX ADMIN — HYDROMORPHONE HYDROCHLORIDE 0.5 MG: 1 INJECTION, SOLUTION INTRAMUSCULAR; INTRAVENOUS; SUBCUTANEOUS at 04:18

## 2020-12-21 RX ADMIN — HYDROMORPHONE HYDROCHLORIDE 0.2 MG: 1 INJECTION, SOLUTION INTRAMUSCULAR; INTRAVENOUS; SUBCUTANEOUS at 11:15

## 2020-12-21 RX ADMIN — METHOCARBAMOL 500 MG: 500 TABLET ORAL at 00:00

## 2020-12-21 RX ADMIN — OXYCODONE HYDROCHLORIDE 10 MG: 10 TABLET ORAL at 08:33

## 2020-12-22 ENCOUNTER — APPOINTMENT (INPATIENT)
Dept: NON INVASIVE DIAGNOSTICS | Facility: HOSPITAL | Age: 60
DRG: 560 | End: 2020-12-22
Payer: COMMERCIAL

## 2020-12-22 ENCOUNTER — HOSPITAL ENCOUNTER (INPATIENT)
Facility: HOSPITAL | Age: 60
LOS: 6 days | Discharge: HOME | DRG: 560 | End: 2020-12-28
Attending: PHYSICAL MEDICINE & REHABILITATION | Admitting: PHYSICAL MEDICINE & REHABILITATION
Payer: COMMERCIAL

## 2020-12-22 VITALS
HEART RATE: 74 BPM | TEMPERATURE: 100 F | DIASTOLIC BLOOD PRESSURE: 77 MMHG | RESPIRATION RATE: 18 BRPM | HEIGHT: 75 IN | WEIGHT: 194.22 LBS | OXYGEN SATURATION: 98 % | BODY MASS INDEX: 24.15 KG/M2 | SYSTOLIC BLOOD PRESSURE: 133 MMHG

## 2020-12-22 DIAGNOSIS — S72.22XA CLOSED LEFT SUBTROCHANTERIC FEMUR FRACTURE (HCC): ICD-10-CM

## 2020-12-22 DIAGNOSIS — G89.11 ACUTE PAIN DUE TO TRAUMA: ICD-10-CM

## 2020-12-22 DIAGNOSIS — Z29.9 DVT PROPHYLAXIS: ICD-10-CM

## 2020-12-22 DIAGNOSIS — I10 HYPERTENSION: Primary | ICD-10-CM

## 2020-12-22 DIAGNOSIS — K59.00 CONSTIPATION: ICD-10-CM

## 2020-12-22 LAB
BASOPHILS # BLD AUTO: 0.02 THOUSANDS/ΜL (ref 0–0.1)
BASOPHILS NFR BLD AUTO: 0 % (ref 0–1)
EOSINOPHIL # BLD AUTO: 0.02 THOUSAND/ΜL (ref 0–0.61)
EOSINOPHIL NFR BLD AUTO: 0 % (ref 0–6)
ERYTHROCYTE [DISTWIDTH] IN BLOOD BY AUTOMATED COUNT: 12.3 % (ref 11.6–15.1)
FLUAV RNA RESP QL NAA+PROBE: NEGATIVE
FLUBV RNA RESP QL NAA+PROBE: NEGATIVE
HCT VFR BLD AUTO: 22 % (ref 36.5–49.3)
HGB BLD-MCNC: 7.4 G/DL (ref 12–17)
IMM GRANULOCYTES # BLD AUTO: 0.07 THOUSAND/UL (ref 0–0.2)
IMM GRANULOCYTES NFR BLD AUTO: 1 % (ref 0–2)
LYMPHOCYTES # BLD AUTO: 1.31 THOUSANDS/ΜL (ref 0.6–4.47)
LYMPHOCYTES NFR BLD AUTO: 11 % (ref 14–44)
MCH RBC QN AUTO: 33 PG (ref 26.8–34.3)
MCHC RBC AUTO-ENTMCNC: 33.6 G/DL (ref 31.4–37.4)
MCV RBC AUTO: 98 FL (ref 82–98)
MONOCYTES # BLD AUTO: 1.2 THOUSAND/ΜL (ref 0.17–1.22)
MONOCYTES NFR BLD AUTO: 10 % (ref 4–12)
NEUTROPHILS # BLD AUTO: 8.89 THOUSANDS/ΜL (ref 1.85–7.62)
NEUTS SEG NFR BLD AUTO: 78 % (ref 43–75)
NRBC BLD AUTO-RTO: 0 /100 WBCS
PLATELET # BLD AUTO: 212 THOUSANDS/UL (ref 149–390)
PMV BLD AUTO: 9.8 FL (ref 8.9–12.7)
RBC # BLD AUTO: 2.24 MILLION/UL (ref 3.88–5.62)
RSV RNA RESP QL NAA+PROBE: NEGATIVE
SARS-COV-2 RNA RESP QL NAA+PROBE: NEGATIVE
WBC # BLD AUTO: 11.51 THOUSAND/UL (ref 4.31–10.16)

## 2020-12-22 PROCEDURE — 93970 EXTREMITY STUDY: CPT | Performed by: SURGERY

## 2020-12-22 PROCEDURE — 99223 1ST HOSP IP/OBS HIGH 75: CPT | Performed by: PHYSICAL MEDICINE & REHABILITATION

## 2020-12-22 PROCEDURE — 93970 EXTREMITY STUDY: CPT

## 2020-12-22 PROCEDURE — 0241U HB NFCT DS VIR RESP RNA 4 TRGT: CPT | Performed by: NURSE PRACTITIONER

## 2020-12-22 PROCEDURE — NC001 PR NO CHARGE: Performed by: ORTHOPAEDIC SURGERY

## 2020-12-22 PROCEDURE — 99238 HOSP IP/OBS DSCHRG MGMT 30/<: CPT | Performed by: SURGERY

## 2020-12-22 PROCEDURE — 85025 COMPLETE CBC W/AUTO DIFF WBC: CPT | Performed by: PHYSICIAN ASSISTANT

## 2020-12-22 PROCEDURE — NC001 PR NO CHARGE

## 2020-12-22 RX ORDER — ACETAMINOPHEN 325 MG/1
975 TABLET ORAL EVERY 8 HOURS SCHEDULED
Status: DISCONTINUED | OUTPATIENT
Start: 2020-12-22 | End: 2020-12-22

## 2020-12-22 RX ORDER — ACETAMINOPHEN 325 MG/1
325 TABLET ORAL EVERY 6 HOURS PRN
Status: DISCONTINUED | OUTPATIENT
Start: 2020-12-22 | End: 2020-12-28 | Stop reason: HOSPADM

## 2020-12-22 RX ORDER — OXYCODONE HYDROCHLORIDE 10 MG/1
10 TABLET ORAL EVERY 4 HOURS PRN
Status: CANCELLED | OUTPATIENT
Start: 2020-12-22

## 2020-12-22 RX ORDER — OXYCODONE HYDROCHLORIDE 10 MG/1
10 TABLET ORAL EVERY 4 HOURS PRN
Status: DISCONTINUED | OUTPATIENT
Start: 2020-12-22 | End: 2020-12-23

## 2020-12-22 RX ORDER — NICOTINE 21 MG/24HR
14 PATCH, TRANSDERMAL 24 HOURS TRANSDERMAL DAILY
Status: DISCONTINUED | OUTPATIENT
Start: 2020-12-23 | End: 2020-12-28 | Stop reason: HOSPADM

## 2020-12-22 RX ORDER — ONDANSETRON 2 MG/ML
4 INJECTION INTRAMUSCULAR; INTRAVENOUS EVERY 6 HOURS PRN
Status: CANCELLED | OUTPATIENT
Start: 2020-12-22

## 2020-12-22 RX ORDER — DOCUSATE SODIUM 100 MG/1
100 CAPSULE, LIQUID FILLED ORAL 2 TIMES DAILY
Status: DISCONTINUED | OUTPATIENT
Start: 2020-12-22 | End: 2020-12-28 | Stop reason: HOSPADM

## 2020-12-22 RX ORDER — METHOCARBAMOL 500 MG/1
500 TABLET, FILM COATED ORAL EVERY 6 HOURS SCHEDULED
Status: CANCELLED | OUTPATIENT
Start: 2020-12-22

## 2020-12-22 RX ORDER — OXYCODONE HYDROCHLORIDE 5 MG/1
5 TABLET ORAL EVERY 4 HOURS PRN
Status: CANCELLED | OUTPATIENT
Start: 2020-12-22

## 2020-12-22 RX ORDER — BISACODYL 10 MG
10 SUPPOSITORY, RECTAL RECTAL DAILY PRN
Status: DISCONTINUED | OUTPATIENT
Start: 2020-12-22 | End: 2020-12-28 | Stop reason: HOSPADM

## 2020-12-22 RX ORDER — OXYCODONE HYDROCHLORIDE 5 MG/1
5 TABLET ORAL EVERY 4 HOURS PRN
Status: DISCONTINUED | OUTPATIENT
Start: 2020-12-22 | End: 2020-12-23

## 2020-12-22 RX ORDER — LIDOCAINE 50 MG/G
2 PATCH TOPICAL DAILY
Status: DISCONTINUED | OUTPATIENT
Start: 2020-12-23 | End: 2020-12-25

## 2020-12-22 RX ORDER — POLYETHYLENE GLYCOL 3350 17 G/17G
17 POWDER, FOR SOLUTION ORAL DAILY PRN
Status: DISCONTINUED | OUTPATIENT
Start: 2020-12-22 | End: 2020-12-28 | Stop reason: HOSPADM

## 2020-12-22 RX ORDER — SENNOSIDES 8.6 MG
2 TABLET ORAL
Status: DISCONTINUED | OUTPATIENT
Start: 2020-12-22 | End: 2020-12-28 | Stop reason: HOSPADM

## 2020-12-22 RX ORDER — ONDANSETRON 4 MG/1
4 TABLET, ORALLY DISINTEGRATING ORAL EVERY 6 HOURS PRN
Status: DISCONTINUED | OUTPATIENT
Start: 2020-12-22 | End: 2020-12-28 | Stop reason: HOSPADM

## 2020-12-22 RX ORDER — OXYCODONE HYDROCHLORIDE 5 MG/1
5 TABLET ORAL
Status: DISCONTINUED | OUTPATIENT
Start: 2020-12-22 | End: 2020-12-23

## 2020-12-22 RX ORDER — METHOCARBAMOL 500 MG/1
500 TABLET, FILM COATED ORAL 4 TIMES DAILY PRN
Status: DISCONTINUED | OUTPATIENT
Start: 2020-12-22 | End: 2020-12-28 | Stop reason: HOSPADM

## 2020-12-22 RX ORDER — DOCUSATE SODIUM 100 MG/1
100 CAPSULE, LIQUID FILLED ORAL 2 TIMES DAILY
Status: CANCELLED | OUTPATIENT
Start: 2020-12-22

## 2020-12-22 RX ORDER — NALOXONE HYDROCHLORIDE 0.4 MG/ML
0.4 INJECTION, SOLUTION INTRAMUSCULAR; INTRAVENOUS; SUBCUTANEOUS DAILY PRN
Status: DISCONTINUED | OUTPATIENT
Start: 2020-12-22 | End: 2020-12-28 | Stop reason: HOSPADM

## 2020-12-22 RX ORDER — AMOXICILLIN 250 MG
2 CAPSULE ORAL DAILY
Status: DISCONTINUED | OUTPATIENT
Start: 2020-12-23 | End: 2020-12-22

## 2020-12-22 RX ORDER — METHOCARBAMOL 500 MG/1
500 TABLET, FILM COATED ORAL EVERY 6 HOURS SCHEDULED
Status: DISCONTINUED | OUTPATIENT
Start: 2020-12-22 | End: 2020-12-22

## 2020-12-22 RX ORDER — ACETAMINOPHEN 325 MG/1
650 TABLET ORAL EVERY 6 HOURS SCHEDULED
Status: DISCONTINUED | OUTPATIENT
Start: 2020-12-22 | End: 2020-12-28 | Stop reason: HOSPADM

## 2020-12-22 RX ORDER — AMOXICILLIN 250 MG
2 CAPSULE ORAL DAILY
Status: CANCELLED | OUTPATIENT
Start: 2020-12-23

## 2020-12-22 RX ORDER — ACETAMINOPHEN 325 MG/1
975 TABLET ORAL EVERY 8 HOURS SCHEDULED
Status: CANCELLED | OUTPATIENT
Start: 2020-12-22

## 2020-12-22 RX ADMIN — DOCUSATE SODIUM 100 MG: 100 CAPSULE ORAL at 09:14

## 2020-12-22 RX ADMIN — OXYCODONE HYDROCHLORIDE 5 MG: 5 TABLET ORAL at 16:26

## 2020-12-22 RX ADMIN — METHOCARBAMOL 500 MG: 500 TABLET ORAL at 05:44

## 2020-12-22 RX ADMIN — DICYCLOMINE HYDROCHLORIDE 20 MG: 20 TABLET ORAL at 01:07

## 2020-12-22 RX ADMIN — ENOXAPARIN SODIUM 30 MG: 30 INJECTION SUBCUTANEOUS at 21:34

## 2020-12-22 RX ADMIN — METHOCARBAMOL 500 MG: 500 TABLET ORAL at 01:07

## 2020-12-22 RX ADMIN — OXYCODONE HYDROCHLORIDE 5 MG: 5 TABLET ORAL at 20:04

## 2020-12-22 RX ADMIN — DICLOFENAC SODIUM 2 G: 10 GEL TOPICAL at 17:38

## 2020-12-22 RX ADMIN — OXYCODONE HYDROCHLORIDE 5 MG: 5 TABLET ORAL at 11:20

## 2020-12-22 RX ADMIN — ACETAMINOPHEN 975 MG: 325 TABLET, FILM COATED ORAL at 14:16

## 2020-12-22 RX ADMIN — ENOXAPARIN SODIUM 30 MG: 30 INJECTION SUBCUTANEOUS at 09:14

## 2020-12-22 RX ADMIN — ACETAMINOPHEN 975 MG: 325 TABLET, FILM COATED ORAL at 05:44

## 2020-12-22 RX ADMIN — ACETAMINOPHEN 650 MG: 325 TABLET, FILM COATED ORAL at 17:36

## 2020-12-22 RX ADMIN — METHOCARBAMOL 500 MG: 500 TABLET ORAL at 11:20

## 2020-12-22 NOTE — CONSULTS
Internal Medicine  Consultation Note    Patient: Rose Marie Thomas  Age/sex: 56 y o  male  Medical Record #: [id number]    Assessment/Plan    Left displaced intertrochanteric fracture   WBAT   Rehab per PMR   Pain mgmt per PMR   +confusion/nausea with higher dose narcotics    HTN   Takes diovan 80mg daily at home   On hold post operatively   Monitor trend and resume when SBP >130    Post operative blood loss anemia   Hgb 7 4   Pt asymptomatic   Would like to hold off on transfusion for now   Repeat in a m  Tobacco use  · Cont nicotine patch    H/o diverticular perforation 2019   Stable            Subjective/ HPI: Patient seen and examined  Pt is a 57 y/o male with h/o HTN  On 12/18 he suffered a mechanical fall on ice  He had a displaced left intertrochanteric fracture requiring surgical IM nail on 12/19  He underwent the procedure without any difficulty  He did have some issues with pain post operatively however those have improved  He is trying to utilize lower doses of pain meds due to nausea and confusion  He also has post operative blood loss anemia, his hgb is 7 4 today, he denies any dizziness, lightheadedness or dyspnea  He is coming to [facility] for rehabilitation and we are asked to follow for medical management  ROS:   A 10 point ROS was performed; negative except as noted above       Medical History:  Past Medical History:   Diagnosis Date    Hypertension      Past Surgical History:   Procedure Laterality Date    TN OPEN RX FEMUR FX+INTRAMED TRES Left 12/19/2020    Procedure: INSERTION NAIL IM FEMUR ANTEGRADE For Subtrochanteric fracture;  Surgeon: Suly Bassett DO;  Location: [facility];  Service: Orthopedics       Substance Use History:   Social History     Substance and Sexual Activity   Alcohol Use Not Currently     Social History     Tobacco Use   Smoking Status Current Every Day Smoker    Packs/day: 0 50    Types: Cigarettes   Smokeless Tobacco Never Used     Social History Substance and Sexual Activity   Drug Use Never       Family History:    History reviewed  No pertinent family history      Review of Scheduled Meds:  Current Facility-Administered Medications   Medication Dose Route Frequency Provider Last Rate    acetaminophen  325 mg Oral Q6H PRN Iker Schumacher MD      acetaminophen  650 mg Oral Q6H [address] Iker Schumacher MD      bisacodyl  10 mg Rectal Daily PRN Iker Schumacher MD      Diclofenac Sodium  2 g Topical TID Iker Schumacher MD      docusate sodium  100 mg Oral BID Lorena Carrasco PA-C      enoxaparin  30 mg Subcutaneous Q12H [address] Lorena Carrasco PA-C      [START ON 12/23/2020] lidocaine  2 patch Topical Daily Iker Schumacher MD      methocarbamol  500 mg Oral 4x Daily PRN Iker Schumacher MD      naloxone  0 4 mg Intramuscular Daily PRN MD Michael [employer]uss [START ON 12/23/2020] nicotine  14 mg Transdermal Daily Lorena Carrasco PA-C      nicotine polacrilex  2 mg Oral Q2H PRN Iker Schumacher MD      ondansetron  4 mg Oral Q6H PRN Lorena Carrasco PA-C      oxyCODONE  10 mg Oral Q4H PRN Lorena Carrasco PA-C      oxyCODONE  5 mg Oral Q4H PRN Lorena Carrasco PA-C      oxyCODONE  5 mg Oral TID AC Iker Schumacher MD      polyethylene glycol  17 g Oral Daily PRN Iker Schumacher MD      senna  2 tablet Oral HS Iker Schumacher MD         Labs:     Results from last 7 days   Lab Units 12/22/20  0754 12/21/20  0448   WBC Thousand/uL 11 51* 10 58*   HEMOGLOBIN g/dL 7 4* 7 7*   HEMATOCRIT % 22 0* 22 6*   PLATELETS Thousands/uL 212 168     Results from last 7 days   Lab Units 12/21/20  0448 12/20/20  0527  12/18/20  1940   SODIUM mmol/L 136 135*   < >  --    POTASSIUM mmol/L 3 6 3 9   < >  --    CHLORIDE mmol/L 102 102   < >  --    CO2 mmol/L 27 28   < >  --    CO2, I-STAT mmol/L  --   --   --  20*   BUN mg/dL 13 16   < >  --    CREATININE mg/dL 0 77 0 91   < >  --    GLUCOSE, ISTAT mg/dl  --   --   --  98   CALCIUM mg/dL 8 5 8 4   < >  -- < > = values in this interval not displayed  Results from last 7 days   Lab Units 20  1941   INR  1 05              No results found for: Renee Eamon, SPUTUMCULTUR    Input and Output Summary (last 24 hours):     No intake or output data in the 24 hours ending 20 1630    Imaging:     VAS lower limb venous duplex study, complete bilateral    (Results Pending)       *Labs /Radiology studiesLabs reviewed  *Medications reviewed and reconciled as needed  *Please refer to order section for additional ordered labs studies  *Case discussed with primary attending during morning huddle case rounds    Vitals:   Temp (24hrs), Av 4 °F (37 4 °C), Min:98 3 °F (36 8 °C), Max:100 5 °F (38 1 °C)    Temp:  [98 3 °F (36 8 °C)-100 5 °F (38 1 °C)] 100 5 °F (38 1 °C)  HR:  [65-92] 65  Resp:  [17-18] 18  BP: (133-139)/(74-77) 135/74  SpO2:  [98 %-99 %] 98 %  Body mass index is 24 24 kg/m²  Physical Exam:   GEN: No apparent distress, interactive  NEURO: Alert and oriented x3  HEENT: Pupils are equal and reactive, EOMI, mucous membranes are moist, face symmetrical  CV: S1 S2 regular, no MRG, no peripheral edema noted  RESP: Lungs are clear bilaterally, no wheezes, rales or rhonchi noted, on room air, respirations easy and non labored  GI: Flat, soft non tender, non distended; +BS x4  : Voiding without difficulty  MUSC: Moves all extremities; LLE dressing in place  SKIN: pink, warm and dry, normal turgor, no rashes, lesions          Invasive Devices     Peripheral Intravenous Line            Peripheral IV 20 Right Forearm 1 day                   Code Status: Level 1 - Full Code  Current Length of Stay: 0 day(s)    Total floor / unit time spent today 45 minutes with more than 50% spent counseling/coordinating care  Counseling includes discussion with patient re: progress  and discussion with patient of his/her current medical state/information   Coordination of patient's care was performed in conjunction with primary service  Time invested included review of patient's labs, vitals, and management of their comorbidities with continued monitoring  In addition, this patient was discussed with medical team including physician and advanced extenders  The care of the patient was extensively discussed and appropriate treatment plan was formulated unique for this patient  ** Please Note: Fluency Direct voice to text software may have been used in the creation of this document   Audio transcription errors may occur**

## 2020-12-22 NOTE — H&P
PHYSICAL MEDICINE AND REHABILITATION H&P/ADMISSION NOTE  Lillian Levin 56 y o  male MRN: [id number]  Unit/Bed#: -01 Encounter: [id number]     Rehab Diagnosis: Impairment of mobility, safety and Activities of Daily Living (ADLs) due to Orthopedic Disorders:  08 11  Unilateral Hip Fracture    History of Present Illness:   Lillian Levin is a 56 y o  male, with HTN and tobacco abuse, who presented 12/18/2020 after a fall on ice  He was down in his driveway for 3 hours prior to being found  He complained of Lt hip and femur pain upon presentation  He was found to have a Lt subtrochanteric fracture with significant displacement  He underwent ORIF with cerclage wiring and IM nailing by Dr Colton Michaels  He did have acute blood loss anemia post-op  He also had nausea due narcotic pain medications  He stopped taking the pain medications and the nausea improved  Pain is currently controlled at 3-4/10  He was determined to be stable for transfer to the [facility] 12/22/2020  Plan:     Rehabilitation   Functional deficits: impaired mobility, self care   Begin PT/OT  Rehabilitation goals are to achieve a Emile level with mobility and self care  Prognosis is good  ELOS is 10-14 days  Estimated discharge is home  DVT prophylaxis   Lovenox    Pain   Currently controlled   Tylenol 975mg every 8 hours   Robaxin 500mg every 6 hours   Oxycodone 5-10mg every 4 hours as needed for pain  Bladder plan   Continent    Bowel plan   Continent   Last [facility] 12/21/2020  Code Status   Level 1 - full code      * Closed left subtrochanteric femur fracture ([address] )  Assessment & Plan  · Pain currently controlled  Pt did not tolerate Oxycodone  He may need more than Tylenol and Robaxin once he starts therapy  Will monitor closely  · Monitor incision for any signs of infection  · Follow-up with Orthopedics 2 weeks post-op  Acute blood loss anemia  Assessment & Plan  · Hgb 7 4    · Pt currently denies any symptoms of anemia although he has not been very active today  · CBC in the AM with type and screen for possible blood transfusion  · Continue to monitor  Smoking  Assessment & Plan  · Pt is requesting a nicotine patch  · Start 14mg daily  · Encourage cessation at discharge  Hypertension  Assessment & Plan  · He does take losartan at home  · He has not been taking BP medications since surgery  · IM to follow and resume when needed  Subjective: Pt is motivated to participate in the rehab program and return home  Review of Systems   Constitutional: Negative  HENT: Negative  Eyes: Negative  Respiratory: Negative  Cardiovascular: Negative  Gastrointestinal: Negative  Endocrine: Negative  Genitourinary: Negative  Musculoskeletal: Positive for arthralgias and gait problem  Skin: Negative  Allergic/Immunologic: Negative  Hematological: Negative  Psychiatric/Behavioral: Negative  Function:  Prior level of function and living situation:  Patient resides in a multilevel home with his wife  He can live on 1 level at discharge  There are 2 steps to enter  PLOF: He was independent and working full time  Current level of function:  Physical Therapy: ModA bed mobility  MaxA transfers  Occupational Therapy: Supervision UB bathing  [address] LB bathing, toileting, bed mobility  Nikkie UB dressing  MaxA LB dressing, transfers  Speech Therapy:    Physical Exam:  /74 (BP Location: Left arm)   Pulse 65   Temp 100 5 °F (38 1 °C) (Oral)   Resp 18   Ht 6' 3" (1 905 m)   Wt 88 kg (193 lb 14 4 oz)   SpO2 98%   BMI 24 24 kg/m²      No intake or output data in the 24 hours ending 12/22/20 1556    Body mass index is 24 24 kg/m²  Physical Exam  Vitals signs reviewed  Constitutional:       Appearance: He is well-developed  HENT:      Head: Normocephalic and atraumatic  Eyes:      Pupils: Pupils are equal, round, and reactive to light     Neck:      Musculoskeletal: Normal range of motion and neck supple  Cardiovascular:      Rate and Rhythm: Normal rate  Pulmonary:      Effort: Pulmonary effort is normal       Breath sounds: Normal breath sounds  Abdominal:      General: Bowel sounds are normal       Palpations: Abdomen is soft  Musculoskeletal: Normal range of motion  Skin:     General: Skin is warm and dry  Comments: Incision dressed  Neurological:      Mental Status: He is alert and oriented to person, place, and time  Labs, medications, and imaging personally reviewed      Laboratory:    Lab Results   Component Value Date    SODIUM 136 12/21/2020    K 3 6 12/21/2020     12/21/2020    CO2 27 12/21/2020    BUN 13 12/21/2020    CREATININE 0 77 12/21/2020    GLUC 107 12/21/2020    CALCIUM 8 5 12/21/2020     Lab Results   Component Value Date    WBC 11 51 (H) 12/22/2020    HGB 7 4 (L) 12/22/2020    HCT 22 0 (L) 12/22/2020    MCV 98 12/22/2020     12/22/2020     Lab Results   Component Value Date    INR 1 05 12/18/2020    PROTIME 13 7 12/18/2020         Current Facility-Administered Medications:     acetaminophen (TYLENOL) tablet 975 mg, 975 mg, Oral, Q8H FLORINDA, Lorena Carrasco PA-C    docusate sodium (COLACE) capsule 100 mg, 100 mg, Oral, BID, Lorena Carrasco PA-C    enoxaparin (LOVENOX) subcutaneous injection 30 mg, 30 mg, Subcutaneous, Q12H FLORINDA, Lorena Carrasco PA-C    methocarbamol (ROBAXIN) tablet 500 mg, 500 mg, Oral, Q6H FLORINDA, Lorena Carrasco PA-C    [START ON 12/23/2020] nicotine (NICODERM CQ) 14 mg/24hr TD 24 hr patch 14 mg, 14 mg, Transdermal, Daily, Lorena Carrasco PA-C    ondansetron (ZOFRAN-ODT) dispersible tablet 4 mg, 4 mg, Oral, Q6H PRN, Lorena Carrasco PA-C    oxyCODONE (ROXICODONE) immediate release tablet 10 mg, 10 mg, Oral, Q4H PRN, Lorena Carrasco PA-C    oxyCODONE (ROXICODONE) IR tablet 5 mg, 5 mg, Oral, Q4H PRN, Lorena Carrasco PA-C    [START ON 12/23/2020] senna-docusate sodium (SENOKOT S) 8 6-50 mg per tablet 2 tablet, 2 tablet, Oral, Daily, Lorena Carrasco PA-C  Allergies   Allergen Reactions    Penicillins       Patient Active Problem List    Diagnosis Date Noted    Closed left subtrochanteric femur fracture ([address] ) 12/18/2020     Priority: High    Acute blood loss anemia 12/20/2020    Acute pain due to trauma 12/19/2020    Hypertension 12/19/2020    Smoking 12/19/2020    Fall from standing 12/18/2020     Past Medical History:   Diagnosis Date    Hypertension      Past Surgical History:   Procedure Laterality Date    VA OPEN RX FEMUR FX+INTRAMED TRES Left 12/19/2020    Procedure: INSERTION NAIL IM FEMUR ANTEGRADE For Subtrochanteric fracture;  Surgeon: Yennifer Santiago DO;  Location: [facility];  Service: Orthopedics     Social History     Socioeconomic History    Marital status: /Civil Union     Spouse name: Not on file    Number of children: Not on file    Years of education: Not on file    Highest education level: Not on file   Occupational History    Not on file   Social Needs    Financial resource strain: Not on file    Food insecurity     Worry: Not on file     Inability: Not on file   [employer] needs     Medical: Not on file     Non-medical: Not on file   Tobacco Use    Smoking status: Current Every Day Smoker     Packs/day: 0 50     Types: Cigarettes    Smokeless tobacco: Never Used   Substance and Sexual Activity    Alcohol use: Not Currently    Drug use: Never    Sexual activity: Not on file   Lifestyle    Physical activity     Days per week: Not on file     Minutes per session: Not on file    Stress: Not on file   Relationships    Social connections     Talks on phone: Not on file     Gets together: Not on file     Attends Synagogue service: Not on file     Active member of club or organization: Not on file     Attends meetings of clubs or organizations: Not on file     Relationship status: Not on file    Intimate partner violence Fear of current or ex partner: Not on file     Emotionally abused: Not on file     Physically abused: Not on file     Forced sexual activity: Not on file   Other Topics Concern    Not on file   Social History Narrative    Not on file     Social History     Tobacco Use   Smoking Status Current Every Day Smoker    Packs/day: 0 50    Types: Cigarettes   Smokeless Tobacco Never Used     Social History     Substance and Sexual Activity   Alcohol Use Not Currently     History reviewed  No pertinent family history  Darrion Abraham PA-C    ** Please Note: Fluency Direct voice to text software may have been used in the creation of this document   **

## 2020-12-22 NOTE — TREATMENT PLAN
Individualized Plan of [address] Leung 56 y o  male MRN: [id number]  Unit/Bed#: -01 Encounter: [id number]     PATIENT INFORMATION  ADMISSION DATE: 12/22/2020  3:12 PM KHALIDA CATEGORY: Displaced Left femur fracture   ADMISSION DIAGNOSIS: Closed left femoral fracture ([address] ) [S72 [id number]]  EXPECTED LOS: 14 days      MEDICAL/FUNCTIONAL PROGNOSIS  Based on my assessment of the patient's medical conditions and current functional status, the prognosis for attaining medical and functional goals or the IRF stay is:  Good    Medical Goals: Patient will be medically stable for discharge to [facility] upon completion of rehab program and Patient will be able to manage medical conditions and comorbid conditions with medications and follow up upon completion of rehab program    [address]: Home - Assistance    ANTICIPATED FOLLOW-UP SERVICE:   Home Health Services: PT, OT and Nursing    DISCIPLINE SPECIFIC PLANS:  Required Disciplines & Services: Rehabillitation Nursing and Case Management    REQUIRED THERAPY:  Therapy Hours per Day Days per Week Total Days   Physical Therapy 1 5 5-6 7   Occupational Therapy 1 5 5-6 7   Speech/Language Therapy 0 0 0       ANTICIPATED FUNCTIONAL OUTCOMES:  ADL:  Supervision - Mod I with LRAD   Bladder/Bowel:    Mod I with LRAD   Transfers:   Supervision - Mod I with LRAD   Locomotion:   Supervision - Mod I with LRAD   Cognitive:       DISCHARGE PLANNING NEEDS  Equipment needs: Discharge needs to be reviewed with team      REHAB ANTICIPATED PARTICIPATION RESTRICTIONS:  None

## 2020-12-22 NOTE — PLAN OF CARE
Problem: Potential for Falls  Goal: Patient will remain free of falls  Description: INTERVENTIONS:  - Assess patient frequently for physical needs  -  Identify cognitive and physical deficits and behaviors that affect risk of falls    -  [place] fall precautions as indicated by assessment   - Educate patient/family on patient safety including physical limitations  - Instruct patient to call for assistance with activity based on assessment  - Modify environment to reduce risk of injury  - Consider OT/PT consult to assist with strengthening/mobility  Outcome: Progressing

## 2020-12-22 NOTE — ASSESSMENT & PLAN NOTE
· Recheck CBC today  · Hgb 8 8 status post transfusion 12/23/20    · Continue to follow H&H  · IM consultants also following

## 2020-12-22 NOTE — ASSESSMENT & PLAN NOTE
· Currently off all antihypertensives  · Previously on losartan 80 mg daily  · IM consultants to follow and resume when needed

## 2020-12-22 NOTE — ASSESSMENT & PLAN NOTE
· Displaced subtrochanteric left femur fracture sustained post fall on 12/18/20  · Status post ORIF on 12/19/20 by Dr Andrew Colin  · WBAT LLE  · Continue daily local incisional care and monitoring  · Managed on Lovenox for DVT prophylaxis   · Checking SPEP/UPEP given family history given fracture - low suspicion     · Follow-up with Dr Andrew Colin as outpatient

## 2020-12-23 PROBLEM — Z90.49 HISTORY OF COLON RESECTION: Status: ACTIVE | Noted: 2020-12-23

## 2020-12-23 LAB
ABO GROUP BLD: NORMAL
ANION GAP SERPL CALCULATED.3IONS-SCNC: 6 MMOL/L (ref 4–13)
BASOPHILS # BLD AUTO: 0.05 THOUSANDS/ΜL (ref 0–0.1)
BASOPHILS # BLD AUTO: 0.08 THOUSANDS/ΜL (ref 0–0.1)
BASOPHILS NFR BLD AUTO: 1 % (ref 0–1)
BASOPHILS NFR BLD AUTO: 1 % (ref 0–1)
BLD GP AB SCN SERPL QL: NEGATIVE
BUN SERPL-MCNC: 12 MG/DL (ref 5–25)
CALCIUM SERPL-MCNC: 8.8 MG/DL (ref 8.3–10.1)
CHLORIDE SERPL-SCNC: 101 MMOL/L (ref 100–108)
CO2 SERPL-SCNC: 29 MMOL/L (ref 21–32)
CREAT SERPL-MCNC: 0.68 MG/DL (ref 0.6–1.3)
EOSINOPHIL # BLD AUTO: 0.13 THOUSAND/ΜL (ref 0–0.61)
EOSINOPHIL # BLD AUTO: 0.15 THOUSAND/ΜL (ref 0–0.61)
EOSINOPHIL NFR BLD AUTO: 2 % (ref 0–6)
EOSINOPHIL NFR BLD AUTO: 2 % (ref 0–6)
ERYTHROCYTE [DISTWIDTH] IN BLOOD BY AUTOMATED COUNT: 12.4 % (ref 11.6–15.1)
ERYTHROCYTE [DISTWIDTH] IN BLOOD BY AUTOMATED COUNT: 13.7 % (ref 11.6–15.1)
GFR SERPL CREATININE-BSD FRML MDRD: 102 ML/MIN/1.73SQ M
GLUCOSE SERPL-MCNC: 96 MG/DL (ref 65–140)
HCT VFR BLD AUTO: 20.5 % (ref 36.5–49.3)
HCT VFR BLD AUTO: 26.7 % (ref 36.5–49.3)
HGB BLD-MCNC: 6.9 G/DL (ref 12–17)
HGB BLD-MCNC: 8.8 G/DL (ref 12–17)
IMM GRANULOCYTES # BLD AUTO: 0.07 THOUSAND/UL (ref 0–0.2)
IMM GRANULOCYTES # BLD AUTO: 0.12 THOUSAND/UL (ref 0–0.2)
IMM GRANULOCYTES NFR BLD AUTO: 1 % (ref 0–2)
IMM GRANULOCYTES NFR BLD AUTO: 1 % (ref 0–2)
LYMPHOCYTES # BLD AUTO: 1.47 THOUSANDS/ΜL (ref 0.6–4.47)
LYMPHOCYTES # BLD AUTO: 1.73 THOUSANDS/ΜL (ref 0.6–4.47)
LYMPHOCYTES NFR BLD AUTO: 17 % (ref 14–44)
LYMPHOCYTES NFR BLD AUTO: 20 % (ref 14–44)
MCH RBC QN AUTO: 32.5 PG (ref 26.8–34.3)
MCH RBC QN AUTO: 33.5 PG (ref 26.8–34.3)
MCHC RBC AUTO-ENTMCNC: 33 G/DL (ref 31.4–37.4)
MCHC RBC AUTO-ENTMCNC: 33.7 G/DL (ref 31.4–37.4)
MCV RBC AUTO: 100 FL (ref 82–98)
MCV RBC AUTO: 99 FL (ref 82–98)
MONOCYTES # BLD AUTO: 1.23 THOUSAND/ΜL (ref 0.17–1.22)
MONOCYTES # BLD AUTO: 1.26 THOUSAND/ΜL (ref 0.17–1.22)
MONOCYTES NFR BLD AUTO: 14 % (ref 4–12)
MONOCYTES NFR BLD AUTO: 15 % (ref 4–12)
NEUTROPHILS # BLD AUTO: 5.31 THOUSANDS/ΜL (ref 1.85–7.62)
NEUTROPHILS # BLD AUTO: 5.88 THOUSANDS/ΜL (ref 1.85–7.62)
NEUTS SEG NFR BLD AUTO: 61 % (ref 43–75)
NEUTS SEG NFR BLD AUTO: 65 % (ref 43–75)
NRBC BLD AUTO-RTO: 0 /100 WBCS
NRBC BLD AUTO-RTO: 0 /100 WBCS
PLATELET # BLD AUTO: 251 THOUSANDS/UL (ref 149–390)
PLATELET # BLD AUTO: 306 THOUSANDS/UL (ref 149–390)
PMV BLD AUTO: 9.4 FL (ref 8.9–12.7)
PMV BLD AUTO: 9.5 FL (ref 8.9–12.7)
POTASSIUM SERPL-SCNC: 3.7 MMOL/L (ref 3.5–5.3)
RBC # BLD AUTO: 2.06 MILLION/UL (ref 3.88–5.62)
RBC # BLD AUTO: 2.71 MILLION/UL (ref 3.88–5.62)
RH BLD: POSITIVE
SODIUM SERPL-SCNC: 136 MMOL/L (ref 136–145)
SPECIMEN EXPIRATION DATE: NORMAL
WBC # BLD AUTO: 8.58 THOUSAND/UL (ref 4.31–10.16)
WBC # BLD AUTO: 8.9 THOUSAND/UL (ref 4.31–10.16)

## 2020-12-23 PROCEDURE — 97162 PT EVAL MOD COMPLEX 30 MIN: CPT

## 2020-12-23 PROCEDURE — 86900 BLOOD TYPING SEROLOGIC ABO: CPT | Performed by: PHYSICIAN ASSISTANT

## 2020-12-23 PROCEDURE — 80048 BASIC METABOLIC PNL TOTAL CA: CPT | Performed by: PHYSICIAN ASSISTANT

## 2020-12-23 PROCEDURE — 97530 THERAPEUTIC ACTIVITIES: CPT

## 2020-12-23 PROCEDURE — 86850 RBC ANTIBODY SCREEN: CPT | Performed by: PHYSICIAN ASSISTANT

## 2020-12-23 PROCEDURE — 85025 COMPLETE CBC W/AUTO DIFF WBC: CPT | Performed by: NURSE PRACTITIONER

## 2020-12-23 PROCEDURE — 97110 THERAPEUTIC EXERCISES: CPT

## 2020-12-23 PROCEDURE — 97116 GAIT TRAINING THERAPY: CPT

## 2020-12-23 PROCEDURE — 86923 COMPATIBILITY TEST ELECTRIC: CPT

## 2020-12-23 PROCEDURE — P9016 RBC LEUKOCYTES REDUCED: HCPCS

## 2020-12-23 PROCEDURE — 86901 BLOOD TYPING SEROLOGIC RH(D): CPT | Performed by: PHYSICIAN ASSISTANT

## 2020-12-23 PROCEDURE — 85025 COMPLETE CBC W/AUTO DIFF WBC: CPT | Performed by: PHYSICIAN ASSISTANT

## 2020-12-23 PROCEDURE — 99233 SBSQ HOSP IP/OBS HIGH 50: CPT | Performed by: PHYSICAL MEDICINE & REHABILITATION

## 2020-12-23 PROCEDURE — 97535 SELF CARE MNGMENT TRAINING: CPT

## 2020-12-23 PROCEDURE — 97166 OT EVAL MOD COMPLEX 45 MIN: CPT

## 2020-12-23 PROCEDURE — 30233N1 TRANSFUSION OF NONAUTOLOGOUS RED BLOOD CELLS INTO PERIPHERAL VEIN, PERCUTANEOUS APPROACH: ICD-10-PCS | Performed by: INTERNAL MEDICINE

## 2020-12-23 RX ORDER — TRAMADOL HYDROCHLORIDE 50 MG/1
50 TABLET ORAL 3 TIMES DAILY PRN
Status: DISCONTINUED | OUTPATIENT
Start: 2020-12-23 | End: 2020-12-24

## 2020-12-23 RX ORDER — TRAMADOL HYDROCHLORIDE 50 MG/1
50 TABLET ORAL 2 TIMES DAILY
Status: DISCONTINUED | OUTPATIENT
Start: 2020-12-23 | End: 2020-12-24

## 2020-12-23 RX ADMIN — ACETAMINOPHEN 650 MG: 325 TABLET, FILM COATED ORAL at 00:08

## 2020-12-23 RX ADMIN — ACETAMINOPHEN 650 MG: 325 TABLET, FILM COATED ORAL at 05:50

## 2020-12-23 RX ADMIN — Medication 14 MG: at 09:00

## 2020-12-23 RX ADMIN — LIDOCAINE 2 PATCH: 50 PATCH TOPICAL at 09:00

## 2020-12-23 RX ADMIN — TRAMADOL HYDROCHLORIDE 50 MG: 50 TABLET, FILM COATED ORAL at 19:57

## 2020-12-23 RX ADMIN — ACETAMINOPHEN 650 MG: 325 TABLET, FILM COATED ORAL at 13:08

## 2020-12-23 RX ADMIN — OXYCODONE HYDROCHLORIDE 5 MG: 5 TABLET ORAL at 06:40

## 2020-12-23 RX ADMIN — ENOXAPARIN SODIUM 30 MG: 30 INJECTION SUBCUTANEOUS at 08:59

## 2020-12-23 RX ADMIN — ACETAMINOPHEN 650 MG: 325 TABLET, FILM COATED ORAL at 23:09

## 2020-12-23 RX ADMIN — ACETAMINOPHEN 650 MG: 325 TABLET, FILM COATED ORAL at 17:17

## 2020-12-23 RX ADMIN — DOCUSATE SODIUM 100 MG: 100 CAPSULE, LIQUID FILLED ORAL at 17:17

## 2020-12-23 RX ADMIN — DICLOFENAC SODIUM 2 G: 10 GEL TOPICAL at 21:01

## 2020-12-23 RX ADMIN — ENOXAPARIN SODIUM 30 MG: 30 INJECTION SUBCUTANEOUS at 21:01

## 2020-12-23 RX ADMIN — TRAMADOL HYDROCHLORIDE 50 MG: 50 TABLET, FILM COATED ORAL at 13:11

## 2020-12-23 NOTE — PCC NURSING
Enrique Burrows is a 56 y o  male, with HTN and tobacco abuse, who presented 12/18/2020 after a fall on ice  He was down in his driveway for 3 hours prior to being found  He complained of Lt hip and femur pain upon presentation  He was found to have a Lt subtrochanteric fracture with significant displacement  He underwent ORIF with cerclage wiring and IM nailing by Dr Jose Cruz Tom  He did have acute blood loss anemia post-op  He also had nausea due narcotic pain medications  He stopped taking the pain medications and the nausea improved  HX: HTN, tobacco abuse    Pain managed with routine Tylenol, PRN and routine oxycodone, routine voltaren gel, PRN robaxin  and routine lidoderm patch  Anticoagulation managed with lovenox injections  Patient is continent for bowel and bladder  Alarms not required for safety at this time  Skin is intact with exception of 3 incision sites covered with mepilex  Regular diet is ordered  This week we will encourage independence with ADLs while monitoring labs and vitals and maintaining skin integrity  We will keep the patient free from falls through patient education with safe transferring and maintaining safety precautions

## 2020-12-23 NOTE — CASE MANAGEMENT
Met w/pt and reviewed rehab routine and cm role  Pt resides with spouse who works full time  Pts home is two stories with 2 zain  Pt has a half bath on the first flr  Pt works full time in his own dentistry practice and plans on returning to work when he is dc'd  Pt reports having no prior outpt therapy experience or [facility] experience  Cm explained pt may be recommended to have outpt therapy when he returns home and he is familiar with [facility] [address] [place] [address] end which is about 5 min from his home  Cm reviewed potential dme that may be needed on dc and the ordering process  Informed of insurance update due on Monday  Explained team mtg from today and team process with projected los  Following to assist w/dc planning recommendations and contd stay review

## 2020-12-23 NOTE — PROGRESS NOTES
Internal Medicine Progress Note  Patient: Nigel Braun  Age/sex: 56 y o  male  Medical Record #: [id number]      ASSESSMENT/PLAN: (Interval History)  Nigel Braun is seen and examined and management for following issues:    Left displaced intertrochanteric fracture  · WBAT  · Rehab per PMR  · Pain mgmt per PMR  · +confusion/nausea with higher dose narcotics     HTN  · Takes diovan 80mg daily at home  · On hold post operatively  · Monitor trend and resume when SBP >130     Post operative blood loss anemia  · Hgb 6 9  · Pt remains asymptomatic  · Transfuse this a m  · Repeat cbc post transfusion     Tobacco use  · Cont nicotine patch     H/o diverticular perforation 2019  · Stable    The above assessment and plan was reviewed and updated as determined by my evaluation of the patient on 12/23/2020  Labs:   Results from last 7 days   Lab Units 12/23/20  0532 12/22/20  0754   WBC Thousand/uL 8 90 11 51*   HEMOGLOBIN g/dL 6 9* 7 4*   HEMATOCRIT % 20 5* 22 0*   PLATELETS Thousands/uL 251 212     Results from last 7 days   Lab Units 12/23/20  0532 12/21/20  0448  12/18/20  1940   SODIUM mmol/L 136 136   < >  --    POTASSIUM mmol/L 3 7 3 6   < >  --    CHLORIDE mmol/L 101 102   < >  --    CO2 mmol/L 29 27   < >  --    CO2, I-STAT mmol/L  --   --   --  20*   BUN mg/dL 12 13   < >  --    CREATININE mg/dL 0 68 0 77   < >  --    GLUCOSE, ISTAT mg/dl  --   --   --  98   CALCIUM mg/dL 8 8 8 5   < >  --     < > = values in this interval not displayed           Results from last 7 days   Lab Units 12/18/20  1941   INR  1 05           Review of Scheduled Meds:  Current Facility-Administered Medications   Medication Dose Route Frequency Provider Last Rate    acetaminophen  325 mg Oral Q6H PRN Duc García MD      acetaminophen  650 mg Oral Q6H [facility] Duc García MD      bisacodyl  10 mg Rectal Daily PRN Duc García MD      Diclofenac Sodium  2 g Topical TID Duc García MD      docusate sodium  100 mg Oral BID Lorena Carrasco PA-C      enoxaparin  30 mg Subcutaneous Q12H [facility] Lorena Carrasco PA-C      lidocaine  2 patch Topical Daily Yaw Hirsch MD      methocarbamol  500 mg Oral 4x Daily PRN Yaw Hirsch MD      naloxone  0 4 mg Intramuscular Daily PRN Yaw Hirsch MD      nicotine  14 mg Transdermal Daily Lorena Carrasco PA-C      nicotine polacrilex  2 mg Oral Q2H PRN Yaw Hirsch MD      ondansetron  4 mg Oral Q6H PRN Lorena Carrasco PA-C      oxyCODONE  10 mg Oral Q4H PRN Lorena Carrasco PA-C      oxyCODONE  5 mg Oral Q4H PRN Lorena Carrasco PA-C      oxyCODONE  5 mg Oral TID AC Yaw Hirsch MD      polyethylene glycol  17 g Oral Daily PRN Yaw Hirsch MD      senna  2 tablet Oral HS Yaw Hirsch MD         Subjective/ HPI: Patient seen and examined  Patients overnight issues or events were reviewed with nursing or staff during rounds or morning huddle session  New or overnight issues include the following:     Pt without any overnight issues or concerns; denies any dizziness, lightheadedness or SOB    ROS:   A 10 point ROS was performed; negative except as noted above         Imaging:     VAS lower limb venous duplex study, complete bilateral   Final Result by Lauren Hyde MD (12/22 2217)          *Labs /Radiology studies Reviewed  *Medications  reviewed and reconciled as needed  *Please refer to order section for additional ordered labs studies  *Case discussed with primary attending during morning huddle case rounds    Physical Examination:  Vitals:   Vitals:    12/22/20 1551 12/22/20 2100 12/23/20 0515   BP: 135/74 112/62 129/72   BP Location: Left arm Left arm Right arm   Pulse: 65 65 64   Resp: 18 18 19   Temp: 100 5 °F (38 1 °C) 99 7 °F (37 6 °C) 98 2 °F (36 8 °C)   TempSrc: Oral Oral Oral   SpO2: 98% 98% 99%   Weight: 88 kg (193 lb 14 4 oz)  84 7 kg (186 lb 11 7 oz)   Height: 6' 3" (1 905 m)         GEN: No apparent distress, interactive  NEURO: Alert and oriented x3  HEENT: Pupils are equal and reactive, EOMI, mucous membranes are moist, face symmetrical  CV: S1 S2 regular, no MRG, no peripheral edema noted  RESP: Lungs are clear bilaterally, no wheezes, rales or rhonchi noted, on room air, respirations easy and non labored  GI: Flat, soft non tender, non distended; +BS x4  : Voiding without difficulty  MUSC: Moves all extremities; except LLE  SKIN: pink, warm and dry, normal turgor, no rashes, lesions      The above physical exam was reviewed and updated as determined by my evaluation of the patient on 12/23/2020  Invasive Devices     Peripheral Intravenous Line            Peripheral IV 12/21/20 Right Forearm 2 days                   VTE Pharmacologic Prophylaxis: Enoxaparin  Code Status: Level 1 - Full Code  Current Length of Stay: 1 day(s)      Total time spent:  30 minutes with more than 50% spent counseling/coordinating care  Counseling includes discussion with patient re: progress  and discussion with patient of his/her current medical state/information  Coordination of patient's care was performed in conjunction with primary service  Time invested included review of patient's labs, vitals, and management of their comorbidities with continued monitoring  In addition, this patient was discussed with medical team including physician and advanced extenders  The care of the patient was extensively discussed and appropriate treatment plan was formulated unique for this patient  ** Please Note:  voice to text software may have been used in the creation of this document   Although proof errors in transcription or interpretation are a potential of such software**

## 2020-12-23 NOTE — PROGRESS NOTES
Occupational Therapy Evaluation         12/23/20 0705   Patient Data   Rehab Impairment 8 11  Unilateral Hip Fracture   Etiologic Diagnosis Left Subtrochanteric Femur Fracture   Date of Onset 12/18/20   Support System   Name Pt lives with his wife (she works on [facility] [employer]), and she works full time  Able to provide 24 hour supervision No   Home Setup   Type of Home Multi Level   Method of Entry Stairs   Number of Stairs 2   First Floor Bathroom Half   Second Floor Bathroom Shower;Built-in shower seat   First Floor Setup Available Yes   Available Equipment   (RW/cane - belonged to his parents )   [address] Work Full Time  (dentist )   Transportation    Prior Device(s) Used   (none)   Prior IADL Participation   Money Management Identify Money;Estimate Costs;Estimate Change;Combine Bills;Manage Checkbook   Meal Preparation Full Participation   Home Cleaning Full Participation   Prior Level of Function   Self-Care 3  Independent - Patient completed the activities by him/herself, with or without an assistive device, with no assistance from a helper  Indoor-Mobility (Ambulation) 3  Independent - Patient completed the activities by him/herself, with or without an assistive device, with no assistance from a helper  Stairs 3  Independent - Patient completed the activities by him/herself, with or without an assistive device, with no assistance from a helper  Functional Cognition 3  Independent - Patient completed the activities by him/herself, with or without an assistive device, with no assistance from a helper  Prior Assistance Needed for   (none)   Prior Device Used Z   None of the above   Falls in the Last Year   Number of falls in the past 12 months 1   Type of Injury Associated with Fall Major injury   Patient Preference   Nickname (Patient Preference) "Kp"    Psychosocial   Psychosocial (WDL) WDL   Restrictions/Precautions   Weight Bearing Restrictions Yes   LLE Weight Bearing Per Order WBAT   Pain Assessment   Pain Assessment Tool 0-10   Pain Score 7   Pain Location/Orientation Orientation: Left; Location: Hip   Eating Assessment   Type of Assistance Needed Independent   Eating CARE Score 6   Oral Hygiene   Type of Assistance Needed Incidental touching   Amount of Physical Assistance Provided No physical assistance   Comment in stance at sink    Oral Hygiene CARE Score 4   Tub/Shower Transfer   Reason Not Assessed Medical;Sponge Bath   Findings Pt does not have shower orders yet    Shower/Bathe Self   Type of Assistance Needed Physical assistance   Amount of Physical Assistance Provided 25%-49%   Comment pt requires assist for B/L LEs/feet, UB bathing while seated  Pt stands with unilateral UE support on grab bar with steadying assist to bathe buttoc/groin    Shower/Bathe Self CARE Score 3   Bathing   Limitations Noted in Balance; Endurance; Safety;Strength;Timeliness   Positioning Standing;Seated   Dressing/Undressing Clothing   Type of Assistance Needed Set-up / clean-up   Amount of Physical Assistance Provided No physical assistance   Comment seated    Upper Body Dressing CARE Score 5   Type of Assistance Needed Physical assistance   Amount of Physical Assistance Provided 50%-74%   Comment pt requires assist to thread/unthread L LE into underwear/pants, stands with steadying assist to manage over hips    Lower Body Dressing CARE Score 2   Limitations Noted In Balance; Endurance; Safety;Strength;Timeliness   Positioning Supported Sit;Standing   Putting On/Taking Off Footwear   Type of Assistance Needed Physical assistance   Amount of Physical Assistance Provided 75% or more   Comment pt requires assist to don B/L socks/shoes, able to doff socks while seated    Putting On/Taking Off Footwear CARE Score 2   Toileting Hygiene   Type of Assistance Needed Physical assistance; Adaptive equipment   Amount of Physical Assistance Provided 25%-49%   Comment steadying assist for clothing management Toileting Hygiene CARE Score 3   Toilet Transfer   Transfer Technique Standard   Limitations Noted In Balance; Endurance; Safety;UE Strength;LE Strength   Type of Assistance Needed Physical assistance; Adaptive equipment   Amount of Physical Assistance Provided 50%-74%   Comment utilized BSC over toilet using grab bars    Toilet Transfer CARE Score 2   Transfer Bed/Chair/Wheelchair   Limitations Noted In Balance; Endurance;UE Strength;LE Strength   Type of Assistance Needed Physical assistance; Adaptive equipment   Amount of Physical Assistance Provided 25%-49%   Comment using RW    Chair/Bed-to-Chair Transfer CARE Score 3   Sit to Stand   Type of Assistance Needed Physical assistance; Adaptive equipment   Amount of Physical Assistance Provided 50%-74%   Comment mod assist intially, progressed to min assist at end of session    Sit to Stand CARE Score 2   Comprehension   QI: Comprehension 4  Undestands: Clear comprehension without cues or repetitions   Expression   QI: Expression 4  Express complex messages without difficulty and with speech that is clear and easy to understan   RUE Assessment   RUE Assessment WFL   LUE Assessment   LUE Assessment WFL   Cognition   Overall Cognitive Status WFL   Arousal/Participation Alert   Attention Within functional limits   Orientation Level Oriented X4   Memory Within functional limits   Following Commands Follows one step commands without difficulty   Discharge Information   Vocational Plan Return to work   Barriers to Return to [employer]; Endurance; Environmental Access;Skill Set Limitation;Transportation Issues   Patient's Discharge Plan home with family support    Patient's Rehab Expectations to get stronger and decrease pain    Barriers to Discharge Home Limited Family Support;Decreased Strength;Decreased Endurance;Pain; Safety Considerations   Impressions Pt is a 56 y o  male who was admitted to [address] [place] [address] on 12/22/2020   Pt's current problem list includes:  Left Subtrochanteric Femur Fracture s/p fall at home s/p Open reduction left femur with cerclage wiring and IM nailing 12/18  Pt is WBAT L LE  At baseline pt was completing ADLS/IADLS and working full time as a dentist  Pt lives at home with wife who works full time  Currently pt requires mod assist for overall ADLS and min/mod assist for functional mobility/transfers using RW  Pt currently presents with impairments in the following categories - activity tolerance, endurance, standing balance/tolerance and sitting balance/tolerance  These impairments, as well as pt's fatigue, pain, orthopedic restricitions , decreased caregiver support, risk for falls and home environment  limit pt's ability to safely engage in all baseline areas of occupation, including grooming, bathing, dressing, toileting, functional mobility/transfers, community mobility, house maintenance, meal prep, work/volunteer work , social participation  and leisure activities    Pt presents with good rehab potential  Pt is unsafe to D/C home at this time, recommending ELOS 10 days to achieve independent/supervision level goals      OT Therapy Minutes   OT Time In 4130   OT Time Out 0835   OT Total Time (minutes) 90   OT Mode of treatment - Individual (minutes) 90   OT Mode of treatment - Concurrent (minutes) 0   OT Mode of treatment - Group (minutes) 0   OT Mode of treatment - Co-treat (minutes) 0   OT Mode of Treatment - Total time(minutes) 90 minutes   OT Cumulative Minutes 90   Cumulative Minutes   Cumulative therapy minutes 90

## 2020-12-23 NOTE — PROGRESS NOTES
PM&R PROGRESS NOTE:  Leni Lutz 56 y o  male MRN: [id number]  Unit/Bed#: -01 Encounter: [id number]        Rehabilitation Diagnosis: Impairment of mobility, safety and Activities of Daily Living (ADLs) due to Orthopedic Disorders:  08 11  Unilateral Hip Fracture    HPI: 56 y  o  male with hypertension,tobacco use, previous partial colon resection for perforated diverticula, patient presented to the [address] 12/18/20 status post traumatic fall on ice  Down for 2-3 hours   Sustained a displaced subtrochanteric left femur fracture with diaphysis displaced medially and proximally with respect to the proximal fragment   Patient evaluated by Orthopedics who determined patient a surgical candidate   Patient taken to the OR on 12/19/20 by Dr Dyson President a open reduction left femur with TFNA nail with cerclage wiring and IM nailing   Patient deemed weight-bearing as tolerated postoperatively and placed on Lovenox for DVT prophylaxis 30 mg subcutaneous q 12 hours   Medically, patient developed acute blood loss anemia, acute pain and postoperative constipation  SUBJECTIVE: Patient seen face to face  Feeling mild to moderate pain with movement otherwise feels pretty comfortable at rest   Patient receiving blood transfusion today and being closely monitored for acute blood loss anemia  Per staff an RN patient mildly confused likely from even low-dose oxycodone, patient willing to try tramadol therefore this will be adjusted  No BM today, but patient states he goes every other day  ASSESSMENT: Stable, progressing      PLAN:    Rehabilitation   Functional deficits:  Left lower extremity weakness, right proximal musculature weakness induced by pain, gait instability, impaired mobility, impaired self care   Continue current rehabilitation plan of care to maximize function      I personally attended, reviewed, and discussed medical and functional updates in team conference today   Please refer to advance care planning note for details   Expected Discharge: To be determined, estimate 10-14 days      Pain   Pain in left lower extremity:  Continue scheduled Tylenol and Scheduled tramadol 50 mg b i d   continue topical Lidoderm patch, topical ice  Additional p r n  tramadol available for breakthrough pain   Tendonitis type pain right proximal hip pelvis:  Continue application of Topical Voltaren gel    DVT prophylaxis   maanged on SQ Lovenox 30mg SQ Q12h   Bilateral lower extremity venous Dopplers completed on 12/22/20:  Negative for DVT   Continue SCDs    Bladder plan   Continent    Bowel plan   Continent, + BM 12/21/20      * Closed left subtrochanteric femur fracture (HCC)  Assessment & Plan  · Displaced subtrochanteric left femur fracture sustained post fall on 12/18/20  · Status post ORIF on 12/19/20 by Dr Vu Castellon  · WBAT LLE  · Continue daily local incisional care and monitoring  · Managed on Lovenox for DVT prophylaxis  · Follow-up with Dr Vu Castellon as outpatient    Acute blood loss anemia  Assessment & Plan  · Hgb 6 9  · Consent obtained for blood transfusion  · Patient to receive 1 unit PRBC 12/23/20  · Follow-up H&H in a m  History of colon resection  Assessment & Plan  Status post laparoscopic sigmoid colectomy February 2019 secondary to perforated diverticula/intra-abdominal abscess  Stable  No acute issues    Smoking  Assessment & Plan  · Counseled on smoking cessation  · Continue 14mg nicotine patch daily and p r n  gum    Hypertension  Assessment & Plan  · Currently off all antihypertensives  · Previously on losartan 80 mg daily  · IM consultants to follow and resume when needed        Appreciate IM consultants medical co-management  Labs, medications, and imaging personally reviewed  ROS:  A ten point review of systems was completed on 12/23/20 and pertinent positives are listed in subjective section  All other systems reviewed were negative  OBJECTIVE:   /68 (BP Location: Left arm)   Pulse 64   Temp 98 2 °F (36 8 °C) (Oral)   Resp 18   Ht 6' 3" (1 905 m)   Wt 84 7 kg (186 lb 11 7 oz)   SpO2 99%   BMI 23 34 kg/m²     Physical Exam  Vitals signs and nursing note reviewed  Constitutional:       General: He is not in acute distress  HENT:      Head: Normocephalic and atraumatic  Nose: Nose normal       Mouth/Throat:      Mouth: Mucous membranes are moist    Eyes:      Conjunctiva/sclera: Conjunctivae normal    Neck:      Musculoskeletal: Neck supple  Cardiovascular:      Rate and Rhythm: Normal rate and regular rhythm  Pulses: Normal pulses  Pulmonary:      Effort: Pulmonary effort is normal       Breath sounds: Normal breath sounds  No wheezing or rales  Abdominal:      General: Bowel sounds are normal  There is no distension  Palpations: Abdomen is soft  Tenderness: There is no abdominal tenderness  Musculoskeletal:         General: Swelling (Makayla-incisional left thigh) present  Skin:     General: Skin is warm  Comments: Incision clean dry and intact with minimal drainage  Covered with staples   Neurological:      Mental Status: He is alert and oriented to person, place, and time  Comments: Improved motor in right lower extremity graded proximally as a 4/5 now    5/5 distally  Left lower extremity motor now a 3-/5 hip flexion, 4/5 knee extension knee flexion, 5/5 dorsiflexion, plantar flexion   Psychiatric:         Mood and Affect: Mood normal           Lab Results   Component Value Date    WBC 8 90 12/23/2020    HGB 6 9 (LL) 12/23/2020    HCT 20 5 (L) 12/23/2020     (H) 12/23/2020     12/23/2020     Lab Results   Component Value Date    SODIUM 136 12/23/2020    K 3 7 12/23/2020     12/23/2020    CO2 29 12/23/2020    BUN 12 12/23/2020    CREATININE 0 68 12/23/2020    GLUC 96 12/23/2020    CALCIUM 8 8 12/23/2020     Lab Results   Component Value Date    INR 1 05 12/18/2020 PROTIME 13 7 12/18/2020           Current Facility-Administered Medications:     acetaminophen (TYLENOL) tablet 325 mg, 325 mg, Oral, Q6H PRN, Annabelle Gutierrez MD    acetaminophen (TYLENOL) tablet 650 mg, 650 mg, Oral, Q6H [address], Annabelle Gutierrez MD, 650 mg at 12/23/20 1308    bisacodyl (DULCOLAX) rectal suppository 10 mg, 10 mg, Rectal, Daily PRN, Annabelle Gutierrez MD    Diclofenac Sodium (VOLTAREN) 1 % topical gel 2 g, 2 g, Topical, TID, Annabelle Gutierrez MD, 2 g at 12/22/20 1738    docusate sodium (COLACE) capsule 100 mg, 100 mg, Oral, BID, Lorena Carrasco PA-C    enoxaparin (LOVENOX) subcutaneous injection 30 mg, 30 mg, Subcutaneous, Q12H [address], Lorena Carrasco PA-C, 30 mg at 12/23/20 0859    lidocaine (LIDODERM) 5 % patch 2 patch, 2 patch, Topical, Daily, Annabelle Gutierrez MD, 2 patch at 12/23/20 0900    methocarbamol (ROBAXIN) tablet 500 mg, 500 mg, Oral, 4x Daily PRN, Annabelle Gutierrez MD    naloxone [facility]) injection 0 4 mg, 0 4 mg, Intramuscular, Daily PRN, Annabelle Gutierrez MD    nicotine (NICODERM CQ) 14 mg/24hr TD 24 hr patch 14 mg, 14 mg, Transdermal, Daily, Lorena Carrasco PA-C, 14 mg at 12/23/20 0900    nicotine polacrilex (NICORETTE) gum 2 mg, 2 mg, Oral, Q2H PRN, Annabelle Gutierrez MD    ondansetron (ZOFRAN-ODT) dispersible tablet 4 mg, 4 mg, Oral, Q6H PRN, Lorena Carrasco PA-C    polyethylene glycol (MIRALAX) packet 17 g, 17 g, Oral, Daily PRN, Annabelle Gutierrez MD    senna (SENOKOT) tablet 17 2 mg, 2 tablet, Oral, HS, Annabelle Gutierrez MD    traMADol (ULTRAM) tablet 50 mg, 50 mg, Oral, BID, Annabelle Gutierrez MD, 50 mg at 12/23/20 1311    traMADol (ULTRAM) tablet 50 mg, 50 mg, Oral, TID PRN, Annabelle Gutierrez MD    Past Medical History:   Diagnosis Date    Hypertension        Patient Active Problem List    Diagnosis Date Noted    Closed left subtrochanteric femur fracture ([facility] [address] ) 12/18/2020     Priority: High    Acute blood loss anemia 12/20/2020     Priority: Medium    History of colon resection 12/23/2020    Acute pain due to trauma 12/19/2020    Hypertension 12/19/2020    Smoking 12/19/2020    Fall from standing 12/18/2020          Lorena Pinedo MD    Total time spent:  45 minutes with more than 50% spent counseling/coordinating care  Counseling includes discussion with patient re: progress and discussion with patient of his/her current medical/functional state/information  Coordination of patient's care was performed in conjunction with consulting services  Time invested included review of patient's labs, vitals, and management of their comorbidities with continued monitoring  The care of the patient was extensively discussed and appropriate treatment plan was formulated unique for this patient  ** Please Note:  voice to text software may have been used in the creation of this document   Although proof errors in transcription or interpretation are a potential of such software**

## 2020-12-23 NOTE — PROGRESS NOTES
[facility] PT Evaluation     12/23/20 1400   Patient Data   Rehab Impairment 8 11  Unilateral Hip Fracture   Etiologic Diagnosis Left Subtrochanteric Femur Fracture   Date of Onset 12/18/20   Home Setup   Type of Home Multi Level   Method of Entry Stairs;Hand Rail Left   Number of Stairs 3   Number of Stairs in Home 13  (or 10+3 with L rail)   In Home Hand Rail Bilateral   First Floor Bathroom Half   Second Floor Bathroom Built-in shower seat   First Floor Setup Available Yes  (powder room)   Available Equipment Roller Walker;Single Point Candice Stank; Long Handled Adaptive Equipment; Bedside Commode  (adjustable sleep number bed at home)   Baseline Information   Vocation Work Full Time   Transportation    Prior Level of Function   Indoor-Mobility (Ambulation) 3  Independent - Patient completed the activities by him/herself, with or without an assistive device, with no assistance from a helper  Stairs 3  Independent - Patient completed the activities by him/herself, with or without an assistive device, with no assistance from a helper  Falls in the Last Year   Number of falls in the past 12 months 1   Type of Injury Associated with Fall Major injury  (resulted to this hospital admission)   Patient Preference   Nickname (Patient Preference) Kp   Psychosocial   Psychosocial (WDL) WDL   Patient Behaviors/Mood Cooperative;Calm   Restrictions/Precautions   Precautions Fall Risk;Pain;Multiple lines  (IV line)   RLE Weight Bearing Per Order FWB   LLE Weight Bearing Per Order WBAT   Pain Assessment   Pain Assessment Tool Pain Assessment not indicated - pt denies pain   Pain Score 6  (6/10 at rest to 5/10 at end of tx)   Pain Location/Orientation Orientation: Left; Location: Hip   Pain Onset/Description Onset: Ongoing;Frequency: Constant/Continuous   Hospital Pain Intervention(s) Cold applied; Rest;Repositioned  (given pain meds by ROSE MARY Woods at start of PT session)   Transfer Bed/Chair/Wheelchair   Positioning Concerns (pain)   Limitations Noted In Endurance;Balance;LE Strength;Pain Management   Adaptive Equipment Roller Walker   Type of Assistance Needed Physical assistance;Verbal cues; Adaptive equipment   Amount of Physical Assistance Provided 25%-49%   Comment RW   Chair/Bed-to-Chair Transfer CARE Score 3   Roll Left and Right   Type of Assistance Needed Supervision;Verbal cues   Comment flat bed without rail but unable to tolerate lying on left side due to pain  also educated set up using pillow when sidelying on R and on his back to facilitate comfort /sleep at [facility]  pt declined to have bed extender citing with current bed he can use footboard to reposition himself as needed  although educated not to hesitate asking for staff assistance for bed positioning rachelle at   Roll Left and Right CARE Score 4   Sit to Lying   Comment will assess next session as pt remained on the w/c at end of tx    Lying to Sitting on Side of Bed   Type of Assistance Needed Physical assistance;Verbal cues   Amount of Physical Assistance Provided 25%-49%   Comment assist with L LE    Lying to Sitting on Side of Bed CARE Score 3   Sit to Stand   Type of Assistance Needed Physical assistance;Verbal cues; Adaptive equipment   Amount of Physical Assistance Provided 25%-49%   Sit to Stand CARE Score 3   Picking Up Object   Type of Assistance Needed Physical assistance;Verbal cues; Adaptive equipment   Amount of Physical Assistance Provided Less than 25%   Comment use R hand to hold reacher to  marker while holding walker with L hand   Picking Up Object CARE Score 3   Car Transfer   Type of Assistance Needed Physical assistance;Verbal cues; Adaptive equipment   Amount of Physical Assistance Provided 25%-49%   Comment assist with L LE in/out of bed with VC for hand placement    placed extra cushion on top of car seat    Car Transfer CARE Score 3   Ambulation   Primary Mode of Locomotion Prior to Admission Walk   Distance Walked (feet) 25 ft  (35) Assist Device Roller Walker   Gait Pattern Inconsistant Deanne; Antalgic;Decreased L stance; Improper weight shift; Step to   Limitations Noted In Endurance;Speed;Strength;Swing;Safety; Device Management;Balance   Provided Assistance with: Weight Shift;Balance   Walk Assist Level Minimum Assist   Walk 10 Feet   Type of Assistance Needed Physical assistance;Verbal cues; Adaptive equipment   Amount of Physical Assistance Provided 25%-49%   Comment 25,35 with RW, initially demo PWB with L knee flexed due to pain so provided VC/TC to improve L LE weightshifting and weightbearing, engage L quads and promote step through gait pattern  gait tolerance limited by pain and weakness but gait pattern and L LE Wbing/WShifting improved with training as session progressed   Walk 10 Feet CARE Score 3   Walk 50 Feet with Two Turns   Reason if not Attempted Safety concerns   Walk 50 Feet with Two Turns CARE Score 88   Walk 150 Feet   Reason if not Attempted Safety concerns   Walk 150 Feet CARE Score 88   Walking 10 Feet on Uneven Surfaces   Reason if not Attempted Safety concerns   Walking 10 Feet on Uneven Surfaces CARE Score 88   Wheel 50 Feet with Two Turns   Reason if not Attempted Activity not applicable   Wheel 50 Feet with Two Turns CARE Score 9   Wheel 150 Feet   Reason if not Attempted Activity not applicable   Wheel 202 Feet CARE Score 9   Curb or Single Stair   Style negotiated Single stair   Type of Assistance Needed Adaptive equipment;Physical assistance;Verbal cues   Amount of Physical Assistance Provided Total assistance   Comment min A x 1 with CGA of 2nd person for safety ascended fwd/descended bwd using bilat HR x 2 steps on 6"    1 Step (Curb) CARE Score 1   4 Steps   Reason if not Attempted Safety concerns   4 Steps CARE Score 88   12 Steps   Reason if not Attempted Safety concerns   12 Steps CARE Score 88   Comprehension   QI: Comprehension 4   Undestands: Clear comprehension without cues or repetitions Expression   QI: Expression 4  Express complex messages without difficulty and with speech that is clear and easy to [place]   RLE Assessment   RLE Assessment WFL   Strength RLE   RLE Overall Strength 4+/5   R Hip Flexion 4-/5  (reported discomfort on R groin with resisted hip flexion )   LLE Assessment   LLE Assessment X   Strength LLE   L Hip Flexion 2-/5   L Hip ABduction 2-/5   L Hip ADduction 3+/5   L Knee Flexion 4-/5   L Knee Extension 4-/5   L Ankle Dorsiflexion 4+/5   L Ankle Plantar Flexion 4+/5   Sensation   Light Touch No apparent deficits   Propioception No apparent deficits   Cognition   Overall Cognitive Status WFL   Arousal/Participation Alert; Cooperative   Attention Within functional limits   Orientation Level Oriented X4   Memory Within functional limits   Following Commands Follows multistep commands with increased time or repetition   Objective Measure   PT Measure(s) cold pack application on L hip and R groin areas during PT session and given cold pack as well at end of tx  encouraged to utilized cold pack at HS as well for pain management along with taking pain meds as needed    PT Findings manual stretching of bilat hamstring and gastroc mm    Discharge Information   Vocational Plan Return to work   Barriers to Return to [employer]; Endurance;Skill Set Limitation; Environmental Access;Transportation Issues   Patient's Discharge Plan home with family   Patient's Rehab Expectations to go home and return to work as soon as possible   Barriers to Discharge Home Limited Family Support;Decreased Strength;Decreased Endurance;Pain; Safety Considerations   Impressions Pt is a 56year old male s/p fall sustaining L subtrochanteric femur fracture requiring open reduction left femur with cerclage wiring and IM nailing    Pt is seen for moderate complexity eval with co-morbidities affecting pt's physical performance at time of assessment includes/p fall, hypothermia, acute pain, leukocytosis, ABLA, HTN  Personal factors affecting pt at time of IE include limited family availability to provide physical assistance at d/c as wife works full time with environmental barrier of [place] and FF to access full bath/bedroom  Pt lives at home with wife and was fully indep at baseline including driving and full time job  [place] pt presents with pain, strength deficits, dec standing tolerance and balance, gait dysfunction, dec indep and safety completing functional mobilities requiring min A with use of a walker to complete safely at this time, see above info for details of functional mobility scores  Pt is a good rehab candidate with anticipated mod indep-S goals using Least restrictive AD with ELOS of 2 weeks (10-14 days)      PT Therapy Minutes   PT Time In 1400   PT Time Out 1530   PT Total Time (minutes) 90   PT Mode of treatment - Individual (minutes) 90   PT Mode of treatment - Concurrent (minutes) 0   PT Mode of treatment - Group (minutes) 0   PT Mode of treatment - Co-treat (minutes) 0   PT Mode of Treatment - Total time(minutes) 90 minutes   PT Cumulative Minutes 90   Cumulative Minutes   Cumulative therapy minutes 180

## 2020-12-23 NOTE — PROGRESS NOTES
OT LONG TERM GOALS          12/23/20 0705   Rehab Team Goals   ADL Team Goal Patient will be independent with ADLs with least restrictive device upon completion of rehab program   Rehab Team Interventions   OT Interventions Self Care;Home Management; Therapeutic Exercise; Energy Conservation;Patient/Family Education   Eating Goal   Eating Goal 06  Independent - Patient completes the activity by him/herself with no assistance from a helper  Status Ongoing; Target goal - one week; Target goal - two weeks   Grooming Goal   Oral Hygiene Goal 06  Independent - Patient completes the activity by him/herself with no assistance from a helper  Task Wash/Dry Face;Wash/Dry Hands;Brush Teeth   Environment Stand at [employer]   Status Target goal - two weeks; Ongoing; Target goal - one week   Intervention Assistive Device;Balance Work;Tolerance Work; Therapeutic Exercise   Tub/Shower Transfer Goal   Method Shower Stall  (supervision - when shower order present )   Assist Device Seat with Back   Status Ongoing; Target goal - two weeks; Target goal - one week   Bathing Goal   Shower/bathe self Goal 05  Setup or clean-up assistance - [place] SETS UP or CLEANS UP, patient completes activity  [place] assists only prior to or following the activity  Environment Seated;Standing   Status Ongoing; Target goal - one week; Target goal - two weeks   Intervention ADL Training;Assistive Device; Therapeutic Exercise   Upper Body Dressing Goal   Upper body dressing Goal 06  Independent - Patient completes the activity by him/herself with no assistance from a helper  Task Upper Body   Environment Seated   Status Ongoing; Target goal - one week; Target goal - two weeks   Intervention Assistive Device;Balance Work; Therapeutic Exercise; Tolerance Work   Lower Body Dressing Goal   Lower body dressing Goal 06  Independent - Patient completes the activity by him/herself with no assistance from a helper  Putting on/taking off footwear Goal 06   Independent - Patient completes the activity by him/herself with no assistance from a helper  Task Lower Body   Adaptive Equipment Reacher;Sock Aide; [place]   Environment Seated;Standing   Status Ongoing; Target goal - two weeks; Target goal - one week   Intervention Assistive Device;Balance Work; Therapeutic Exercise; Tolerance Work   Toileting Transfer Goal   Toilet transfer Goal 06  Independent - Patient completes the activity by him/herself with no assistance from a helper  Assistive Device [employer] [place]; Bedside Commode   Status Ongoing; Target goal - one week; Target goal - two weeks   Intervention ADL Training;Balance Work;Assistive Device   Toileting Goal   Toileting hygiene Goal 06  Independent - Patient completes the activity by him/herself with no assistance from a helper  Status Ongoing; Target goal - one week; Target goal - two weeks   Intervention ADL Training;Balance Work;Assistive Device   Meal Prep and Kitchen Mobility   Assist Level Independent  (simple meal prep/kitchen mobility )   Status Ongoing; Target goal - one week; Target goal - two weeks

## 2020-12-23 NOTE — ASSESSMENT & PLAN NOTE
Status post laparoscopic sigmoid colectomy February 2019 secondary to perforated diverticula/intra-abdominal abscess  Stable  No acute issues

## 2020-12-23 NOTE — TEAM CONFERENCE
Acute RehabilitationTeam Conference Note  Date: 12/23/2020   Time: 10:50 AM       Patient Name:  Jabari Rondon       Medical Record Number: [id number]   YOB: 1960  Sex: Male          Room/Bed:  [facility]0/[facility]0-01  Payor Info:  Payor: Nasrin Childes / Plan: CAPITAL BC PLAN [id number] / Product Type: Blue Fee for Service /      Admitting Diagnosis: Closed left femoral fracture ([facility] [address] ) [[id number]]   Admit Date/Time:  12/22/2020  3:12 PM  Admission Comments: No comment available     Primary Diagnosis:  Closed left subtrochanteric femur fracture (HCC)  Principal Problem: Closed left subtrochanteric femur fracture [facility])    Patient Active Problem List    Diagnosis Date Noted    Acute blood loss anemia 12/20/2020    Acute pain due to trauma 12/19/2020    Hypertension 12/19/2020    Smoking 12/19/2020    Fall from standing 12/18/2020    Closed left subtrochanteric femur fracture ([facility] [address] ) 12/18/2020       Physical Therapy:         Pt to be evaluated by PT at 14:00  See ICP and TAA for details  Occupational Therapy:          No notes on file    Speech Therapy:           No notes on file    Nursing Notes:  Appetite: Good  Diet Type: Regular/House                      Diet Patient/Family Education Complete: Yes    Type of Wound (LDA): Wound                    Type of Wound Patient/Family Education: Yes  Bladder: Continent     Bladder Patient/Family Education: Yes  Bowel: Continent     Bowel Patient/Family Education: Yes  Pain Location/Orientation: Orientation: Left, Location: Leg  Pain Score: 3                       Hospital Pain Intervention(s): Medication (See MAR)  Pain Patient/Family Education: Yes  Medication Management/Safety  Injectable: Lovenox  Safe Administration: Yes  Medication Patient/Family Education Complete: Yes    Jabari Rondon is a 56 y o  male, with HTN and tobacco abuse, who presented 12/18/2020 after a fall on ice  He was down in his driveway for 3 hours prior to being found   He complained of Lt hip and femur pain upon presentation  He was found to have a Lt subtrochanteric fracture with significant displacement  He underwent ORIF with cerclage wiring and IM nailing by Dr Moreno Thompson  He did have acute blood loss anemia post-op  He also had nausea due narcotic pain medications  He stopped taking the pain medications and the nausea improved  HX: HTN, tobacco abuse    Pain managed with routine Tylenol, PRN and routine oxycodone, routine voltaren gel, PRN robaxin  and routine lidoderm patch  Anticoagulation managed with lovenox injections  Patient is continent for bowel and bladder  Alarms not required for safety at this time  Skin is intact with exception of 3 incision sites covered with mepilex  Regular diet is ordered  This week we will encourage independence with ADLs while monitoring labs and vitals and maintaining skin integrity  We will keep the patient free from falls through patient education with safe transferring and maintaining safety precautions  Case Management:     Discharge Planning  Living Arrangements: Spouse/significant other  Support Systems: Spouse/significant other  Assistance Needed: PT/OT  Type of Current Residence: Private residence  Current Home Care Services: No  Initial assessment to be completed, following for contd stay review and dc planning needs  Is the patient actively participating in therapies? yes  List any modifications to the treatment plan:     Barriers Interventions   pain Pain meds, repositioning   confusion Pain meds to be adjusted   anemia Blood transfusion today   Steps to enter Therapy stair training         Is the patient making expected progress toward goals?  yes  List any update or changes to goals:     Medical Goals: Patient will be medically stable for discharge to [facility] upon completion of rehab program and Patient will be able to manage medical conditions and comorbid conditions with medications and follow up upon completion of rehab program    Weekly Team Goals:        Discussion: pt presents with the above barriers and is undergoing evaluations today  pts biggest barrier is pain, and is currently presenting with confusion to which pain meds are going to be adjusted  Estimated los 10-14 days but could be less if pain is under control  Anticipated Discharge Date:  reteam [facility] Team Members Present: The following team members are supervising care for this patient and were present during this Weekly Team Conference      Physician: Dr Lary Aguilar MD  : MONSE Simon  Registered Nurse: Jennifer Costello RN, BSN, [address] [place] [address]  Physical Therapist: Ketty James DPT  Occupational Therapist: Andra Espinosa MS, OTR/L, CBIS  Speech Therapist: Umang Gutierrez MS, CCC-SLP  Other:

## 2020-12-24 LAB
ABO GROUP BLD BPU: NORMAL
BPU ID: NORMAL
CROSSMATCH: NORMAL
UNIT DISPENSE STATUS: NORMAL
UNIT PRODUCT CODE: NORMAL
UNIT RH: NORMAL

## 2020-12-24 PROCEDURE — 97124 MASSAGE THERAPY: CPT

## 2020-12-24 PROCEDURE — 99232 SBSQ HOSP IP/OBS MODERATE 35: CPT | Performed by: PHYSICAL MEDICINE & REHABILITATION

## 2020-12-24 PROCEDURE — 97110 THERAPEUTIC EXERCISES: CPT

## 2020-12-24 PROCEDURE — 97530 THERAPEUTIC ACTIVITIES: CPT

## 2020-12-24 PROCEDURE — 97535 SELF CARE MNGMENT TRAINING: CPT

## 2020-12-24 PROCEDURE — 97116 GAIT TRAINING THERAPY: CPT

## 2020-12-24 RX ORDER — TRAMADOL HYDROCHLORIDE 50 MG/1
25 TABLET ORAL 4 TIMES DAILY PRN
Status: DISCONTINUED | OUTPATIENT
Start: 2020-12-24 | End: 2020-12-28 | Stop reason: HOSPADM

## 2020-12-24 RX ADMIN — TRAMADOL HYDROCHLORIDE 50 MG: 50 TABLET, FILM COATED ORAL at 06:01

## 2020-12-24 RX ADMIN — ACETAMINOPHEN 650 MG: 325 TABLET, FILM COATED ORAL at 12:57

## 2020-12-24 RX ADMIN — ENOXAPARIN SODIUM 30 MG: 30 INJECTION SUBCUTANEOUS at 10:25

## 2020-12-24 RX ADMIN — ACETAMINOPHEN 650 MG: 325 TABLET, FILM COATED ORAL at 18:22

## 2020-12-24 RX ADMIN — DICLOFENAC SODIUM 2 G: 10 GEL TOPICAL at 18:40

## 2020-12-24 RX ADMIN — DOCUSATE SODIUM 100 MG: 100 CAPSULE, LIQUID FILLED ORAL at 10:25

## 2020-12-24 RX ADMIN — DOCUSATE SODIUM 100 MG: 100 CAPSULE, LIQUID FILLED ORAL at 18:23

## 2020-12-24 RX ADMIN — ENOXAPARIN SODIUM 30 MG: 30 INJECTION SUBCUTANEOUS at 22:22

## 2020-12-24 RX ADMIN — DICLOFENAC SODIUM 2 G: 10 GEL TOPICAL at 10:26

## 2020-12-24 RX ADMIN — METHOCARBAMOL 500 MG: 500 TABLET ORAL at 19:53

## 2020-12-24 RX ADMIN — NICOTINE POLACRILEX 2 MG: 2 GUM, CHEWING BUCCAL at 03:27

## 2020-12-24 RX ADMIN — SENNOSIDES 17.2 MG: 8.6 TABLET, FILM COATED ORAL at 22:23

## 2020-12-24 RX ADMIN — ACETAMINOPHEN 650 MG: 325 TABLET, FILM COATED ORAL at 06:01

## 2020-12-24 RX ADMIN — TRAMADOL HYDROCHLORIDE 25 MG: 50 TABLET, FILM COATED ORAL at 18:23

## 2020-12-24 RX ADMIN — Medication 14 MG: at 10:26

## 2020-12-24 NOTE — PROGRESS NOTES
PM&R PROGRESS NOTE:  Leni Silva 56 y o  male MRN: [id number]  Unit/Bed#: -01 Encounter: [id number]        Rehabilitation Diagnosis: Impairment of mobility, safety and Activities of Daily Living (ADLs) due to Orthopedic Disorders:  08 11  Unilateral Hip Fracture    HPI: 56 y  o  male with hypertension,tobacco use, previous partial colon resection for perforated diverticula, patient presented to the [facility] 12/18/20 status post traumatic fall on ice  Down for 2-3 hours   Sustained a displaced subtrochanteric left femur fracture with diaphysis displaced medially and proximally with respect to the proximal fragment   Patient evaluated by Orthopedics who determined patient a surgical candidate   Patient taken to the OR on 12/19/20 by Dr Aung Figuereod a open reduction left femur with TFNA nail with cerclage wiring and IM nailing   Patient deemed weight-bearing as tolerated postoperatively and placed on Lovenox for DVT prophylaxis 30 mg subcutaneous q 12 hours   Medically, patient developed acute blood loss anemia, acute pain and postoperative constipation  SUBJECTIVE:  Patient seen face to face today in physical therapy working on transfers and weight-bearing of the left lower extremity  Has improved today in weight-bearing with getting his heel down on the ground  Pain is moderate with activity and only mild at rest   Per staff, continues to have intermittent confusion even on tramadol, therefore will discontinued any scheduled doses and continue only on Tylenol/topical pain-relieving patches and ice  Patient in agreement  No good bowel movement for 48 hours  ASSESSMENT: Stable, progressing      PLAN:    Rehabilitation   Functional deficits:  Left lower extremity weakness, right proximal musculature weakness induced by pain, gait instability, impaired mobility, impaired self care   Continue current rehabilitation plan of care to maximize function      Seen today progressing to a min assist level with bed mobility and sit-to-stand transfers using a rolling walker   Expected Discharge: To be determined, estimate 10-14 days      Pain   Pain in left lower extremity:  Continue scheduled Tylenol, topical Lidoderm patches and ice  Discontinued the scheduled tramadol 12/24/20  For breakthrough moderate to severe pain only utilized tramadol 25 mg as needed  * patient is very sensitive to narcotics and gets easily confused   Tendonitis type pain right proximal hip pelvis:  Continue application of Topical Voltaren gel    DVT prophylaxis   maanged on SQ Lovenox 30mg SQ Q12h   Bilateral lower extremity venous Dopplers completed on 12/22/20:  Negative for DVT   Continue SCDs   Compression stockings ordered for mild developing edema of left lower extremity    Bladder plan   Continent    Bowel plan   Continent, + BM 12/21/20   Continue Colace and Senokot  MiraLax x1 today      * Closed left subtrochanteric femur fracture ([facility] [address] )  Assessment & Plan  · Displaced subtrochanteric left femur fracture sustained post fall on 12/18/20  · Status post ORIF on 12/19/20 by Dr Colton Michaels  · WBAT LLE  · Continue daily local incisional care and monitoring  · Managed on Lovenox for DVT prophylaxis  · Follow-up with Dr Colton Michaels as outpatient    Acute blood loss anemia  Assessment & Plan  · Hgb 8 8 status post transfusion 12/23/20    · Continue to follow H&H  · IM consultants also following    History of colon resection  Assessment & Plan  Status post laparoscopic sigmoid colectomy February 2019 secondary to perforated diverticula/intra-abdominal abscess  Stable  No acute issues    Smoking  Assessment & Plan  · Counseled on smoking cessation  · Continue 14mg nicotine patch daily and p r n  gum    Hypertension  Assessment & Plan  · Currently off all antihypertensives  · Previously on losartan 80 mg daily  · IM consultants to follow and resume when needed        Appreciate IM consultants medical co-management  Labs, medications, and imaging personally reviewed  ROS:  A ten point review of systems was completed on 12/24/20 and pertinent positives are listed in subjective section  All other systems reviewed were negative  OBJECTIVE:   /75 (BP Location: Right arm)   Pulse 60   Temp 98 6 °F (37 °C) (Oral)   Resp 20   Ht 6' 3" (1 905 m)   Wt 84 7 kg (186 lb 11 7 oz)   SpO2 100%   BMI 23 34 kg/m²     Physical Exam  Vitals signs and nursing note reviewed  Constitutional:       General: He is not in acute distress  HENT:      Head: Normocephalic and atraumatic  Nose: Nose normal       Mouth/Throat:      Mouth: Mucous membranes are moist    Eyes:      Conjunctiva/sclera: Conjunctivae normal    Neck:      Musculoskeletal: Neck supple  Cardiovascular:      Rate and Rhythm: Normal rate and regular rhythm  Pulses: Normal pulses  Pulmonary:      Effort: Pulmonary effort is normal       Breath sounds: Normal breath sounds  No wheezing or rales  Abdominal:      General: Bowel sounds are normal  There is no distension  Palpations: Abdomen is soft  Tenderness: There is no abdominal tenderness  Musculoskeletal:         General: Swelling (Makayla-incisional left thigh) present  Skin:     General: Skin is warm  Comments: Incision clean dry and intact with minimal drainage  Covered with staples   Neurological:      Mental Status: He is alert and oriented to person, place, and time  Comments: Improved motor in right lower extremity graded proximally as a 4/5 now    5/5 distally  Left lower extremity motor now a 3-/5 hip flexion, 4/5 knee extension knee flexion, 5/5 dorsiflexion, plantar flexion   Psychiatric:         Mood and Affect: Mood normal           Lab Results   Component Value Date    WBC 8 58 12/23/2020    HGB 8 8 (L) 12/23/2020    HCT 26 7 (L) 12/23/2020    MCV 99 (H) 12/23/2020     12/23/2020     Lab Results   Component Value Date    SODIUM 136 12/23/2020    K 3 7 12/23/2020     12/23/2020    CO2 29 12/23/2020    BUN 12 12/23/2020    CREATININE 0 68 12/23/2020    GLUC 96 12/23/2020    CALCIUM 8 8 12/23/2020     Lab Results   Component Value Date    INR 1 05 12/18/2020    PROTIME 13 7 12/18/2020           Current Facility-Administered Medications:     acetaminophen (TYLENOL) tablet 325 mg, 325 mg, Oral, Q6H PRN, Adolfo Hong MD    acetaminophen (TYLENOL) tablet 650 mg, 650 mg, Oral, Q6H [address], Adolfo Hong MD, 650 mg at 12/24/20 0601    bisacodyl (DULCOLAX) rectal suppository 10 mg, 10 mg, Rectal, Daily PRN, Adolfo Hong MD    Diclofenac Sodium (VOLTAREN) 1 % topical gel 2 g, 2 g, Topical, TID, Adolfo Hong MD, 2 g at 12/24/20 1026    docusate sodium (COLACE) capsule 100 mg, 100 mg, Oral, BID, Lorena Carrasco PA-C, 100 mg at 12/24/20 1025    enoxaparin (LOVENOX) subcutaneous injection 30 mg, 30 mg, Subcutaneous, Q12H [address], Lorena Carrasco PA-C, 30 mg at 12/24/20 1025    lidocaine (LIDODERM) 5 % patch 2 patch, 2 patch, Topical, Daily, Adolfo Hong MD, 2 patch at 12/24/20 1025    methocarbamol (ROBAXIN) tablet 500 mg, 500 mg, Oral, 4x Daily PRN, Adolfo Hong MD    naloxone [facility]) injection 0 4 mg, 0 4 mg, Intramuscular, Daily PRN, Adolfo Hong MD    nicotine (NICODERM CQ) 14 mg/24hr TD 24 hr patch 14 mg, 14 mg, Transdermal, Daily, Lorena Carrasco PA-C, 14 mg at 12/24/20 1026    nicotine polacrilex (NICORETTE) gum 2 mg, 2 mg, Oral, Q2H PRN, Adolfo Hong MD, 2 mg at 12/24/20 0327    ondansetron (ZOFRAN-ODT) dispersible tablet 4 mg, 4 mg, Oral, Q6H PRN, Lorena Carrasco PA-C    polyethylene glycol (MIRALAX) packet 17 g, 17 g, Oral, Daily PRN, Adolfo Hong MD    senna (SENOKOT) tablet 17 2 mg, 2 tablet, Oral, HS, Adolfo Hong MD    traMADol (ULTRAM) tablet 25 mg, 25 mg, Oral, 4x Daily PRN, Adolfo Hong MD    Past Medical History:   Diagnosis Date    Hypertension        Patient Active Problem List Diagnosis Date Noted    Closed left subtrochanteric femur fracture ([facility] [address] ) 12/18/2020     Priority: High    Acute blood loss anemia 12/20/2020     Priority: Medium    History of colon resection 12/23/2020    Acute pain due to trauma 12/19/2020    Hypertension 12/19/2020    Smoking 12/19/2020    Fall from standing 12/18/2020          Gracia Marques MD    Total time spent:  30 minutes with more than 50% spent counseling/coordinating care  Counseling includes discussion with patient re: progress and discussion with patient of his/her current medical/functional state/information  Coordination of patient's care was performed in conjunction with consulting services  Time invested included review of patient's labs, vitals, and management of their comorbidities with continued monitoring  The care of the patient was extensively discussed and appropriate treatment plan was formulated unique for this patient  ** Please Note:  voice to text software may have been used in the creation of this document   Although proof errors in transcription or interpretation are a potential of such software**

## 2020-12-24 NOTE — PROGRESS NOTES
Internal Medicine Progress Note  Patient: Jefry Colindres  Age/sex: 56 y o  male  Medical Record #: [id number]      ASSESSMENT/PLAN: (Interval History)  Jefry Colindres is seen and examined and management for following issues:    Left displaced intertrochanteric fracture  · WBAT  · Rehab per PMR  · Pain mgmt per PMR  · Pain is well controlled  · LE dopplers negative DVT  · +confusion/nausea with higher dose narcotics     HTN  · Takes diovan 80mg daily at home  · On hold post operatively  · Monitor trend and resume when SBP >130     Post operative blood loss anemia  · Hgb 8 8 s/p transfusion 12/23  · Repeat cbc Monday    Constipation  · Mgmt per PMR     Tobacco use  · Cont nicotine patch     H/o diverticular perforation 2019  · Stable    The above assessment and plan was reviewed and updated as determined by my evaluation of the patient on 12/24/2020  Labs:   Results from last 7 days   Lab Units 12/23/20  1549 12/23/20  0532   WBC Thousand/uL 8 58 8 90   HEMOGLOBIN g/dL 8 8* 6 9*   HEMATOCRIT % 26 7* 20 5*   PLATELETS Thousands/uL 306 251     Results from last 7 days   Lab Units 12/23/20  0532 12/21/20  0448  12/18/20  1940   SODIUM mmol/L 136 136   < >  --    POTASSIUM mmol/L 3 7 3 6   < >  --    CHLORIDE mmol/L 101 102   < >  --    CO2 mmol/L 29 27   < >  --    CO2, I-STAT mmol/L  --   --   --  20*   BUN mg/dL 12 13   < >  --    CREATININE mg/dL 0 68 0 77   < >  --    GLUCOSE, ISTAT mg/dl  --   --   --  98   CALCIUM mg/dL 8 8 8 5   < >  --     < > = values in this interval not displayed           Results from last 7 days   Lab Units 12/18/20  1941   INR  1 05           Review of Scheduled Meds:  Current Facility-Administered Medications   Medication Dose Route Frequency Provider Last Rate    acetaminophen  325 mg Oral Q6H PRN Molly Minor MD      acetaminophen  650 mg Oral Q6H [address] Molly Minor MD      bisacodyl  10 mg Rectal Daily PRN Molly Minor MD      Diclofenac Sodium  2 g Topical TID Toña Serum Halle Zazueta MD      docusate sodium  100 mg Oral BID Lorena Carrasco PA-C      enoxaparin  30 mg Subcutaneous Q12H [facility] Lorena Carrasco PA-C      lidocaine  2 patch Topical Daily Tank Car, MD      methocarbamol  500 mg Oral 4x Daily PRN Tank Shape, MD      naloxone  0 4 mg Intramuscular Daily PRN Tank Shape, MD      nicotine  14 mg Transdermal Daily Lorena Carrasco PA-C      nicotine polacrilex  2 mg Oral Q2H PRN Tank Shape, MD      ondansetron  4 mg Oral Q6H PRN Lorena Carrasco PA-C      polyethylene glycol  17 g Oral Daily PRN Tank Shape, MD      senna  2 tablet Oral HS Tank Shape, MD      traMADol  50 mg Oral BID Tank Shape, MD      traMADol  50 mg Oral TID PRN Tank Shape, MD         Subjective/ HPI: Patient seen and examined  Patients overnight issues or events were reviewed with nursing or staff during rounds or morning huddle session  New or overnight issues include the following:     Pt slept well, doing well with therapy  He is having some right knee pain this a m  no BM since admission as well will d/w PMR      ROS:   A 10 point ROS was performed; negative except as noted above         Imaging:     VAS lower limb venous duplex study, complete bilateral   Final Result by Daquan Barajas MD (12/22 2217)          *Labs /Radiology studies Reviewed  *Medications  reviewed and reconciled as needed  *Please refer to order section for additional ordered labs studies  *Case discussed with primary attending during morning huddle case rounds    Physical Examination:  Vitals:   Vitals:    12/23/20 1019 12/23/20 1338 12/23/20 1953 12/24/20 0514   BP: 132/63 127/68 120/69 133/75   BP Location:  Left arm Left arm Right arm   Pulse: 65 64 63 60   Resp: 18 18 18 20   Temp: 99 5 °F (37 5 °C) 98 2 °F (36 8 °C) 99 7 °F (37 6 °C) 98 6 °F (37 °C)   TempSrc: Oral Oral Oral Oral   SpO2: 100% 99% 99% 100%   Weight:       Height:           GEN: No apparent distress, interactive  NEURO: Alert and oriented x3  HEENT: Pupils are equal and reactive, EOMI, mucous membranes are moist, face symmetrical  CV: S1 S2 regular, no MRG, no peripheral edema noted  RESP: Lungs are clear bilaterally, no wheezes, rales or rhonchi noted, on room air, respirations easy and non labored  GI: Flat, soft non tender, non distended; +BS x4  : Voiding without difficulty  MUSC: Moves all extremities; except LLE; dressing intact to left hip  SKIN: pink, warm and dry, normal turgor, no rashes, lesions      The above physical exam was reviewed and updated as determined by my evaluation of the patient on 12/24/2020  Invasive Devices     Peripheral Intravenous Line            Peripheral IV 12/23/20 Left Forearm less than 1 day                   VTE Pharmacologic Prophylaxis: Enoxaparin  Code Status: Level 1 - Full Code  Current Length of Stay: 2 day(s)      Total time spent:  30 minutes with more than 50% spent counseling/coordinating care  Counseling includes discussion with patient re: progress  and discussion with patient of his/her current medical state/information  Coordination of patient's care was performed in conjunction with primary service  Time invested included review of patient's labs, vitals, and management of their comorbidities with continued monitoring  In addition, this patient was discussed with medical team including physician and advanced extenders  The care of the patient was extensively discussed and appropriate treatment plan was formulated unique for this patient  ** Please Note:  voice to text software may have been used in the creation of this document   Although proof errors in transcription or interpretation are a potential of such software**

## 2020-12-24 NOTE — PROGRESS NOTES
12/24/20 0830   Pain Assessment   Pain Assessment Tool 0-10   Pain Score 4   Pain Location/Orientation Orientation: Left; Location: Hip   Restrictions/Precautions   Precautions Fall Risk;Pain;Supervision on toilet/commode   LLE Weight Bearing Per Order WBAT   Lifestyle   Autonomy "I misread the schedule, I wanted to stretch before you came"   Putting On/Taking Off Footwear   Type of Assistance Needed Adaptive equipment;Verbal cues; Supervision   Amount of Physical Assistance Provided No physical assistance   Comment Educated and demo'd reacher, dressing stick and sock aid for independence with donning/doffing footwear  Pt requires VCs for sock orientation on sock aid, otherwise overall supervision for doffing socks with reacher, adjusting top of sock with dressing stick  No shoes donned at this time  Putting On/Taking Off Footwear CARE Score 4   Sit to Stand   Type of Assistance Needed Incidental touching   Amount of Physical Assistance Provided No physical assistance   Comment CG with RW   Sit to Stand CARE Score 4   Bed-Chair Transfer   Type of Assistance Needed Incidental touching   Amount of Physical Assistance Provided No physical assistance   Comment CG with RW   Chair/Bed-to-Chair Transfer CARE Score 4   Kitchen Mobility   Kitchen-Mobility Level Walker   Kitchen Activity Retrieve items;Transport items   Kitchen Mobility Comments Pt uses RW to navigate short distances within kitchen in order to retrieve items from fridge, over head cabinets  No LOB noted  Pt benefits from min/mod VCs for WBing through L LE/performing heel-toe stepping vs pivoting on L toes  Stance limited due to "tightness" in L hip/knee  Pt requires intermittent seated rest breaks throughout due to discomfort/tightness  Pt stands in front of cabinets with encouragement to perform cross body reaching in order to inc WBing on L LE, however pt declines to complete stating, "oh no, that will really throw my body awareness off"     Functional Standing Tolerance   Time 2 min multiple trials   Activity kitchen mobility; standing at table top to complete card matching task to challenge pt balance by reaching in all directions  Limited WBing thru L LE noted despite VCs  No LOB noted  Exercise Tools   Exercise Tools Yes   Other Exercise Tool 1 Pt performs UE Strengthening using 3# FW, 3x10 for each of the following: chest press, shoulder press and elbow flex  Strengthening completed in order to inc strength and endurance for ADLs/transfers  Cognition   Overall Cognitive Status WFL   Arousal/Participation Alert; Cooperative   Attention Within functional limits   Orientation Level Oriented X4   Memory Within functional limits   Following Commands Follows multistep commands with increased time or repetition   Additional Activities   Additional Activities Comments Discussed GB placement in/near walk in shower in order to ensure safety with transferring in/out of shower  Pt verbalizes understanding stating, "I need to get them, If I don't need them now, I, or my wife will eventually"  Activity Tolerance   Activity Tolerance Patient tolerated treatment well  (requires intermittent rest breaks d/t L LE tightness)   Other Comments   Assessment Chair alarm donned due to overnight confusion and to ensure safety and decrease risk of falls  Pt made aware of alarms and need for alarms  Assessment   Treatment Assessment Pt participates in 90 minutes skilled OT focusing on LHAE training for footwear, functional transfers, kitchen mobility/short distance mobility focusing on turning and navigating in small spaces, standing jm/bal and general strengthening  Pt educated on AE (reacher, sock aid and dressing stick) in order to inc independence with self care tasks  Pt reports having a reacher and dressing stick at home from his YANI but "would have to search for them"   Pt requires CG for STS and functional transfers from wheelchair with RW this date, requiring much encouragement to WB thru L LE vs pivoting on toes  Pt completes item retrieval from over head cabinets focusing on standing jm/bal and reaching across midline to encourage WBing thru L LE, however pt politely declines to reach across midline stating, "oh no, that will throw my body awareness off"  Pt progress is limited by overall edema in L LE, "tightness" in hip and knee, decreased standing tolerance, activity tolerance, impaired ADLs and functional transfers warranting continued skilled care in order to inc independence and decrease burden of care at d/c  Prognosis Good   Problem List Decreased strength;Decreased endurance; Impaired balance;Decreased mobility; Decreased range of motion;Pain;Orthopedic restrictions   Barriers to Discharge Decreased caregiver support; Inaccessible home environment   Plan   Treatment/Interventions ADL retraining;Functional transfer training; Therapeutic exercise; Endurance training;Patient/family training;Equipment eval/education; Compensatory technique education   OT Therapy Minutes   OT Time In 0830   OT Time Out 1000   OT Total Time (minutes) 90   OT Mode of treatment - Individual (minutes) 90   OT Mode of treatment - Concurrent (minutes) 0   OT Mode of treatment - Group (minutes) 0   OT Mode of treatment - Co-treat (minutes) 0   OT Mode of Treatment - Total time(minutes) 90 minutes   OT Cumulative Minutes 180   Therapy Time missed   Time missed?  No

## 2020-12-25 PROCEDURE — 97116 GAIT TRAINING THERAPY: CPT

## 2020-12-25 PROCEDURE — 97535 SELF CARE MNGMENT TRAINING: CPT

## 2020-12-25 PROCEDURE — 97110 THERAPEUTIC EXERCISES: CPT

## 2020-12-25 PROCEDURE — 99232 SBSQ HOSP IP/OBS MODERATE 35: CPT | Performed by: PHYSICAL MEDICINE & REHABILITATION

## 2020-12-25 PROCEDURE — 97530 THERAPEUTIC ACTIVITIES: CPT

## 2020-12-25 RX ORDER — LIDOCAINE 50 MG/G
2 PATCH TOPICAL DAILY
Status: DISCONTINUED | OUTPATIENT
Start: 2020-12-26 | End: 2020-12-28 | Stop reason: HOSPADM

## 2020-12-25 RX ORDER — CELECOXIB 100 MG/1
100 CAPSULE ORAL 2 TIMES DAILY
Status: DISCONTINUED | OUTPATIENT
Start: 2020-12-25 | End: 2020-12-28 | Stop reason: HOSPADM

## 2020-12-25 RX ADMIN — CELECOXIB 100 MG: 100 CAPSULE ORAL at 21:26

## 2020-12-25 RX ADMIN — ACETAMINOPHEN 650 MG: 325 TABLET, FILM COATED ORAL at 00:25

## 2020-12-25 RX ADMIN — Medication 14 MG: at 09:01

## 2020-12-25 RX ADMIN — NICOTINE POLACRILEX 2 MG: 2 GUM, CHEWING BUCCAL at 12:26

## 2020-12-25 RX ADMIN — DOCUSATE SODIUM 100 MG: 100 CAPSULE, LIQUID FILLED ORAL at 09:00

## 2020-12-25 RX ADMIN — ACETAMINOPHEN 650 MG: 325 TABLET, FILM COATED ORAL at 18:13

## 2020-12-25 RX ADMIN — ACETAMINOPHEN 650 MG: 325 TABLET, FILM COATED ORAL at 05:21

## 2020-12-25 RX ADMIN — LIDOCAINE 2 PATCH: 50 PATCH TOPICAL at 09:00

## 2020-12-25 RX ADMIN — DOCUSATE SODIUM 100 MG: 100 CAPSULE, LIQUID FILLED ORAL at 18:13

## 2020-12-25 RX ADMIN — ACETAMINOPHEN 650 MG: 325 TABLET, FILM COATED ORAL at 23:19

## 2020-12-25 RX ADMIN — TRAMADOL HYDROCHLORIDE 25 MG: 50 TABLET, FILM COATED ORAL at 18:13

## 2020-12-25 RX ADMIN — ENOXAPARIN SODIUM 30 MG: 30 INJECTION SUBCUTANEOUS at 09:00

## 2020-12-25 RX ADMIN — TRAMADOL HYDROCHLORIDE 25 MG: 50 TABLET, FILM COATED ORAL at 05:21

## 2020-12-25 RX ADMIN — DICLOFENAC SODIUM 2 G: 10 GEL TOPICAL at 21:26

## 2020-12-25 RX ADMIN — ACETAMINOPHEN 650 MG: 325 TABLET, FILM COATED ORAL at 12:24

## 2020-12-25 RX ADMIN — TRAMADOL HYDROCHLORIDE 25 MG: 50 TABLET, FILM COATED ORAL at 12:24

## 2020-12-25 RX ADMIN — ENOXAPARIN SODIUM 30 MG: 30 INJECTION SUBCUTANEOUS at 21:26

## 2020-12-25 NOTE — PROGRESS NOTES
PM&R PROGRESS NOTE:  Farhat Ng 56 y o  male MRN: [id number]  Unit/Bed#: -01 Encounter: [id number]        Rehabilitation Diagnosis: Impairment of mobility, safety and Activities of Daily Living (ADLs) due to Orthopedic Disorders:  08 11  Unilateral Hip Fracture    HPI: 56 y  o  male with hypertension,tobacco use, previous partial colon resection for perforated diverticula, patient presented to the [facility] 12/18/20 status post traumatic fall on ice  Down for 2-3 hours   Sustained a displaced subtrochanteric left femur fracture with diaphysis displaced medially and proximally with respect to the proximal fragment   Patient evaluated by Orthopedics who determined patient a surgical candidate   Patient taken to the OR on 12/19/20 by Dr Tijerina Head a open reduction left femur with TFNA nail with cerclage wiring and IM nailing   Patient deemed weight-bearing as tolerated postoperatively and placed on Lovenox for DVT prophylaxis 30 mg subcutaneous q 12 hours   Medically, patient developed acute blood loss anemia, acute pain and postoperative constipation  SUBJECTIVE:  Patient seen post therapies  Reports had a bad night with pain and feeling hot overnight  Today was able to tolerate low dose Tramadol 25mg x 2 and was able to participate in therapies well without severe pain  +BM yesterday  No other complaints  Eating better  ASSESSMENT: Stable, progressing      PLAN:    Rehabilitation   Functional deficits:  Left lower extremity weakness, right proximal musculature weakness induced by pain, gait instability, impaired mobility, impaired self care   Continue current rehabilitation plan of care to maximize function   Expected Discharge: To be determined, estimate d/c next week with his recent progress      Pain   Pain in left lower extremity:  Continue scheduled Tylenol, topical Lidoderm patches and ice  Added Celebrex 100mg BID 12/25/20     Breakthrough pain with PRN Tramadol 25mg 4x/day PRN  * patient is very sensitive to narcotics and gets easily confused   Tendonitis type pain right proximal hip pelvis:  Continue application of Topical Voltaren gel    DVT prophylaxis   maanged on SQ Lovenox 30mg SQ Q12h   Bilateral lower extremity venous Dopplers completed on 12/22/20:  Negative for DVT   Continue SCDs   Compression stockings ordered for mild developing edema of left lower extremity    Bladder plan   Continent    Bowel plan   Continent, + BM 12/24      * Closed left subtrochanteric femur fracture (HCC)  Assessment & Plan  · Displaced subtrochanteric left femur fracture sustained post fall on 12/18/20  · Status post ORIF on 12/19/20 by Dr Julio Santos  · WBAT LLE  · Continue daily local incisional care and monitoring  · Managed on Lovenox for DVT prophylaxis  · Follow-up with Dr Julio Santos as outpatient    Acute blood loss anemia  Assessment & Plan  · Hgb 8 8 status post transfusion 12/23/20  · Continue to follow H&H  · IM consultants also following    History of colon resection  Assessment & Plan  Status post laparoscopic sigmoid colectomy February 2019 secondary to perforated diverticula/intra-abdominal abscess  Stable  No acute issues    Smoking  Assessment & Plan  · Counseled on smoking cessation  · Continue 14mg nicotine patch daily and p r n  gum    Hypertension  Assessment & Plan  · Currently off all antihypertensives  · Previously on losartan 80 mg daily  · IM consultants to follow and resume when needed        Appreciate IM consultants medical co-management  Labs, medications, and imaging personally reviewed  ROS:  A ten point review of systems was completed on 12/25/20 and pertinent positives are listed in subjective section  All other systems reviewed were negative         OBJECTIVE:   /66 (BP Location: Right arm)   Pulse 58   Temp 99 2 °F (37 3 °C) (Oral)   Resp 19   Ht 6' 3" (1 905 m)   Wt 84 7 kg (186 lb 11 7 oz)   SpO2 99%   BMI 23 34 kg/m²     Physical Exam  Vitals signs and nursing note reviewed  Constitutional:       General: He is not in acute distress  HENT:      Head: Normocephalic and atraumatic  Nose: Nose normal       Mouth/Throat:      Mouth: Mucous membranes are moist    Eyes:      Conjunctiva/sclera: Conjunctivae normal    Neck:      Musculoskeletal: Neck supple  Cardiovascular:      Rate and Rhythm: Normal rate and regular rhythm  Pulses: Normal pulses  Pulmonary:      Effort: Pulmonary effort is normal       Breath sounds: Normal breath sounds  No wheezing or rales  Abdominal:      General: Bowel sounds are normal  There is no distension  Palpations: Abdomen is soft  Tenderness: There is no abdominal tenderness  Musculoskeletal:         General: No swelling  Skin:     General: Skin is warm  Comments: Incision with sero-sang mild drainage proximally, mid incision with serous mild drainage on dressing only  +Staples   Neurological:      Mental Status: He is alert and oriented to person, place, and time        Comments: Motor:   RLE: 3-/5 HF, 4/5 KE, KF, 5/5 DF, PF  LLE: 2+/5 HF, 4-/5 KE, KF, 5/5 DF, PF  Sensation intact to LT   Psychiatric:         Mood and Affect: Mood normal           Lab Results   Component Value Date    WBC 8 58 12/23/2020    HGB 8 8 (L) 12/23/2020    HCT 26 7 (L) 12/23/2020    MCV 99 (H) 12/23/2020     12/23/2020     Lab Results   Component Value Date    SODIUM 136 12/23/2020    K 3 7 12/23/2020     12/23/2020    CO2 29 12/23/2020    BUN 12 12/23/2020    CREATININE 0 68 12/23/2020    GLUC 96 12/23/2020    CALCIUM 8 8 12/23/2020     Lab Results   Component Value Date    INR 1 05 12/18/2020    PROTIME 13 7 12/18/2020           Current Facility-Administered Medications:     acetaminophen (TYLENOL) tablet 325 mg, 325 mg, Oral, Q6H PRN, Annabelle Gutierrez MD    acetaminophen (TYLENOL) tablet 650 mg, 650 mg, Oral, Q6H [facility], Annabelle Gutierrez MD, 650 mg at 12/25/20 1362   bisacodyl (DULCOLAX) rectal suppository 10 mg, 10 mg, Rectal, Daily PRN, Lm Perrin MD    celecoxib (CeleBREX) capsule 100 mg, 100 mg, Oral, BID, Lm Perrin MD    Diclofenac Sodium (VOLTAREN) 1 % topical gel 2 g, 2 g, Topical, TID, Lm Perrin MD    docusate sodium (COLACE) capsule 100 mg, 100 mg, Oral, BID, Lorena Carrasco PA-C, 100 mg at 12/25/20 1813    enoxaparin (LOVENOX) subcutaneous injection 30 mg, 30 mg, Subcutaneous, Q12H [facility], Lorena Carrasco PA-C, 30 mg at 12/25/20 0900    [START ON 12/26/2020] lidocaine (LIDODERM) 5 % patch 2 patch, 2 patch, Topical, Daily, Lm Perrin MD    methocarbamol (ROBAXIN) tablet 500 mg, 500 mg, Oral, 4x Daily PRN, Lm Perrin MD, 500 mg at 12/24/20 1953    naloxone [facility]) injection 0 4 mg, 0 4 mg, Intramuscular, Daily PRN, Lm Perrin MD    nicotine (NICODERM CQ) 14 mg/24hr TD 24 hr patch 14 mg, 14 mg, Transdermal, Daily, Lorena Carrasco PA-C, 14 mg at 12/25/20 0901    nicotine polacrilex (NICORETTE) gum 2 mg, 2 mg, Oral, Q2H PRN, Lm Perrin MD, 2 mg at 12/25/20 1226    ondansetron (ZOFRAN-ODT) dispersible tablet 4 mg, 4 mg, Oral, Q6H PRN, Lorena Carrasco PA-C    polyethylene glycol (MIRALAX) packet 17 g, 17 g, Oral, Daily PRN, Lm Perrin MD    senna (SENOKOT) tablet 17 2 mg, 2 tablet, Oral, HS, Lm Perrin MD, 17 2 mg at 12/24/20 2223    traMADol (ULTRAM) tablet 25 mg, 25 mg, Oral, 4x Daily PRN, Lm Perrin MD, 25 mg at 12/25/20 1813    Past Medical History:   Diagnosis Date    Hypertension        Patient Active Problem List    Diagnosis Date Noted    Closed left subtrochanteric femur fracture ([facility] [address] ) 12/18/2020     Priority: High    Acute blood loss anemia 12/20/2020     Priority: Medium    History of colon resection 12/23/2020    Acute pain due to trauma 12/19/2020    Hypertension 12/19/2020    Smoking 12/19/2020    Fall from standing 12/18/2020          Lm Perrin MD    Total time spent:  30 minutes with more than 50% spent counseling/coordinating care  Counseling includes discussion with patient re: progress and discussion with patient of his/her current medical/functional state/information  Coordination of patient's care was performed in conjunction with consulting services  Time invested included review of patient's labs, vitals, and management of their comorbidities with continued monitoring  The care of the patient was extensively discussed and appropriate treatment plan was formulated unique for this patient  ** Please Note:  voice to text software may have been used in the creation of this document   Although proof errors in transcription or interpretation are a potential of such software**

## 2020-12-25 NOTE — PROGRESS NOTES
12/25/20 0700   Pain Assessment   Pain Assessment Tool 0-10   Pain Score 7   Pain Location/Orientation Orientation: Left; Location: Leg   Restrictions/Precautions   Precautions Bed/chair alarms; Fall Risk;Supervision on toilet/commode   Eating   Type of Assistance Needed Independent   Amount of Physical Assistance Provided No physical assistance   Eating CARE Score 6   Oral Hygiene   Type of Assistance Needed Incidental touching   Amount of Physical Assistance Provided No physical assistance   Comment CGA in stance at sink   Oral Hygiene CARE Score 4   Shower/Bathe Self   Type of Assistance Needed Physical assistance   Amount of Physical Assistance Provided 25%-49%   Comment A for B/L LEs/feet, UB bathing while seated   Pt stands with unilateral UE support on grab bar with steadying assist to bathe buttoc/groin    Shower/Bathe Self CARE Score 3   Upper Body Dressing   Type of Assistance Needed Set-up / clean-up   Amount of Physical Assistance Provided No physical assistance   Upper Body Dressing CARE Score 5   Lower Body Dressing   Type of Assistance Needed Incidental touching   Amount of Physical Assistance Provided No physical assistance   Comment CGA in stance, use reacher   Lower Body Dressing CARE Score 4   Putting On/Taking Off Footwear   Type of Assistance Needed Physical assistance   Amount of Physical Assistance Provided Less than 25%   Comment Min A for TEDS, used sock aid for slipper socks   Putting On/Taking Off Footwear CARE Score 3   Lying to Sitting on Side of Bed   Type of Assistance Needed Physical assistance   Amount of Physical Assistance Provided 25%-49%   Comment A for LLE   Lying to Sitting on Side of Bed CARE Score 3   Sit to Stand   Type of Assistance Needed Incidental touching   Amount of Physical Assistance Provided No physical assistance   Comment CGA with RW   Sit to Stand CARE Score 4   Bed-Chair Transfer   Type of Assistance Needed Incidental touching   Amount of Physical Assistance Provided No physical assistance   Comment CGA with RW   Chair/Bed-to-Chair Transfer CARE Score 4   Toileting Hygiene   Type of Assistance Needed Incidental touching   Amount of Physical Assistance Provided No physical assistance   Comment CGA with RW for    Toileting Hygiene CARE Score 4   Toilet Transfer   Type of Assistance Needed Incidental touching   Amount of Physical Assistance Provided No physical assistance   Comment CGA with RW to [facility]   Toilet Transfer CARE Score 4   Cognition   Overall Cognitive Status [facility]   Arousal/Participation Alert; Cooperative   Attention Within functional limits   Orientation Level Oriented X4   Memory Within functional limits   Following Commands Follows multistep commands with increased time or repetition   Activity Tolerance   Activity Tolerance Patient tolerated treatment well   Assessment   Treatment Assessment Pt engaged in skilled OT session with focus on ADL retraining, functional transfer training, and use/education of LHAE  Pt tolerated session well, with good tolerance noted however did report sig pain limiting bed mobility  Pt completed entire ADL at [facility] level, however required A for LB bathing and donning/doffing socks due to need for TEDs and poor LLE mobility  Pt will benefit from continued skilled OT to maximize independence and safety with ADLs and functional transfers  Continue POC with focus on: functional transfers, LHAE, standing tolerance and endurance training, dynamic standing tolerance and balance  Prognosis Good   Problem List Decreased strength;Decreased endurance; Impaired balance;Decreased mobility; Decreased range of motion;Pain;Orthopedic restrictions   Barriers to Discharge Decreased caregiver support; Inaccessible home environment   Plan   Treatment/Interventions ADL retraining;Functional transfer training; Therapeutic exercise; Endurance training;Patient/family training;Equipment eval/education; Compensatory technique education   Progress Progressing toward goals   OT Therapy Minutes   OT Time In 0700   OT Time Out 0830   OT Total Time (minutes) 90   OT Mode of treatment - Individual (minutes) 90   OT Mode of treatment - Concurrent (minutes) 0   OT Mode of treatment - Group (minutes) 0   OT Mode of treatment - Co-treat (minutes) 0   OT Mode of Treatment - Total time(minutes) 90 minutes   OT Cumulative Minutes 270

## 2020-12-25 NOTE — PROGRESS NOTES
12/25/20 1000   Pain Assessment   Pain Assessment Tool 0-10   Pain Score 8   Pain Location/Orientation Orientation: Left; Location: Hip   Restrictions/Precautions   Precautions Bed/chair alarms; Fall Risk;Supervision on toilet/commode;Pain   Cognition   Overall Cognitive Status WFL   Subjective   Subjective "i have to use the bathroom first"   Sit to Stand   Type of Assistance Needed Incidental touching;Supervision   Comment CS/CGA   Sit to Stand CARE Score 4   Bed-Chair Transfer   Type of Assistance Needed Incidental touching;Supervision; Adaptive equipment   Comment CS/CGA   Chair/Bed-to-Chair Transfer CARE Score 4   Walk 10 Feet   Type of Assistance Needed Incidental touching; Adaptive equipment   Walk 10 Feet CARE Score 4   Walk 50 Feet with Two Turns   Type of Assistance Needed Incidental touching; Adaptive equipment   Walk 50 Feet with Two Turns CARE Score 4   Walk 150 Feet   Type of Assistance Needed Incidental touching; Adaptive equipment   Walk 150 Feet CARE Score 4   Walking 10 Feet on Uneven Surfaces   Reason if not Attempted Safety concerns   Walking 10 Feet on Uneven Surfaces CARE Score 88   Ambulation   Distance Walked (feet) 150 ft  (x2)   Assist Device Roller Walker   Gait Pattern Inconsistant Deanne; Slow Deanne;Decreased foot clearance; Step to; Improper weight shift   Limitations Noted In Balance; Endurance;Speed; Sequencing;Strength   Provided Assistance with: Weight Shift   Findings decrease heel strike on L improved with longer distances   Wheel 50 Feet with Two Turns   Reason if not Attempted Activity not applicable   Wheel 50 Feet with Two Turns CARE Score 9   Wheel 150 Feet   Reason if not Attempted Activity not applicable   Wheel 149 Feet CARE Score 9   Wheelchair mobility   Does the patient use a wheelchair? 0  No   Curb or Single Stair   Style negotiated Single stair   Type of Assistance Needed Physical assistance; Adaptive equipment   Amount of Physical Assistance Provided 25%-49%   1 Step (Curb) CARE Score 3   4 Steps   Type of Assistance Needed Physical assistance; Adaptive equipment   Amount of Physical Assistance Provided 25%-49%   4 Steps CARE Score 3   12 Steps   Reason if not Attempted Safety concerns   12 Steps CARE Score 88   Stairs   # of Steps 8   Weight Bearing Precautions Fall Risk   Assist Devices Bilateral Rail   Findings ascending fwd/descending bwd; please practice with L HR only; pull in pt's spouse, Abraham Osbonre, for FT to see how pt performs steps and for positioning and cueing if needed   Toilet Transfer   Type of Assistance Needed Incidental touching   Comment raised toilet seat   Toilet Transfer CARE Score 4   Therapeutic Interventions   Strengthening seated LAQ and hip flex x20, AA on LLE  Flexibility B gastroc and HS x3mins   Modalities ice [id number] during seated ther exs   Other STS x10 for functional strengthening   Equipment Use   NuStep L1 10mins   Assessment   Treatment Assessment pt tolerated tx well wtih cont focus on LE strengthening and ROM to improve functional mobility  pt able to increase gait distance and number of stairs during session to meet d/c goals  pt question alarms at this time, and educated pt on safety policy and seeing consistency with appropriate calling for nursing  will cont to improve strengthening, stairs and amb to achieve d/c goals for safe return home  spouse wants d/c home Monday, if able and has support at d/c   Problem List Decreased strength;Decreased endurance; Impaired balance;Decreased mobility; Decreased range of motion;Pain;Orthopedic restrictions   Barriers to Discharge Decreased caregiver support; Inaccessible home environment   PT Barriers   Functional Limitation Walking;Stair negotiation;Car transfers   Plan   Treatment/Interventions Functional transfer training;LE strengthening/ROM; Endurance training;Patient/family training;Bed mobility;Gait training   Progress Progressing toward goals   Recommendation   PT Discharge Recommendation Home with skilled therapy   Equipment Recommended Walker   PT Therapy Minutes   PT Time In 1000   PT Time Out 1130   PT Total Time (minutes) 90   PT Mode of treatment - Individual (minutes) 40   PT Mode of treatment - Concurrent (minutes) 50   PT Mode of treatment - Group (minutes) 0   PT Mode of treatment - Co-treat (minutes) 0   PT Mode of Treatment - Total time(minutes) 90 minutes   PT Cumulative Minutes 270   Therapy Time missed   Time missed?  No

## 2020-12-26 PROCEDURE — 97535 SELF CARE MNGMENT TRAINING: CPT

## 2020-12-26 PROCEDURE — 97110 THERAPEUTIC EXERCISES: CPT

## 2020-12-26 PROCEDURE — 97530 THERAPEUTIC ACTIVITIES: CPT

## 2020-12-26 RX ADMIN — TRAMADOL HYDROCHLORIDE 25 MG: 50 TABLET, FILM COATED ORAL at 14:00

## 2020-12-26 RX ADMIN — ENOXAPARIN SODIUM 30 MG: 30 INJECTION SUBCUTANEOUS at 21:34

## 2020-12-26 RX ADMIN — ENOXAPARIN SODIUM 30 MG: 30 INJECTION SUBCUTANEOUS at 08:08

## 2020-12-26 RX ADMIN — DICLOFENAC SODIUM 2 G: 10 GEL TOPICAL at 21:35

## 2020-12-26 RX ADMIN — SENNOSIDES 17.2 MG: 8.6 TABLET, FILM COATED ORAL at 21:34

## 2020-12-26 RX ADMIN — ACETAMINOPHEN 650 MG: 325 TABLET, FILM COATED ORAL at 17:52

## 2020-12-26 RX ADMIN — ACETAMINOPHEN 650 MG: 325 TABLET, FILM COATED ORAL at 05:10

## 2020-12-26 RX ADMIN — TRAMADOL HYDROCHLORIDE 25 MG: 50 TABLET, FILM COATED ORAL at 08:07

## 2020-12-26 RX ADMIN — CELECOXIB 100 MG: 100 CAPSULE ORAL at 21:34

## 2020-12-26 RX ADMIN — TRAMADOL HYDROCHLORIDE 25 MG: 50 TABLET, FILM COATED ORAL at 03:30

## 2020-12-26 RX ADMIN — CELECOXIB 100 MG: 100 CAPSULE ORAL at 08:08

## 2020-12-26 RX ADMIN — DOCUSATE SODIUM 100 MG: 100 CAPSULE, LIQUID FILLED ORAL at 17:52

## 2020-12-26 RX ADMIN — DICLOFENAC SODIUM 2 G: 10 GEL TOPICAL at 17:53

## 2020-12-26 RX ADMIN — LIDOCAINE 5% 2 PATCH: 700 PATCH TOPICAL at 08:18

## 2020-12-26 RX ADMIN — ACETAMINOPHEN 650 MG: 325 TABLET, FILM COATED ORAL at 11:40

## 2020-12-26 RX ADMIN — DOCUSATE SODIUM 100 MG: 100 CAPSULE, LIQUID FILLED ORAL at 08:07

## 2020-12-26 RX ADMIN — Medication 14 MG: at 08:06

## 2020-12-26 NOTE — PROGRESS NOTES
12/26/20 1500   Pain Assessment   Pain Assessment Tool 0-10   Pain Score 5   Restrictions/Precautions   Precautions Fall Risk;Pain;Supervision on toilet/commode   RLE Weight Bearing Per Order FWB   Subjective   Subjective pt agreeable to perform skilled PT    Sit to Stand   Type of Assistance Needed Supervision; Adaptive equipment   Sit to Stand CARE Score 4   Bed-Chair Transfer   Type of Assistance Needed Supervision; Adaptive equipment   Chair/Bed-to-Chair Transfer CARE Score 4   Transfer Bed/Chair/Wheelchair   Limitations Noted In Balance; Endurance;LE Strength;Pain Management   Adaptive Equipment Roller Walker   Car Transfer   Type of Assistance Needed Incidental touching   Comment A LLE    Car Transfer CARE Score 4   Walk 10 Feet   Type of Assistance Needed Supervision; Adaptive equipment   Walk 10 Feet CARE Score 4   Walk 50 Feet with Two Turns   Type of Assistance Needed Supervision; Adaptive equipment   Walk 50 Feet with Two Turns CARE Score 4   Walk 150 Feet   Type of Assistance Needed Supervision; Adaptive equipment   Walk 150 Feet CARE Score 4   Ambulation   Does the patient walk? 2  Yes   Primary Mode of Locomotion Prior to Admission Walk   Distance Walked (feet) 160 ft   Assist Device Roller Walker   Curb or Single Stair   Style negotiated Single stair   Type of Assistance Needed Incidental touching; Adaptive equipment   Comment Gait belt F/T    1 Step (Curb) CARE Score 4   4 Steps   Type of Assistance Needed Incidental touching; Adaptive equipment   Comment gait belt F/T    4 Steps CARE Score 4   12 Steps   Type of Assistance Needed Incidental touching; Adaptive equipment   Comment gait belt F/T    12 Steps CARE Score 4   Stairs   Type Stairs   # of Steps 12   Assist Devices Single Rail;Hand Hold   Findings RHR/ LHHA descending and ascending LHR both hands sideway leaning with his RLE    Toilet Transfer   Type of Assistance Needed Supervision; Adaptive equipment   Toilet Transfer CARE Score 4   Toilet Transfer   Surface Assessed Raised Toilet   Equipment Use   NuStep lvl 2 foe 12 min    Assessment   Treatment Assessment pt focus on gait training with wife dmitry ambulation at S lvl , also pt perform F/T with wife Misael Dickens ascending and descending with insturction for hand and body positioning for safety and Vc's for step pattern and seqeunce  Pt will needs hip kit / leg  and RW for DME's   Pt return to his room to recliner with all needs in reach   Cont to work on [place] stairs and in and OOB and car xfers and during ambulation with RW     Barriers to Discharge Inaccessible home environment;Decreased caregiver support   Plan   Progress Progressing toward goals   PT Therapy Minutes   PT Time In 1500   PT Time Out 1539   PT Total Time (minutes) 39   PT Mode of treatment - Individual (minutes) 9   PT Mode of treatment - Concurrent (minutes) 30   PT Mode of treatment - Group (minutes) 0   PT Mode of treatment - Co-treat (minutes) 0   PT Mode of Treatment - Total time(minutes) 39 minutes   PT Cumulative Minutes 369   Therapy Time missed   Time missed?  No

## 2020-12-26 NOTE — PROGRESS NOTES
12/26/20 0800   Pain Assessment   Pain Assessment Tool 0-10   Pain Score 4   Pain Location/Orientation Location: Hip;Orientation: Left   Restrictions/Precautions   Precautions Fall Risk;Pain;Supervision on toilet/commode   Sit to Stand   Type of Assistance Needed Supervision   Amount of Physical Assistance Provided No physical assistance   Comment    Sit to Stand CARE Score 4   Bed-Chair Transfer   Type of Assistance Needed Supervision   Amount of Physical Assistance Provided No physical assistance   Comment CS   Chair/Bed-to-Chair Transfer CARE Score 4   Toileting Hygiene   Type of Assistance Needed Supervision   Amount of Physical Assistance Provided No physical assistance   Comment RW   Toileting Hygiene CARE Score 4   Toilet Transfer   Type of Assistance Needed Supervision   Amount of Physical Assistance Provided No physical assistance   Comment BSC over toilet   Toilet Transfer CARE Score 4   Meal Prep   Meal Preparation Engaged in coffee making activity, obtaining all needs to make coffee  Pt able to make coffee with S with use of RW and kitchen countertops   Kitchen Mobility   Kitchen-Mobility Level Walker   Kitchen Activity Retrieve items;Transport items   Kitchen Mobility Comments Pt uses RW to navigate short distances within kitchen in order to retrieve items from fridge, over head cabinets  No LOB noted  Pt able to complete all kitchen mob with no seated rest breaks, ~7 minutes in stance with RW   Light Housekeeping   Light Housekeeping Level Walker   Light Housekeeping Level of Assistance Close supervision;Distant supervision   Light Housekeeping Pt engaged in [id number] clothing items from floor with use of reacher to obtain items, then brougt to table for folding  Completed at - and navigated objects well with no LOB     Functional Standing Tolerance   Time 6 min (2 min 3x)   Activity bean bag toss   Comments Pt engaged in dyanmic standing activity, retrieving bean bags from all over table to complete bean bag toss  Pt completed 3x with too weight shifting on LLE in stance, completed at S level  no LOB noted  Exercise Tools   Other Exercise Tool 1 BUE tabletop ergo 5 min forward/backward with 2 min rest break  Engaged with focus on endurance training, UE strengthening to maximize independence and safety with ADLs and functional transfers   Cognition   Overall Cognitive Status [facility]   Arousal/Participation Alert; Cooperative   Attention Within functional limits   Orientation Level Oriented X4   Memory Within functional limits   Following Commands Follows multistep commands with increased time or repetition   Additional Activities   Additional Activities Comments Discussed RW bags, pt believes bag on side of RW will be most benefital for him at this time  Will continue to educate and practice  Activity Tolerance   Activity Tolerance Patient tolerated treatment well   Assessment   Treatment Assessment Pt engaged in skilled OT session with focus on functional standing tolerance, IADL management, dynamic standing, LHAE use, and toileting  Pt tolerated session well with good engagement  Pt did report pain, however did not limit engagement in sessions  Pt continues to require VC for full WB onto LLE in stance, however demo improvement with increased time in stance  Pt would like hip kit and leg  at time of D/C  Plan for D/C Monday after full day of therapy, and look to possibly progress to IRP tomorrow or Monday  Pt will benefit from continued skilled OT to maximize independence and safety with ADLs and functional transfers  Continue POC with focus on: RW management in kitchen with use of resulable bag on side of RW, dynamic standing tolerance/endurance training, UE strengthening, and weight shifting to L  Prognosis Good   Problem List Decreased strength;Decreased endurance; Impaired balance;Decreased mobility; Decreased range of motion;Pain;Orthopedic restrictions   Barriers to Discharge Decreased caregiver support; Inaccessible home environment   Plan   Treatment/Interventions ADL retraining;Functional transfer training; Therapeutic exercise; Endurance training;Patient/family training;Equipment eval/education; Compensatory technique education   Progress Progressing toward goals   Recommendation   OT Discharge Recommendation Return to previous environment with social support   OT Therapy Minutes   OT Time In 0800   OT Time Out 0938   OT Total Time (minutes) 98   OT Mode of treatment - Individual (minutes) 98   OT Mode of treatment - Concurrent (minutes) 0   OT Mode of treatment - Group (minutes) 0   OT Mode of treatment - Co-treat (minutes) 0   OT Mode of Treatment - Total time(minutes) 98 minutes   OT Cumulative Minutes 368

## 2020-12-27 LAB
ANION GAP SERPL CALCULATED.3IONS-SCNC: 4 MMOL/L (ref 4–13)
BASOPHILS # BLD AUTO: 0.1 THOUSANDS/ΜL (ref 0–0.1)
BASOPHILS NFR BLD AUTO: 1 % (ref 0–1)
BUN SERPL-MCNC: 17 MG/DL (ref 5–25)
CALCIUM SERPL-MCNC: 9.4 MG/DL (ref 8.3–10.1)
CHLORIDE SERPL-SCNC: 104 MMOL/L (ref 100–108)
CO2 SERPL-SCNC: 30 MMOL/L (ref 21–32)
CREAT SERPL-MCNC: 0.84 MG/DL (ref 0.6–1.3)
EOSINOPHIL # BLD AUTO: 0.3 THOUSAND/ΜL (ref 0–0.61)
EOSINOPHIL NFR BLD AUTO: 3 % (ref 0–6)
ERYTHROCYTE [DISTWIDTH] IN BLOOD BY AUTOMATED COUNT: 13.7 % (ref 11.6–15.1)
GFR SERPL CREATININE-BSD FRML MDRD: 94 ML/MIN/1.73SQ M
GLUCOSE SERPL-MCNC: 104 MG/DL (ref 65–140)
HCT VFR BLD AUTO: 28.6 % (ref 36.5–49.3)
HGB BLD-MCNC: 9.1 G/DL (ref 12–17)
IMM GRANULOCYTES # BLD AUTO: 0.24 THOUSAND/UL (ref 0–0.2)
IMM GRANULOCYTES NFR BLD AUTO: 2 % (ref 0–2)
LYMPHOCYTES # BLD AUTO: 2.11 THOUSANDS/ΜL (ref 0.6–4.47)
LYMPHOCYTES NFR BLD AUTO: 21 % (ref 14–44)
MCH RBC QN AUTO: 32.3 PG (ref 26.8–34.3)
MCHC RBC AUTO-ENTMCNC: 31.8 G/DL (ref 31.4–37.4)
MCV RBC AUTO: 101 FL (ref 82–98)
MONOCYTES # BLD AUTO: 1.24 THOUSAND/ΜL (ref 0.17–1.22)
MONOCYTES NFR BLD AUTO: 12 % (ref 4–12)
NEUTROPHILS # BLD AUTO: 6.2 THOUSANDS/ΜL (ref 1.85–7.62)
NEUTS SEG NFR BLD AUTO: 61 % (ref 43–75)
NRBC BLD AUTO-RTO: 0 /100 WBCS
PLATELET # BLD AUTO: 638 THOUSANDS/UL (ref 149–390)
PMV BLD AUTO: 8.9 FL (ref 8.9–12.7)
POTASSIUM SERPL-SCNC: 4.1 MMOL/L (ref 3.5–5.3)
RBC # BLD AUTO: 2.82 MILLION/UL (ref 3.88–5.62)
SODIUM SERPL-SCNC: 138 MMOL/L (ref 136–145)
WBC # BLD AUTO: 10.19 THOUSAND/UL (ref 4.31–10.16)

## 2020-12-27 PROCEDURE — 97530 THERAPEUTIC ACTIVITIES: CPT

## 2020-12-27 PROCEDURE — 84165 PROTEIN E-PHORESIS SERUM: CPT | Performed by: PATHOLOGY

## 2020-12-27 PROCEDURE — 99232 SBSQ HOSP IP/OBS MODERATE 35: CPT | Performed by: PHYSICAL MEDICINE & REHABILITATION

## 2020-12-27 PROCEDURE — 85025 COMPLETE CBC W/AUTO DIFF WBC: CPT | Performed by: PHYSICAL MEDICINE & REHABILITATION

## 2020-12-27 PROCEDURE — 97110 THERAPEUTIC EXERCISES: CPT

## 2020-12-27 PROCEDURE — 84166 PROTEIN E-PHORESIS/URINE/CSF: CPT | Performed by: PHYSICAL MEDICINE & REHABILITATION

## 2020-12-27 PROCEDURE — 84165 PROTEIN E-PHORESIS SERUM: CPT | Performed by: PHYSICAL MEDICINE & REHABILITATION

## 2020-12-27 PROCEDURE — 84166 PROTEIN E-PHORESIS/URINE/CSF: CPT | Performed by: PATHOLOGY

## 2020-12-27 PROCEDURE — 80048 BASIC METABOLIC PNL TOTAL CA: CPT | Performed by: PHYSICAL MEDICINE & REHABILITATION

## 2020-12-27 RX ORDER — PANTOPRAZOLE SODIUM 40 MG/1
40 TABLET, DELAYED RELEASE ORAL
Status: DISCONTINUED | OUTPATIENT
Start: 2020-12-28 | End: 2020-12-28 | Stop reason: HOSPADM

## 2020-12-27 RX ORDER — LACTULOSE 20 G/30ML
20 SOLUTION ORAL ONCE
Status: COMPLETED | OUTPATIENT
Start: 2020-12-27 | End: 2020-12-27

## 2020-12-27 RX ADMIN — POLYETHYLENE GLYCOL 3350 17 G: 17 POWDER, FOR SOLUTION ORAL at 11:35

## 2020-12-27 RX ADMIN — ACETAMINOPHEN 650 MG: 325 TABLET, FILM COATED ORAL at 00:27

## 2020-12-27 RX ADMIN — DOCUSATE SODIUM 100 MG: 100 CAPSULE, LIQUID FILLED ORAL at 17:16

## 2020-12-27 RX ADMIN — LACTULOSE 20 G: 10 SOLUTION ORAL at 17:16

## 2020-12-27 RX ADMIN — CELECOXIB 100 MG: 100 CAPSULE ORAL at 07:48

## 2020-12-27 RX ADMIN — DOCUSATE SODIUM 100 MG: 100 CAPSULE, LIQUID FILLED ORAL at 07:48

## 2020-12-27 RX ADMIN — ACETAMINOPHEN 650 MG: 325 TABLET, FILM COATED ORAL at 23:00

## 2020-12-27 RX ADMIN — ACETAMINOPHEN 650 MG: 325 TABLET, FILM COATED ORAL at 17:16

## 2020-12-27 RX ADMIN — LIDOCAINE 5% 2 PATCH: 700 PATCH TOPICAL at 07:49

## 2020-12-27 RX ADMIN — NICOTINE POLACRILEX 2 MG: 2 GUM, CHEWING BUCCAL at 21:33

## 2020-12-27 RX ADMIN — DICLOFENAC SODIUM 2 G: 10 GEL TOPICAL at 07:49

## 2020-12-27 RX ADMIN — ACETAMINOPHEN 650 MG: 325 TABLET, FILM COATED ORAL at 05:30

## 2020-12-27 RX ADMIN — ACETAMINOPHEN 650 MG: 325 TABLET, FILM COATED ORAL at 11:35

## 2020-12-27 RX ADMIN — LIDOCAINE 5% 2 PATCH: 700 PATCH TOPICAL at 09:00

## 2020-12-27 RX ADMIN — DICLOFENAC SODIUM 2 G: 10 GEL TOPICAL at 17:17

## 2020-12-27 RX ADMIN — TRAMADOL HYDROCHLORIDE 25 MG: 50 TABLET, FILM COATED ORAL at 15:40

## 2020-12-27 RX ADMIN — TRAMADOL HYDROCHLORIDE 25 MG: 50 TABLET, FILM COATED ORAL at 07:49

## 2020-12-27 RX ADMIN — SENNOSIDES 17.2 MG: 8.6 TABLET, FILM COATED ORAL at 22:58

## 2020-12-27 RX ADMIN — METHOCARBAMOL 500 MG: 500 TABLET ORAL at 20:36

## 2020-12-27 RX ADMIN — CELECOXIB 100 MG: 100 CAPSULE ORAL at 23:00

## 2020-12-27 RX ADMIN — ENOXAPARIN SODIUM 30 MG: 30 INJECTION SUBCUTANEOUS at 07:49

## 2020-12-27 RX ADMIN — Medication 14 MG: at 09:00

## 2020-12-27 RX ADMIN — TRAMADOL HYDROCHLORIDE 25 MG: 50 TABLET, FILM COATED ORAL at 20:37

## 2020-12-27 RX ADMIN — TRAMADOL HYDROCHLORIDE 25 MG: 50 TABLET, FILM COATED ORAL at 03:39

## 2020-12-27 NOTE — PROGRESS NOTES
PM&R PROGRESS NOTE:  Nolan Son 56 y o  male MRN: [id number]  Unit/Bed#: -01 Encounter: [id number]        Rehabilitation Diagnosis: Impairment of mobility, safety and Activities of Daily Living (ADLs) due to Orthopedic Disorders:  08 11  Unilateral Hip Fracture    HPI: 56 y  o  male with hypertension,tobacco use, previous partial colon resection for perforated diverticula, patient presented to the [facility] 12/18/20 status post traumatic fall on ice  Down for 2-3 hours   Sustained a displaced subtrochanteric left femur fracture with diaphysis displaced medially and proximally with respect to the proximal fragment   Patient evaluated by Orthopedics who determined patient a surgical candidate   Patient taken to the OR on 12/19/20 by Dr Katy Gramajo a open reduction left femur with TFNA nail with cerclage wiring and IM nailing   Patient deemed weight-bearing as tolerated postoperatively and placed on Lovenox for DVT prophylaxis 30 mg subcutaneous q 12 hours   Medically, patient developed acute blood loss anemia, acute pain and postoperative constipation  SUBJECTIVE:  Patient seen face to face today in PT  Reports during the night it was hard for him to get comfortable as he did a lot in therapies and was feeling sore  Improved during the day today  States he has a family history of multiple myeloma and was wondering if he has it - could it have contributed to his fracture  Willing to check some labs  No good BM today yet  ASSESSMENT: Stable, progressing      PLAN:    Rehabilitation   Functional deficits:  Left lower extremity weakness, right proximal musculature weakness induced by pain, gait instability, impaired mobility, impaired self care   Continue current rehabilitation plan of care to maximize function   Expected Discharge: Possible discharge in AM (Monday) with projected outpatient continued therapies as patient is progressing well    Patient did undergo family training additionally  Pain   Pain in left lower extremity:  Continue scheduled Tylenol, topical Lidoderm patches and ice  Added Celebrex 100mg BID 12/25/20 and PPI  Breakthrough pain with PRN Tramadol 25mg 4x/day PRN  * patient is very sensitive to narcotics and gets easily confused   Tendonitis type pain right proximal hip pelvis:  Continue application of Topical Voltaren gel    DVT prophylaxis   maanged on SQ Lovenox 40mg SQ daily for total of 28 days  Patient now ambulating >120 feet, therefore can do Lovenox 40mg SQ daily now   Bilateral lower extremity venous Dopplers completed on 12/22/20:  Negative for DVT   Continue SCDs   Compression stockings ordered for mild developing edema of left lower extremity    Bladder plan   Continent    Bowel plan   Continent, + BM 12/24   Lactulose x 1 today if no BM      * Closed left subtrochanteric femur fracture (HCC)  Assessment & Plan  · Displaced subtrochanteric left femur fracture sustained post fall on 12/18/20  · Status post ORIF on 12/19/20 by Dr Mary Ann Frank  · WBAT LLE  · Continue daily local incisional care and monitoring  · Managed on Lovenox for DVT prophylaxis   · Checking CBC/BMP today in anticipation for D/C tomorrow  · Checking SPEP/UPEP given family history given fracture - low suspicion  · Follow-up with Dr Mary Ann Frank as outpatient    Acute blood loss anemia  Assessment & Plan  · Hgb 8 8 status post transfusion 12/23/20    · Continue to follow H&H  · IM consultants also following    History of colon resection  Assessment & Plan  Status post laparoscopic sigmoid colectomy February 2019 secondary to perforated diverticula/intra-abdominal abscess  Stable  No acute issues    Smoking  Assessment & Plan  · Counseled on smoking cessation  · Continue 14mg nicotine patch daily and p r n  gum    Hypertension  Assessment & Plan  · Currently off all antihypertensives  · Previously on losartan 80 mg daily  · IM consultants to follow and resume when needed        Appreciate IM consultants medical co-management  Labs, medications, and imaging personally reviewed  ROS:  A ten point review of systems was completed on 12/27/20 and pertinent positives are listed in subjective section  All other systems reviewed were negative  OBJECTIVE:   /60 (BP Location: Right arm)   Pulse 60   Temp 98 4 °F (36 9 °C) (Oral)   Resp 18   Ht 6' 3" (1 905 m)   Wt 84 7 kg (186 lb 11 7 oz)   SpO2 100%   BMI 23 34 kg/m²     Physical Exam  Vitals signs and nursing note reviewed  Constitutional:       General: He is not in acute distress  HENT:      Head: Normocephalic and atraumatic  Nose: Nose normal       Mouth/Throat:      Mouth: Mucous membranes are moist    Eyes:      Conjunctiva/sclera: Conjunctivae normal    Neck:      Musculoskeletal: Neck supple  Cardiovascular:      Rate and Rhythm: Normal rate and regular rhythm  Pulses: Normal pulses  Pulmonary:      Effort: Pulmonary effort is normal       Breath sounds: Normal breath sounds  No wheezing or rales  Abdominal:      General: Bowel sounds are normal  There is no distension  Palpations: Abdomen is soft  Tenderness: There is no abdominal tenderness  Musculoskeletal:         General: Swelling and tenderness present  Right lower leg: No edema  Left lower leg: Edema present  Skin:     General: Skin is warm  Comments: Incision clean and dry and intact with staples   Neurological:      Mental Status: He is alert and oriented to person, place, and time        Comments: Motor:   RLE: 3-/5 HF, 4+/5 KE, KF, DF, PF  LLE: 3-/5 HF, 4/5 KE, KF, DF, PF   Psychiatric:         Mood and Affect: Mood normal           Lab Results   Component Value Date    WBC 8 58 12/23/2020    HGB 8 8 (L) 12/23/2020    HCT 26 7 (L) 12/23/2020    MCV 99 (H) 12/23/2020     12/23/2020     Lab Results   Component Value Date    SODIUM 136 12/23/2020    K 3 7 12/23/2020     12/23/2020    CO2 29 12/23/2020    BUN 12 12/23/2020    CREATININE 0 68 12/23/2020    GLUC 96 12/23/2020    CALCIUM 8 8 12/23/2020     Lab Results   Component Value Date    INR 1 05 12/18/2020    PROTIME 13 7 12/18/2020           Current Facility-Administered Medications:     acetaminophen (TYLENOL) tablet 325 mg, 325 mg, Oral, Q6H PRN, Umesh Huerta MD    acetaminophen (TYLENOL) tablet 650 mg, 650 mg, Oral, Q6H [facility], Umesh Huerta MD, 650 mg at 12/27/20 1135    bisacodyl (DULCOLAX) rectal suppository 10 mg, 10 mg, Rectal, Daily PRN, Umesh Huerta MD    celecoxib (CeleBREX) capsule 100 mg, 100 mg, Oral, BID, Umesh Huerta MD, 100 mg at 12/27/20 0748    Diclofenac Sodium (VOLTAREN) 1 % topical gel 2 g, 2 g, Topical, TID, Umesh Huerta MD, 2 g at 12/27/20 0749    docusate sodium (COLACE) capsule 100 mg, 100 mg, Oral, BID, Lorena Carrasco PA-C, 100 mg at 12/27/20 0748    [START ON 12/28/2020] enoxaparin (LOVENOX) subcutaneous injection 40 mg, 40 mg, Subcutaneous, Q24H [facility], Umesh Huerta MD    lactulose oral solution 20 g, 20 g, Oral, Once, Umesh Huerta MD    lidocaine (LIDODERM) 5 % patch 2 patch, 2 patch, Topical, Daily, Umesh Huerta MD, 2 patch at 12/27/20 0900    methocarbamol (ROBAXIN) tablet 500 mg, 500 mg, Oral, 4x Daily PRN, Umesh Huerta MD, 500 mg at 12/24/20 1953    naloxone [facility]) injection 0 4 mg, 0 4 mg, Intramuscular, Daily PRN, Umesh Huerta MD    nicotine (NICODERM CQ) 14 mg/24hr TD 24 hr patch 14 mg, 14 mg, Transdermal, Daily, Lorena Carrasco PA-C, 14 mg at 12/27/20 0900    nicotine polacrilex (NICORETTE) gum 2 mg, 2 mg, Oral, Q2H PRN, Umesh Huerta MD, 2 mg at 12/25/20 1226    ondansetron (ZOFRAN-ODT) dispersible tablet 4 mg, 4 mg, Oral, Q6H PRN, Lorena Carrasco PA-C    [START ON 12/28/2020] pantoprazole (PROTONIX) EC tablet 40 mg, 40 mg, Oral, Early Morning, Umesh Huerta MD    polyethylene glycol (MIRALAX) packet 17 g, 17 g, Oral, Daily PRN, Stephen Aragon MD Alisha, 17 g at 12/27/20 1135    senna (SENOKOT) tablet 17 2 mg, 2 tablet, Oral, HS, Osmany Romero MD, 17 2 mg at 12/26/20 2134    traMADol (ULTRAM) tablet 25 mg, 25 mg, Oral, 4x Daily PRN, Osmany Romero MD, 25 mg at 12/27/20 1540    Past Medical History:   Diagnosis Date    Hypertension        Patient Active Problem List    Diagnosis Date Noted    Closed left subtrochanteric femur fracture ([address] ) 12/18/2020     Priority: High    Acute blood loss anemia 12/20/2020     Priority: Medium    History of colon resection 12/23/2020    Acute pain due to trauma 12/19/2020    Hypertension 12/19/2020    Smoking 12/19/2020    Fall from standing 12/18/2020          Osmany Romero MD    Total time spent:  30 minutes with more than 50% spent counseling/coordinating care  Counseling includes discussion with patient re: progress and discussion with patient of his/her current medical/functional state/information  Coordination of patient's care was performed in conjunction with consulting services  Time invested included review of patient's labs, vitals, and management of their comorbidities with continued monitoring  The care of the patient was extensively discussed and appropriate treatment plan was formulated unique for this patient  ** Please Note:  voice to text software may have been used in the creation of this document   Although proof errors in transcription or interpretation are a potential of such software**

## 2020-12-27 NOTE — PLAN OF CARE
Problem: Potential for Falls  Goal: Patient will remain free of falls  Description: INTERVENTIONS:  - Assess patient frequently for physical needs  -  Identify cognitive and physical deficits and behaviors that affect risk of falls    -  [place] fall precautions as indicated by assessment   - Educate patient/family on patient safety including physical limitations  - Instruct patient to call for assistance with activity based on assessment  - Modify environment to reduce risk of injury  - Consider OT/PT consult to assist with strengthening/mobility  Outcome: Progressing     Problem: Prexisting or High Potential for Compromised Skin Integrity  Goal: Skin integrity is maintained or improved  Description: INTERVENTIONS:  - Identify patients at risk for skin breakdown  - Assess and monitor skin integrity  - Assess and monitor nutrition and hydration status  - Monitor labs   - Assess for incontinence   - Turn and reposition patient  - Assist with mobility/ambulation  - Relieve pressure over bony prominences  - Avoid friction and shearing  - Provide appropriate hygiene as needed including keeping skin clean and dry  - Evaluate need for skin moisturizer/barrier cream  - Collaborate with interdisciplinary team   - Patient/family teaching  - Consider wound care consult   Outcome: Progressing     Problem: PAIN - ADULT  Goal: Verbalizes/displays adequate comfort level or baseline comfort level  Description: Interventions:  - Encourage patient to monitor pain and request assistance  - Assess pain using appropriate pain scale  - Administer analgesics based on type and severity of pain and evaluate response  - Implement non-pharmacological measures as appropriate and evaluate response  - Consider cultural and social influences on pain and pain management  - Notify physician/advanced practitioner if interventions unsuccessful or patient reports new pain  Outcome: Progressing     Problem: INFECTION - ADULT  Goal: Absence or prevention of progression during hospitalization  Description: INTERVENTIONS:  - Assess and monitor for signs and symptoms of infection  - Monitor lab/diagnostic results  - Monitor all insertion sites, i e  indwelling lines, tubes, and drains  - Monitor endotracheal if appropriate and nasal secretions for changes in amount and color  - [place] appropriate cooling/warming therapies per order  - Administer medications as ordered  - Instruct and encourage patient and family to use good hand hygiene technique  - Identify and instruct in appropriate isolation precautions for identified infection/condition  Outcome: Progressing  Goal: Absence of fever/infection during neutropenic period  Description: INTERVENTIONS:  - Monitor WBC    Outcome: Completed     Problem: SAFETY ADULT  Goal: Patient will remain free of falls  Description: INTERVENTIONS:  - Assess patient frequently for physical needs  -  Identify cognitive and physical deficits and behaviors that affect risk of falls    -  [place] fall precautions as indicated by assessment   - Educate patient/family on patient safety including physical limitations  - Instruct patient to call for assistance with activity based on assessment  - Modify environment to reduce risk of injury  - Consider OT/PT consult to assist with strengthening/mobility  Outcome: Progressing  Goal: Maintain or return to baseline ADL function  Description: INTERVENTIONS:  -  Assess patient's ability to carry out ADLs; assess patient's baseline for ADL function and identify physical deficits which impact ability to perform ADLs (bathing, care of mouth/teeth, toileting, grooming, dressing, etc )  - Assess/evaluate cause of self-care deficits   - Assess range of motion  - Assess patient's mobility; develop plan if impaired  - Assess patient's need for assistive devices and provide as appropriate  - Encourage maximum independence but intervene and supervise when necessary  - Involve family in performance of ADLs  - Assess for home care needs following discharge   - Consider OT consult to assist with ADL evaluation and planning for discharge  - Provide patient education as appropriate  Outcome: Progressing  Goal: Maintain or return mobility status to optimal level  Description: INTERVENTIONS:  - Assess patient's baseline mobility status (ambulation, transfers, stairs, etc )    - Identify cognitive and physical deficits and behaviors that affect mobility  - Identify mobility aids required to assist with transfers and/or ambulation (gait belt, sit-to-stand, lift, walker, cane, etc )  - [place] fall precautions as indicated by assessment  - Record patient progress and toleration of activity level on Mobility SBAR; progress patient to next Phase/Stage  - Instruct patient to call for assistance with activity based on assessment  - Consider rehabilitation consult to assist with strengthening/weightbearing, etc   Outcome: Progressing     Problem: DISCHARGE PLANNING  Goal: Discharge to home or other facility with appropriate resources  Description: INTERVENTIONS:  - Identify barriers to discharge w/patient and caregiver  - Arrange for needed discharge resources and transportation as appropriate  - Identify discharge learning needs (meds, wound care, etc )  - Arrange for interpretive services to assist at discharge as needed  - Refer to Case Management Department for coordinating discharge planning if the patient needs post-hospital services based on physician/advanced practitioner order or complex needs related to functional status, cognitive ability, or social support system  Outcome: Progressing     Problem: Knowledge Deficit  Goal: Patient/family/caregiver demonstrates understanding of disease process, treatment plan, medications, and discharge instructions  Description: Complete learning assessment and assess knowledge base    Interventions:  - Provide teaching at level of understanding  - Provide teaching via preferred learning methods  Outcome: Progressing

## 2020-12-27 NOTE — PROGRESS NOTES
12/27/20 1320   Pain Assessment   Pain Score 5   Pain Location/Orientation Orientation: Left; Location: Hip   Hospital Pain Intervention(s) Repositioned; Ambulation/increased activity; Rest   Restrictions/Precautions   Precautions Fall Risk;Pain   Cognition   Arousal/Participation Cooperative   Subjective   Subjective pt reported feeling overall well this afternoon, but didn't sleep well last night  We were able to get a bed extender for him from engineering today  He would like an HEP print out tomorrow before dc home  Sit to Lying   Type of Assistance Needed Independent   Sit to Lying CARE Score 6   Lying to Sitting on Side of Bed   Type of Assistance Needed Independent; Adaptive equipment   Lying to Sitting on Side of Bed CARE Score 6   Sit to Stand   Type of Assistance Needed Independent; Adaptive equipment   Sit to Stand CARE Score 6   Bed-Chair Transfer   Type of Assistance Needed Independent; Adaptive equipment   Chair/Bed-to-Chair Transfer CARE Score 6   Transfer Bed/Chair/Wheelchair   Limitations Noted In Balance; Endurance;LE Strength;Pain Management   Adaptive Equipment Roller Walker   Stand Pivot Modified Independent   Sit to Stand Modified Independent   Stand to Sit Modified Independent   Supine to Sit Independent   Sit to Supine Independent   Car Transfer   Comment reviewed how to get in/out of their SUV, pt demonstrated understanding    Walk 10 Feet   Type of Assistance Needed Independent; Adaptive equipment   Walk 10 Feet CARE Score 6   Walk 50 Feet with Two Turns   Type of Assistance Needed Independent; Adaptive equipment   Walk 50 Feet with Two Turns CARE Score 6   Walk 150 Feet   Type of Assistance Needed Supervision; Adaptive equipment   Walk 150 Feet CARE Score 4   Ambulation   Primary Mode of Locomotion Prior to Admission Walk   Distance Walked (feet) 150 ft  (100, 50)   Assist Device Roller Walker   Gait Pattern Inconsistant Deanne;Narrow JENNIFER;Step through; Improper weight shift   Limitations Noted In Endurance; Heel Strike;Speed;Strength;Swing   Curb or Single Stair   Style negotiated Single stair   Type of Assistance Needed Incidental touching; Adaptive equipment   1 Step (Curb) CARE Score 4   4 Steps   Type of Assistance Needed Incidental touching; Adaptive equipment   4 Steps CARE Score 4   12 Steps   Type of Assistance Needed Incidental touching; Adaptive equipment   12 Steps CARE Score 4   Stairs   Type Stairs   # of Steps 12  (x2,  steps and then FF later)   Weight Bearing Precautions Fall Risk   Assist Devices Single Rail   Findings step to with both hands on L rail up/ same rail down   Picking Up Object   Type of Assistance Needed Adaptive equipment; Independent   Picking Up Object CARE Score 6   Other Comments   Comments L hamstring, hip flexor stretch and calf stretch  L hip flexor stretch with leg flat on mat, head elevated by small wedge and pillows  AAROM for hip flexion, knee flexion, limited by pain/swelling    Assessment   Treatment Assessment Pt progressed to IRP with use of RW  Pt demonstrated good safety awareness with functional mobility and RW, and was able to work on heel strike/push off of LLE during walking, as pain permits  Pt cont to have inc swelling in LLE, limiting some ROM at knee and hip as a result  Cont to recommend CGA-CS on FF of stairs as pt has 1 rail at home and ascends/descends on an angle  Pt will cont to benefit from skilled PT here to review all functional mobility and maintain Mane level, as well as to review and receive an HEP To be (I) with his at d/c  Plan for 12/28 includes PT to print and review HEP with pt  PT Barriers   Physical Impairment Decreased strength;Decreased range of motion;Decreased endurance; Impaired balance;Decreased mobility;Pain   Functional Limitation Car transfers;Stair negotiation;Transfers; Walking;Standing   Plan   Treatment/Interventions Functional transfer training;LE strengthening/ROM; Elevations; Therapeutic exercise; Endurance training;Patient/family training;Equipment eval/education; Bed mobility;Gait training   Progress Progressing toward goals   Recommendation   PT Discharge Recommendation Home with skilled therapy   Equipment Recommended Walker   PT Therapy Minutes   PT Time In 1320   PT Time Out 1415   PT Total Time (minutes) 55   PT Mode of treatment - Individual (minutes) 0   PT Mode of treatment - Concurrent (minutes) 55   PT Mode of treatment - Group (minutes) 0   PT Mode of treatment - Co-treat (minutes) 0   PT Mode of Treatment - Total time(minutes) 55 minutes   PT Cumulative Minutes 424   Therapy Time missed   Time missed?  No

## 2020-12-27 NOTE — PROGRESS NOTES
12/27/20 1100   Pain Assessment   Pain Assessment Tool 0-10   Pain Score 5   Pain Location/Orientation Orientation: Left; Location: Hip   Restrictions/Precautions   Precautions Fall Risk;Pain   Lifestyle   Autonomy "I didn't sleep at all last night"   Sit to Stand   Type of Assistance Needed Supervision   Amount of Physical Assistance Provided No physical assistance   Sit to Stand CARE Score 4   Bed-Chair Transfer   Type of Assistance Needed Supervision   Amount of Physical Assistance Provided No physical assistance   Comment RW   Chair/Bed-to-Chair Transfer CARE Score 4   Cognition   Overall Cognitive Status WFL   Arousal/Participation Alert; Cooperative   Attention Within functional limits   Orientation Level Oriented X4   Memory Within functional limits   Following Commands Follows multistep commands with increased time or repetition   Additional Activities   Additional Activities Comments Simulating household management at time of DC engaged in retrieving bean bags from floor with RW and reacher in tight surface with RW bag on side of RW  Pt completed with 1 slight LOB that he was able to correct and demo good understanding of RW management,  Body positioning,  and safety awareness  Completed entire task at DS  Activity Tolerance   Activity Tolerance Patient tolerated treatment well   Assessment   Treatment Assessment Pt engaged in skilled OT session with focus on functional mob, and functional use of LHAE  Pt tolerated session well, with plan to purchase hip kit, and leg  tomorrow  Plan for D/C following therapy tomorrow  Pt will have supervision at time of D/C  Prognosis Good   Problem List Decreased strength;Decreased endurance; Impaired balance;Decreased mobility; Decreased range of motion;Pain;Orthopedic restrictions   Barriers to Discharge Inaccessible home environment;Decreased caregiver support   Plan   Treatment/Interventions ADL retraining;Functional transfer training; Therapeutic exercise; Endurance training;Patient/family training;Equipment eval/education; Compensatory technique education   Progress Progressing toward goals   Recommendation   OT Discharge Recommendation Return to previous environment with social support   OT Therapy Minutes   OT Time In 1100   OT Time Out 1130   OT Total Time (minutes) 30   OT Mode of treatment - Individual (minutes) 30   OT Mode of treatment - Concurrent (minutes) 0   OT Mode of treatment - Group (minutes) 0   OT Mode of treatment - Co-treat (minutes) 0   OT Mode of Treatment - Total time(minutes) 30 minutes   OT Cumulative Minutes 398

## 2020-12-28 VITALS
BODY MASS INDEX: 23.22 KG/M2 | DIASTOLIC BLOOD PRESSURE: 64 MMHG | OXYGEN SATURATION: 100 % | RESPIRATION RATE: 18 BRPM | TEMPERATURE: 99 F | WEIGHT: 186.73 LBS | SYSTOLIC BLOOD PRESSURE: 131 MMHG | HEIGHT: 75 IN | HEART RATE: 70 BPM

## 2020-12-28 LAB
ALBUMIN SERPL ELPH-MCNC: 3.31 G/DL (ref 3.5–5)
ALBUMIN SERPL ELPH-MCNC: 58 % (ref 52–65)
ALBUMIN UR ELPH-MCNC: 100 %
ALPHA1 GLOB MFR UR ELPH: 0 %
ALPHA1 GLOB SERPL ELPH-MCNC: 0.66 G/DL (ref 0.1–0.4)
ALPHA1 GLOB SERPL ELPH-MCNC: 11.5 % (ref 2.5–5)
ALPHA2 GLOB MFR UR ELPH: 0 %
ALPHA2 GLOB SERPL ELPH-MCNC: 0.91 G/DL (ref 0.4–1.2)
ALPHA2 GLOB SERPL ELPH-MCNC: 15.9 % (ref 7–13)
B-GLOBULIN MFR UR ELPH: 0 %
BETA GLOB ABNORMAL SERPL ELPH-MCNC: 0.31 G/DL (ref 0.4–0.8)
BETA1 GLOB SERPL ELPH-MCNC: 5.5 % (ref 5–13)
BETA2 GLOB SERPL ELPH-MCNC: 4.6 % (ref 2–8)
BETA2+GAMMA GLOB SERPL ELPH-MCNC: 0.26 G/DL (ref 0.2–0.5)
GAMMA GLOB ABNORMAL SERPL ELPH-MCNC: 0.26 G/DL (ref 0.5–1.6)
GAMMA GLOB MFR UR ELPH: 0 %
GAMMA GLOB SERPL ELPH-MCNC: 4.5 % (ref 12–22)
IGG/ALB SER: 1.38 {RATIO} (ref 1.1–1.8)
PROT PATTERN SERPL ELPH-IMP: ABNORMAL
PROT PATTERN UR ELPH-IMP: NORMAL
PROT SERPL-MCNC: 5.7 G/DL (ref 6.4–8.2)
PROT UR-MCNC: 12 MG/DL

## 2020-12-28 PROCEDURE — 97530 THERAPEUTIC ACTIVITIES: CPT

## 2020-12-28 PROCEDURE — 97110 THERAPEUTIC EXERCISES: CPT

## 2020-12-28 PROCEDURE — 97535 SELF CARE MNGMENT TRAINING: CPT

## 2020-12-28 PROCEDURE — 99239 HOSP IP/OBS DSCHRG MGMT >30: CPT | Performed by: PHYSICAL MEDICINE & REHABILITATION

## 2020-12-28 RX ORDER — PANTOPRAZOLE SODIUM 40 MG/1
40 TABLET, DELAYED RELEASE ORAL
Qty: 30 TABLET | Refills: 0 | Status: SHIPPED | OUTPATIENT
Start: 2020-12-29 | End: 2021-11-05 | Stop reason: ALTCHOICE

## 2020-12-28 RX ORDER — TRAMADOL HYDROCHLORIDE 50 MG/1
25 TABLET ORAL EVERY 4 HOURS PRN
Qty: 40 TABLET | Refills: 0 | Status: SHIPPED | OUTPATIENT
Start: 2020-12-28 | End: 2021-11-05 | Stop reason: ALTCHOICE

## 2020-12-28 RX ORDER — DOCUSATE SODIUM 100 MG/1
100 CAPSULE, LIQUID FILLED ORAL 2 TIMES DAILY
Refills: 0
Start: 2020-12-28 | End: 2021-11-05 | Stop reason: ALTCHOICE

## 2020-12-28 RX ORDER — ACETAMINOPHEN 325 MG/1
650 TABLET ORAL EVERY 6 HOURS SCHEDULED
Refills: 0
Start: 2020-12-28 | End: 2021-11-05 | Stop reason: ALTCHOICE

## 2020-12-28 RX ORDER — METHOCARBAMOL 500 MG/1
500 TABLET, FILM COATED ORAL 4 TIMES DAILY PRN
Qty: 45 TABLET | Refills: 0 | Status: SHIPPED | OUTPATIENT
Start: 2020-12-28 | End: 2021-11-05 | Stop reason: ALTCHOICE

## 2020-12-28 RX ORDER — CELECOXIB 100 MG/1
100 CAPSULE ORAL 2 TIMES DAILY
Qty: 30 CAPSULE | Refills: 0 | Status: SHIPPED | OUTPATIENT
Start: 2020-12-28 | End: 2021-11-05 | Stop reason: ALTCHOICE

## 2020-12-28 RX ADMIN — DOCUSATE SODIUM 100 MG: 100 CAPSULE, LIQUID FILLED ORAL at 08:48

## 2020-12-28 RX ADMIN — ACETAMINOPHEN 650 MG: 325 TABLET, FILM COATED ORAL at 11:55

## 2020-12-28 RX ADMIN — NICOTINE POLACRILEX 2 MG: 2 GUM, CHEWING BUCCAL at 08:56

## 2020-12-28 RX ADMIN — ACETAMINOPHEN 650 MG: 325 TABLET, FILM COATED ORAL at 06:27

## 2020-12-28 RX ADMIN — TRAMADOL HYDROCHLORIDE 25 MG: 50 TABLET, FILM COATED ORAL at 14:51

## 2020-12-28 RX ADMIN — ENOXAPARIN SODIUM 40 MG: 40 INJECTION SUBCUTANEOUS at 08:48

## 2020-12-28 RX ADMIN — Medication 14 MG: at 08:53

## 2020-12-28 RX ADMIN — PANTOPRAZOLE SODIUM 40 MG: 40 TABLET, DELAYED RELEASE ORAL at 06:28

## 2020-12-28 RX ADMIN — CELECOXIB 100 MG: 100 CAPSULE ORAL at 08:54

## 2020-12-28 RX ADMIN — DOCUSATE SODIUM 100 MG: 100 CAPSULE, LIQUID FILLED ORAL at 17:35

## 2020-12-28 RX ADMIN — ACETAMINOPHEN 650 MG: 325 TABLET, FILM COATED ORAL at 17:35

## 2020-12-28 RX ADMIN — LIDOCAINE 5% 2 PATCH: 700 PATCH TOPICAL at 08:49

## 2020-12-28 NOTE — DISCHARGE SUMMARY
Discharge Summary - PMR   Brent Jackson 56 y o  male MRN: [id number]  Unit/Bed#: [facility] 960-01 Encounter: [id number]    Admission Date: 12/22/2020     Discharge Date: 12/28/20    Etiologic/Rehabilitation Diagnosis: Impairment of mobility, safety and Activities of Daily Living (ADLs) due to Orthopedic Disorders:  08 11  Unilateral Hip Fracture    HPI: 56 y  o  male with hypertension, status post colon resection for perforated diverticula, tobacco use, patient presented to the [facility] 12/18/20 status post traumatic fall on ice  Down for 2-3 hours   Sustained a displaced subtrochanteric left femur fracture with diaphysis displaced medially and proximally with respect to the proximal fragment   Patient evaluated by Orthopedics who deemed patient a surgical candidate   Patient taken to the OR on 12/19/20 by Dr Derrick Montana a open reduction left femur with TFNA donal with cerclage wiring and IM nailing   Patient deemed weight-bearing as tolerated LLE postoperatively and placed on Lovenox for DVT prophylaxis  Medically, patient developed acute blood loss anemia, acute pain and postoperative constipation, which resolved  He was found to have functional deficits from his baseline, and therefore admitted to [address] [place] [address] 12/29/20  Procedures Performed During [facility] Admission: Bilateral lower extremity venous dopplers: NEGATIVE for DVT    Acute Rehabilitation Center Course: Functional deficits:  Left lower extremity weakness, right proximal musculature weakness induced by pain, gait instability, impaired mobility, impaired self care  Patient participated in a comprehensive interdisciplinary inpatient rehabilitation program which included involvment of MD, therapies (PT, OT, and/or SLP), RN, CM, SW, dietary  He was able to be advanced to a modified independent level with mobility and supervision - Min A with self care using a RW and adaptive equipment    Patient completed family training with his wife and will have assistance post discharge by his wife and family  He was considered safe for discharge home and will continue his rehabilitation with outpatient PT  Medical Issues Addressed While at [facility]:    Pain  · Pain in left lower extremity:  Continue scheduled Tylenol, topical Lidoderm patches and ice  Added Celebrex 100mg BID 12/25/20 and PPI  Breakthrough pain with PRN Tramadol 25mg 4x/day PRN  * patient is very sensitive to narcotics and gets easily confused  · Tendonitis type pain right proximal hip pelvis:  Continue application of Topical Voltaren gel     DVT prophylaxis  · maanged on SQ Lovenox 40mg SQ daily for total of 28 days  Patient now ambulating >120 feet, therefore can do Lovenox 40mg SQ daily now  · Bilateral lower extremity venous Dopplers completed on 12/22/20:  Negative for DVT  · Continue SCDs  · Compression stockings ordered for mild developing edema of left lower extremity    * Closed left subtrochanteric femur fracture (HCC)  Assessment & Plan  · Displaced subtrochanteric left femur fracture sustained post fall on 12/18/20  · Status post ORIF on 12/19/20 by Dr Pawel Dawson  · WBAT LLE  · Continue daily local incisional care and monitoring  · Managed on Lovenox for DVT prophylaxis   · SPEP/UPEP were checked due to family history - NEGATIVE   · Follow-up with Dr Pawel Dawson as outpatient    Acute blood loss anemia  Assessment & Plan  · Received transfusion 12/23/20  · H&H improving  · Continue MVI with iron for home  · Follow-up with PCP at home    History of colon resection  Assessment & Plan  · Status post laparoscopic sigmoid colectomy February 2019 secondary to perforated diverticula/intra-abdominal abscess  · Stable    Smoking  Assessment & Plan  · Counseled on smoking cessation  · Placed on a 14mg nicotine patch daily and p r n   Nicorette while at [facility]  · Can continue as outpatient    Hypertension  Assessment & Plan  · Currently off all antihypertensives due to normotensive BPs post-op  · Previously on ARB - Diovan- 80 mg daily  · PCP to follow as outpatient and decide when to restart      Discharge Physical Examination:  Physical Exam  Vitals signs and nursing note reviewed  Constitutional:       General: He is not in acute distress  HENT:      Head: Normocephalic and atraumatic  Nose: Nose normal       Mouth/Throat:      Mouth: Mucous membranes are moist    Eyes:      Conjunctiva/sclera: Conjunctivae normal    Neck:      Musculoskeletal: Neck supple  Cardiovascular:      Rate and Rhythm: Normal rate and regular rhythm  Pulses: Normal pulses  Pulmonary:      Effort: Pulmonary effort is normal       Breath sounds: Normal breath sounds  No wheezing or rales  Abdominal:      General: Bowel sounds are normal  There is no distension  Palpations: Abdomen is soft  Tenderness: There is no abdominal tenderness  Musculoskeletal:         General: Swelling and tenderness present  Right lower leg: No edema  Left lower leg: Edema present  Skin:     General: Skin is warm  Comments: Incision clean and dry and intact with staples   Neurological:      Mental Status: He is alert and oriented to person, place, and time  Comments: Motor:   RLE: 3-/5 HF, 4+/5 KE, KF, DF, PF  LLE: 3-/5 HF, 4/5 KE, KF, DF, PF   Psychiatric:         Mood and Affect: Mood normal        Discharge Medications:   See after visit summary for reconciled discharge medications provided to patient and family  Condition at Discharge: fair     Discharge instructions/Information to patient and family:   See after visit summary for information provided to patient and family  Provisions for Follow-Up Care:  See after visit summary for information related to follow-up care and any pertinent home health orders  No future appointments  Disposition: Home      Planned Readmission: No    Discharge Statement   I spent more than 30 minutes discharging the patient   This time was spent on the day of discharge  I had direct contact with the patient on the day of discharge  Greater than 50% of the total time was spent examining patient, answering all patient questions, arranging and discussing plan of care with patient as well as directly providing post-discharge instructions  Additional time then spent on discharge activities  Discharge Medications:  See after visit summary for reconciled discharge medications provided to patient and family

## 2020-12-28 NOTE — PROGRESS NOTES
Internal Medicine Progress Note  Patient: Rahul Garnett  Age/sex: 56 y o  male  Medical Record #: [id number]      ASSESSMENT/PLAN: (Interval History)  Rahul Garnett is seen and examined and management for following issues:    Left displaced intertrochanteric fracture  · WBAT  · Rehab per PMR  · Pain mgmt per PMR  · Pain is well controlled  · LE dopplers negative DVT  · +confusion/nausea with higher dose narcotics     HTN  · Takes diovan 80mg daily at home  · On hold post operatively and remains on hold  · Can resume at home when BP allows  · Monitor trend and resume when SBP >130     Post operative blood loss anemia  · Hgb 9 1 s/p transfusion 12/23  · stable    Constipation  · Mgmt per PMR     Tobacco use  · Cont nicotine patch     H/o diverticular perforation 2019  · Stable    For DC today, oK with medicine    The above assessment and plan was reviewed and updated as determined by my evaluation of the patient on 12/28/2020      Labs:   Results from last 7 days   Lab Units 12/27/20  1733 12/23/20  1549   WBC Thousand/uL 10 19* 8 58   HEMOGLOBIN g/dL 9 1* 8 8*   HEMATOCRIT % 28 6* 26 7*   PLATELETS Thousands/uL 638* 306     Results from last 7 days   Lab Units 12/27/20  1733 12/23/20  0532   SODIUM mmol/L 138 136   POTASSIUM mmol/L 4 1 3 7   CHLORIDE mmol/L 104 101   CO2 mmol/L 30 29   BUN mg/dL 17 12   CREATININE mg/dL 0 84 0 68   CALCIUM mg/dL 9 4 8 8                   Review of Scheduled Meds:  Current Facility-Administered Medications   Medication Dose Route Frequency Provider Last Rate    acetaminophen  325 mg Oral Q6H PRN Ghislaine Temple MD      acetaminophen  650 mg Oral Q6H [address] Ghislaine Temple MD      bisacodyl  10 mg Rectal Daily PRN Ghislaine Temple MD      celecoxib  100 mg Oral BID Ghislaine Temple MD      Diclofenac Sodium  2 g Topical TID Ghislaine Temple MD      docusate sodium  100 mg Oral BID Lorena Carrasco PA-C      enoxaparin  40 mg Subcutaneous Q24H [address] Ghislaine Temple MD      lidocaine 2 patch Topical Daily Arianne Segal MD      methocarbamol  500 mg Oral 4x Daily PRN Arianne Segal MD      naloxone  0 4 mg Intramuscular Daily PRN Arianne Segal MD      nicotine  14 mg Transdermal Daily Lorena Carrasco PA-C      nicotine polacrilex  2 mg Oral Q2H PRN Arianne Segal MD      ondansetron  4 mg Oral Q6H PRN Lorena Carrasco PA-C      pantoprazole  40 mg Oral Early Morning Arianne Segal MD      polyethylene glycol  17 g Oral Daily PRN Arianne Segal MD      senna  2 tablet Oral HS Arianne Segal MD      traMADol  25 mg Oral 4x Daily PRN Arianne Segal MD         Subjective/ HPI: Patient seen and examined  Patients overnight issues or events were reviewed with nursing or staff during rounds or morning huddle session  New or overnight issues include the following:     No  Overnight issues or concerns      ROS:   A 10 point ROS was performed; negative except as noted above         Imaging:     VAS lower limb venous duplex study, complete bilateral   Final Result by Faustina Sutton MD (12/22 2217)          *Labs /Radiology studies Reviewed  *Medications  reviewed and reconciled as needed  *Please refer to order section for additional ordered labs studies  *Case discussed with primary attending during morning huddle case rounds    Physical Examination:  Vitals:   Vitals:    12/27/20 0526 12/27/20 1311 12/27/20 2048 12/28/20 0518   BP: 121/65 113/60 119/72 141/78   BP Location: Right arm Right arm Right arm Right arm   Pulse: 58 60 63 70   Resp: 18 18 18 19   Temp: 98 9 °F (37 2 °C) 98 4 °F (36 9 °C) 98 6 °F (37 °C) 97 6 °F (36 4 °C)   TempSrc: Oral Oral Oral Oral   SpO2: 100% 100% 99% 100%   Weight:       Height:           GEN: No apparent distress, interactive  NEURO: Alert and oriented x3  HEENT: Pupils are equal and reactive, EOMI, mucous membranes are moist, face symmetrical  CV: S1 S2 regular, no MRG, no peripheral edema noted  RESP: Lungs are clear bilaterally, no wheezes, rales or rhonchi noted, on room air, respirations easy and non labored  GI: Flat, soft non tender, non distended; +BS x4  : Voiding without difficulty  MUSC: Moves all extremities; except LLE  SKIN: pink, warm and dry, normal turgor, no rashes, lesions      The above physical exam was reviewed and updated as determined by my evaluation of the patient on 12/28/2020  Invasive Devices     None                    VTE Pharmacologic Prophylaxis: Enoxaparin  Code Status: Level 1 - Full Code  Current Length of Stay: 6 day(s)      Total time spent:  30 minutes with more than 50% spent counseling/coordinating care  Counseling includes discussion with patient re: progress  and discussion with patient of his/her current medical state/information  Coordination of patient's care was performed in conjunction with primary service  Time invested included review of patient's labs, vitals, and management of their comorbidities with continued monitoring  In addition, this patient was discussed with medical team including physician and advanced extenders  The care of the patient was extensively discussed and appropriate treatment plan was formulated unique for this patient  ** Please Note:  voice to text software may have been used in the creation of this document   Although proof errors in transcription or interpretation are a potential of such software**

## 2020-12-28 NOTE — CASE MANAGEMENT
Pt made good progress over the weekend and team feels pt is able to return home today  Cm received an order for a walker  Order placed with [facility]  Cm met w/pt who is in agreement with plan  Pt wishes to make his own appointment for outpt physical therapy at [address]

## 2020-12-28 NOTE — PLAN OF CARE
Problem: Potential for Falls  Goal: Patient will remain free of falls  Description: INTERVENTIONS:  - Assess patient frequently for physical needs  -  Identify cognitive and physical deficits and behaviors that affect risk of falls    -  [place] fall precautions as indicated by assessment   - Educate patient/family on patient safety including physical limitations  - Instruct patient to call for assistance with activity based on assessment  - Modify environment to reduce risk of injury  - Consider OT/PT consult to assist with strengthening/mobility  Outcome: Adequate for Discharge     Problem: Prexisting or High Potential for Compromised Skin Integrity  Goal: Skin integrity is maintained or improved  Description: INTERVENTIONS:  - Identify patients at risk for skin breakdown  - Assess and monitor skin integrity  - Assess and monitor nutrition and hydration status  - Monitor labs   - Assess for incontinence   - Turn and reposition patient  - Assist with mobility/ambulation  - Relieve pressure over bony prominences  - Avoid friction and shearing  - Provide appropriate hygiene as needed including keeping skin clean and dry  - Evaluate need for skin moisturizer/barrier cream  - Collaborate with interdisciplinary team   - Patient/family teaching  - Consider wound care consult   Outcome: Adequate for Discharge     Problem: PAIN - ADULT  Goal: Verbalizes/displays adequate comfort level or baseline comfort level  Description: Interventions:  - Encourage patient to monitor pain and request assistance  - Assess pain using appropriate pain scale  - Administer analgesics based on type and severity of pain and evaluate response  - Implement non-pharmacological measures as appropriate and evaluate response  - Consider cultural and social influences on pain and pain management  - Notify physician/advanced practitioner if interventions unsuccessful or patient reports new pain  Outcome: Adequate for Discharge     Problem: INFECTION - ADULT  Goal: Absence or prevention of progression during hospitalization  Description: INTERVENTIONS:  - Assess and monitor for signs and symptoms of infection  - Monitor lab/diagnostic results  - Monitor all insertion sites, i e  indwelling lines, tubes, and drains  - Monitor endotracheal if appropriate and nasal secretions for changes in amount and color  - [place] appropriate cooling/warming therapies per order  - Administer medications as ordered  - Instruct and encourage patient and family to use good hand hygiene technique  - Identify and instruct in appropriate isolation precautions for identified infection/condition  Outcome: Adequate for Discharge     Problem: SAFETY ADULT  Goal: Patient will remain free of falls  Description: INTERVENTIONS:  - Assess patient frequently for physical needs  -  Identify cognitive and physical deficits and behaviors that affect risk of falls    -  [place] fall precautions as indicated by assessment   - Educate patient/family on patient safety including physical limitations  - Instruct patient to call for assistance with activity based on assessment  - Modify environment to reduce risk of injury  - Consider OT/PT consult to assist with strengthening/mobility  Outcome: Adequate for Discharge  Goal: Maintain or return to baseline ADL function  Description: INTERVENTIONS:  -  Assess patient's ability to carry out ADLs; assess patient's baseline for ADL function and identify physical deficits which impact ability to perform ADLs (bathing, care of mouth/teeth, toileting, grooming, dressing, etc )  - Assess/evaluate cause of self-care deficits   - Assess range of motion  - Assess patient's mobility; develop plan if impaired  - Assess patient's need for assistive devices and provide as appropriate  - Encourage maximum independence but intervene and supervise when necessary  - Involve family in performance of ADLs  - Assess for home care needs following discharge   - Consider OT consult to assist with ADL evaluation and planning for discharge  - Provide patient education as appropriate  Outcome: Adequate for Discharge  Goal: Maintain or return mobility status to optimal level  Description: INTERVENTIONS:  - Assess patient's baseline mobility status (ambulation, transfers, stairs, etc )    - Identify cognitive and physical deficits and behaviors that affect mobility  - Identify mobility aids required to assist with transfers and/or ambulation (gait belt, sit-to-stand, lift, walker, cane, etc )  - [place] fall precautions as indicated by assessment  - Record patient progress and toleration of activity level on Mobility SBAR; progress patient to next Phase/Stage  - Instruct patient to call for assistance with activity based on assessment  - Consider rehabilitation consult to assist with strengthening/weightbearing, etc   Outcome: Adequate for Discharge     Problem: DISCHARGE PLANNING  Goal: Discharge to home or other facility with appropriate resources  Description: INTERVENTIONS:  - Identify barriers to discharge w/patient and caregiver  - Arrange for needed discharge resources and transportation as appropriate  - Identify discharge learning needs (meds, wound care, etc )  - Arrange for interpretive services to assist at discharge as needed  - Refer to Case Management Department for coordinating discharge planning if the patient needs post-hospital services based on physician/advanced practitioner order or complex needs related to functional status, cognitive ability, or social support system  Outcome: Adequate for Discharge     Problem: Knowledge Deficit  Goal: Patient/family/caregiver demonstrates understanding of disease process, treatment plan, medications, and discharge instructions  Description: Complete learning assessment and assess knowledge base    Interventions:  - Provide teaching at level of understanding  - Provide teaching via preferred learning methods  Outcome: Adequate for Discharge

## 2020-12-28 NOTE — QUICK NOTE
Lab Results   Component Value Date    SPEP  12/27/2020     No monoclonal bands noted  Reviewed by: Anastacio Weber MD, PhD ([id number]) **Electronic Signature**    UPEP  12/27/2020     No monoclonal bands noted  Reviewed by: Anastacio Weber MD, PhD ([id number])  **Electronic Signature**     Serum SPEP  A/G Ratio 1 10 - 1 80 1 38    Albumin Electrophoresis 52 0 - 65 0 % 58 0    Albumin CONC 3 50 - 5 00 g/dl 3  31Low     Alpha 1 2 5 - 5 0 % 11 5High     ALPHA 1 CONC 0 10 - 0 40 g/dL 0  66High     Alpha 2 7 0 - 13 0 % 15 9High     ALPHA 2 CONC 0 40 - 1 20 g/dL 0 91    Beta-1 5 0 - 13 0 % 5 5    BETA 1 CONC 0 40 - 0 80 g/dL 0 31Low     Beta-2 2 0 - 8 0 % 4 6    BETA 2 CONC 0 20 - 0 50 g/dL 0 26    Gamma Globulin 12 0 - 22 0 % 4 5Low     GAMMA CONC 0 50 - 1 60 g/dL 0 26Low     Total Protein 6 4 - 8 2 g/dL 5 7Low       Urine SPEP   Ref Range & Units 12/27/20 1956   Urine Protein 2 0 - 17 5 mg/dL 12 0    Albumin ELP, Urine % 100 0    Alpha 1, Urine % 0 0    Alpha 2, Urine % 0 0    Beta, Urine % 0 0    Gamma Globulin, Urine % 0 0        Lab Results   Component Value Date    SODIUM 138 12/27/2020    K 4 1 12/27/2020     12/27/2020    CO2 30 12/27/2020    BUN 17 12/27/2020    CREATININE 0 84 12/27/2020    GLUC 104 12/27/2020    CALCIUM 9 4 12/27/2020     Lab Results   Component Value Date    WBC 10 19 (H) 12/27/2020    HGB 9 1 (L) 12/27/2020    HCT 28 6 (L) 12/27/2020     (H) 12/27/2020     (H) 12/27/2020         Reviewed the above with patient and his wife        Annabelle Gutierrez MD  PM&R

## 2020-12-28 NOTE — DISCHARGE INSTRUCTIONS
DISCHARGE INSTRUCTIONS:    · Incisional Care: Visualize incision daily  Cover with padded gauze dressing and secure to protect your incision  You are allowed to shower with incision covered with waterproof dressing  Do not soak incision in tub bath, pool, sauna until cleared by your surgeon  Please ask your surgeon when it is safe to take a shower and get incision wet  · Avoid additional NSAIDS (advil, aleve, ibuprofen, motrin)  Please clear this with your surgeon  · Continue smoking cessation to optimize healing  You can continue over the counter Nicorette and Nicotine patch if you choose or can discuss other supplements with your PCP  · Medication:  You are currently on HOLD from your blood pressure medication  Please discuss when to resume with your PCP  Check BP twice daily and record at the same time  · Restrictions: You are allowed to Weight bear as tolerated on Left leg  Please follow activity instructions as instructed by your therapists  NO DRIVING OR WORK UNTIL cleared by your surgeon

## 2020-12-28 NOTE — PROGRESS NOTES
12/28/20 1230   Pain Assessment   Pain Assessment Tool 0-10   Pain Score 3   Pain Location/Orientation Orientation: Left; Location: Hip   Restrictions/Precautions   Precautions Fall Risk   LUE Weight Bearing Per Order WBAT   ROM Restrictions No   Braces or Orthoses Other (Comment)  ( b/l knee high TEDs)   Cognition   Overall Cognitive Status WFL   Arousal/Participation Cooperative; Alert   Attention Within functional limits   Orientation Level Oriented X4   Memory Within functional limits   Following Commands Follows multistep commands with increased time or repetition   Subjective   Subjective Pt agreeable to session   Roll Left and Right   Reason if not Attempted Refused to perform  (due to pain)   Roll Left and Right CARE Score 7   Sit to Lying   Type of Assistance Needed Independent   Comment HOB flat no rails   Sit to Lying CARE Score 6   Lying to Sitting on Side of Bed   Type of Assistance Needed Independent   Comment HOB flat no rails   Lying to Sitting on Side of Bed CARE Score 6   Sit to Stand   Type of Assistance Needed Independent   Amount of Physical Assistance Provided No physical assistance   Comment IRP's   Sit to Stand CARE Score 6   Bed-Chair Transfer   Type of Assistance Needed Independent; Adaptive equipment   Amount of Physical Assistance Provided No physical assistance   Comment with RW   Chair/Bed-to-Chair Transfer CARE Score 6   Transfer Bed/Chair/Wheelchair   Limitations Noted In Balance; Coordination; Endurance;Pain Management   Adaptive Equipment Roller Walker   Walk 10 Feet   Type of Assistance Needed Independent; Adaptive equipment   Amount of Physical Assistance Provided No physical assistance   Comment with RW   Walk 10 Feet CARE Score 6   Walk 50 Feet with Two Turns   Type of Assistance Needed Independent; Adaptive equipment   Comment with RW   Walk 50 Feet with Two Turns CARE Score 6   Walk 150 Feet   Type of Assistance Needed Supervision; Adaptive equipment   Amount of Physical Assistance Provided No physical assistance   Comment with RW   Walk 150 Feet CARE Score 4   Walking 10 Feet on Uneven Surfaces   Reason if not Attempted Safety concerns   Walking 10 Feet on Uneven Surfaces CARE Score 88   Ambulation   Does the patient walk? 2  Yes   Primary Mode of Locomotion Prior to Admission Walk   Distance Walked (feet) 150 ft  (x3, 75 x2)   Assist Device Roller Walker   Gait Pattern Antalgic; Inconsistant Deanne; Slow Deanne;Decreased L stance; Improper weight shift; Step to;Narrow JENNIFER   Limitations Noted In Balance; Coordination; Endurance;Strength; Safety;Speed   Provided Assistance with: Direction   Walk Assist Level Distant Supervision;Modified Independent   Findings increased toe stance on LLE with amb to decrease pain in hip  Wheel 50 Feet with Two Turns   Reason if not Attempted Activity not applicable   Wheel 50 Feet with Two Turns CARE Score 9   Wheel 150 Feet   Reason if not Attempted Activity not applicable   Wheel 744 Feet CARE Score 9   Wheelchair mobility   Does the patient use a wheelchair? 0  No   Curb or Single Stair   Style negotiated Single stair   Type of Assistance Needed Incidental touching;Verbal cues; Adaptive equipment   Amount of Physical Assistance Provided No physical assistance   Comment CGA with increased verbal cues for sequencing task   1 Step (Curb) CARE Score 4   4 Steps   Type of Assistance Needed Physical assistance; Adaptive equipment   Amount of Physical Assistance Provided 25%-49%   Comment R rail descending,B hands on rail, pt felt better leading down with unaffected limb(R) vs affected  First trial with wife with RONAL, verbal cues for sequencing on FF   Second trial x4 steps up/down with increased focus on positioning and descending with L vs R    4 Steps CARE Score 3   12 Steps   Type of Assistance Needed Physical assistance   Amount of Physical Assistance Provided 25%-49%   Comment R rail descending,B hands on rail, pt felt better leading down with unaffected limb(R) vs affected  First trial with wife with RONAL, verbal cues for sequencing on FF  Second trial x4 steps up/down with increased focus on positioning and descending with L vs R    12 Steps CARE Score 3   Stairs   Type Stairs   # of Steps 12   Weight Bearing Precautions Fall Risk   Assist Devices Single Rail   Findings increased difficulty today with sequyencing tasks  Picking Up Object   Type of Assistance Needed Supervision; Adaptive equipment   Amount of Physical Assistance Provided No physical assistance   Comment with RW and reacher   Picking Up Object CARE Score 4   Toilet Transfer   Type of Assistance Needed Independent; Adaptive equipment   Amount of Physical Assistance Provided No physical assistance   Comment BSC over toilet   Toilet Transfer CARE Score 6   Therapeutic Interventions   Strengthening HEP handout given and instructed on seated DF/PF, glute sets, L quad sets, LAQ and hip add/abd vs ball 3x10 reps  Flexibility B hams/gastroc stretches x5 reps with 20 sec hold  Equipment Use   NuStep x10 min on level 1 for ROM   Assessment   Treatment Assessment Pt participated in skilled PT session with increased focus on functional transfers and stair management with pt's wife  Pt had increased difficulty with steps today due to sequencing  Pt trialed a second time x4 steps with R rail descending and completely sideways with B hands on rail  Pt required verbal cues to "lock" L knee each time he stepped and only required CGA with verbal cues  Pt anticipating discharge home today and will remain on first floor during the day with A from wife to amb on steps  PT Family training done with:  Wife Mell   Problem List Decreased strength;Decreased range of motion;Decreased endurance;Pain;Orthopedic restrictions   PT Barriers   Physical Impairment Decreased strength;Decreased range of motion;Decreased endurance;Decreased mobility;Pain;Orthopedic restrictions   Functional Limitation Stair negotiation   Plan Treatment/Interventions Functional transfer training; Therapeutic exercise; Endurance training;Bed mobility;Gait training   Recommendation   PT Discharge Recommendation Other (Comment)  (out patient PT)   Equipment Recommended Walker   PT Therapy Minutes   PT Time In 1230   PT Time Out 1400   PT Total Time (minutes) 90   PT Mode of treatment - Individual (minutes) 90   PT Mode of treatment - Concurrent (minutes) 0   PT Mode of treatment - Group (minutes) 0   PT Mode of treatment - Co-treat (minutes) 0   PT Mode of Treatment - Total time(minutes) 90 minutes   PT Cumulative Minutes 514   Therapy Time missed   Time missed?  No

## 2020-12-28 NOTE — NURSING NOTE
Discharge instructions reviewed with patients spouse  All questions answered  Patients skin intact  Surgical incision to L hip - staples remain in place  Patient to follow up outpatient to have them removed  Continent of both bowel and bladder  Advanced to IRP while on rehab  Escorted patient to the lobby

## 2020-12-28 NOTE — PROGRESS NOTES
12/28/20 0700   Pain Assessment   Pain Assessment Tool 0-10   Pain Score 3   Pain Location/Orientation Orientation: Left; Location: Leg   Pain Onset/Description Onset: Ongoing; Descriptor: Aching   Effect of Pain on Daily Activities limits tolerance to full WB but overall tolerates session   Hospital Pain Intervention(s) Repositioned; Rest   Restrictions/Precautions   Precautions Fall Risk;Pain   LLE Weight Bearing Per Order WBAT   ROM Restrictions No   Braces or Orthoses   (b/l knee high TEDs)   Lifestyle   Autonomy "I am comfortable here so that will be a hard transition"   Eating   Type of Assistance Needed Independent   Amount of Physical Assistance Provided No physical assistance   Eating CARE Score 6   Oral Hygiene   Type of Assistance Needed Independent   Amount of Physical Assistance Provided No physical assistance   Comment seated or in stance  Prefers seated as this limits strain on hip  Oral Hygiene CARE Score 6   Shower/Bathe Self   Type of Assistance Needed Physical assistance   Amount of Physical Assistance Provided Less than 25%   Comment Performed sponge bathe at sink this session with pt only req A for L knee to foot  Will have LH sponge for use at home  Discussed home set-up including armed chair v sitting on commode if bathing in bathroom or setting up at sink  Will req set-up A at MA  Shower/Bathe Self CARE Score 3   Tub/Shower Transfer   Reason Not Assessed Sponge Bath   Findings Did discuss recommendation for grab bar and shower chair for inc safety and indep at MA  Pt receptive  Upper Body Dressing   Type of Assistance Needed Independent   Amount of Physical Assistance Provided No physical assistance   Upper Body Dressing CARE Score 6   Lower Body Dressing   Type of Assistance Needed Independent; Adaptive equipment   Amount of Physical Assistance Provided No physical assistance   Comment LHAE   Lower Body Dressing CARE Score 6   Putting On/Taking Off Footwear   Type of Assistance Needed Physical assistance   Amount of Physical Assistance Provided 50%-74%   Comment A needed for donning TEDs  Able to use sockaide to don socks  Putting On/Taking Off Footwear CARE Score 2   Sit to Stand   Type of Assistance Needed Independent   Amount of Physical Assistance Provided No physical assistance   Comment IRPs   Sit to Stand CARE Score 6   Bed-Chair Transfer   Type of Assistance Needed Independent; Adaptive equipment   Amount of Physical Assistance Provided No physical assistance   Comment IRPs   Chair/Bed-to-Chair Transfer CARE Score 6   Toileting Hygiene   Type of Assistance Needed Independent   Amount of Physical Assistance Provided No physical assistance   Comment IRPs   Toileting Hygiene CARE Score 6   Toilet Transfer   Type of Assistance Needed Independent; Adaptive equipment   Amount of Physical Assistance Provided No physical assistance   Comment BSC over toilet, IRPs   Toilet Transfer CARE Score 6   Kitchen Mobility   Kitchen-Mobility Level Walker   Kitchen Activity Retrieve items;Transport items   Kitchen Mobility Comments Use of counters to transport cup to coffee pot  No noted LOB with mobility with RW  Will have family A to transport after dc  Cognition   Overall Cognitive Status WFL   Arousal/Participation Cooperative   Attention Within functional limits   Orientation Level Oriented X4   Memory Within functional limits   Following Commands Follows multistep commands with increased time or repetition   Activity Tolerance   Activity Tolerance Patient tolerated treatment well   Assessment   Treatment Assessment Pt participated in skilled OT session with focus on ADL management, fx transfers, DME/AE needs  Pt performing sponge bathing at sink this session  Req A for set-up, A for TEDS, but able to manage rest of task with LHAE use   Reinforced recommendation that pt use shower chair and grab bar when able to shower at dc but recommending sponge bathing until pt tolerating more weight through LLE to safely complete step over into shower  Pt receptive  Completing all mobility and toileting at indep level with RW at this time  Will have family A at dc and initial supervision to ensure safe transition to home  Reporting no questions or concerns at this time  Pt in recliner at end of session with all needs met  Prognosis Good   Problem List Decreased strength;Decreased endurance; Impaired balance;Decreased mobility; Decreased range of motion;Pain;Orthopedic restrictions   Barriers to Discharge Inaccessible home environment;Decreased caregiver support   Plan   Treatment/Interventions ADL retraining;Functional transfer training; Endurance training; Therapeutic exercise;Patient/family training;Equipment eval/education; Bed mobility   Progress Progressing toward goals   Recommendation   OT Discharge Recommendation Return to previous environment with social support  (family support)   Equipment Recommended   (shower chair, grab bars, BSC, RW)   OT Equipment ordered purchased hip kit and leg    Date ordered 12/28/20   OT Therapy Minutes   OT Time In 0700   OT Time Out 0830   OT Total Time (minutes) 90   OT Mode of treatment - Individual (minutes) 90   OT Mode of treatment - Concurrent (minutes) 0   OT Mode of treatment - Group (minutes) 0   OT Mode of treatment - Co-treat (minutes) 0   OT Mode of Treatment - Total time(minutes) 90 minutes   OT Cumulative Minutes 488   Therapy Time missed   Time missed?  No

## 2020-12-29 NOTE — CASE MANAGEMENT
Team dc summary - pt made good progress and returned home w/spouse [facility]  Pt wished to make his own appmt  Pt received a roller walker through [facility] prior to dc  Family pre sent for training and for dc instructions

## 2020-12-29 NOTE — PHYSICAL THERAPY NOTE
Pt demonstrated good progress in PT however unable to met all mobility goals as pt got d/c to home earlier than anticipated per family request  So completed pt and family education with hands on family training prior to home d/c with good understanding and carry over demonstrated  No DME needs identified at this admission as pt already has a RW at home and will continue therapy with outpatient PT services to address ongoing rehab needs

## 2021-01-04 ENCOUNTER — HOSPITAL ENCOUNTER (OUTPATIENT)
Dept: RADIOLOGY | Facility: HOSPITAL | Age: 61
Discharge: HOME | End: 2021-01-04
Attending: ORTHOPAEDIC SURGERY
Payer: COMMERCIAL

## 2021-01-04 ENCOUNTER — OFFICE VISIT (OUTPATIENT)
Dept: OBGYN CLINIC | Facility: HOSPITAL | Age: 61
End: 2021-01-04

## 2021-01-04 VITALS
SYSTOLIC BLOOD PRESSURE: 161 MMHG | HEART RATE: 58 BPM | HEIGHT: 75 IN | DIASTOLIC BLOOD PRESSURE: 84 MMHG | BODY MASS INDEX: 23.34 KG/M2

## 2021-01-04 DIAGNOSIS — Z48.89 AFTERCARE FOLLOWING SURGERY: ICD-10-CM

## 2021-01-04 DIAGNOSIS — Z98.890 S/P ORIF (OPEN REDUCTION INTERNAL FIXATION) FRACTURE: ICD-10-CM

## 2021-01-04 DIAGNOSIS — Z87.81 S/P ORIF (OPEN REDUCTION INTERNAL FIXATION) FRACTURE: ICD-10-CM

## 2021-01-04 DIAGNOSIS — Z48.89 AFTERCARE FOLLOWING SURGERY: Primary | ICD-10-CM

## 2021-01-04 PROCEDURE — 73552 X-RAY EXAM OF FEMUR 2/>: CPT

## 2021-01-04 PROCEDURE — 72170 X-RAY EXAM OF PELVIS: CPT

## 2021-01-04 PROCEDURE — 99024 POSTOP FOLLOW-UP VISIT: CPT | Performed by: ORTHOPAEDIC SURGERY

## 2021-01-04 RX ORDER — VALSARTAN 160 MG/1
TABLET ORAL
COMMUNITY
Start: 2020-12-11 | End: 2021-11-05 | Stop reason: ALTCHOICE

## 2021-01-04 NOTE — PROGRESS NOTES
Assessment:   S/P INSERTION NAIL IM FEMUR ANTEGRADE For Subtrochanteric fracture - Left on 12/19/2020    Plan:   X-rays taken and reviewed, physical exam performed  Patient is doing well 1st postoperative visit greater than 2 weeks status post ORIF left hip for subtrochanteric fracture  All incisions are healed appropriately and staples removed  Steri-Strips applied  Can get incision wet, do not submerge, pat dry  Follow through with physical therapy as already scheduled  Continue Lovenox until its completion  Weightbearing as tolerated  Return in about 4 weeks (around 2/1/2021) for re-check with x-rays of left femur and A/P pelvis  SUBJECTIVE:  Federico Galeazzi is a 56 y o  male who presents for 1st postoperative visit 16 days after INSERTION NAIL IM FEMUR ANTEGRADE For Subtrochanteric fracture - Left on 12/19/2020  Patient presents in a wheelchair for transportation ease however uses a rolling walker for ambulatory assistance  He notes mild diffuse groin and thigh pain but it is manageable with tramadol  He takes an oxycodone before bedtime  He has been administering his Lovenox for DVT prophylaxis  PHYSICAL EXAMINATION:  Vital signs: /84   Pulse 58   Ht 6' 3" (1 905 m)   BMI 23 34 kg/m²   General: well developed and well nourished, alert, oriented times 3 and appears comfortable  Psychiatric: Normal    MUSCULOSKELETAL EXAMINATION:    Surgical Site:  Left lower extremity  Incision: All incisions are healed appropriately with staples in place which removed successfully  Minimal erythematous discoloration around the staples consistent with staple irritation  Minimal swelling  Calf and thigh are soft nontender no signs DVT  Resolving ecchymosis throughout    Range of Motion: As expected and Limited due to stiffness  Neurovascular status: Neuro intact, good cap refill        STUDIES REVIEWED:  Imaging studies reviewed by Dr Vu Castellon and demonstrate :  Left hip, pelvis, femur x-rays show:  Well aligned and positioned subtrochanteric fracture with long intramedullary nail, lag screw, cerclage wire, and 2 distal locking screws in excellent position alignment        PROCEDURES PERFORMED:  Procedures  No Procedures performed today      Scribe Attestation    I,:  Ed Zavala am acting as a scribe while in the presence of the attending physician :       I,:  Paula Hines DO personally performed the services described in this documentation    as scribed in my presence :

## 2021-01-05 ENCOUNTER — EVALUATION (OUTPATIENT)
Dept: PHYSICAL THERAPY | Age: 61
End: 2021-01-05
Payer: COMMERCIAL

## 2021-01-05 DIAGNOSIS — S72.22XD CLOSED DISPLACED SUBTROCHANTERIC FRACTURE OF LEFT FEMUR WITH ROUTINE HEALING, SUBSEQUENT ENCOUNTER: ICD-10-CM

## 2021-01-05 DIAGNOSIS — Z48.89 AFTERCARE FOLLOWING SURGERY: Primary | ICD-10-CM

## 2021-01-05 PROCEDURE — 97162 PT EVAL MOD COMPLEX 30 MIN: CPT | Performed by: PHYSICAL THERAPIST

## 2021-01-05 PROCEDURE — 97110 THERAPEUTIC EXERCISES: CPT | Performed by: PHYSICAL THERAPIST

## 2021-01-05 NOTE — PROGRESS NOTES
PT Evaluation     Today's date: 2021  Patient name: Federico Galeazzi  : 1960  MRN: [id number]  Referring provider: Emani Hannah MD  Dx:   Encounter Diagnosis     ICD-10-CM    1  Closed displaced subtrochanteric fracture of left femur with routine healing, subsequent encounter  S72  22XD PT plan of care cert/re-cert   2  Aftercare following surgery  Z48 89 PT plan of care cert/re-cert   3  Closed displaced subtrochanteric fracture of left femur, sequela  S72 22XS Ambulatory referral to Physical Therapy       Start Time: 1340  Stop Time: 1440  Total time in clinic (min): 60 minutes    Assessment  Assessment details: Federico Galeazzi is a 56 y o  male who presents with pain, decreased strength, decreased ROM and ambulatory dysfunction  Due to these impairments, Patient has difficulty performing a/iadls  Patient's clinical presentation is consistent with their referring diagnosis of right ORIF femur Fracture  Patient would benefit from skilled physical therapy to address their aforementioned impairments, improve their level of function and to improve their overall quality of life  Impairments: abnormal gait, abnormal or restricted ROM, abnormal movement, activity intolerance, impaired balance, impaired physical strength, lacks appropriate home exercise program, pain with function, safety issue, weight-bearing intolerance, poor posture  and poor body mechanics  Understanding of Dx/Px/POC: good   Prognosis: good    Goals  ST-3 WEEKS  1  Decrease pain by 2 points on VAS at its worst   2   Increase ROM by > 5 deg in all deficients planes  3   Increase LE by 1/2 MMT grade in all deficient planes  LT-6 WEEKS  1  Patient to be independent with a/iadls and progress ambulation from  to [employer] to No assistive device  2  Increase functional activities for leisure and home activities to previous LOF stair climbing and full RTW  3   Independent with HEP and/or fitness program     Plan  Patient would benefit from: skilled physical therapy  Planned modality interventions: cryotherapy, electrical stimulation/Russian stimulation, thermotherapy: hydrocollator packs and unattended electrical stimulation  Planned therapy interventions: activity modification, behavior modification, body mechanics training, aquatic therapy, flexibility, functional ROM exercises, home exercise program, IADL retraining, joint mobilization, manual therapy, neuromuscular re-education, patient education, postural training, strengthening, stretching, therapeutic activities and therapeutic exercise  Frequency: 3x week (2-3x week)  Duration in weeks: 12  Plan of Care beginning date: 2021  Plan of Care expiration date: 2021  Treatment plan discussed with: patient        Subjective Evaluation    History of Present Illness  Date of onset: 2020  Date of surgery: 2020  Mechanism of injury: Patient reports slipped and fell in driveway and   Fracture left femur with ORIF pinning on 2020, complains of groin pain and hip pain with movement, patient had 5 days at acute rehab in [place] 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          Not a recurrent problem   Quality of life: good    Pain  Current pain ratin  At best pain ratin  At worst pain ratin  Quality: knife-like  Relieving factors: ice, medications and rest  Aggravating factors: stair climbing, standing and walking  Progression: no change    Social Support  Steps to enter house: yes  Stairs in house: yes   Lives in: [facility]  Lives with: spouse      Diagnostic Tests  X-ray: abnormal  Patient Goals  Patient goals for therapy: independence with ADLs/IADLs, increased strength, increased motion and decreased pain          Objective     Observations   Left Hip  Positive for edema and incision  Negative for drainage  Palpation   Left   Hypertonic in the rectus femoris and TFL  Tenderness of the medial gastrocnemius       Active Range of Motion   Left Hip   Flexion: 100 degrees   Abduction: 25 degrees   External rotation (90/90): 20 degrees   Internal rotation (90/90): 20 degrees     Right Hip   Flexion: 110 degrees   Abduction: 40 degrees   External rotation (90/90): 55 degrees   Internal rotation (90/90): 45 degrees     Strength/Myotome Testing     Left Hip   Planes of Motion   Flexion: 4  Extension: 4-  Abduction: 3+  Adduction: 4  External rotation: 3+  Internal rotation: 3+    Right Hip   Normal muscle strength    Left Knee   Flexion: 4  Extension: 3+    Right Knee   Flexion: 4  Extension: 3+    Ambulation     Ambulation: Stairs   Ascend stairs: independent  Pattern: non-reciprocal  Railings: two rails  Descend stairs: independent  Pattern: non-reciprocal  Railings: two rails    Observational Gait   Gait: antalgic   Decreased walking speed and stride length       Additional Observational Gait Details  Uses RW    Functional Assessment        Single Leg Stance   Left: 0 seconds  Right: 9 seconds      Flowsheet Rows      Most Recent Value   PT/OT G-Codes   Current Score  37   Projected Score  69   Assessment Type  Evaluation   G code set  Mobility: Walking & Moving Around   Mobility: Walking and Moving Around Current Status ()  CK   Mobility: Walking and Moving Around Goal Status ()  CI             Precautions: HTN, colon sx      Manuals 1/05                         MT left hip 10                                      Neuro Re-Ed                                                                                                        Ther Ex             NU STEP 5 min            Ball exs 30x            SAQ 2/30            LAQ 2/30            Hip add 30x            Hip abd 30x            bridge 30x                         Ther Activity                                       Gait Training                                       Modalities

## 2021-01-06 NOTE — OCCUPATIONAL THERAPY NOTE
Occupational Therapy Discharge Summary:     Pt made good progress throughout rehab stay, from OT standpoint  Pt was D/C home with family support  At time of D/C pt was completing functional transfers with at independent level using RW, had assist for footwear and bathing   Pt recommended for the following DME: hip kit, leg , shower chair, BSC

## 2021-01-07 ENCOUNTER — IMMUNIZATIONS (OUTPATIENT)
Dept: FAMILY MEDICINE CLINIC | Facility: HOSPITAL | Age: 61
End: 2021-01-07

## 2021-01-07 DIAGNOSIS — Z23 ENCOUNTER FOR IMMUNIZATION: ICD-10-CM

## 2021-01-07 PROCEDURE — 0011A SARS-COV-2 / COVID-19 MRNA VACCINE (MODERNA) 100 MCG: CPT

## 2021-01-07 PROCEDURE — 91301 SARS-COV-2 / COVID-19 MRNA VACCINE (MODERNA) 100 MCG: CPT

## 2021-01-08 ENCOUNTER — OFFICE VISIT (OUTPATIENT)
Dept: PHYSICAL THERAPY | Age: 61
End: 2021-01-08
Payer: COMMERCIAL

## 2021-01-08 DIAGNOSIS — S72.22XD CLOSED DISPLACED SUBTROCHANTERIC FRACTURE OF LEFT FEMUR WITH ROUTINE HEALING, SUBSEQUENT ENCOUNTER: ICD-10-CM

## 2021-01-08 DIAGNOSIS — Z48.89 AFTERCARE FOLLOWING SURGERY: Primary | ICD-10-CM

## 2021-01-08 PROCEDURE — 97110 THERAPEUTIC EXERCISES: CPT | Performed by: PHYSICAL THERAPIST

## 2021-01-08 PROCEDURE — 97140 MANUAL THERAPY 1/> REGIONS: CPT | Performed by: PHYSICAL THERAPIST

## 2021-01-08 NOTE — PROGRESS NOTES
Daily Note     Today's date: 2021  Patient name: Jefry Colindres  : 1960  MRN: [id number]  Referring provider: Cuca Cobos MD  Dx:   Encounter Diagnosis     ICD-10-CM    1  Aftercare following surgery  Z48 89    2  Closed displaced subtrochanteric fracture of left femur with routine healing, subsequent encounter  S72  22XD        Start Time: 0900  Stop Time: 0945  Total time in clinic (min): 45 minutes    Subjective: some soreness after first visit      Objective: See treatment diary below      Assessment: Tolerated treatment well  Patient demonstrated fatigue post treatment, exhibited good technique with therapeutic exercises and would benefit from continued PT, tightness with hip abduction and hip extension, WBAT left LE with RW      Plan: Progress treatment as tolerated         Precautions: HTN, colon sx      Manuals                         MT left hip 10 10                                     Neuro Re-Ed                                                                                                        Ther Ex             NU STEP 5 min 5 min           Ball exs 30x 30x           SAQ            LAQ            Hip add 30x 30x           Hip abd 30x 30x           bridge 30x 30x           Stand hip abd  30x           Ther Activity                                       Gait Training                                       Modalities

## 2021-01-11 ENCOUNTER — OFFICE VISIT (OUTPATIENT)
Dept: PHYSICAL THERAPY | Age: 61
End: 2021-01-11
Payer: COMMERCIAL

## 2021-01-11 DIAGNOSIS — Z48.89 AFTERCARE FOLLOWING SURGERY: Primary | ICD-10-CM

## 2021-01-11 DIAGNOSIS — S72.22XD CLOSED DISPLACED SUBTROCHANTERIC FRACTURE OF LEFT FEMUR WITH ROUTINE HEALING, SUBSEQUENT ENCOUNTER: ICD-10-CM

## 2021-01-11 PROCEDURE — 97110 THERAPEUTIC EXERCISES: CPT

## 2021-01-11 NOTE — PROGRESS NOTES
Daily Note     Today's date: 2021  Patient name: Trupti Baker  : 1960  MRN: [id number]  Referring provider: Nba Reeves MD  Dx:   Encounter Diagnosis     ICD-10-CM    1  Aftercare following surgery  Z48 89    2  Closed displaced subtrochanteric fracture of left femur with routine healing, subsequent encounter  S72  22XD        Start Time: 344  Stop Time: 5949  Total time in clinic (min): 45 minutes    Subjective: Patient reports increased pain in his left hip and leg today and over the  Weekend  Objective: See treatment diary below      Assessment: Tolerated treatment fair, increased pain with exercises and manual therapy in his left hip,  Guarded with all planes of motion  Pain with transfers on/off mat table  Decreased reps with bridging today due to increased pain  Patient notes feeling better after therapy, was walking better in RW  Patient demonstrated fatigue post treatment and would benefit from continued PT      Plan: Continue per plan of care        Precautions: HTN, colon sx      Manuals                        MT left hip 10 10 10                                    Neuro Re-Ed                                                                                                        Ther Ex             NU STEP 5 min 5 min 7'          Ball exs 30x 30x 30x          SAQ           LAQ           Hip add 30x 30x 30x          Hip abd 30x 30x 30x          bridge 30x 30x 15x          Stand hip abd  30x 30x          Heel raises   30x                                    Gait Training                                       Modalities

## 2021-01-13 ENCOUNTER — OFFICE VISIT (OUTPATIENT)
Dept: PHYSICAL THERAPY | Age: 61
End: 2021-01-13
Payer: COMMERCIAL

## 2021-01-13 DIAGNOSIS — Z48.89 AFTERCARE FOLLOWING SURGERY: Primary | ICD-10-CM

## 2021-01-13 DIAGNOSIS — S72.22XD CLOSED DISPLACED SUBTROCHANTERIC FRACTURE OF LEFT FEMUR WITH ROUTINE HEALING, SUBSEQUENT ENCOUNTER: ICD-10-CM

## 2021-01-13 PROCEDURE — 97110 THERAPEUTIC EXERCISES: CPT

## 2021-01-13 NOTE — PROGRESS NOTES
Daily Note     Today's date: 2021  Patient name: Delvis Gutierrez  : 1960  MRN: [id number]  Referring provider: Davin León MD  Dx:   Encounter Diagnosis     ICD-10-CM    1  Aftercare following surgery  Z48 89    2  Closed displaced subtrochanteric fracture of left femur with routine healing, subsequent encounter  S72  22XD        Start Time: 6675  Stop Time: 1440  Total time in clinic (min): 55 minutes    Subjective: Reports mild pain today, 1/10 at his L hip  Objective: See treatment diary below      Assessment: Tolerated treatment well  Challenged by standing exercises, attempted hip abduction on R with L as stance limb but is unable to perform at this time secondary to pain  Rest periods needed t/o session secondary to discomfort  Limited L hip flexion and ER PROM noted, again limited by pain  Patient would benefit from continued PT  Plan: Continue per plan of care        Precautions: HTN, colon sx      Manuals                       MT left hip 10 10 10 10'                                   Neuro Re-Ed                                                                                                        Ther Ex             NU STEP 5 min 5 min 7' 8'         Slant board     L2 30"x4         Ball exs 30x 30x 30x 30x         SAQ  2# 30x         LAQ  2# 30x         Hip add 30x 30x 30x 30x         Hip abd 30x 30x 30x Blue 30x         bridge 30x 30x 15x 30x         Stand L hip abd  30x 30x 30x         Heel raises   30x 30x                                   Gait Training                                       Modalities

## 2021-01-15 ENCOUNTER — OFFICE VISIT (OUTPATIENT)
Dept: PHYSICAL THERAPY | Age: 61
End: 2021-01-15
Payer: COMMERCIAL

## 2021-01-15 DIAGNOSIS — S72.22XD CLOSED DISPLACED SUBTROCHANTERIC FRACTURE OF LEFT FEMUR WITH ROUTINE HEALING, SUBSEQUENT ENCOUNTER: ICD-10-CM

## 2021-01-15 DIAGNOSIS — Z48.89 AFTERCARE FOLLOWING SURGERY: Primary | ICD-10-CM

## 2021-01-15 PROCEDURE — 97110 THERAPEUTIC EXERCISES: CPT

## 2021-01-15 PROCEDURE — 97530 THERAPEUTIC ACTIVITIES: CPT

## 2021-01-15 NOTE — PROGRESS NOTES
Daily Note     Today's date: 1/15/2021  Patient name: Rowdy Trevino  : 1960  MRN: [id number]  Referring provider: Jose Antonio Macdonald MD  Dx:   Encounter Diagnosis     ICD-10-CM    1  Aftercare following surgery  Z48 89    2  Closed displaced subtrochanteric fracture of left femur with routine healing, subsequent encounter  S72  22XD        Start Time: 1330  Stop Time: 1425  Total time in clinic (min): 55 minutes    Subjective: no c/o pain today, getting better  Objective: See treatment diary below      Assessment: Tolerated treatment fairly well, continues to have pain with prom all planes, but less today  Worked on WB on the L but was unsuccessful due fearfulness and some pain  Increased program with good tolerance, pain with eccentric lowering leg  Patient demonstrated fatigue post treatment and would benefit from continued PT      Plan: Continue per plan of care        Precautions: HTN, colon sx      Manuals 1/05 1/08 1/11 1/13 1/15                     MT left hip 10 10 10 10' 10'                                  Neuro Re-Ed                                                                                                        Ther Ex             NU STEP 5 min 5 min 7' 8' 9'        Slant board     L2 30"x4 30"x4        Ball exs 30x 30x 30x 30x 30x        SAQ  2# 30x 3#/30        LAQ  2# 30x 3/30        Hip add 30x 30x 30x 30x 30x        Hip abd 30x 30x 30x Blue 30x 30x        bridge 30x 30x 15x 30x 30x        Stand L hip abd  30x 30x 30x 30x        Heel raises   30x 30x 30x        Standing march     20x        Standing ext     30x        Gait Training                                       Modalities

## 2021-01-18 ENCOUNTER — OFFICE VISIT (OUTPATIENT)
Dept: PHYSICAL THERAPY | Age: 61
End: 2021-01-18
Payer: COMMERCIAL

## 2021-01-18 DIAGNOSIS — Z48.89 AFTERCARE FOLLOWING SURGERY: Primary | ICD-10-CM

## 2021-01-18 DIAGNOSIS — S72.22XD CLOSED DISPLACED SUBTROCHANTERIC FRACTURE OF LEFT FEMUR WITH ROUTINE HEALING, SUBSEQUENT ENCOUNTER: ICD-10-CM

## 2021-01-18 PROCEDURE — 97110 THERAPEUTIC EXERCISES: CPT | Performed by: PHYSICAL THERAPIST

## 2021-01-18 PROCEDURE — 97140 MANUAL THERAPY 1/> REGIONS: CPT | Performed by: PHYSICAL THERAPIST

## 2021-01-18 NOTE — PROGRESS NOTES
Daily Note     Today's date: 2021  Patient name: Rowdy Trevino  : 1960  MRN: [id number]  Referring provider: Jose Antonio Macdonald MD  Dx:   Encounter Diagnosis     ICD-10-CM    1  Aftercare following surgery  Z48 89    2  Closed displaced subtrochanteric fracture of left femur with routine healing, subsequent encounter  S72  22XD        Start Time:   Stop Time: 153  Total time in clinic (min): 50 minutes    Subjective: Patient reports hip is sore today at 6/10 from working      Objective: See treatment diary below      Assessment: Tolerated treatment fair  Patient demonstrated fatigue post treatment, exhibited good technique with therapeutic exercises and would benefit from continued PT, uses RW with antalgic gait pattern, also left low back pain and difficulty with bridges      Plan: Progress treatment as tolerated         Precautions: HTN, colon sx      Manuals 1/05 1/08 1/11 1/13 1/15 1/18                    MT left hip 10 10 10 10' 10' 10                                 Neuro Re-Ed                                                                                                        Ther Ex             NU STEP 5 min 5 min 7' 8' 9' 6        Slant board     L2 30"x4 30"x4 4x       Ball exs 30x 30x 30x 30x 30x 30x       SAQ 2/30 2/30 2/30 2# 30x 3#/30 4/30       LAQ 2/30 2/30 2/30 2# 30x 3/30 4/30       Hip add 30x 30x 30x 30x 30x 30x       Hip abd 30x 30x 30x Blue 30x 30x 30/30       bridge 30x 30x 15x 30x 30x nt       Stand L hip abd  30x 30x 30x 30x 1/30       Heel raises   30x 30x 30x 30x       Standing march     20x 30x       Standing ext     30x 30x       Gait Training                                       Modalities

## 2021-01-20 ENCOUNTER — APPOINTMENT (OUTPATIENT)
Dept: PHYSICAL THERAPY | Age: 61
End: 2021-01-20
Payer: COMMERCIAL

## 2021-01-22 ENCOUNTER — OFFICE VISIT (OUTPATIENT)
Dept: PHYSICAL THERAPY | Age: 61
End: 2021-01-22
Payer: COMMERCIAL

## 2021-01-22 DIAGNOSIS — S72.22XD CLOSED DISPLACED SUBTROCHANTERIC FRACTURE OF LEFT FEMUR WITH ROUTINE HEALING, SUBSEQUENT ENCOUNTER: ICD-10-CM

## 2021-01-22 DIAGNOSIS — Z48.89 AFTERCARE FOLLOWING SURGERY: Primary | ICD-10-CM

## 2021-01-22 PROCEDURE — 97140 MANUAL THERAPY 1/> REGIONS: CPT | Performed by: PHYSICAL THERAPIST

## 2021-01-22 PROCEDURE — 97110 THERAPEUTIC EXERCISES: CPT | Performed by: PHYSICAL THERAPIST

## 2021-01-29 ENCOUNTER — OFFICE VISIT (OUTPATIENT)
Dept: OBGYN CLINIC | Facility: HOSPITAL | Age: 61
End: 2021-01-29

## 2021-01-29 ENCOUNTER — HOSPITAL ENCOUNTER (OUTPATIENT)
Dept: RADIOLOGY | Facility: HOSPITAL | Age: 61
Discharge: HOME | End: 2021-01-29
Attending: ORTHOPAEDIC SURGERY
Payer: COMMERCIAL

## 2021-01-29 ENCOUNTER — OFFICE VISIT (OUTPATIENT)
Dept: PHYSICAL THERAPY | Age: 61
End: 2021-01-29
Payer: COMMERCIAL

## 2021-01-29 VITALS
HEIGHT: 75 IN | WEIGHT: 178 LBS | DIASTOLIC BLOOD PRESSURE: 81 MMHG | SYSTOLIC BLOOD PRESSURE: 143 MMHG | HEART RATE: 62 BPM | BODY MASS INDEX: 22.13 KG/M2

## 2021-01-29 DIAGNOSIS — Z48.89 AFTERCARE FOLLOWING SURGERY: Primary | ICD-10-CM

## 2021-01-29 DIAGNOSIS — S72.22XD CLOSED DISPLACED SUBTROCHANTERIC FRACTURE OF LEFT FEMUR WITH ROUTINE HEALING, SUBSEQUENT ENCOUNTER: ICD-10-CM

## 2021-01-29 DIAGNOSIS — Z48.89 AFTERCARE FOLLOWING SURGERY: ICD-10-CM

## 2021-01-29 PROCEDURE — 97110 THERAPEUTIC EXERCISES: CPT

## 2021-01-29 PROCEDURE — 97116 GAIT TRAINING THERAPY: CPT

## 2021-01-29 PROCEDURE — 73552 X-RAY EXAM OF FEMUR 2/>: CPT

## 2021-01-29 PROCEDURE — 99024 POSTOP FOLLOW-UP VISIT: CPT | Performed by: ORTHOPAEDIC SURGERY

## 2021-01-29 PROCEDURE — 72170 X-RAY EXAM OF PELVIS: CPT

## 2021-01-29 NOTE — PROGRESS NOTES
Daily Note     Today's date: 2021  Patient name: Parris Hathaway  : 1960  MRN: [id number]  Referring provider: Mel Sheppard MD  Dx:   Encounter Diagnosis     ICD-10-CM    1  Aftercare following surgery  Z48 89    2  Closed displaced subtrochanteric fracture of left femur with routine healing, subsequent encounter  S72  22XD                   Subjective: Patient reports having pain in his ant left knee and in the joint  C/o 2/10left leg pain and it increases in the knee to 4/10 with walking  Objective: See treatment diary below      Assessment: Tolerated treatment fairly well, tolerating more with exercises, continues to have increased pain with certain movements rachelle with IR/ER in left lat leg and knee, tenderness lateral knee itb,  strength and ROM slowly improving  Patient able to ambulate with spc today, requires with min A when turning due to increased guarding fearful of pain  Cues for gait sequence with spc  Patient demonstrated fatigue post treatment and would benefit from continued PT      Plan: Continue per plan of care        Precautions: HTN, colon sx      Manuals 1/05 1/08 1/11 1/13 1/15 1/18 1/22 1/29                  MT left hip 10 10 10 10' 10' 10 10 10                               Neuro Re-Ed                                                                                                        Ther Ex             NU STEP 5 min 5 min 7' 8' 9' 6  6 min 8'     Slant board     L2 30"x4 30"x4 4x 4x 4x     Ball exs 30x 30x 30x 30x 30x 30x 30x 30x     SAQ  2# 30x 3#/30      LAQ  2# 30x 3/30 30     Hip add 30x 30x 30x 30x 30x 30x 30x 30x     Hip abd 30x 30x 30x Blue 30x 30x 30/30 30/30 30#/30     bridge 30x 30x 15x 30x 30x nt nt Dc  lbp     Stand L hip abd  30x 30x 30x 30x 1/30 30     Heel raises   30x 30x 30x 30x 30x 30x     Standing march     20x 30x 3/30 3/30     Standing ext     30x 30x 3/30 3/30                  Leg press 65/30 65/30                  Gait Training             spc        20'x2

## 2021-01-29 NOTE — PROGRESS NOTES
Assessment:   S/P INSERTION NAIL IM FEMUR ANTEGRADE For Subtrochanteric fracture - Left on 12/19/2020    Plan:   Continue weight-bearing activities to tolerance  Continue with physical therapy to continue strengthening the left lower extremity  No longer needs DVT prophylaxis  Can continue Advil and Tylenol as needed    transition to a cane when ready  Follow-up in 6 weeks       SUBJECTIVE:  Eloise Wall is a 59 y o  male who presents for  6 week follow up after [address] For Subtrochanteric fracture - Left on 12/19/2020  Patient is doing well overall  He is back to modified work activities  He states that he can get to the day, however, he does have a lot of soreness at the end of the day  Patient has been taken Advil 400 mg at the end of the day and Tylenol 650 mg twice a day for pain control  He has been working with physical therapy and making good progress  PHYSICAL EXAMINATION:  Vital signs: /81   Pulse 62   Ht 6' 3" (1 905 m)   Wt 80 7 kg (178 lb)   BMI 22 25 kg/m²   General: well developed and well nourished, alert, oriented times 3 and appears comfortable  Psychiatric: Normal    MUSCULOSKELETAL EXAMINATION:    Surgical Site:  Left hip long IM nail  Incision: Clean, dry, intact  Range of Motion: Full hip flexion, external rotation to 30°, internal rotation to 15°    Full knee flexion/extension  Neurovascular status: Neuro intact, good cap refill    STUDIES REVIEWED:  Imaging studies reviewed by Dr Alfredo Narayan and demonstrate Orthopedic implants in continued position with maintained alignment of hip fracture, healing noted on exam today      PROCEDURES PERFORMED:  Procedures  No Procedures performed today    Scribe Attestation    I,:  Cornelius Butler PA-C am acting as a scribe while in the presence of the attending physician :       I,:  Rafal Lewis DO personally performed the services described in this documentation    as scribed in my presence :

## 2021-02-03 ENCOUNTER — OFFICE VISIT (OUTPATIENT)
Dept: PHYSICAL THERAPY | Age: 61
End: 2021-02-03
Payer: COMMERCIAL

## 2021-02-03 DIAGNOSIS — S72.22XD CLOSED DISPLACED SUBTROCHANTERIC FRACTURE OF LEFT FEMUR WITH ROUTINE HEALING, SUBSEQUENT ENCOUNTER: Primary | ICD-10-CM

## 2021-02-03 PROCEDURE — 97110 THERAPEUTIC EXERCISES: CPT

## 2021-02-03 PROCEDURE — 97112 NEUROMUSCULAR REEDUCATION: CPT

## 2021-02-03 NOTE — PROGRESS NOTES
Daily Note     Today's date: 2/3/2021  Patient name: Leni Silva  : 1960  MRN: [id number]  Referring provider: Nomi Burden MD  Dx:   Encounter Diagnosis     ICD-10-CM    1  Closed displaced subtrochanteric fracture of left femur with routine healing, subsequent encounter  S72  22XD        Start Time: 09  Stop Time: 1025  Total time in clinic (min): 55 minutes    Subjective: Patient reports getting better, less pain in his hip, most of his pain is in the left lateral thigh and knee  Objective: See treatment diary below      Assessment: Tolerated treatment fairly well, continues to have pain in his hip and later thigh, lack strength in left ant hip, added standing slr, and biodex to work on  [employer] on the left  Continues to have pain with SLS and uses RW for long distance  Patient demonstrated fatigue post treatment and would benefit from continued PT      Plan: Continue per plan of care        Precautions: HTN, colon sx      Manuals 1/05 1/08 1/11 1/13 1/15 1/18 1/22 1/29 2/3                 MT left hip 10 10 10 10' 10' 10 10 10 10                              Neuro Re-Ed                                                                                                        Ther Ex             NU STEP 5 min 5 min 7' 8' 9' 6  6 min 8' 8'    Slant board     L2 30"x4 30"x4 4x 4x 4x 4x    Ball exs 30x 30x 30x 30x 30x 30x 30x 30x 30x    SAQ  2# 30x 3#/30     LAQ  2# 30x 3/30 4/30 4/30 4/30 5/30    Hip add 30x 30x 30x 30x 30x 30x 30x 30x 30x    Hip abd 30x 30x 30x Blue 30x 30x 30/30 /30 30#/30 25#/30    bridge 30x 30x 15x 30x 30x nt nt Dc  lbp     Stand L hip abd  30x 30x 30x 30x 1/30 2/30 2/30 2/30    Heel raises   30x 30x 30x 30x 30x 30x 30x    Standing march     20x 30x 3/30 3/30 3/30    Standing ext     30x 30x 3/30 3/30 3/30    Standing SLR         30    Leg press       65/30 65/30 70/30    biodex         5'  WB 2'  LOS 2'    Gait Training spc        20'x2

## 2021-02-05 ENCOUNTER — IMMUNIZATIONS (OUTPATIENT)
Dept: FAMILY MEDICINE CLINIC | Facility: HOSPITAL | Age: 61
End: 2021-02-05

## 2021-02-05 ENCOUNTER — APPOINTMENT (OUTPATIENT)
Dept: PHYSICAL THERAPY | Age: 61
End: 2021-02-05
Payer: COMMERCIAL

## 2021-02-05 DIAGNOSIS — Z23 ENCOUNTER FOR IMMUNIZATION: Primary | ICD-10-CM

## 2021-02-05 PROCEDURE — 0012A SARS-COV-2 / COVID-19 MRNA VACCINE (MODERNA) 100 MCG: CPT

## 2021-02-05 PROCEDURE — 91301 SARS-COV-2 / COVID-19 MRNA VACCINE (MODERNA) 100 MCG: CPT

## 2021-02-11 NOTE — PROGRESS NOTES
Daily Note     Today's date: 2021  Patient name: Maury Downs  : 1960  MRN: [id number]  Referring provider: Gudelia Calderon MD  Dx:   Encounter Diagnosis     ICD-10-CM    1  Closed displaced subtrochanteric fracture of left femur with routine healing, subsequent encounter  S72  22XD    2  Aftercare following surgery  Z48 89        Start Time: 930  Stop Time: 433  Total time in clinic (min): 45 minutes    Subjective: Patient reports hurting today in his left hip and thigh  Notes getting better, and trying to ambulate with spc more now  Objective: See treatment diary below      Assessment: Tolerated treatment well, continues to have pain TE ant and lat thigh  Ambulating with spc throughout session  Increased antalgic gait as fatigue increased  Increased program, added supine slr with fair tolerance 10 reps  Patient demonstrated fatigue post treatment and would benefit from continued PT      Plan: Continue per plan of care        Precautions: HTN, colon sx      Manuals 1/05 1/08 1/11 1/13 1/15 1/18 1/22 1/29 2/3 2/12                MT left hip 10 10 10 10' 10' 10 10 10 10 10                             Neuro Re-Ed                                                                                                        Ther Ex             NU STEP 5 min 5 min 7' 8' 9' 6  6 min 8' 8' 8'   Slant board     L2 30"x4 30"x4 4x 4x 4x 4x 4x   Ball exs 30x 30x 30x 30x 30x 30x 30x 30x 30x 30x   SAQ  2# 30x 3#/30    LAQ  2# 30x 3/30 4/30 4/30 4/30 5 5/30   Hip add 30x 30x 30x 30x 30x 30x 30x 30x 30x 30x   Hip abd 30x 30x 30x Blue 30x 30x 30/30 30/30 30#/30 25#/30 25#/30   Supine slr          10x   bridge 30x 30x 15x 30x 30x nt nt Dc  lbp     Stand L hip abd  30x 30x 30x 30x 1/30 2/30 2/30 2/30 3/30   Heel raises   30x 30x 30x 30x 30x 30x 30x 30x   Standing march     20x 30x 3/30 3/30 3/30 3/30   Standing ext     30x 30x 3/30 3/30 3/30 3/30   Standing SLR 2/30 3/30   Leg press       65/30 65/30 70/30 75/30   biodex         5'  WB 2'  LOS 2' 8'  WB4'  LOS  3'  L10   Gait Training             spc        20'x2

## 2021-02-12 ENCOUNTER — OFFICE VISIT (OUTPATIENT)
Dept: PHYSICAL THERAPY | Age: 61
End: 2021-02-12
Payer: COMMERCIAL

## 2021-02-12 DIAGNOSIS — S72.22XD CLOSED DISPLACED SUBTROCHANTERIC FRACTURE OF LEFT FEMUR WITH ROUTINE HEALING, SUBSEQUENT ENCOUNTER: Primary | ICD-10-CM

## 2021-02-12 DIAGNOSIS — Z48.89 AFTERCARE FOLLOWING SURGERY: ICD-10-CM

## 2021-02-12 PROCEDURE — 97110 THERAPEUTIC EXERCISES: CPT

## 2021-02-19 ENCOUNTER — APPOINTMENT (OUTPATIENT)
Dept: PHYSICAL THERAPY | Age: 61
End: 2021-02-19
Payer: COMMERCIAL

## 2021-02-26 ENCOUNTER — TELEPHONE (OUTPATIENT)
Dept: OBGYN CLINIC | Facility: HOSPITAL | Age: 61
End: 2021-02-26

## 2021-02-26 ENCOUNTER — EVALUATION (OUTPATIENT)
Dept: PHYSICAL THERAPY | Age: 61
End: 2021-02-26
Payer: COMMERCIAL

## 2021-02-26 DIAGNOSIS — S72.22XD CLOSED DISPLACED SUBTROCHANTERIC FRACTURE OF LEFT FEMUR WITH ROUTINE HEALING, SUBSEQUENT ENCOUNTER: Primary | ICD-10-CM

## 2021-02-26 PROCEDURE — 97110 THERAPEUTIC EXERCISES: CPT | Performed by: PHYSICAL THERAPIST

## 2021-02-26 PROCEDURE — 97140 MANUAL THERAPY 1/> REGIONS: CPT | Performed by: PHYSICAL THERAPIST

## 2021-02-26 NOTE — TELEPHONE ENCOUNTER
Patient sees Dr Lauren Orellana  Patient is thinking about going to a chiropractor and he wanted to check with Dr Lauren Orellana to make sure this would be okay  He doesn't think the pain is related to anything with the surgery but wanted to get his take on it            CB: [phone]

## 2021-02-26 NOTE — PROGRESS NOTES
PT Re-Evaluation     Today's date: 2021  Patient name: Dari Mandujano  : 1960  MRN: [id number]  Referring provider: Marlene Hung MD  Dx:   Encounter Diagnosis     ICD-10-CM    1  Closed displaced subtrochanteric fracture of left femur with routine healing, subsequent encounter  S72  22XD        Start Time: 1000  Stop Time: 1100  Total time in clinic (min): 60 minutes    Assessment  Assessment details: Patient is able to walk much better but has pain with prolonged standing greater than 5 minutes  Patient is making slow steady progress with increasd hip ROM and strength noted as well as improved FOTO score  Patient has returned to work x 1 month and is now ambulating with a cane   Patient notes good  progress  after 10 PT visits,but needs more strength(hip abduction) to eliminate trendelenburg Gait  Impairments: abnormal gait, abnormal or restricted ROM, abnormal movement, activity intolerance, impaired balance, impaired physical strength, lacks appropriate home exercise program, pain with function, safety issue, weight-bearing intolerance, poor posture  and poor body mechanics  Understanding of Dx/Px/POC: good   Prognosis: good    Goals  ST-3 WEEKS  1  Decrease pain by 2 points on VAS at its worst MET  2  Increase ROM by > 5 deg in all deficients planes  MET  3  Increase LE by 1/2 MMT grade in all deficient planes  WORKING TOWARDS    LT-6 WEEKS  1  Patient to be independent with a/iadls and progress ambulation from  to [place] to No assistive device  WORKING TOWARDS  2  Increase functional activities for leisure and home activities to previous LOF stair climbing and full RTW  WORKING TOWARDS  3  Independent with HEP and/or fitness program   NEW GOAL  1   Patient will continue with aggressive strengthening exercise program with emphlysis on gluteus medius so that he can walk without trendelenburg sign and increase standing tolerance    Plan  Patient would benefit from: skilled physical therapy  Planned modality interventions: cryotherapy, electrical stimulation/Russian stimulation, thermotherapy: hydrocollator packs and unattended electrical stimulation  Planned therapy interventions: activity modification, behavior modification, body mechanics training, aquatic therapy, flexibility, functional ROM exercises, home exercise program, IADL retraining, joint mobilization, manual therapy, neuromuscular re-education, patient education, postural training, strengthening, stretching, therapeutic activities and therapeutic exercise  Frequency: 3x week (2-3x week)  Duration in weeks: 6  Plan of Care beginning date: 2021  Plan of Care expiration date: 2021  Treatment plan discussed with: patient        Subjective Evaluation    History of Present Illness  Date of onset: 2020  Date of surgery: 2020  Mechanism of injury: Patient reports some improvement but still has soreness to left thigh area  Patient has returned to work for 1 month now and uses cane  Patient continues to note hip progression after 10 PT visits  Not a recurrent problem   Quality of life: good    Pain  Current pain rating: 3  At best pain ratin  At worst pain ratin  Quality: knife-like  Relieving factors: ice, medications and rest  Aggravating factors: stair climbing, standing and walking  Progression: improved    Social Support  Steps to enter house: yes  Stairs in house: yes   L[facility] in: [facility]  Lives with: spouse      Diagnostic Tests  X-ray: abnormal  Patient Goals  Patient goals for therapy: independence with ADLs/IADLs, increased strength, increased motion and decreased pain          Objective     Observations   Left Hip  Negative for drainage, edema and incision  Palpation   Left   Hypertonic in the rectus femoris and TFL  Tenderness of the medial gastrocnemius, rectus femoris and TFL       Active Range of Motion   Left Hip   Flexion: 100 degrees   Abduction: 35 degrees   External rotation (90/90): 40 degrees   Internal rotation (90/90): 35 degrees     Right Hip   Flexion: 110 degrees   Abduction: 40 degrees   External rotation (90/90): 55 degrees   Internal rotation (90/90): 45 degrees     Strength/Myotome Testing     Left Hip   Planes of Motion   Flexion: 4+  Extension: 4  Abduction: 3+  Adduction: 4+  External rotation: 3+  Internal rotation: 3+    Right Hip   Normal muscle strength    Left Knee   Flexion: 4  Extension: 3+    Right Knee   Flexion: 4  Extension: 3+    Ambulation     Ambulation: Stairs   Ascend stairs: independent  Pattern: non-reciprocal  Railings: two rails  Descend stairs: independent  Pattern: non-reciprocal  Railings: two rails    Observational Gait   Gait: antalgic   Decreased walking speed and stride length       Additional Observational Gait Details  Uses cane    Functional Assessment        Single Leg Stance   Left: 0 seconds  Right: 9 seconds      Flowsheet Rows      Most Recent Value   PT/OT G-Codes   Current Score  59   Projected Score  69   Assessment Type  Re-evaluation   G code set  Mobility: Walking & Moving Around   Mobility: Walking and Moving Around Current Status ()  CJ   Mobility: Walking and Moving Around Goal Status ()  CI              Precautions: HTN, colon sx      Manuals 2/26 1/08 1/11 1/13 1/15 1/18 1/22 1/29 2/3 2/12                MT left hip 10 10 10 10' 10' 10 10 10 10 10                             Neuro Re-Ed                                                                                                        Ther Ex             NU STEP 8 min 5 min 7' 8' 9' 6  6 min 8' 8' 8'   Slant board     L2 30"x4 30"x4 4x 4x 4x 4x 4x   Ball exs 30x 30x 30x 30x 30x 30x 30x 30x 30x 30x   SAQ 6/30 2/30 2/30 2# 30x 3#/30 4/30 4/30 4/30 5/30 5/30   LAQ 6/30 2/30 2/30 2# 30x 3/30 4/30 4/30 4/30 5/30 5/30   Hip add 30/30 30x 30x 30x 30x 30x 30x 30x 30x 30x   Hip abd 30/30 30x 30x Blue 30x 30x 30/30 30/30 30#/30 25#/30 25#/30   Supine slr          10x bridge 30x 30x 15x 30x 30x nt nt Dc  lbp     Stand L hip abd 3/30 30x 30x 30x 30x 1/30 2/30 2/30 2/30 3/30   Heel raises 30x  30x 30x 30x 30x 30x 30x 30x 30x   Standing march 3/30    20x 30x 3/30 3/30 3/30 3/30   Standing ext 3/30    30x 30x 3/30 3/30 3/30 3/30   Standing SLR 3/30        2/30 3/30   Leg press 75/30      65/30 65/30 70/30 75/30   biodex         5'  WB 2'  LOS 2' 8'  WB4'  LOS  3'  L10   Gait Training             spc        20'x2

## 2021-03-05 ENCOUNTER — OFFICE VISIT (OUTPATIENT)
Dept: PHYSICAL THERAPY | Age: 61
End: 2021-03-05
Payer: COMMERCIAL

## 2021-03-05 DIAGNOSIS — Z48.89 AFTERCARE FOLLOWING SURGERY: ICD-10-CM

## 2021-03-05 DIAGNOSIS — S72.22XD CLOSED DISPLACED SUBTROCHANTERIC FRACTURE OF LEFT FEMUR WITH ROUTINE HEALING, SUBSEQUENT ENCOUNTER: Primary | ICD-10-CM

## 2021-03-05 PROCEDURE — 97110 THERAPEUTIC EXERCISES: CPT | Performed by: PHYSICAL THERAPIST

## 2021-03-05 PROCEDURE — 97140 MANUAL THERAPY 1/> REGIONS: CPT | Performed by: PHYSICAL THERAPIST

## 2021-03-05 NOTE — PROGRESS NOTES
Daily Note     Today's date: 3/5/2021  Patient name: Lillian Levin  : 1960  MRN: [id number]  Referring provider: Gissel Larson MD  Dx:   Encounter Diagnosis     ICD-10-CM    1  Closed displaced subtrochanteric fracture of left femur with routine healing, subsequent encounter  S72  22XD    2  Aftercare following surgery  Z48 89        Start Time: 1345  Stop Time: 1440  Total time in clinic (min): 55 minutes    Subjective: MD recheck is next week      Objective: See treatment diary below      Assessment: Tolerated treatment well  Patient demonstrated fatigue post treatment, exhibited good technique with therapeutic exercises and would benefit from continued PT, still with weakness to hip extension and abduction       Plan: Progress treatment as tolerated         Precautions: HTN, colon sx      Manuals 2/26 3/5 1/11 1/13 1/15 1/18 1/22 1/29 2/3 2/12                MT left hip 10 10 10 10' 10' 10 10 10 10 10                             Neuro Re-Ed                                                                                                        Ther Ex             NU STEP 8 min 5 min 7' 8' 9' 6  6 min 8' 8' 8'   Slant board     L2 30"x4 30"x4 4x 4x 4x 4x 4x   Ball exs 30x 30x 30x 30x 30x 30x 30x 30x 30x 30x   SAQ  2# 30x 3#/30    LAQ  2# 30x 3/30 4/30 4/30 4/30 5/30 5/30   Hip add  30x 30x 30x 30x 30x 30x 30x 30x   Hip abd 30 3030 30x Blue 30x 30x 30/30 30/30 30#/30 25#/30 25#/30   Supine slr          10x   bridge 30x 30x 15x 30x 30x nt nt Dc  lbp     Stand L hip abd 3/30 30x 30x 30x 30x 1/30 2/30 2/30 2/30 3/30   Heel raises 30x  30x 30x 30x 30x 30x 30x 30x 30x   Standing march 3/30    20x 30x 3/30 3/30 3/30 3/30   Standing ext 3/30    30x 30x 3/30 3/30 3/30 3/30   Standing SLR 3/30        2/30 3/30   Leg press 30   biodex         5'  WB 2'  LOS 2' 8'  WB4'  LOS  3'  L10   Gait Training             spc 20'x2

## 2021-03-12 ENCOUNTER — OFFICE VISIT (OUTPATIENT)
Dept: PHYSICAL THERAPY | Age: 61
End: 2021-03-12
Payer: COMMERCIAL

## 2021-03-12 DIAGNOSIS — Z48.89 AFTERCARE FOLLOWING SURGERY: ICD-10-CM

## 2021-03-12 DIAGNOSIS — S72.22XD CLOSED DISPLACED SUBTROCHANTERIC FRACTURE OF LEFT FEMUR WITH ROUTINE HEALING, SUBSEQUENT ENCOUNTER: Primary | ICD-10-CM

## 2021-03-12 PROCEDURE — 97140 MANUAL THERAPY 1/> REGIONS: CPT | Performed by: PHYSICAL THERAPIST

## 2021-03-12 PROCEDURE — 97110 THERAPEUTIC EXERCISES: CPT | Performed by: PHYSICAL THERAPIST

## 2021-03-12 NOTE — PROGRESS NOTES
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      Objective: See treatment diary below      Assessment: Tolerated treatment well  Patient demonstrated fatigue post treatment, exhibited good technique with therapeutic exercises and would benefit from continued PT, slowly progressing with left hip ROM and strength       Plan: Progress treatment as tolerated         Precautions: HTN, colon sx      Manuals 2/26 3/5 3/12 1/13 1/15 1/18 1/22 1/29 2/3 2/12                MT left hip 10 10 10 10' 10' 10 10 10 10 10                             Neuro Re-Ed                                       Step ups   10x                                                              Ther Ex             NU STEP 8 min 5 min 7' 8' 9' 6  6 min 8' 8' 8'   Slant board     L2 30"x4 30"x4 4x 4x 4x 4x 4x   Ball exs 30x 30x 30x 30x 30x 30x 30x 30x 30x 30x   SAQ  2# 30x 3#/30    LAQ  2# 30x 3/30 4/30 4/30 4/30 5/30 5/30   Hip add 30 30/30 30/30 30x 30x 30x 30x 30x 30x 30x   Hip abd 30/30 30/30 30/30 Blue 30x 30x 30/30 30/30 30#/30 25#/30 25#/30   Supine slr          10x   bridge 30x 30x 15x 30x 30x nt nt Dc  lbp     Stand L hip abd 3/30 30x 30x 30x 30x 1/30 2/30 2/30 2/30 3/30   Heel raises 30x  30x 30x 30x 30x 30x 30x 30x 30x   Standing march 3/30  3/30  20x 30x 3/30 3/30 3/30 3/30   Standing ext 3/30  3/30  30x 30x 3/30 3/30 3/30 3/30   Standing SLR 3/30  3/30      2/30 3/30   Leg press /   biodex 5'  WB 2'  LOS 2' 8'  WB4'  LOS  3'  L10   Gait Training             spc        20'x2     Side step

## 2021-03-19 ENCOUNTER — HOSPITAL ENCOUNTER (OUTPATIENT)
Dept: RADIOLOGY | Facility: HOSPITAL | Age: 61
Discharge: HOME | End: 2021-03-19
Attending: ORTHOPAEDIC SURGERY
Payer: COMMERCIAL

## 2021-03-19 ENCOUNTER — OFFICE VISIT (OUTPATIENT)
Dept: PHYSICAL THERAPY | Age: 61
End: 2021-03-19
Payer: COMMERCIAL

## 2021-03-19 ENCOUNTER — OFFICE VISIT (OUTPATIENT)
Dept: OBGYN CLINIC | Facility: HOSPITAL | Age: 61
End: 2021-03-19

## 2021-03-19 ENCOUNTER — OFFICE VISIT (OUTPATIENT)
Dept: PHYSICAL THERAPY | Facility: CLINIC | Age: 61
End: 2021-03-19
Payer: COMMERCIAL

## 2021-03-19 VITALS — DIASTOLIC BLOOD PRESSURE: 75 MMHG | HEART RATE: 58 BPM | SYSTOLIC BLOOD PRESSURE: 155 MMHG

## 2021-03-19 DIAGNOSIS — S72.22XD CLOSED DISPLACED SUBTROCHANTERIC FRACTURE OF LEFT FEMUR WITH ROUTINE HEALING, SUBSEQUENT ENCOUNTER: Primary | ICD-10-CM

## 2021-03-19 DIAGNOSIS — M21.70 ACQUIRED LEG LENGTH DISCREPANCY: ICD-10-CM

## 2021-03-19 DIAGNOSIS — Z48.89 AFTERCARE FOLLOWING SURGERY: ICD-10-CM

## 2021-03-19 PROCEDURE — 99024 POSTOP FOLLOW-UP VISIT: CPT | Performed by: ORTHOPAEDIC SURGERY

## 2021-03-19 PROCEDURE — 97161 PT EVAL LOW COMPLEX 20 MIN: CPT | Performed by: PHYSICAL THERAPIST

## 2021-03-19 PROCEDURE — 77073 BONE LENGTH STUDIES: CPT

## 2021-03-19 PROCEDURE — 73552 X-RAY EXAM OF FEMUR 2/>: CPT

## 2021-03-19 PROCEDURE — 97110 THERAPEUTIC EXERCISES: CPT

## 2021-03-19 NOTE — PROGRESS NOTES
Assessment:   S/P INSERTION NAIL IM FEMUR ANTEGRADE For Subtrochanteric fracture - Left on 12/19/2020    Plan:   Patient is now 3 months out from the above procedure  Patient does have a 1 cm leg length discrepancy, right longer than left however he is likely experiencing abductor weakness and his gait resembles an abductor lurch  Continue working with physical therapy to strengthen the hip  He may remain WBAT on the LLE with use of cane as needed for ambulation  Patient may continue Tylenol/Motrin as needed for pain  Contact the office with any further questions or concerns prior to next follow-up  Follow-up in 3 months with repeat x-rays of the left femur  SUBJECTIVE:  Sheree Abdi is a 59 y o  male who presents for 3 month follow up after INSERTION NAIL IM FEMUR ANTEGRADE For Subtrochanteric fracture - Left on 12/19/2020  At his last evaluation on 01/29/2021 we recommended that he continue with physical therapy and strengthening of the LLE  Today patient presents to the office using a cane for ambulation  Physical therapist recently noticed what the believe to be a leg length discrepancy, right longer than left  Patient offers no additional complaints at this time  PHYSICAL EXAMINATION:  Vital signs: /75   Pulse 58   General: well developed and well nourished, alert, oriented times 3 and appears comfortable  Psychiatric: Normal    MUSCULOSKELETAL EXAMINATION:    Surgical Site: Left hip  Incision: healed   No erythema  Range of Motion: As expected  Neurovascular status: Neuro intact, good cap refill        STUDIES REVIEWED:  X-rays of the left femur performed on 3/19/2021 reviewed by Dr Karie Vazquez demonstrate maintained alignment of subtrochanteric fracture with signs of interval healing  ORIF hardware in stable positioning with no signs of loosening or failure      Scanogram x-rays performed on 3/19/2021 reviewed by Dr Karie Vazquez and demonstrate a 1 cm leg length discrepancy, right longer than left     PROCEDURES PERFORMED:  No Procedures performed today     Scribe Attestation    I,:  Margette Lombard am acting as a scribe while in the presence of the attending physician :       I,:  Alanis Anguiano DO personally performed the services described in this documentation    as scribed in my presence :

## 2021-03-19 NOTE — PROGRESS NOTES
Daily Note     Today's date: 3/19/2021  Patient name: Enrique Burrows  : 1960  MRN: [id number]  Referring provider: Orlando Puente MD  Dx:   Encounter Diagnosis     ICD-10-CM    1  Acquired leg length discrepancy  M21 70    2  Closed displaced subtrochanteric fracture of left femur with routine healing, subsequent encounter  S72  22XD    3  Aftercare following surgery  Z48 89        Start Time: 1300  Stop Time: 1410  Total time in clinic (min): 70 minutes    Subjective: Reports going to get assessed for orthotics secondary to his leg length discrepancy, however, also saw his orthopedic and believes he can correct it with strengthening of the L hip abductors  Minimal pain reported  States he has a lot of trouble standing still, feels better when walking, sitting, or lying down  Objective: See treatment diary below      Assessment: Tolerated treatment well  Added R hip hiking with L foot on 4" step secondary to positive L trendelenburg  Significant drop noted during standing exercises when L is stance limb, emphasis on keeping hips level during as able  Used a mirror for visual feedback with some improvement noted and decreased dropping, however weakness is still present  Increased weights for cybex machines today without issues  Also added clamshells which were challenging and the most uncomfortable compared to the rest of the program, but patient was able to complete 20 reps  Instructed patient to add hip hiking and clamshells to HEP and to only perform in a range and quantity that is tolerable  Patient would benefit from continued PT  Plan: Continue per plan of care        Precautions: L hip ORIF      Manuals 2/26 3/5 3/12 3/19  1/18 1/22 1/29 2/3 2/12                MT left hip 10 10 10 10'  10 10 10 10 10                             Neuro Re-Ed                                       Step ups   10x 6'' x20          Side stepping    NV                                                Ther Ex NU STEP 8 min 5 min 7' 8'   6  6 min 8' 8' 8'   Slant board     L3 30''x4  4x 4x 4x 4x 4x   Ball exs 30x 30x 30x 30x  30x 30x 30x 30x 30x   SAQ 6/30 2/30 6/30 6# 30x  4/30 4/30 4/30 5/30 5/30   LAQ 6/30 2/30 6/30 6# 30x  4/30 4/30 4/30 5/30 5/30   Hip add 30/30 30/30 30/30 30# 30x  30x 30x 30x 30x 30x   Hip abd 30/30 30/30 30/30 60# 30x  30/30 30/30 30#/30 25#/30 25#/30   Supine slr          10x   bridge 30x 30x 15x D/C LBP - - - - - -   Clamshells     2x10  HEP         Stand L hip abd 3/30 30x 30x B x30 ea mirror for feedback   1/30 2/30 2/30 2/30 3/30   R hip hike (L foot on step)     x20   HEP         Heel raises 30x  30x 30x  30x 30x 30x 30x 30x   Standing march 3/30  3/30 3# 30x B  30x 3/30 3/30 3/30 3/30   Standing ext 3/30  3/30 3# 30x B  30x 3/30 3/30 3/30 3/30   Standing SLR 3/30  3/30 3# 30x B     2/30 3/30   Leg press 75/30  75/30 80# 30x   65/30 65/30 70/30 75/30   biodex         5'  WB 2'  LOS 2' 8'  WB4'  LOS  3'  L10   Gait Training             spc        20'x2     Side step

## 2021-03-19 NOTE — PROGRESS NOTES
PT Evaluation     Today's date: 3/19/2021  Patient name: Delvis Gutierrez  : 1960  MRN: [id number]  Referring provider: Sergio Barth, PT  Dx:   Encounter Diagnosis     ICD-10-CM    1  Closed displaced subtrochanteric fracture of left femur with routine healing, subsequent encounter  S72  22XD    2  Acquired leg length discrepancy  M21 70    3  Aftercare following surgery  Z48 89                   Assessment/Plan  Pt should benefit from shoe modification to accommodate LLD, no need for custom orthoses at this time  Pt will continue PT    Subjective Evaluation    History of Present Illness  Mechanism of injury: Pt presents for orthotics evaluation s/p L hip ORIF with subsequent LLD  He c/o constant pain lateral aspect of L hip at 1-2/10  Occasionally radiates into groin  Difficulty with ambulation and standing due to LLD (R>L)    He has been using a 12 mm heel lift and has used foot levelers inserts (from chiropractor) for many  Years  Pain  Current pain ratin  At best pain ratin  At worst pain ratin          Objective  Stands with L ankle plantarflexed, heel off floor  Mild pes planus, symmetrical    Gait: SPC, L hip drop; trendelenberg    genu varum R > L  LLD noted R > L 1 1/4 inch measured in supine    Discussed options of shoe modification since LLD is > 1/2 inch  He will consider having shoes modified to add lift       Precautions: L hip ORIF      Manuals                                                                 Neuro Re-Ed                                                                                                        Ther Ex                                                                                                                     Ther Activity                                       Gait Training                                       Modalities

## 2021-03-23 ENCOUNTER — TRANSCRIBE ORDERS (OUTPATIENT)
Dept: GASTROENTEROLOGY | Facility: HOSPITAL | Age: 61
End: 2021-03-23

## 2021-03-26 ENCOUNTER — OFFICE VISIT (OUTPATIENT)
Dept: PHYSICAL THERAPY | Age: 61
End: 2021-03-26
Payer: COMMERCIAL

## 2021-03-26 DIAGNOSIS — M21.70 ACQUIRED LEG LENGTH DISCREPANCY: ICD-10-CM

## 2021-03-26 DIAGNOSIS — S72.22XD CLOSED DISPLACED SUBTROCHANTERIC FRACTURE OF LEFT FEMUR WITH ROUTINE HEALING, SUBSEQUENT ENCOUNTER: Primary | ICD-10-CM

## 2021-03-26 DIAGNOSIS — Z48.89 AFTERCARE FOLLOWING SURGERY: ICD-10-CM

## 2021-03-26 PROCEDURE — 97110 THERAPEUTIC EXERCISES: CPT

## 2021-03-26 NOTE — PROGRESS NOTES
Daily Note     Today's date: 3/26/2021  Patient name: Kisha Dee  : 1960  MRN: [id number]  Referring provider: Yissel Nguyen MD  Dx:   Encounter Diagnosis     ICD-10-CM    1  Closed displaced subtrochanteric fracture of left femur with routine healing, subsequent encounter  S72  22XD    2  Aftercare following surgery  Z48 89    3  Acquired leg length discrepancy  M21 70                   Subjective: Patient reports getting better but still having pain and weakness  Objective: See treatment diary below      Assessment: Tolerated treatment fairly well, slow progression with improving strength and PROM left hip  Patient was able to tolerate SLR supine today  Patient demonstrated fatigue post treatment and would benefit from continued PT      Plan: Continue per plan of care        Precautions: L hip ORIF      Manuals 2/26 3/5 3/12 3/19 3/26  1/22 1/29 2/3 2/12                MT left hip 10 10 10 10' 10' 10 10 10 10 10                             Neuro Re-Ed                                       Step ups   10x 6'' x20  30x        Side stepping    NV 30x                                               Ther Ex             NU STEP 8 min 5 min 7' 8'  8' 6  6 min 8' 8' 8'   Slant board     L3 30''x4 30"x4 4x 4x 4x 4x 4x   Ball exs 30x 30x 30x 30x 30x 30x 30x 30x 30x 30x   SAQ / 6/30 6# 30x 6/30 4/30 4/30 4/ 5/ 5/30   LAQ  2 630 6# 30x 6/30  4 4/ 5/ 5/30   Hip add 30/30 30/30 30/30 30# 30x 60#/30 30x 30x 30x 30x 30x   Hip abd 30/30 30/30 30/30 60# 30x 50#/30 30/30 30/30 30#/30 25#/30 25#/30   Supine slr     30x     10x   bridge 30x 30x 15x D/C LBP - - - - - -   Clamshells     2x10  HEP 30x        Stand L hip abd 3/30 30x 30x B x30 ea mirror for feedback  30x ea 1/30 2/30 2/30 2/30 3/30   R hip hike (L foot on step)     x20   HEP 30x        Heel raises 30x  30x 30x 30x 30x 30x 30x 30x 30x   Standing march 3/30  3/30 3# 30x B 3#/30x  B 30x 3/30 3/30 3/30 3/30   Standing ext 3/30 3/30 3# 30x B 3#/30x B 30x 3/30 3/30 3/30 3/30   Standing SLR 3/30  3/30 3# 30x B 3#/30x B    2/30 3/30   Leg press 75/30  75/30 80# 30x 80#/30  65/30 65/30 70/30 75/30   biodex         5'  WB 2'  LOS 2' 8'  WB4'  LOS  3'  L10   Gait Training             spc        20'x2     Side step

## 2021-03-31 ENCOUNTER — OFFICE VISIT (OUTPATIENT)
Dept: PHYSICAL THERAPY | Age: 61
End: 2021-03-31
Payer: COMMERCIAL

## 2021-03-31 DIAGNOSIS — S72.22XD CLOSED DISPLACED SUBTROCHANTERIC FRACTURE OF LEFT FEMUR WITH ROUTINE HEALING, SUBSEQUENT ENCOUNTER: Primary | ICD-10-CM

## 2021-03-31 DIAGNOSIS — M21.70 ACQUIRED LEG LENGTH DISCREPANCY: ICD-10-CM

## 2021-03-31 DIAGNOSIS — Z48.89 AFTERCARE FOLLOWING SURGERY: ICD-10-CM

## 2021-03-31 PROCEDURE — 97110 THERAPEUTIC EXERCISES: CPT

## 2021-03-31 NOTE — PROGRESS NOTES
Daily Note     Today's date: 3/31/2021  Patient name: Jabari Rondon  : 1960  MRN: [id number]  Referring provider: Elsa Velázquez MD  Dx:   Encounter Diagnosis     ICD-10-CM    1  Closed displaced subtrochanteric fracture of left femur with routine healing, subsequent encounter  S72  22XD    2  Aftercare following surgery  Z48 89    3  Acquired leg length discrepancy  M21 70        Start Time: 0930  Stop Time: 1000  Total time in clinic (min): 30 minutes    Subjective: Patient reports sore and stiff today  Notes end of work day his lb and legs are in pain due to unevenness   Objective: See treatment diary below    Active Range of Motion   Left Hip   Flexion: 105 degrees   Abduction: 35 degrees   External rotation (90/90): 40 degrees   Internal rotation (90/90): 35 degrees        Assessment: Tolerated treatment fairly added sl press on machine to day with fair tolerance, lacks control with the left hip/le  Continues to ambulate with spc  Significant drop noted during standing exercises when L is stance limb, emphasis on keeping hips level during as able  Trouble with WB on the left leg  Continues ot have pain with PROM left hip  Patient demonstrated fatigue post treatment and would benefit from continued PT, progress treatment as tolerated  Plan: Continue per plan of care        Precautions: L hip ORIF      Manuals 2/26 3/5 3/12 3/19 3/26 3/31  1/29 2/3 2/12                MT left hip 10 10 10 10' 10' 10 10 10 10 10                             Neuro Re-Ed                                       Step ups   10x 6'' x20  30x nt       Side stepping    NV 30x nt                                              Ther Ex             NU STEP 8 min 5 min 7' 8'  8' 10 6 min 8' 8' 8'   Slant board     L3 30''x4 30"x4 4x 4x 4x 4x 4x   Ball exs 30x 30x 30x 30x 30x 30x 30x 30x 30x 30x   SAQ  6# 30x 6/ 7    LAQ  6# 30x 6/30 7    Hip add 30/30 30/30 30/30 30# 30x 60#/30 60#/30 30x 30x 30x 30x   Hip abd 30/30 30/30 30/30 60# 30x 50#/30 50#/30 30/30 30#/30 25#/30 25#/30   Supine slr     30x 30x    10x   bridge 30x 30x 15x D/C LBP - - - - - -   Clamshells     2x10  HEP 30x        Stand L hip abd 3/30 30x 30x B x30 ea mirror for feedback  30x ea 30x ea 2/30 2/30 2/30 3/30   R hip hike (L foot on step)     x20   HEP 30x 30x       Heel raises 30x  30x 30x 30x 30x 30x 30x 30x 30x   Standing march 3/30  3/30 3# 30x B 3#/30x  B 3/30x 3/30 3/30 3/30 3/30   Standing ext 3/30  3/30 3# 30x B 3#/30x B 3/30x 3/30 3/30 3/30 3/30   Standing SLR 3/30  3/30 3# 30x B 3#/30x B 3/30   2/30 3/30   Leg press  SL press 75/30  75/30 80# 30x 80#/30 80/30  30/30 65/30 65/30 70/30 75/30   biodex         5'  WB 2'  LOS 2' 8'  WB4'  LOS  3'  L10   Gait Training             spc        20'x2     Side step

## 2021-04-02 ENCOUNTER — OFFICE VISIT (OUTPATIENT)
Dept: PHYSICAL THERAPY | Age: 61
End: 2021-04-02
Payer: COMMERCIAL

## 2021-04-02 DIAGNOSIS — M21.70 ACQUIRED LEG LENGTH DISCREPANCY: ICD-10-CM

## 2021-04-02 DIAGNOSIS — Z48.89 AFTERCARE FOLLOWING SURGERY: ICD-10-CM

## 2021-04-02 DIAGNOSIS — S72.22XD CLOSED DISPLACED SUBTROCHANTERIC FRACTURE OF LEFT FEMUR WITH ROUTINE HEALING, SUBSEQUENT ENCOUNTER: Primary | ICD-10-CM

## 2021-04-02 PROCEDURE — 97110 THERAPEUTIC EXERCISES: CPT

## 2021-04-02 NOTE — PROGRESS NOTES
Daily Note     Today's date: 2021  Patient name: Sherrie Hilton  : 1960  MRN: [id number]  Referring provider: Luci Díaz MD  Dx:   Encounter Diagnosis     ICD-10-CM    1  Closed displaced subtrochanteric fracture of left femur with routine healing, subsequent encounter  S72  22XD    2  Aftercare following surgery  Z48 89    3  Acquired leg length discrepancy  M21 70        Start Time: 1345  Stop Time: 1430  Total time in clinic (min): 45 minutes    Subjective: Patient reports he felt sore after his last visit but was better later that day  Objective: See treatment diary below      Assessment: Tolerated treatment fairly well, continues to have challenges with pelvic stabilization  Strength slowly improving  Patient demonstrated fatigue post treatment and would benefit from continued PT      Plan: Continue per plan of care        Precautions: L hip ORIF      Manuals 2/26 3/5 3/12 3/19 3/26 3/31 4/2  2/3 2/12                MT left hip 10 10 10 10' 10' 10 10 10 10 10                             Neuro Re-Ed                                       Step ups   10x 6'' x20  30x nt 30x      Side stepping    NV 30x nt 30x                                             Ther Ex             NU STEP 8 min 5 min 7' 8'  8' 10 10 L4 8' 8' 8'   Slant board     L3 30''x4 30"x4 4x 4x 4x 4x 4x   Ball exs 30x 30x 30x 30x 30x 30x 30x 30x 30x 30x   SAQ 6/30 2/ 6/30 6# 30x 6/30 7 5/30 7 5/30 4/30 5/30 5/30   LAQ 6/ 2/ 6/30 6# 30x 6/30 7 5/30 7 5/30 4/30 5/30 5/30   Hip add 30/30 30/30 30/30 30# 30x 60#/30 60#/30 60/30 30x 30x 30x   Hip abd 30/30 30/30 30/30 60# 30x 50#/30 50#/30 45/30 30#/30 25#/30 25#/30   Supine slr     30x 30x 3x10   10x   bridge 30x 30x 15x D/C LBP - - - - - -   Clamshells     2x10  HEP 30x  2#/30      Stand L hip abd 3/30 30x 30x B x30 ea mirror for feedback  30x ea 30x ea 2/30 2/30 2/30 3/30   R hip hike (L foot on step)     x20   HEP 30x 30x 30x      Heel raises 30x  30x 30x 30x 30x 30x 30x 30x 30x   Standing march 3/30  3/30 3# 30x B 3#/30x  B 3/30x 3/30 3/30 3/30 3/30   Standing ext 3/30  3/30 3# 30x B 3#/30x B 3/30x 3/30 3/30 3/30 3/30   Standing SLR 3/30  3/30 3# 30x B 3#/30x B 3/30 3/30  2/30 3/30   Leg press  SL press 75/30  75/30 80# 30x 80#/30 80/30  30/30 80/30  30/30 65/30 70/30 75/30   biodex         5'  WB 2'  LOS 2' 8'  WB4'  LOS  3'  L10   Gait Training             spc        20'x2     Side step

## 2021-04-09 ENCOUNTER — OFFICE VISIT (OUTPATIENT)
Dept: PHYSICAL THERAPY | Age: 61
End: 2021-04-09
Payer: COMMERCIAL

## 2021-04-09 DIAGNOSIS — M21.70 ACQUIRED LEG LENGTH DISCREPANCY: ICD-10-CM

## 2021-04-09 DIAGNOSIS — Z48.89 AFTERCARE FOLLOWING SURGERY: ICD-10-CM

## 2021-04-09 DIAGNOSIS — S72.22XD CLOSED DISPLACED SUBTROCHANTERIC FRACTURE OF LEFT FEMUR WITH ROUTINE HEALING, SUBSEQUENT ENCOUNTER: Primary | ICD-10-CM

## 2021-04-09 PROCEDURE — 97110 THERAPEUTIC EXERCISES: CPT | Performed by: PHYSICAL THERAPIST

## 2021-04-09 PROCEDURE — 97140 MANUAL THERAPY 1/> REGIONS: CPT | Performed by: PHYSICAL THERAPIST

## 2021-04-09 NOTE — PROGRESS NOTES
Daily Note     Today's date: 2021  Patient name: Juan Carlos Barraza  : 1960  MRN: [id number]  Referring provider: Sharmin Delaney MD  Dx:   Encounter Diagnosis     ICD-10-CM    1  Closed displaced subtrochanteric fracture of left femur with routine healing, subsequent encounter  S72  22XD    2  Aftercare following surgery  Z48 89    3  Acquired leg length discrepancy  M21 70        Start Time: 1615  Stop Time: 1700  Total time in clinic (min): 45 minutes    Subjective: some improvement      Objective: See treatment diary below      Assessment: Tolerated treatment well  Patient demonstrated fatigue post treatment, exhibited good technique with therapeutic exercises and would benefit from continued PT, slow steady progress with increased left hip strength noted but still has pain with hip abduction AROM/PRE's      Plan: Progress treatment as tolerated         Precautions: L hip ORIF      Manuals 2/26 3/5 3/12 3/19 3/26 3/31 4/2 4/9 2/3 2/12                MT left hip 10 10 10 10' 10' 10 10 10 10 10                             Neuro Re-Ed                                       Step ups   10x 6'' x20  30x nt 30x 30x     Side stepping    NV 30x nt 30x 30x                                            Ther Ex             NU STEP 8 min 5 min 7' 8'  8' 10 10 L4 8' 8' 8'   Slant board     L3 30''x4 30"x4 4x 4x 4x 4x 4x   Ball exs 30x 30x 30x 30x 30x 30x 30x 30x 30x 30x   SAQ 6/30 2/30 6/30 6# 30x 6/30 7 5/30 7 5/30 8/30 5/30 5/30   LAQ 6/30 2/30 6/30 6# 30x 6/30 7 5/30 7 5/30 8/30 5/30 5/30   Hip add 30/30 30/30 30/30 30# 30x 60#/30 60#/30 60/30 60/30 30x 30x   Hip abd 30/30 30/30 30/30 60# 30x 50#/30 50#/30 45/30 40#/30 25#/30 25#/30   Supine slr     30x 30x 3x10 30x  10x   bridge 30x 30x 15x D/C LBP - - - - - -   Clamshells     2x10  HEP 30x  2#/30 2/30     Stand L hip abd 3/30 30x 30x B x30 ea mirror for feedback  30x ea 30x ea 2/30 2/30 2/30 3/30   R hip hike (L foot on step)     x20   HEP 30x 30x 30x 30x     Heel raises 30x  30x 30x 30x 30x 30x 30x 30x 30x   Standing march 3/30  3/30 3# 30x B 3#/30x  B 3/30x 3/30 3/30 3/30 3/30   Standing ext 3/30  3/30 3# 30x B 3#/30x B 3/30x 3/30 3/30 3/30 3/30   Standing SLR 3/30  3/30 3# 30x B 3#/30x B 3/30 3/30 3/30 2/30 3/30   Leg press  SL press 75/30  75/30 80# 30x 80#/30 80/30  30/30 80/30  30/30 85/30  30/30 70/30 75/30   biodex         5'  WB 2'  LOS 2' 8'  WB4'  LOS  3'  L10   Gait Training             spc        20'x2     Side step

## 2021-04-13 ENCOUNTER — TELEPHONE (OUTPATIENT)
Dept: NEUROLOGY | Facility: CLINIC | Age: 61
End: 2021-04-13

## 2021-04-13 NOTE — TELEPHONE ENCOUNTER
----- Message from Sarahi Mclaughlin DO sent at 4/13/2021  4:01 PM EDT -----  Regarding: Encounter  Can we find a way to schedule Kp on May 28th? I am ok if he is scheduled for earlier like 12, even though my schedule doesn't generally open until 1pm  If not we can double book him at 4:30 on that date  Or even better would see if we can fit him in to a cancellation if that occurs in the meantime  He wants to be seen for a gait issue post hip fracture repair

## 2021-04-14 NOTE — TELEPHONE ENCOUNTER
Spoke to pt's wife    They would like 4:30 appt, also requested to be added to the wait list     Can you please overbook dr Tejada Comment on 5/28 at 4:30 and add to wait list

## 2021-04-16 ENCOUNTER — OFFICE VISIT (OUTPATIENT)
Dept: PHYSICAL THERAPY | Age: 61
End: 2021-04-16
Payer: COMMERCIAL

## 2021-04-16 DIAGNOSIS — S72.22XD CLOSED DISPLACED SUBTROCHANTERIC FRACTURE OF LEFT FEMUR WITH ROUTINE HEALING, SUBSEQUENT ENCOUNTER: Primary | ICD-10-CM

## 2021-04-16 DIAGNOSIS — M21.70 ACQUIRED LEG LENGTH DISCREPANCY: ICD-10-CM

## 2021-04-16 DIAGNOSIS — Z48.89 AFTERCARE FOLLOWING SURGERY: ICD-10-CM

## 2021-04-16 PROCEDURE — 97140 MANUAL THERAPY 1/> REGIONS: CPT | Performed by: PHYSICAL THERAPIST

## 2021-04-16 PROCEDURE — 97110 THERAPEUTIC EXERCISES: CPT | Performed by: PHYSICAL THERAPIST

## 2021-04-16 NOTE — PROGRESS NOTES
Daily Note     Today's date: 2021  Patient name: Junior Sigala  : 1960  MRN: [id number]  Referring provider: Jennifer Nye MD  Dx:   Encounter Diagnosis     ICD-10-CM    1  Closed displaced subtrochanteric fracture of left femur with routine healing, subsequent encounter  S72  22XD    2  Aftercare following surgery  Z48 89    3  Acquired leg length discrepancy  M21 70        Start Time: 1345  Stop Time: 1430  Total time in clinic (min): 45 minutes    Subjective: getting stronger      Objective: See treatment diary below      Assessment: Tolerated treatment well  Patient demonstrated fatigue post treatment, exhibited good technique with therapeutic exercises and would benefit from continued PT, still with crepitation and hip abduction weakness      Plan: Progress treatment as tolerated         Precautions: L hip ORIF      Manuals 2/26 3/5 3/12 3/19 3/26 3/31 4/2 4/9 4/16 2/12                MT left hip 10 10 10 10' 10' 10 10 10 10 10                             Neuro Re-Ed                                       Step ups   10x 6'' x20  30x nt 30x 30x 30x    Side stepping    NV 30x nt 30x 30x 30x                                           Ther Ex             NU STEP 8 min 5 min 7' 8'  8' 10 10 L4 8' 8' 8'   Slant board     L3 30''x4 30"x4 4x 4x 4x 4x 4x   Ball exs 30x 30x 30x 30x 30x 30x 30x 30x 30x 30x   SAQ 6/30 2/30 6/30 6# 30x 6/30 7 5/30 7 5/30 8/30 5/30 5/30   LAQ 6/ 2/30 6/30 6# 30x 6/30 7 5/30 7 5/30 8/30 5/30 5/30   Hip add 30/30 30/30 30/30 30# 30x 60#/30 60#/30 60/30 60/30 30x 30x   Hip abd 30/30 30/30 30/30 60# 30x 50#/30 50#/30 45/30 40#/30 45#/30 25#/30   Supine slr     30x 30x 3x10 30x 30x 10x   bridge 30x 30x 15x D/C LBP - - - - - -   Clamshells     2x10  HEP 30x  2#/30 2/30 2/30    Stand L hip abd 3/30 30x 30x B x30 ea mirror for feedback  30x ea 30x ea 2/30 2/30 2/30 3/30   R hip hike (L foot on step)     x20   HEP 30x 30x 30x 30x 30x    Heel raises 30x  30x 30x 30x 30x 30x 30x 30x 30x Standing march 3/30  3/30 3# 30x B 3#/30x  B 3/30x 3/30 3/30 3/30 3/30   Standing ext 3/30  3/30 3# 30x B 3#/30x B 3/30x 3/30 3/30 3/30 3/30   Standing SLR 3/30  3/30 3# 30x B 3#/30x B 3/30 3/30 3/30 2/30 3/30   Leg press  SL press 75/30  75/30 80# 30x 80#/30 80/30  30/30 80/30  30/30 85/30  30/30 70/30 75/30   biodex          8'  WB4'  LOS  3'  L10   Gait Training             spc        20'x2     Side step

## 2021-04-23 ENCOUNTER — OFFICE VISIT (OUTPATIENT)
Dept: PHYSICAL THERAPY | Age: 61
End: 2021-04-23
Payer: COMMERCIAL

## 2021-04-23 DIAGNOSIS — Z48.89 AFTERCARE FOLLOWING SURGERY: ICD-10-CM

## 2021-04-23 DIAGNOSIS — S72.22XD CLOSED DISPLACED SUBTROCHANTERIC FRACTURE OF LEFT FEMUR WITH ROUTINE HEALING, SUBSEQUENT ENCOUNTER: Primary | ICD-10-CM

## 2021-04-23 PROCEDURE — 97110 THERAPEUTIC EXERCISES: CPT | Performed by: PHYSICAL THERAPIST

## 2021-04-23 PROCEDURE — 97140 MANUAL THERAPY 1/> REGIONS: CPT | Performed by: PHYSICAL THERAPIST

## 2021-04-23 NOTE — PROGRESS NOTES
Daily Note     Today's date: 2021  Patient name: Hannah Tomas  : 1960  MRN: [id number]  Referring provider: Lena Pat MD  Dx:   Encounter Diagnosis     ICD-10-CM    1  Closed displaced subtrochanteric fracture of left femur with routine healing, subsequent encounter  S72  22XD    2  Aftercare following surgery  Z48 89        Start Time: 1400  Stop Time: 1440  Total time in clinic (min): 40 minutes    Subjective: Patient complains of right hip pain at 4/10      Objective: See treatment diary below      Assessment: Tolerated treatment fair  Patient demonstrated fatigue post treatment, exhibited good technique with therapeutic exercises and would benefit from continued PT, slow steady progress, able to perom SLR and LLR as well as SLS balance today fairly well       Plan: Progress treatment as tolerated         Precautions: L hip ORIF      Manuals 2/26 3/5 3/12 3/19 3/26 3/31 4/2 4/9 4/16 4/23                MT left hip 10 10 10 10' 10' 10 10 10 10 10                             Neuro Re-Ed                                       Step ups   10x 6'' x20  30x nt 30x 30x 30x 30x   Side stepping    NV 30x nt 30x 30x 30x 30x                                          Ther Ex             NU STEP 8 min 5 min 7' 8'  8' 10 10 L4 8' 8' 8'   Slant board     L3 30''x4 30"x4 4x 4x 4x 4x 4x   Ball exs 30x 30x 30x 30x 30x 30x 30x 30x 30x 30x   SAQ 6/30 2/30 6/30 6# 30x 6/30 7 5/30 7 5/30 8/30 5/30 5/30   LAQ 6/30 2/30 6/30 6# 30x 6/30 7 5/30 7 5/30 8/30 5/30 5/30   Hip add 30/30 30/30 30/30 30# 30x 60#/30 60#/30 60/30 60/30 30x 30x   Hip abd 30/30 30/30 30/30 60# 30x 50#/30 50#/30 45/30 40#/30 45#/30 25#/30   Supine slr     30x 30x 3x10 30x 30x 10x   bridge 30x 30x 15x D/C LBP - - - - - -   Clamshells     2x10  HEP 30x  2   Stand L hip abd 3/30 30x 30x B x30 ea mirror for feedback  30x ea 30x ea 2/30 2/30 2/30 3/30   R hip hike (L foot on step)     x20   HEP 30x 30x 30x 30x 30x 30x   Heel raises 30x 30x 30x 30x 30x 30x 30x 30x 30x   Standing march 3/30  3/30 3# 30x B 3#/30x  B 3/30x 3/30 3/30 3/30 3/30   Standing ext 3/30  3/30 3# 30x B 3#/30x B 3/30x 3/30 3/30 3/30 3/30   Standing SLR 3/30  3/30 3# 30x B 3#/30x B 3/30 3/30 3/30 2/30 3/30   Leg press  SL press 75/30  75/30 80# 30x 80#/30 80/30  30/30 80/30  30/30 85/30  30/30 70/30 75/30   biodex          8'  WB4'  LOS  3'  L10   Gait Training             spc        20'x2     Side step

## 2021-04-30 ENCOUNTER — OFFICE VISIT (OUTPATIENT)
Dept: PHYSICAL THERAPY | Age: 61
End: 2021-04-30
Payer: COMMERCIAL

## 2021-04-30 DIAGNOSIS — S72.22XD CLOSED DISPLACED SUBTROCHANTERIC FRACTURE OF LEFT FEMUR WITH ROUTINE HEALING, SUBSEQUENT ENCOUNTER: Primary | ICD-10-CM

## 2021-04-30 DIAGNOSIS — Z48.89 AFTERCARE FOLLOWING SURGERY: ICD-10-CM

## 2021-04-30 PROCEDURE — 97140 MANUAL THERAPY 1/> REGIONS: CPT | Performed by: PHYSICAL THERAPIST

## 2021-04-30 PROCEDURE — 97110 THERAPEUTIC EXERCISES: CPT | Performed by: PHYSICAL THERAPIST

## 2021-04-30 NOTE — PROGRESS NOTES
Daily Note     Today's date: 2021  Patient name: Luz Courser  : 1960  MRN: [id number]  Referring provider: Toyin Wade MD  Dx:   Encounter Diagnosis     ICD-10-CM    1  Closed displaced subtrochanteric fracture of left femur with routine healing, subsequent encounter  S72  22XD    2  Aftercare following surgery  Z48 89                   Subjective: Patient reports slow progress      Objective: See treatment diary below      Assessment: Tolerated treatment well  Patient demonstrated fatigue post treatment, exhibited good technique with therapeutic exercises and would benefit from continued PT,uses SPC to ambulate and is working full time, still has ome crepitation with AROM for hip abduction      Plan: Progress treatment as tolerated         Precautions: L hip ORIF      Manuals 4/30 3/5 3/12 3/19 3/26 3/31 4/2 4/9 4/16 4/23                MT left hip 10 10 10 10' 10' 10 10 10 10 10                             Neuro Re-Ed                                       Step ups 30  10x 6'' x20  30x nt 30x 30x 30x 30x   Side stepping 30   NV 30x nt 30x 30x 30x 30x                                          Ther Ex             NU STEP 8 min 5 min 7' 8'  8' 10 10 L4 8' 8' 8'   Slant board  4x   L3 30''x4 30"x4 4x 4x 4x 4x 4x   Ball exs 30x 30x 30x 30x 30x 30x 30x 30x 30x 30x   SAQ 6/30 2/30 6/30 6# 30x 6/30 7 5/30 7 5/30 8/30 5/30 5/30   LAQ 6/30 2/30 6/30 6# 30x 6/30 7 5/30 7 5/30 8/30 5/30 5/30   Hip add 60/30 30/30 30/30 30# 30x 60#/30 60#/30 60/30 60/30 30x 30x   Hip abd 30/30 30/30 30/30 60# 30x 50#/30 50#/30 45/30 40#/30 45#/30 25#/30   Supine slr     30x 30x 3x10 30x 30x 10x   bridge  30x 15x D/C LBP - - - - - -   Clamshells  30x   2x10  HEP 30x  2#/30 2/30 2/30 2/30   Stand L hip abd 3/30 30x 30x B x30 ea mirror for feedback  30x ea 30x ea 2/30 2/30 2/30 3/30   R hip hike (L foot on step)  30x   x20   HEP 30x 30x 30x 30x 30x 30x   Heel raises 30x  30x 30x 30x 30x 30x 30x 30x 30x   Standing march 3/30  3/30 3# 30x B 3#/30x  B 3/30x 3/30 3/30 3/30 3/30   Standing ext 3/30  3/30 3# 30x B 3#/30x B 3/30x 3/30 3/30 3/30 3/30   Standing SLR 3/30  3/30 3# 30x B 3#/30x B 3/30 3/30 3/30 2/30 3/30   Leg press  SL press 75/30  75/30 80# 30x 80#/30 80/30  30/30 80/30  30/30 85/30  30/30 70/30 75/30   biodex          8'  WB4'  LOS  3'  L10   Gait Training             spc        20'x2     Side step

## 2021-05-07 ENCOUNTER — OFFICE VISIT (OUTPATIENT)
Dept: PHYSICAL THERAPY | Age: 61
End: 2021-05-07
Payer: COMMERCIAL

## 2021-05-07 DIAGNOSIS — Z48.89 AFTERCARE FOLLOWING SURGERY: ICD-10-CM

## 2021-05-07 DIAGNOSIS — S72.22XD CLOSED DISPLACED SUBTROCHANTERIC FRACTURE OF LEFT FEMUR WITH ROUTINE HEALING, SUBSEQUENT ENCOUNTER: Primary | ICD-10-CM

## 2021-05-07 PROCEDURE — 97110 THERAPEUTIC EXERCISES: CPT | Performed by: PHYSICAL THERAPIST

## 2021-05-07 PROCEDURE — 97140 MANUAL THERAPY 1/> REGIONS: CPT | Performed by: PHYSICAL THERAPIST

## 2021-05-07 NOTE — PROGRESS NOTES
Daily Note     Today's date: 2021  Patient name: Arturo Benavidez  : 1960  MRN: [id number]  Referring provider: Janna Mccray MD  Dx:   Encounter Diagnosis     ICD-10-CM    1  Closed displaced subtrochanteric fracture of left femur with routine healing, subsequent encounter  S72  22XD    2  Aftercare following surgery  Z48 89        Start Time: 1430  Stop Time: 1500  Total time in clinic (min): 30 minutes    Subjective: same      Objective: See treatment diary below      Assessment: Tolerated treatment well  Patient demonstrated fatigue post treatment, exhibited good technique with therapeutic exercises and would benefit from continued PT, still with hip abd and hip ext rot weakness      Plan: Progress treatment as tolerated         Precautions: L hip ORIF      Manuals 4/30 5/7 3/12 3/19 3/26 3/31 4/2 4/9 4/16 4/23                MT left hip 10 10 10 10' 10' 10 10 10 10 10                             Neuro Re-Ed                                       Step ups 30 30 10x 6'' x20  30x nt 30x 30x 30x 30x   Side stepping 30 30  NV 30x nt 30x 30x 30x 30x                                          Ther Ex             NU STEP 8 min 8 min 7' 8'  8' 10 10 L4 8' 8' 8'   Slant board  4x 4x  L3 30''x4 30"x4 4x 4x 4x 4x 4x   Ball exs 30x 30x 30x 30x 30x 30x 30x 30x 30x 30x   SAQ 6/30 6/30 6/30 6# 30x 6/30 7 5/30 7 5/30 8/30 5/30 5/30   LAQ 6/30 6/30 6/30 6# 30x 6/30 7 5/30 7 5/30 8/30 5/30 5/30   Hip add 60/30 60/30 30/30 30# 30x 60#/30 60#/30 60/30 60/30 30x 30x   Hip abd 30/30 30/30 30/30 60# 30x 50#/30 50#/30 45/30 40#/30 45#/30 25#/30   Supine slr     30x 30x 3x10 30x 30x 10x   bridge  30x 15x D/C LBP - - - - - -   Clamshells  30x   2x10  HEP 30x  2#/30 2/ 2/30 2/30   Stand L hip abd 3/30 30x 30x B x30 ea mirror for feedback  30x ea 30x ea 2/30 2/30 2/30 3/30   R hip hike (L foot on step)  30x 30x  x20   HEP 30x 30x 30x 30x 30x 30x   Heel raises 30x 30x 30x 30x 30x 30x 30x 30x 30x 30x   Standing march 3/30 3/30 3/30 3# 30x B 3#/30x  B 3/30x 3/30 3/30 3/30 3/30   Standing ext 3/30 3/30 3/30 3# 30x B 3#/30x B 3/30x 3/30 3/30 3/30 3/30   Standing SLR 3/30 3/30 3/30 3# 30x B 3#/30x B 3/30 3/30 3/30 2/30 3/30   Leg press  SL press 75/30 75/30 75/30 80# 30x 80#/30 80/30  30/30 80/30  30/30 85/30  30/30 70/30 75/30   biodex          8'  WB4'  LOS  3'  L10   Gait Training             spc        20'x2     Side step

## 2021-05-14 ENCOUNTER — OFFICE VISIT (OUTPATIENT)
Dept: PHYSICAL THERAPY | Age: 61
End: 2021-05-14
Payer: COMMERCIAL

## 2021-05-14 DIAGNOSIS — Z48.89 AFTERCARE FOLLOWING SURGERY: ICD-10-CM

## 2021-05-14 DIAGNOSIS — M21.70 ACQUIRED LEG LENGTH DISCREPANCY: ICD-10-CM

## 2021-05-14 DIAGNOSIS — S72.22XD CLOSED DISPLACED SUBTROCHANTERIC FRACTURE OF LEFT FEMUR WITH ROUTINE HEALING, SUBSEQUENT ENCOUNTER: Primary | ICD-10-CM

## 2021-05-14 PROCEDURE — 97110 THERAPEUTIC EXERCISES: CPT | Performed by: PHYSICAL THERAPIST

## 2021-05-14 PROCEDURE — 97140 MANUAL THERAPY 1/> REGIONS: CPT | Performed by: PHYSICAL THERAPIST

## 2021-05-14 NOTE — PROGRESS NOTES
Daily Note     Today's date: 2021  Patient name: Hannah Tomas  : 1960  MRN: [id number]  Referring provider: Lena Pat MD  Dx:   Encounter Diagnosis     ICD-10-CM    1  Closed displaced subtrochanteric fracture of left femur with routine healing, subsequent encounter  S72  22XD    2  Aftercare following surgery  Z48 89    3  Acquired leg length discrepancy  M21 70        Start Time: 08  Stop Time: 09  Total time in clinic (min): 40 minutes    Subjective: Patient still with left lateral hip soreness especially with left side lying  position    Objective: See treatment diary below      Assessment: Tolerated treatment well  Patient demonstrated fatigue post treatment, exhibited good technique with therapeutic exercises and would benefit from continued PT, increase hip abduction strength note with LLR      Plan: Progress treatment as tolerated         Precautions: L hip ORIF      Manuals 4/30 5/7 5/14 3/19 3/26 3/31 4/2 4/9 4/16 4/23                MT left hip 10 10 10 10' 10' 10 10 10 10 10                             Neuro Re-Ed                                       Step ups 30 30 10x 6'' x20  30x nt 30x 30x 30x 30x   Side stepping 30 30  NV 30x nt 30x 30x 30x 30x                                          Ther Ex             NU STEP 8 min 8 min 7' 8'  8' 10 10 L4 8' 8' 8'   Slant board  4x 4x  L3 30''x4 30"x4 4x 4x 4x 4x 4x   Ball exs 30x 30x 30x 30x 30x 30x 30x 30x 30x 30x   SAQ 6/30 6/30 6/30 6# 30x 6/30 7 5/30 7 5/30 8/30 5/30 5/30   LAQ 6/30 6/30 6/30 6# 30x 6/30 7 5/30 7 5/30 8/30 5/30 5/30   Hip add 60/30 60/30 60/30 30# 30x 60#/30 60#/30 60/30 60/30 30x 30x   Hip abd 30/30 30/30 30/30 60# 30x 50#/30 50#/30 45/30 40#/30 45#/30 25#/30   Supine slr     30x 30x 3x10 30x 30x 10x   bridge  30x 15x D/C LBP - - - - - -   Clamshells  30x   2x10  HEP 30x  2   Stand L hip abd 3/30 30x 30x B x30 ea mirror for feedback  30x ea 30x ea 2/30 2/30 2/30 3/30   R hip hike (L foot on step) 30x 30x 30x x20   HEP 30x 30x 30x 30x 30x 30x   Heel raises 30x 30x 30x 30x 30x 30x 30x 30x 30x 30x   Standing march 3/30 3/30 3/30 3# 30x B 3#/30x  B 3/30x 3/30 3/30 3/30 3/30   Standing ext 3/30 3/30 3/30 3# 30x B 3#/30x B 3/30x 3/30 3/30 3/30 3/30   Standing SLR 3/30 3/30 3/30 3# 30x B 3#/30x B 3/30 3/30 3/30 2/30 3/30   Leg press  SL press 75/30 75/30 75/30 80# 30x 80#/30 80/30  30/30 80/30  30/30 85/30  30/30 70/30 75/30   biodex          8'  WB4'  LOS  3'  L10   Gait Training             spc        20'x2     Side step

## 2021-05-21 ENCOUNTER — OFFICE VISIT (OUTPATIENT)
Dept: PHYSICAL THERAPY | Age: 61
End: 2021-05-21
Payer: COMMERCIAL

## 2021-05-21 DIAGNOSIS — Z48.89 AFTERCARE FOLLOWING SURGERY: ICD-10-CM

## 2021-05-21 DIAGNOSIS — S72.22XD CLOSED DISPLACED SUBTROCHANTERIC FRACTURE OF LEFT FEMUR WITH ROUTINE HEALING, SUBSEQUENT ENCOUNTER: Primary | ICD-10-CM

## 2021-05-21 PROCEDURE — 97110 THERAPEUTIC EXERCISES: CPT | Performed by: PHYSICAL THERAPIST

## 2021-05-21 PROCEDURE — 97140 MANUAL THERAPY 1/> REGIONS: CPT | Performed by: PHYSICAL THERAPIST

## 2021-05-21 NOTE — PROGRESS NOTES
Daily Note     Today's date: 2021  Patient name: Kai Cook  : 1960  MRN: [id number]  Referring provider: Marcin Grant MD  Dx:   Encounter Diagnosis     ICD-10-CM    1  Closed displaced subtrochanteric fracture of left femur with routine healing, subsequent encounter  S72  22XD    2  Aftercare following surgery  Z48 89        Start Time: 1345  Stop Time: 1430  Total time in clinic (min): 45 minutes    Subjective: some hip/knee sorenes      Objective: See treatment diary below      Assessment: Tolerated treatment well  Patient demonstrated fatigue post treatment, exhibited good technique with therapeutic exercises and would benefit from continued PT, still uses cane and has a reverse trendelenburg sign      Plan: Progress treatment as tolerated         Precautions: L hip ORIF      Manuals 4/30 5/7 5/14 5/21 3/26 3/31 4/2 4/9 4/16 4/23                MT left hip 10 10 10 10' 10' 10 10 10 10 10                             Neuro Re-Ed                                       Step ups 30 30 10x 6'' x20  30x nt 30x 30x 30x 30x   Side stepping 30 30  30 30x nt 30x 30x 30x 30x   bike    5 min                                   Ther Ex             NU STEP 8 min 8 min 7' 8'  8' 10 10 L4 8' 8' 8'   Slant board  4x 4x  L3 30''x4 30"x4 4x 4x 4x 4x 4x   Ball exs 30x 30x 30x 30x 30x 30x 30x 30x 30x 30x   SAQ 6/30 6/30 6/30 6# 30x 6/30 7 5/30 7 5/30 8/30 5/30 5/30   LAQ 6/30 6/30 6/30 6# 30x 6/30 7 5/30 7 5/30 8/30 5/30 5/30   Hip add 60/30 60/30 60/30 60# 30x 60#/30 60#/30 60/30 60/30 30x 30x   Hip abd 30/30 30/30 30/30 60# 30x 50#/30 50#/30 45/30 40#/30 45#/30 25#/30   Supine slr     30x 30x 3x10 30x 30x 10x   bridge  30x 15x D/C LBP - - - - - -   Clamshells  30x   2x10  HEP 30x  2   Stand L hip abd 3/30 30x 30x B x30 ea mirror for feedback  30x ea 30x ea 2/30 2/30 2/30 3/30   R hip hike (L foot on step)  30x 30x 30x x20   HEP 30x 30x 30x 30x 30x 30x   Heel raises 30x 30x 30x 30x 30x 30x 30x 30x 30x 30x   Standing march 3/30 3/30 3/30 3# 30x B 3#/30x  B 3/30x 3/30 3/30 3/30 3/30   Standing ext 3/30 3/30 3/30 3# 30x B 3#/30x B 3/30x 3/30 3/30 3/30 3/30   Standing SLR 3/30 3/30 3/30 3# 30x B 3#/30x B 3/30 3/30 3/30 2/30 3/30   Leg press  SL press 75/30 75/30 75/30 80# 30x 80#/30 80/30  30/30 80/30  30/30 85/30  30/30 70/30 75/30   biodex          8'  WB4'  LOS  3'  L10   Gait Training             spc        20'x2     Side step

## 2021-05-26 ENCOUNTER — TELEPHONE (OUTPATIENT)
Dept: NEUROLOGY | Facility: CLINIC | Age: 61
End: 2021-05-26

## 2021-05-26 NOTE — TELEPHONE ENCOUNTER
RAMU-Left a VM regarding his appt on 5/28/2021 we wanted to see if he could come in at 12pm instead of 4:30 pm with Dr Andrade Cisneros  If so please schedule or let Saira know   Thanks

## 2021-05-27 NOTE — TELEPHONE ENCOUNTER
RAMU-I spoke with patient wife she will check with  to see if he is able to come in at 12pm  If so please let tamika know and he can pass it along to the Provider  And let Romie Rubin know so she can open slot  Thanks

## 2021-05-27 NOTE — TELEPHONE ENCOUNTER
FYI- Patient returned my call and was able to come in at 12PM on 5/28/2021(instead of 4:30PM) Provider is okay with the time switch

## 2021-05-28 ENCOUNTER — OFFICE VISIT (OUTPATIENT)
Dept: PHYSICAL THERAPY | Age: 61
End: 2021-05-28
Payer: COMMERCIAL

## 2021-05-28 ENCOUNTER — CONSULT (OUTPATIENT)
Dept: NEUROLOGY | Facility: CLINIC | Age: 61
End: 2021-05-28
Payer: COMMERCIAL

## 2021-05-28 VITALS
BODY MASS INDEX: 23.07 KG/M2 | DIASTOLIC BLOOD PRESSURE: 82 MMHG | SYSTOLIC BLOOD PRESSURE: 128 MMHG | WEIGHT: 184.6 LBS | HEART RATE: 53 BPM

## 2021-05-28 DIAGNOSIS — M16.0 BILATERAL HIP JOINT ARTHRITIS: ICD-10-CM

## 2021-05-28 DIAGNOSIS — M62.81 MUSCLE WEAKNESS OF EXTREMITY: ICD-10-CM

## 2021-05-28 DIAGNOSIS — S72.22XD CLOSED DISPLACED SUBTROCHANTERIC FRACTURE OF LEFT FEMUR WITH ROUTINE HEALING, SUBSEQUENT ENCOUNTER: Primary | ICD-10-CM

## 2021-05-28 DIAGNOSIS — R26.2 AMBULATORY DYSFUNCTION: ICD-10-CM

## 2021-05-28 DIAGNOSIS — Z48.89 AFTERCARE FOLLOWING SURGERY: ICD-10-CM

## 2021-05-28 DIAGNOSIS — M21.70 ACQUIRED LEG LENGTH DISCREPANCY: ICD-10-CM

## 2021-05-28 DIAGNOSIS — G89.11 ACUTE PAIN DUE TO TRAUMA: ICD-10-CM

## 2021-05-28 PROCEDURE — 97110 THERAPEUTIC EXERCISES: CPT | Performed by: PHYSICAL THERAPIST

## 2021-05-28 PROCEDURE — 99214 OFFICE O/P EST MOD 30 MIN: CPT | Performed by: STUDENT IN AN ORGANIZED HEALTH CARE EDUCATION/TRAINING PROGRAM

## 2021-05-28 PROCEDURE — 97140 MANUAL THERAPY 1/> REGIONS: CPT | Performed by: PHYSICAL THERAPIST

## 2021-05-28 NOTE — PROGRESS NOTES
DISCHARGE    Today's date: 2021  Patient name: Rebeca Weldon  : 1960  MRN: [id number]  Referring provider: Lilibeth Wynn MD  Dx:   Encounter Diagnosis     ICD-10-CM    1  Closed displaced subtrochanteric fracture of left femur with routine healing, subsequent encounter  S72  22XD    2  Aftercare following surgery  Z48 89        Start Time: 0900  Stop Time: 0945  Total time in clinic (min): 45 minutes    Subjective: still sore to left hip       Objective: See treatment diary below      Assessment: Tolerated treatment fair  Patient exhibited good technique with therapeutic exercises  At this time, patient has achieved their maximum functional benefit from skilled physical therapy services and will be discharged to their HEP  Patient is in agreement with the plan of care  As a result, patient is discharged from physical therapy        Plan: D/C to [facility]     Precautions: L hip ORIF      Manuals 4/30 5/7 5/14 5/21 5/28 3/31 4/2 4/9 4/16 4/23                MT left hip 10 10 10 10' 10' 10 10 10 10 10                             Neuro Re-Ed                                       Step ups 30 30 10x 6'' x20  30x nt 30x 30x 30x 30x   Side stepping 30 30  30 30x nt 30x 30x 30x 30x   bike    5 min                                   Ther Ex             NU STEP 8 min 8 min 7' 8'  8' 10 10 L4 8' 8' 8'   Slant board  4x 4x  L3 30''x4 30"x4 4x 4x 4x 4x 4x   Ball exs 30x 30x 30x 30x 30x 30x 30x 30x 30x 30x   SAQ 6/30 6/30 6/30 6# 30x 6/30 7 5/30 7 5/30 8/30 5/30 5/30   LAQ 6/ 6/30 6/30 6# 30x 6/30 7 5/ 7 5/ 8/30 5/30 5/30   Hip add 60/30 60/30 60/30 60# 30x 60#/30 60#/30 60/30 60/30 30x 30x   Hip abd 30/30 30/30 30/30 60# 30x 50#/30 50#/30 45/30 40#/30 45#/30 25#/30   Supine slr     30x 30x 3x10 30x 30x 10x   bridge  30x 15x D/C LBP - - - - - -   Clamshells  30x   2x10  HEP 30x  2   Stand L hip abd 3/30 30x 30x B x30 ea mirror for feedback  30x ea 30x ea 2/30 2/30 2/30 3/30   R hip hike (L foot on step)  30x 30x 30x x20   HEP 30x 30x 30x 30x 30x 30x   Heel raises 30x 30x 30x 30x 30x 30x 30x 30x 30x 30x   Standing march 3/30 3/30 3/30 3# 30x B 3#/30x  B 3/30x 3/30 3/30 3/30 3/30   Standing ext 3/30 3/30 3/30 3# 30x B 3#/30x B 3/30x 3/30 3/30 3/30 3/30   Standing SLR 3/30 3/30 3/30 3# 30x B 3#/30x B 3/30 3/30 3/30 2/30 3/30   Leg press  SL press 75/30 75/30 75/30 80# 30x 80#/30 80/30  30/30 80/30  30/30 85/30  30/30 70/30 75/30   biodex          8'  WB4'  LOS  3'  L10   Gait Training             spc        20'x2     Side step

## 2021-05-28 NOTE — PROGRESS NOTES
Review of Systems   Constitutional: Negative  Negative for appetite change and fever  HENT: Negative  Negative for hearing loss, tinnitus, trouble swallowing and voice change  Eyes: Negative  Negative for photophobia and pain  Respiratory: Negative  Negative for shortness of breath  Cardiovascular: Negative  Negative for palpitations  Gastrointestinal: Negative  Negative for nausea and vomiting  Endocrine: Negative  Negative for cold intolerance  Genitourinary: Negative  Negative for dysuria, frequency and urgency  Musculoskeletal: Positive for gait problem (due to broken leg and surgery) and myalgias (occasional)  Negative for neck pain  Skin: Negative  Negative for rash  Allergic/Immunologic: Negative  Neurological: Negative for dizziness, tremors, seizures, syncope, facial asymmetry, speech difficulty, weakness, light-headedness, numbness and headaches  Hematological: Negative  Does not bruise/bleed easily  Psychiatric/Behavioral: Negative  Negative for confusion, hallucinations and sleep disturbance  All other systems reviewed and are negative

## 2021-05-28 NOTE — PROGRESS NOTES
Physical Medicine & Rehabilitation New Patient Evaluation  Aissatou Rm 59 y o  male      HPI/SUBJECTIVE: Aissatou Rm 59 y o  male with hypertension, status post colon resection for perforated diverticula, tobacco use, patient presented to the [address] 12/18/20 status post traumatic fall on ice   Down for 2-3 hours   Sustained a displaced subtrochanteric left femur fracture with diaphysis displaced medially and proximally with respect to the proximal fragment   Patient evaluated by Orthopedics who deemed patient a surgical candidate   Patient taken to the OR on 12/19/20 by Dr Shefali Mae a open reduction left femur with TFNA donal with cerclage wiring and IM nailing   Patient deemed weight-bearing as tolerated LLE postoperatively and placed on Lovenox for DVT prophylaxis  Medically, patient developed acute blood loss anemia, acute pain and postoperative constipation, which resolved  He was found to have functional deficits from his baseline, and therefore admitted to [address] [place] [address] 12/29/20 and discharged on 12/28/20     Patient seen face to face today in clinic for follow up  He is having issues with his gait and with some degree of pain in the left hip over 6 months post operatively  He notes he has a leg length discrepency in the left leg since surgery measured at approximately 2 5cm by imaging and his physical therapist  He obtained a 0 5 inch heel lift from a friend which has helped but not entirely  He ambulates with a single point cane, but had previously used a rolling walker  Overall the pain is controlled on acetaminophen and 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  The pain is sharp at times and normally a 2-3/10 but can spike to a 9/10 especially when exiting his car or sleeping on that side at night  He tried diclofenac gel in the past but with limited benefit, but had not tried it regularly      ASSESSMENT/PLAN:     Mr Amy Soares presents with abnormal gait patterns over 5 months after a surgical repair by Dr Lauren Lr after a traumatic fall on the icy driveway at his house  He has a significant leg length discrepency noted even without formal measurements, however this was measured in the office to be around 2 5cm or 1 inch  He currently is using a heel insert that is no larger than 0 5 inches  In addition he has some weakness in the gluteal muscles on the left with a trendelenburg gait  He is unable to support his body on one leg without the hip dropping down  The femoral nerve which was initially of potential concern due tot he proximity to the severely displaced fracture seems to be intact  1  [facility] therapy to work on gait after he obtains a proper shoe lift  Also to work on core and hip girdle strengthening  2  Referral to [facility] for evaluation of proper shoe build up to the appropriate height  I gave a list of [facility] in the area  I believe with his large discrepancy, an in shoe removable orthotic will not be adequate  3  EMG/NCS of the left lower limbs due to gluteal weakness  I am not only interested in ruling out other focal neuropathic processing, but interested to see if their is any area of denervation in the gluteus medius/an  4  Heterotopic bone noted on XR unlikely cause for symptoms but will follow  5  Try using dicofenac gel regularly in order to see if can address some symptoms  He already has this at home  Diagnoses and all orders for this visit:    Closed displaced subtrochanteric fracture of left femur with routine healing, subsequent encounter    Acute pain due to trauma    Acquired leg length discrepancy  -     Ambulatory referral for orthotics/ prosthetics; Future  -     Ambulatory referral to Physical Therapy; Future    Bilateral hip joint arthritis    Muscle weakness of extremity  -     EMG 1 Limb; Future  -     Ambulatory referral to Physical Therapy;  Future    Ambulatory dysfunction  -     Ambulatory referral to Physical Therapy; Future           Review of Systems   Constitutional: Negative  Negative for appetite change and fever  HENT: Negative  Negative for hearing loss, tinnitus, trouble swallowing and voice change  Eyes: Negative  Negative for photophobia and pain  Respiratory: Negative  Negative for shortness of breath  Cardiovascular: Negative  Negative for palpitations  Gastrointestinal: Negative  Negative for nausea and vomiting  Endocrine: Negative  Negative for cold intolerance  Genitourinary: Negative  Negative for dysuria, frequency and urgency  Musculoskeletal: Positive for gait problem (due to broken leg and surgery) and myalgias (occasional)  Negative for neck pain  Skin: Negative  Negative for rash  Allergic/Immunologic: Negative  Neurological: Negative for dizziness, tremors, seizures, syncope, facial asymmetry, speech difficulty, weakness, light-headedness, numbness and headaches  Hematological: Negative  Does not bruise/bleed easily  Psychiatric/Behavioral: Negative  Negative for confusion, hallucinations and sleep disturbance  All other systems reviewed and are negative      OBJECTIVE:   /82 (BP Location: Left arm, Patient Position: Sitting, Cuff Size: Standard)   Pulse (!) 53   Wt 83 7 kg (184 lb 9 6 oz)   BMI 23 07 kg/m²      Physical Exam  Constitutional:       General: He is not in acute distress  Appearance: Normal appearance  HENT:      Head: Normocephalic and atraumatic  Right Ear: External ear normal       Left Ear: External ear normal    Cardiovascular:      Pulses: Normal pulses  Pulmonary:      Effort: Pulmonary effort is normal  No respiratory distress  Abdominal:      General: There is no distension  Palpations: Abdomen is soft  Musculoskeletal:      Comments: Limited ROM of the proximal left proximal leg due to pain and weakness  Good passive ROM  Tish length measured in office to approximately 2 5 centimeters shorter on the left  Genu varum bilaterally  Tender to palpation over palpable portion of hardware due to body habitus  Skin:     General: Skin is warm  Findings: No bruising or erythema  Neurological:      Mental Status: He is alert and oriented to person, place, and time  Comments: Trendelenburg gait with and without use of single point cane  Overt left leg shortening even with heel lift and visible in stance and swing phase with lateral lean  Weakness noted on MMT in the hip extensors and abductors 4/5   Otherwise lower limbs are 5/5 in strength   Psychiatric:         Mood and Affect: Mood normal          Behavior: Behavior normal          Imaging: I personally reviewed pertinent imaging    Labs: Personally reviewed  Lab Results   Component Value Date    WBC 10 19 (H) 12/27/2020    HGB 9 1 (L) 12/27/2020    HCT 28 6 (L) 12/27/2020     (H) 12/27/2020     (H) 12/27/2020     Lab Results   Component Value Date    GLUCOSE 98 12/18/2020    CALCIUM 9 4 12/27/2020    K 4 1 12/27/2020    CO2 30 12/27/2020     12/27/2020    BUN 17 12/27/2020    CREATININE 0 84 12/27/2020         The following portions of the patient's history were reviewed and updated as appropriate: allergies, current medications, past family history, past medical history, past social history, past surgical history and problem list     Past Medical History:   Diagnosis Date    Hypertension        Patient Active Problem List    Diagnosis Date Noted    History of colon resection 12/23/2020    Acute blood loss anemia 12/20/2020    Acute pain due to trauma 12/19/2020    Hypertension 12/19/2020    Smoking 12/19/2020    Fall from standing 12/18/2020    Closed left subtrochanteric femur fracture ([address] ) 12/18/2020    S/P laparoscopic-assisted sigmoidectomy 02/24/2019    Intra-abdominal abscess ([address] ) 01/28/2019    Diverticulitis of large intestine with abscess without bleeding 01/28/2019       Past Surgical History:   Procedure Laterality Date    COLONOSCOPY      CT GUIDED PERC DRAINAGE CATHETER PLACEMENT  1/11/2019    ELBOW SURGERY      KNEE SURGERY      IA COLONOSCOPY FLX DX W/COLLJ SPEC WHEN PFRMD N/A 2/20/2019    Procedure: COLONOSCOPY;  Surgeon: Abrahan Pearl MD;  [facility]; Service: Colorectal    IA CYSTOSCOPY,INSERT URETERAL STENT N/A 2/21/2019    Procedure: Arlon Lanes BILATERAL STENTS URETERAL;  Surgeon: Izabela Marlow MD;  [facility];  Service: Urology    IA LAP,SURG,COLECTOMY, PARTIAL, W/ANAST N/A 2/21/2019    Procedure: DIAGNOSTIC LAPAROSCOPY, LAPAROSCOPIC HAND-ASSISTED SIGMOID COLECTOMY, REPAIR OF UNAVOIDABLE SEROSAL ADHESION, INTRAOPERATIVE LASER FLUORSENCE ANGIOGRAPHY ([address]), FLEXIBLE SIGMOIDOSCOPY;  Surgeon: Abrahan Pearl MD;  [facility];  Service: Colorectal    IA OPEN RX FEMUR FX+INTRAMED TRES Left 12/19/2020    Procedure: INSERTION NAIL IM FEMUR ANTEGRADE For Subtrochanteric fracture;  Surgeon: Consuelo Christianson DO;  [facility];  Service: Orthopedics       History reviewed  No pertinent family history      Social History     Allergies   Allergen Reactions    Penicillins     Penicillins Rash         Current Outpatient Medications:     acetaminophen (TYLENOL) 325 mg tablet, OTC (Patient taking differently: 650 mg every 4 (four) hours as needed OTC), Disp: 30 tablet, Rfl: 0    acetaminophen (TYLENOL) 325 mg tablet, Take 2 tablets (650 mg total) by mouth every 6 (six) hours, Disp: , Rfl: 0    ascorbic acid (VITAMIN C) 500 mg tablet, Take 500 mg by mouth daily, Disp: , Rfl:     Diovan 160 MG tablet, As directed, Disp: , Rfl:     ibuprofen (ADVIL,MOTRIN) 100 MG tablet, Take 100 mg by mouth every 6 (six) hours as needed for mild pain, Disp: , Rfl:     multivitamin (THERAGRAN) TABS, Take 1 tablet by mouth daily, Disp: , Rfl:     multivitamin (THERAGRAN) TABS, Take 1 tablet by mouth daily, Disp: , Rfl:     celecoxib (CeleBREX) 100 mg capsule, Take 1 capsule (100 mg total) by mouth 2 (two) times a day (Patient not taking: Reported on 1/29/2021), Disp: 30 capsule, Rfl: 0    cholecalciferol (VITAMIN D3) 400 units tablet, Take 400 Units by mouth daily, Disp: , Rfl:     Diclofenac Sodium (VOLTAREN) 1 %, Apply 2 g topically 3 (three) times a day (Patient not taking: Reported on 1/29/2021), Disp: 2 Tube, Rfl: 0    docusate sodium (COLACE) 100 mg capsule, Take 1 capsule (100 mg total) by mouth 2 (two) times a day (Patient not taking: Reported on 1/29/2021), Disp: , Rfl: 0    enoxaparin (LOVENOX) 40 mg/0 4 mL, Inject 0 4 mL (40 mg total) under the skin every 24 hours (Patient not taking: Reported on 1/29/2021), Disp: 17 Syringe, Rfl: 0    losartan (COZAAR) 50 mg tablet, Take 50 mg by mouth daily, Disp: , Rfl:     methocarbamol (ROBAXIN) 500 mg tablet, Take 1 tablet (500 mg total) by mouth 4 (four) times a day as needed for muscle spasms (Patient not taking: Reported on 5/28/2021), Disp: 45 tablet, Rfl: 0    pantoprazole (PROTONIX) 40 mg tablet, Take 1 tablet (40 mg total) by mouth daily in the early morning (Patient not taking: Reported on 5/28/2021), Disp: 30 tablet, Rfl: 0    traMADol (ULTRAM) 50 mg tablet, Take 0 5 tablets (25 mg total) by mouth every 4 (four) hours as needed for moderate pain or severe pain (Patient not taking: Reported on 5/28/2021), Disp: 40 tablet, Rfl: 0     Suezanne Quant, DO  [facility] [place]

## 2021-05-28 NOTE — PATIENT INSTRUCTIONS
You were seen today for abnormal gait or walking  After review of your imaging and examination, you have an approximately 1 inch short leg on the left, or the side of the fracture  We discussed seeing an [facility] for this issue to have a shoe build up  Currently your insert is only 0 5 inches  In addition you have left sided gluteal muscles that affects the way you stand and walk  You may use diclofenc gel 4x per day on the hip, some of the pain is due to your habitus and positioning of the hardware  You have heterotopic bone in the medial aspect, but not likely causing symptoms  Continue tylenol as needed as well as Ice

## 2021-06-25 ENCOUNTER — OFFICE VISIT (OUTPATIENT)
Dept: OBGYN CLINIC | Facility: CLINIC | Age: 61
End: 2021-06-25
Payer: COMMERCIAL

## 2021-06-25 ENCOUNTER — TELEPHONE (OUTPATIENT)
Dept: OBGYN CLINIC | Facility: MEDICAL CENTER | Age: 61
End: 2021-06-25

## 2021-06-25 ENCOUNTER — APPOINTMENT (OUTPATIENT)
Dept: RADIOLOGY | Facility: CLINIC | Age: 61
End: 2021-06-25
Payer: COMMERCIAL

## 2021-06-25 DIAGNOSIS — S72.22XD CLOSED DISPLACED SUBTROCHANTERIC FRACTURE OF LEFT FEMUR WITH ROUTINE HEALING, SUBSEQUENT ENCOUNTER: Primary | ICD-10-CM

## 2021-06-25 DIAGNOSIS — S72.22XD CLOSED DISPLACED SUBTROCHANTERIC FRACTURE OF LEFT FEMUR WITH ROUTINE HEALING, SUBSEQUENT ENCOUNTER: ICD-10-CM

## 2021-06-25 DIAGNOSIS — R29.898 WEAKNESS OF LEFT HIP: ICD-10-CM

## 2021-06-25 DIAGNOSIS — M21.70 LEG LENGTH DISCREPANCY: ICD-10-CM

## 2021-06-25 PROCEDURE — 73552 X-RAY EXAM OF FEMUR 2/>: CPT

## 2021-06-25 PROCEDURE — 99213 OFFICE O/P EST LOW 20 MIN: CPT | Performed by: ORTHOPAEDIC SURGERY

## 2021-06-25 NOTE — PROGRESS NOTES
ASSESSMENT/PLAN:    Assessment:   59 y o  male  6 months s/p left hip TFN, left gluteal weakness    Plan: Today I had a long discussion with the patient and caregiver regarding the diagnosis and plan moving forward  X-rays today show intact hardware and continued fracture healing  He continues to have issues with his gait, I feel this is mostly due to abductor weakness likely residual from the surgery  He does have a leg-length discrepancy but I do not think this is his biggest problem  X-rays demonstrate increased healing and consolidation although there is still some area of lucency over the lateral cortex  He has no pain over this area most of his pain is in the back  He should continue with therapy, I will see him back in 3 months for repeat x-rays  Follow up: 3 months with repeat x-rays of the left femur    The above diagnosis and plan has been dicussed with the patient and caregiver  They verbalized an understanding and will follow up accordingly  _____________________________________________________    SUBJECTIVE:  Maritza Lantigua is a 59 y o  male who presents 6 months s/p left hip TFN  Patient does note that he has had increased hip, knee, and lower back pain due to his leg length discrepancy  Patient does state he is working with [employer] on getting a shoe lift  Still with some numbness around the incision site  Patient will be returning to physical therapy within the next few weeks  He is also being sent by Dr Shu Brewer for an EMG of the LLE to evaluate gluteal weakness  Presents to the office today for repeat x-rays  Patient offers no additional complaints this time      PAST MEDICAL HISTORY:  Past Medical History:   Diagnosis Date    Hypertension        PAST SURGICAL HISTORY:  Past Surgical History:   Procedure Laterality Date    COLONOSCOPY      CT GUIDED PERC DRAINAGE CATHETER PLACEMENT  1/11/2019    ELBOW SURGERY      KNEE SURGERY      HI COLONOSCOPY FLX DX W/COLLJ SPEC WHEN PFRMD N/A 2/20/2019    Procedure: COLONOSCOPY;  Surgeon: Rafa Boswell MD;  [facility]; Service: Colorectal    CA CYSTOSCOPY,INSERT URETERAL STENT N/A 2/21/2019    Procedure: Yanni Alexander BILATERAL STENTS URETERAL;  Surgeon: Wanda Jacobs MD;  [facility];  Service: Urology    CA LAP,SURG,COLECTOMY, PARTIAL, W/ANAST N/A 2/21/2019    Procedure: DIAGNOSTIC LAPAROSCOPY, LAPAROSCOPIC HAND-ASSISTED SIGMOID COLECTOMY, REPAIR OF UNAVOIDABLE SEROSAL ADHESION, INTRAOPERATIVE LASER FLU[facility]ENCE ANGIOGRAPHY [facility] [facility] FLEXIBLE SIGMOIDOSCOPY;  Surgeon: Rafa Boswell MD;  [facility];  Service: Colorectal    CA OPEN RX FEMUR FX+INTRAMED TRES Left 12/19/2020    Procedure: INSERTION NAIL IM FEMUR ANTEGRADE For Subtrochanteric fracture;  Surgeon: Lorenza Myrick DO;  [facility];  Service: Orthopedics       FAMILY HISTORY:  History reviewed  No pertinent family history      SOCIAL HISTORY:  Social History     Tobacco Use    Smoking status: Current Every Day Smoker     Packs/day: 0 50     Types: Cigarettes    Smokeless tobacco: Never Used   Vaping Use    Vaping Use: Never used   Substance Use Topics    Alcohol use: Not Currently    Drug use: Never       MEDICATIONS:    Current Outpatient Medications:     acetaminophen (TYLENOL) 325 mg tablet, OTC (Patient taking differently: 650 mg every 4 (four) hours as needed OTC), Disp: 30 tablet, Rfl: 0    acetaminophen (TYLENOL) 325 mg tablet, Take 2 tablets (650 mg total) by mouth every 6 (six) hours, Disp: , Rfl: 0    ascorbic acid (VITAMIN C) 500 mg tablet, Take 500 mg by mouth daily, Disp: , Rfl:     celecoxib (CeleBREX) 100 mg capsule, Take 1 capsule (100 mg total) by mouth 2 (two) times a day (Patient not taking: Reported on 1/29/2021), Disp: 30 capsule, Rfl: 0    cholecalciferol (VITAMIN D3) 400 units tablet, Take 400 Units by mouth daily, Disp: , Rfl:     Diclofenac Sodium (VOLTAREN) 1 %, Apply 2 g topically 3 (three) times a day (Patient not taking: Reported on 1/29/2021), Disp: 2 Tube, Rfl: 0    Diovan 160 MG tablet, As directed, Disp: , Rfl:     docusate sodium (COLACE) 100 mg capsule, Take 1 capsule (100 mg total) by mouth 2 (two) times a day (Patient not taking: Reported on 1/29/2021), Disp: , Rfl: 0    enoxaparin (LOVENOX) 40 mg/0 4 mL, Inject 0 4 mL (40 mg total) under the skin every 24 hours (Patient not taking: Reported on 1/29/2021), Disp: 17 Syringe, Rfl: 0    ibuprofen (ADVIL,MOTRIN) 100 MG tablet, Take 100 mg by mouth every 6 (six) hours as needed for mild pain, Disp: , Rfl:     losartan (COZAAR) 50 mg tablet, Take 50 mg by mouth daily, Disp: , Rfl:     methocarbamol (ROBAXIN) 500 mg tablet, Take 1 tablet (500 mg total) by mouth 4 (four) times a day as needed for muscle spasms (Patient not taking: Reported on 5/28/2021), Disp: 45 tablet, Rfl: 0    multivitamin (THERAGRAN) TABS, Take 1 tablet by mouth daily, Disp: , Rfl:     multivitamin (THERAGRAN) TABS, Take 1 tablet by mouth daily, Disp: , Rfl:     pantoprazole (PROTONIX) 40 mg tablet, Take 1 tablet (40 mg total) by mouth daily in the early morning (Patient not taking: Reported on 5/28/2021), Disp: 30 tablet, Rfl: 0    traMADol (ULTRAM) 50 mg tablet, Take 0 5 tablets (25 mg total) by mouth every 4 (four) hours as needed for moderate pain or severe pain (Patient not taking: Reported on 5/28/2021), Disp: 40 tablet, Rfl: 0    ALLERGIES:  Allergies   Allergen Reactions    Penicillins     Penicillins Rash       REVIEW OF SYSTEMS:  ROS is negative other than that noted in the HPI  Constitutional: Negative for fatigue and fever  HENT: Negative for sore throat  Respiratory: Negative for shortness of breath  Cardiovascular: Negative for chest pain  Gastrointestinal: Negative for abdominal pain  Endocrine: Negative for cold intolerance and heat intolerance  Genitourinary: Negative for flank pain  Musculoskeletal: Negative for back pain     Skin: Negative for rash    Allergic/Immunologic: Negative for immunocompromised state  Neurological: Negative for dizziness  Psychiatric/Behavioral: Negative for agitation  _____________________________________________________  PHYSICAL EXAMINATION:  General/Constitutional: NAD, well developed, well nourished  HENT: Normocephalic, atraumatic  CV: Intact distal pulses, regular rate  Resp: No respiratory distress or labored breathing  Lymphatic: No lymphadenopathy palpated  Neuro: Alert and Oriented x 3, no focal deficits  Psych: Normal mood, normal affect, normal judgement, normal behavior  Skin: Warm, dry, no rashes, no erythema      MUSCULOSKELETAL EXAMINATION:  Left hip  Well-healed incision   No erythema   No bony or soft tissue tenderness appreciated  Decreased sensation over the incision  Neurovascularly intact distally  Right leg longer than left  Trendelenburg gait  Positive Trendelenburg sign    _____________________________________________________  STUDIES REVIEWED:  X-rays of the left femur performed on 6/25/2021 reviewed by Dr Leretha Blizzard demonstrate maintained alignment of subtrochanteric fracture with signs of interval healing  ORIF hardware in stable positioning with no signs of loosening or failure  Severe degenerative changes in the hip joint noted        PROCEDURES PERFORMED:  No Procedures performed today     Scribe Attestation    I,:  Quirino Officer am acting as a scribe while in the presence of the attending physician :       I,:  Zachary Mckinney DO personally performed the services described in this documentation    as scribed in my presence :

## 2021-06-25 NOTE — TELEPHONE ENCOUNTER
Patient sees Dr Cathy Choi  Patient calling about his prescriptions, when he got his print out he noticed the prescriptions are incorrect  He states there is some of the medications he is no longer taken and would like his list to be updated  Who can he address this with?      # [phone]

## 2021-07-02 ENCOUNTER — EVALUATION (OUTPATIENT)
Dept: PHYSICAL THERAPY | Age: 61
End: 2021-07-02
Payer: COMMERCIAL

## 2021-07-02 DIAGNOSIS — M21.70 ACQUIRED LEG LENGTH DISCREPANCY: Primary | ICD-10-CM

## 2021-07-02 DIAGNOSIS — M62.81 MUSCLE WEAKNESS OF EXTREMITY: ICD-10-CM

## 2021-07-02 DIAGNOSIS — R26.2 AMBULATORY DYSFUNCTION: ICD-10-CM

## 2021-07-02 PROCEDURE — 97162 PT EVAL MOD COMPLEX 30 MIN: CPT | Performed by: PHYSICAL THERAPIST

## 2021-07-02 PROCEDURE — 97110 THERAPEUTIC EXERCISES: CPT | Performed by: PHYSICAL THERAPIST

## 2021-07-02 NOTE — PROGRESS NOTES
PT Evaluation     Today's date: 2021  Patient name: Shellie Goel  : 1960  MRN: [id number]  Referring provider: Kiko Trejo DO  Dx:   Encounter Diagnosis     ICD-10-CM    1  Acquired leg length discrepancy  M21 70    2  Ambulatory dysfunction  R26 2    3  Muscle weakness of extremity  M62 81        Start Time: 0915  Stop Time: 1015  Total time in clinic (min): 60 minutes    Assessment  Assessment details: Jesus Cox is a 59year old male s/p femur fracture/ORIF due to traumatic fall in December  He has a significant leg length discrepancy and will have shoe lift molded this week  Both weakness and leg length contribute to current ambulatory dysfunction  He was recently referred bck to PT to address impairments and functinal limitations as outlined  He could benefit from PT as indicated  Understanding of Dx/Px/POC: good   Prognosis: good    Goals  STG 2-3  1  Decrease pain 50% with decrease use of OTC medication  2  Ambulation with decreased antalgia and increased step length/speed    LTG 8 weeks  1  Less than 2/10 pain lef thip and LB most times  2  4/5 LE strength  3  4/5 core strength  4  Ambulation  All surfaces with increased confidence, decreased antalgia and improved hip stabilization      Plan  Patient would benefit from: skilled physical therapy  Planned therapy interventions: manual therapy, motor coordination training, neuromuscular re-education, therapeutic activities, therapeutic exercise, functional ROM exercises and home exercise program  Frequency: 1-2 times per week  Duration in weeks: 10  Plan of Care beginning date: 2021  Plan of Care expiration date: 2021  Treatment plan discussed with: patient        Subjective Evaluation    History of Present Illness  Date of onset: 2020  Date of surgery: 2020  Mechanism of injury: Slip and fall in driveway with fracture femur and ORIF 2020  Seen in PT up until 7 weeks ago   Recently seen by MD and now referred for out patient PT  Did do a course of PT up until May 2021  Notes waking with pain at 2/10; taking pain medication during the night (OTC)     Uses cane when fatigued  Up stairs in one of the worst activities  Notes some loss of balance to left side while turning in office  Continues to work full time  Quality of life: good    Pain  Current pain ratin  At best pain ratin (with OTC)  At worst pain ratin  Location: lateral hip left ; lateral pelvis; left groin stabbing at times; some LBP  Quality: sharp and dull ache  Alleviating factors: OTC medication  Aggravating factors: stair climbing and walking (adjustingchair at work with left foot; )    Social Support  Lives in: multiple-level home  Lives with: spouse    Patient Goals  Patient goals for therapy: increased strength and improved balance  Patient goal: move without thinking;         Objective     Palpation   Left   Hypertonic in the quadratus lumborum  Tenderness of the medial gastrocnemius, rectus femoris and TFL  Right   Hypertonic in the quadratus lumborum  Tenderness of the quadratus lumborum       Neurological Testing     Sensation     Hip   Left Hip   Intact: light touch  Diminished: light touch    Active Range of Motion   Left Hip   Flexion: 100 degrees   Abduction: 35 degrees   External rotation (90/90): 35 degrees   Internal rotation (90/90): 30 degrees     Right Hip   Flexion: 110 degrees   Abduction: 40 degrees   External rotation (90/90): 55 degrees   Internal rotation (90/90): 45 degrees     Strength/Myotome Testing     Left Hip   Planes of Motion   Flexion: 4+  Extension: 4-  Abduction: 3  Adduction: 4-    Right Hip   Normal muscle strength    Left Knee   Flexion: 4  Extension: 3+    Right Knee   Flexion: 4  Extension: 3+    Ambulation   Weight-Bearing Status   Assistive device used: none and single point cane    Ambulation: Level Surfaces   Ambulation with assistive device: independent  Ambulation without assistive device: independent    Ambulation: Stairs   Ascend stairs: independent  Pattern: non-reciprocal  Railings: two rails  Descend stairs: independent  Pattern: non-reciprocal  Railings: two rails    Observational Gait   Gait: antalgic   Decreased walking speed and stride length       Additional Observational Gait Details  Uses cane    Functional Assessment        Single Leg Stance   Left: 5 seconds  Right: 10 seconds             Precautions: Left ORIF      Manuals 7/2            L LE 8'            Leg pull 2'                                      Neuro Re-Ed                          Biodex balance             Side stepping             Hp hike             Step ups                                       Ther Ex             SLR 10x            SL SLR 10x            clam 10x            quapruped hydrants 10x            LAQ nv            Standing hydrant at wall 10x  B                         NuStep 10'  L5            slant             Hip ABD             Hip ADD             Leg Press                                                    Ther Activity                                       Gait Training                                       Modalities

## 2021-07-08 NOTE — PROGRESS NOTES
Daily Note     Today's date: 2021  Patient name: Perla Florez  : 1960  MRN: [id number]  Referring provider: Perico Leigh DO  Dx:   Encounter Diagnosis     ICD-10-CM    1  Acquired leg length discrepancy  M21 70    2  Ambulatory dysfunction  R26 2    3  Muscle weakness of extremity  M62 81        Start Time: 0815  Stop Time: 0900  Total time in clinic (min): 45 minutes    Subjective: Patient reports having increased pain past two day probably form the weather, and working  Objective: See treatment diary below      Assessment: Tolerated treatment fairly well, challenged with mat exercises  Weakness in bl glutes, increased program, cues for francisco technique and core activation  Some discomfort with PROM rachelle with end range flexion  Patient demonstrated fatigue post treatment and would benefit from continued PT      Plan: Continue per plan of care        Precautions: Left ORIF      Manuals            L LE 8' 8           Leg pull 2' 2                                     Neuro Re-Ed                          Biodex balance             Side stepping             Hp hike             Step ups                                       Ther Ex             SLR 10x 2x10           SL SLR 10x 10x  5x hold 3" lower 3"           clam 10x 10x           quapruped hydrants 10x 10x           Donkey kicks  10x           LAQ nv 5#/30           Standing hydrant at wall 10x  B 10x ball           bridge  modified 2x10           NuStep 10'  L5 10'           slant  30"x4  L3           Hip ABD  nv           Hip ADD  nv           Leg Press                                                    Ther Activity                                       Gait Training                                       Modalities

## 2021-07-09 ENCOUNTER — OFFICE VISIT (OUTPATIENT)
Dept: PHYSICAL THERAPY | Age: 61
End: 2021-07-09
Payer: COMMERCIAL

## 2021-07-09 DIAGNOSIS — M21.70 ACQUIRED LEG LENGTH DISCREPANCY: Primary | ICD-10-CM

## 2021-07-09 DIAGNOSIS — M62.81 MUSCLE WEAKNESS OF EXTREMITY: ICD-10-CM

## 2021-07-09 DIAGNOSIS — R26.2 AMBULATORY DYSFUNCTION: ICD-10-CM

## 2021-07-09 PROCEDURE — 97112 NEUROMUSCULAR REEDUCATION: CPT

## 2021-07-09 PROCEDURE — 97110 THERAPEUTIC EXERCISES: CPT

## 2021-07-15 NOTE — PROGRESS NOTES
Daily Note     Today's date: 2021  Patient name: Albertus Boeck  : 1960  MRN: [id number]  Referring provider: Lopez Lauren DO  Dx:   Encounter Diagnosis     ICD-10-CM    1  Acquired leg length discrepancy  M21 70    2  Ambulatory dysfunction  R26 2    3  Muscle weakness of extremity  M62 81        Start Time: 0625  Stop Time: 07  Total time in clinic (min): 55 minutes    Subjective: 4/10 left thigh and hip discomfort this week which patient attributes to prolonged walking without a cane over the weekend  Objective: See treatment diary below      Assessment: Tolerated treatment well  Patient would benefit from continued PT Patient to get modified shoe this date  Tactile cues for correct execution of exercises  Glut medius weakness persists  Plan: Continue per plan of care        Precautions: Left ORIF      Manuals           L LE 8' 8 5          Leg pull 2' 2 5                                    Neuro Re-Ed                          Biodex balance             Side stepping   3x          Hp hike             Step ups   20x          Later step up   20x  4"                       Ther Ex             Ball heel slides   30x          SLR 10x 2x10 20x          SL SLR 10x 10x  5x hold 3" lower 3" 15x  A          clam 10x 10x 20x          quapruped hydrants 10x 10x 20x          Donkey kicks  10x 10x          LAQ nv 5#/30 8#/30          Standing hydrant at wall 10x  B 10x ball 10x          bridge  modified 2x10 30x          NuStep 10'  L5 10' 10'          slant  30"x4  L3           Hip ABD  nv 35#/20          Hip ADD  nv 45/30x          Leg Press   85/30x          SL Leg Press   40#/30                                    Ther Activity                                       Gait Training                                       Modalities

## 2021-07-16 ENCOUNTER — OFFICE VISIT (OUTPATIENT)
Dept: PHYSICAL THERAPY | Age: 61
End: 2021-07-16
Payer: COMMERCIAL

## 2021-07-16 DIAGNOSIS — M62.81 MUSCLE WEAKNESS OF EXTREMITY: ICD-10-CM

## 2021-07-16 DIAGNOSIS — R26.2 AMBULATORY DYSFUNCTION: ICD-10-CM

## 2021-07-16 DIAGNOSIS — M21.70 ACQUIRED LEG LENGTH DISCREPANCY: Primary | ICD-10-CM

## 2021-07-16 PROCEDURE — 97140 MANUAL THERAPY 1/> REGIONS: CPT | Performed by: PHYSICAL THERAPIST

## 2021-07-16 PROCEDURE — 97110 THERAPEUTIC EXERCISES: CPT | Performed by: PHYSICAL THERAPIST

## 2021-07-23 ENCOUNTER — OFFICE VISIT (OUTPATIENT)
Dept: PHYSICAL THERAPY | Age: 61
End: 2021-07-23
Payer: COMMERCIAL

## 2021-07-23 DIAGNOSIS — M62.81 MUSCLE WEAKNESS OF EXTREMITY: ICD-10-CM

## 2021-07-23 DIAGNOSIS — R26.2 AMBULATORY DYSFUNCTION: ICD-10-CM

## 2021-07-23 DIAGNOSIS — M21.70 ACQUIRED LEG LENGTH DISCREPANCY: Primary | ICD-10-CM

## 2021-07-23 PROCEDURE — 97110 THERAPEUTIC EXERCISES: CPT

## 2021-07-23 PROCEDURE — 97140 MANUAL THERAPY 1/> REGIONS: CPT

## 2021-07-23 NOTE — PROGRESS NOTES
Daily Note     Today's date: 2021  Patient name: Cayetano Shin  : 1960  MRN: [id number]  Referring provider: Faby Mendoza DO  Dx:   Encounter Diagnosis     ICD-10-CM    1  Acquired leg length discrepancy  M21 70    2  Ambulatory dysfunction  R26 2    3  Muscle weakness of extremity  M62 81        Start Time: 0800  Stop Time: 0900  Total time in clinic (min): 60 minutes    Subjective: Patient notes receiving his orthotic which is working out really well  Notes improvements with therapy  Objective: See treatment diary below    Assessment: Huan Brito is progressing well with therapy showing progress towards his goals  He is appropriately fatiguing to the current PT POC and should be progressed as able  Plan: Continue per plan of care        Precautions: Left ORIF    Manuals          L LE 8' 8 5 5         Leg pull 2' 2 5 5                                   Neuro Re-Ed                          Biodex balance             Side stepping   3x Blue aboveknee  3x         Hp hike             Step ups   20x 4"x20         Later step up   20x  4" 4"x20                      Ther Ex             Ball heel slides   30x 30x         SLR 10x 2x10 20x 20x         SL SLR 10x 10x  5x hold 3" lower 3" 15x  A 15x         clam 10x 10x 20x 20x         quapruped hydrants 10x 10x 20x 20x         Donkey kicks  10x 10x 10x         LAQ nv 5#/30 8#/30 8# 30x         Standing hydrant at wall 10x  B 10x ball 10x 10x         bridge  modified 2x10 30x def         NuStep 10'  L5 10' 10' L5 10'         slant  30"x4  L3           Hip ABD  nv 35#/20 35# 30x         Hip ADD  nv 45/30x 45# 30x         Leg Press   85/30x 110# 30x         SL Leg Press   40#/30 40# 30x                                   Ther Activity                                       Gait Training                                       Modalities

## 2021-07-30 ENCOUNTER — OFFICE VISIT (OUTPATIENT)
Dept: PHYSICAL THERAPY | Age: 61
End: 2021-07-30
Payer: COMMERCIAL

## 2021-07-30 DIAGNOSIS — M21.70 ACQUIRED LEG LENGTH DISCREPANCY: Primary | ICD-10-CM

## 2021-07-30 DIAGNOSIS — M62.81 MUSCLE WEAKNESS OF EXTREMITY: ICD-10-CM

## 2021-07-30 DIAGNOSIS — R26.2 AMBULATORY DYSFUNCTION: ICD-10-CM

## 2021-07-30 PROCEDURE — 97110 THERAPEUTIC EXERCISES: CPT

## 2021-07-30 NOTE — PROGRESS NOTES
Daily Note     Today's date: 2021  Patient name: Obed Alejandre  : 1960  MRN: [id number]  Referring provider: Jessie Tamez DO  Dx:   Encounter Diagnosis     ICD-10-CM    1  Acquired leg length discrepancy  M21 70    2  Ambulatory dysfunction  R26 2    3  Muscle weakness of extremity  M62 81        Start Time: 0815  Stop Time: 930  Total time in clinic (min): 75 minutes    Subjective: Reports being more sore today, "I don't know if I slept on it wrong "       Objective: See treatment diary below      Assessment: Tolerated treatment well  Pain with transition between stretching positions and transfers, especially when in supine <> rolling to side  Added self stretching to HEP as noted below  Added seated trunk FW to stretch hip ER secondary to patient reporting difficulty putting on shoes, very limited ROM noted with this stretch  Relief felt post session  Patient would benefit from continued PT  Plan: Continue per plan of care        Precautions: Left ORIF    Manuals         L LE 8' 8 5 5 5'         Leg pull 2' 2 5 5 5'                                   Neuro Re-Ed                          Biodex balance             Side stepping   3x Blue aboveknee  3x Blue above knee 3x         Hp hike             Step ups   20x 4"x20 8'' on Biodex x20         Later step up   20x  4" 4"x20 6'' x20                      Ther Ex             Ball heel slides   30x 30x 30x         SLR 10x 2x10 20x 20x 20x         SL SLR 10x 10x  5x hold 3" lower 3" 15x  A 15x 2x10         clam 10x 10x 20x 20x 20x         quapruped hydrants 10x 10x 20x 20x 20x        Donkey kicks  10x 10x 10x 10x         LAQ nv 5#/30 8#/30 8# 30x 8# 30x         Standing hydrant at wall 10x  B 10x ball 10x 10x B W/ ball 10x ea         bridge  modified 2x10 30x def D/C         NuStep 10'  L5 10' 10' L5 10' L5 10'         slant  30"x4  L3   L3 30"x4        Hip ABD  nv 35#/20 35# 30x 35# 30x        Hip ADD  nv 45/30x 45# 30x 45# 30x         Leg Press   85/30x 110# 30x 110# 30x        SL Leg Press   40#/30 40# 30x 40# 30x         Seated FW flexion hip ER stretch      10''x10         HEP      *3'         Ther Activity                                       Gait Training                                       Modalities                                       Provided written HEP 7/30/2021: standing hip flexor stretch, modified tamika stretch, supine modified figure 4 stretch, sktc stretch

## 2021-08-06 ENCOUNTER — OFFICE VISIT (OUTPATIENT)
Dept: PHYSICAL THERAPY | Age: 61
End: 2021-08-06
Payer: COMMERCIAL

## 2021-08-06 DIAGNOSIS — M62.81 MUSCLE WEAKNESS OF EXTREMITY: ICD-10-CM

## 2021-08-06 DIAGNOSIS — M21.70 ACQUIRED LEG LENGTH DISCREPANCY: Primary | ICD-10-CM

## 2021-08-06 DIAGNOSIS — R26.2 AMBULATORY DYSFUNCTION: ICD-10-CM

## 2021-08-06 PROCEDURE — 97110 THERAPEUTIC EXERCISES: CPT

## 2021-08-06 NOTE — PROGRESS NOTES
Daily Note     Today's date: 2021  Patient name: Nestor Holden  : 1960  MRN: [id number]  Referring provider: Alena Allred DO  Dx:   Encounter Diagnosis     ICD-10-CM    1  Acquired leg length discrepancy  M21 70    2  Ambulatory dysfunction  R26 2    3  Muscle weakness of extremity  M62 81        Start Time: 08  Stop Time: 925  Total time in clinic (min): 65 minutes    Subjective: Reports some soreness and stiffness, states the severity depends on the day  Doing okay with getting his socks and shoes on  Objective: See treatment diary below      Assessment: Tolerated treatment well  Unable to perform S/L clamshell today without pain, performed in hooklying with a blue tband with better tolerance  Performed S/L hip abduction with a pillow between knees today secondary to discomfort with L hip in adduction, better tolerance and form with use of pillow  Struggles with standing fire hydrant with ball on wall, extra cues needed to ensure proper form  No changes in pain post session  Patient would benefit from continued PT  Plan: Continue per plan of care        Precautions: Left ORIF    Manuals  8/6       L LE 8' 8 5 5 5'  5'       Leg pull 2' 2 5 5 5'  5'                                 Neuro Re-Ed                          Biodex balance             Side stepping   3x Blue aboveknee  3x Blue above knee 3x  Blue above knee 3x        Hp hike             Step ups   20x 4"x20 8'' on Biodex x20  10'' step x20       Later step up   20x  4" 4"x20 6'' x20  6" x20                     Ther Ex             Ball heel slides   30x 30x 30x  30x       SLR 10x 2x10 20x 20x 20x  20x       SL SLR 10x 10x  5x hold 3" lower 3" 15x  A 15x 2x10  2x10       clam 10x 10x 20x 20x 20x  supine BTB 20x        quapruped hydrants 10x 10x 20x 20x 20x 20x        Donkey kicks  10x 10x 10x 10x  10x        LAQ nv 5#/30 8#/30 8# 30x 8# 30x  8# 30x       Standing hydrant at wall 10x  B 10x ball 10x 10x B W/ ball 10x ea  B w/ ball 10x        bridge  modified 2x10 30x def D/C         NuStep 10'  L5 10' 10' L5 10' L5 10'  L5 x10'        slant  30"x4  L3   L3 30"x4 L3 30''x4        Hip ABD  nv 35#/20 35# 30x 35# 30x 35# 30x       Hip ADD  nv 45/30x 45# 30x 45# 30x  50# 30x       Leg Press   85/30x 110# 30x 80# 30x 80# 30x       SL Leg Press   40#/30 40# 30x 40# 30x  40# 30x       Seated FW flexion hip ER stretch      10''x10  At home       HEP      *3'         Ther Activity                                       Gait Training                                       Modalities             CP      10'                    Provided written HEP 7/30/2021: standing hip flexor stretch, modified tamika stretch, supine modified figure 4 stretch, sktc stretch

## 2021-08-13 ENCOUNTER — OFFICE VISIT (OUTPATIENT)
Dept: PHYSICAL THERAPY | Age: 61
End: 2021-08-13
Payer: COMMERCIAL

## 2021-08-13 DIAGNOSIS — M62.81 MUSCLE WEAKNESS OF EXTREMITY: ICD-10-CM

## 2021-08-13 DIAGNOSIS — R26.2 AMBULATORY DYSFUNCTION: ICD-10-CM

## 2021-08-13 DIAGNOSIS — M21.70 ACQUIRED LEG LENGTH DISCREPANCY: Primary | ICD-10-CM

## 2021-08-13 PROCEDURE — 97110 THERAPEUTIC EXERCISES: CPT | Performed by: PHYSICAL THERAPIST

## 2021-08-13 PROCEDURE — 97112 NEUROMUSCULAR REEDUCATION: CPT | Performed by: PHYSICAL THERAPIST

## 2021-08-13 NOTE — PROGRESS NOTES
Daily Note     Today's date: 2021  Patient name: Isabel Adame  : 1960  MRN: [id number]  Referring provider: Roxanna Buitrago DO  Dx:   Encounter Diagnosis     ICD-10-CM    1  Acquired leg length discrepancy  M21 70    2  Ambulatory dysfunction  R26 2    3  Muscle weakness of extremity  M62 81        Start Time: 0700  Stop Time: 0755  Total time in clinic (min): 55 minutes    Subjective: Patient reports he can't lye on left side due to "pin"  Pain in left hip, some exercises are ok and other times not  Objective: See treatment diary below      Assessment: Tolerated treatment fair  Patient would benefit from continued PT  Wearing 1/2" lift in sneakers on left  Still significant LL and trendelenburg noted  Weakness in left gluts  Encouraged patient to stretch hamstrings and do same exercises at home  Plan: Continue per plan of care        Precautions: Left ORIF    Manuals       L LE 8' 8 5 5 5'  5' 5'      Leg pull 2' 2 5 5 5'  5' 5'                                Neuro Re-Ed                          Biodex balance             Side stepping   3x Blue aboveknee  3x Blue above knee 3x  Blue above knee 3x  4x      Hp hike       20x      Step ups   20x 4"x20 8'' on Biodex x20  10'' step x20 20x      Later step up   20x  4" 4"x20 6'' x20  6" x20  20x                   Ther Ex             Ball heel slides   30x 30x 30x  30x 30x      SLR 10x 2x10 20x 20x 20x  20x 20x      SL SLR 10x 10x  5x hold 3" lower 3" 15x  A 15x 2x10  2x10       clam 10x 10x 20x 20x 20x  supine BTB 20x  20x      quapruped hydrants 10x 10x 20x 20x 20x 20x  20x      Donkey kicks  10x 10x 10x 10x  10x  10x      LAQ nv 5#/30 8#/30 8# 30x 8# 30x  8# 30x nt      Standing hydrant at wall 10x  B 10x ball 10x 10x B W/ ball 10x ea  B w/ ball 10x  20x ea      bridge  modified 2x10 30x def D/C         NuStep 10'  L5 10' 10' L5 10' L5 10'  L5 x10'  10'      slant  30"x4  L3   L3 30"x4 L3 30''x4  4x      Hip ABD  nv 35#/20 35# 30x 35# 30x 35# 30x 35# 30x      Hip ADD  nv 45/30x 45# 30x 45# 30x  50# 30x 70# 30x      Leg Press   85/30x 110# 30x 80# 30x 80# 30x 90# 30x      SL Leg Press   40#/30 40# 30x 40# 30x  40# 30x 40# 30x      Seated FW flexion hip ER stretch      10''x10  At home       HEP      *3'         Ther Activity                                       Gait Training                                       Modalities             CP      10' 10'                   Provided written HEP 7/30/2021: standing hip flexor stretch, modified tamika stretch, supine modified figure 4 stretch, sktc stretch

## 2021-08-19 NOTE — PROGRESS NOTES
PT Re-Evaluation     Today's date: 2021  Patient name: Donny Mendoza  : 1960  MRN: [id number]  Referring provider: Quirino Guillen DO  Dx:   Encounter Diagnosis     ICD-10-CM    1  Acquired leg length discrepancy  M21 70    2  Ambulatory dysfunction  R26 2    3  Muscle weakness of extremity  M62 81        Start Time: 0730  Stop Time: 0815  Total time in clinic (min): 45 minutes    Assessment  Assessment details: Yanni Ventura is a 59year old male s/p femur fracture/ORIF due to traumatic fall in December  He has a significant leg length discrepancy and now wearing  shoe lift   Both weakness and leg length contribute to current ambulatory dysfunction  He was recently referred back to PT to address impairments and functinal limitations as outlined  Since wearing lift and returning to PT, patient now with decreased pain in back and knees and improved tolerance for some activities  He could benefit from continued PT as indicated with transition to a step down program over the next month  Understanding of Dx/Px/POC: good   Prognosis: good    Goals  STG 2-3 - progressingtowards  1  Decrease pain 50% with decrease use of OTC medication  2  Ambulation with decreased antalgia and increased step length/speed    LTG 8 weeks  1  Less than 2/10 pain lef thip and LB most times  2  4/5 LE strength  3  4/5 core strength  4  Ambulation  All surfaces with increased confidence, decreased antalgia and improved hip stabilization      Plan  Patient would benefit from: skilled physical therapy  Planned therapy interventions: manual therapy, motor coordination training, neuromuscular re-education, therapeutic activities, therapeutic exercise, functional ROM exercises and home exercise program  Frequency: 1-2 times per week    Duration in weeks: 6  Plan of Care beginning date: 2021  Plan of Care expiration date: 10/19/2021  Treatment plan discussed with: patient        Subjective Evaluation    History of Present Illness  Date of onset: 2020  Date of surgery: 2020  Mechanism of injury: Slip and fall in driveway with fracture femur and ORIF 2020  Seen in PT up until 7 weeks ago  Recently seen by MD and now referred for out patient PT  Did do a course of PT up until May 2021  Notes waking with pain at 2/10; taking pain medication during the night (OTC)     Uses cane when fatigued  Up stairs in one of the worst activities  Notes some loss of balance to left side while turning in office  Continues to work full time    21  Notes areas of pain have decreased  It takes more activity to get those stabs of pain  The dull constant ache is still present  Decreased LBP and right knee since wearing shoe lift  Continued weakness left hip with weight bearing direction changes and initial WB activities challenging  Quality of life: good    Pain  Current pain rating: 3  At best pain ratin (with OTC)  At worst pain ratin  Location: lateral hip left ; lateral pelvis; left groin stabbing at times; left knee and calf at times  Quality: sharp and dull ache  Alleviating factors: OTC medication  Aggravating factors: stair climbing and walking (adjustingchair at work with left foot; )  Progression: improved    Social Support  Lives in: multiple-level home  Lives with: spouse    Patient Goals  Patient goals for therapy: increased strength and improved balance  Patient goal: move without thinking;         Objective     Palpation   Left   Hypertonic in the quadratus lumborum  Tenderness of the medial gastrocnemius, rectus femoris and TFL  Right   Hypertonic in the quadratus lumborum  Tenderness of the quadratus lumborum       Neurological Testing     Sensation     Hip   Left Hip   Intact: light touch  Diminished: light touch    Active Range of Motion   Left Hip   Flexion: 100 degrees   Abduction: 40 degrees   External rotation (90/90): 40 degrees   Internal rotation (90/90): 30 degrees     Right Hip   Flexion: 110 degrees Abduction: 40 degrees   External rotation (90/90): 55 degrees   Internal rotation (90/90): 45 degrees     Additional Active Range of Motion Details  Crepitus with AROM, especially ABD and ER    Strength/Myotome Testing     Left Hip   Planes of Motion   Flexion: 4+  Extension: 4-  Abduction: 3  Adduction: 4    Right Hip   Normal muscle strength    Left Knee   Flexion: 4  Extension: 3+    Right Knee   Flexion: 4  Extension: 3+    Ambulation   Weight-Bearing Status   Assistive device used: none and single point cane    Ambulation: Level Surfaces   Ambulation with assistive device: independent  Ambulation without assistive device: independent    Ambulation: Stairs   Ascend stairs: independent  Pattern: non-reciprocal  Railings: two rails  Descend stairs: independent  Pattern: non-reciprocal  Railings: two rails    Observational Gait   Gait: antalgic   Decreased walking speed and stride length       Additional Observational Gait Details  Now with shoe lift and improved gait with increased step length and increased lateral stability      Flowsheet Rows      Most Recent Value   PT/OT G-Codes   Current Score  73   Projected Score  61              Precautions: Left ORIF    Manuals 7/2 7/9 7/16 7/23 7/30 8/6 8/13 8/20     L LE 8' 8 5 5 5'  5' 5' 5     Leg pull 2' 2 5 5 5'  5' 5' 5                               Neuro Re-Ed                          Biodex balance             Side stepping   3x Blue aboveknee  3x Blue above knee 3x  Blue above knee 3x  4x 4x     Hp hike       20x 20x     Step ups   20x 4"x20 8'' on Biodex x20  10'' step x20 20x 20x     Later step up   20x  4" 4"x20 6'' x20  6" x20  20x 20x                  Ther Ex             Ball heel slides   30x 30x 30x  30x 30x 30x     SLR 10x 2x10 20x 20x 20x  20x 20x 20x     SL SLR 10x 10x  5x hold 3" lower 3" 15x  A 15x 2x10  2x10       clam 10x 10x 20x 20x 20x  supine BTB 20x  20x 20x     quapruped hydrants 10x 10x 20x 20x 20x 20x  20x 20x     Donkey kicks  10x 10x 10x 10x  10x  10x 20x     LAQ nv 5#/30 8#/30 8# 30x 8# 30x  8# 30x nt 10#/30     Standing hydrant at wall 10x  B 10x ball 10x 10x B W/ ball 10x ea  B w/ ball 10x  20x ea 20x  each     bridge  modified 2x10 30x def D/C         NuStep 10'  L5 10' 10' L5 10' L5 10'  L5 x10'  10' 10'     slant  30"x4  L3   L3 30"x4 L3 30''x4  4x 4x     Hip ABD  nv 35#/20 35# 30x 35# 30x 35# 30x 35# 30x 35/30     Hip ADD  nv 45/30x 45# 30x 45# 30x  50# 30x 70# 30x 70/30     Leg Press   85/30x 110# 30x 80# 30x 80# 30x 90# 30x 90/30     SL Leg Press   40#/30 40# 30x 40# 30x  40# 30x 40# 30x 40/30     Seated FW flexion hip ER stretch      10''x10  At home       HEP      *3'         Ther Activity                                       Gait Training                                       Modalities             CP      10' 10' 10                  Provided written HEP 7/30/2021: standing hip flexor stretch, modified tamika stretch, supine modified figure 4 stretch, sktc stretch

## 2021-08-20 ENCOUNTER — EVALUATION (OUTPATIENT)
Dept: PHYSICAL THERAPY | Age: 61
End: 2021-08-20
Payer: COMMERCIAL

## 2021-08-20 DIAGNOSIS — M62.81 MUSCLE WEAKNESS OF EXTREMITY: ICD-10-CM

## 2021-08-20 DIAGNOSIS — R26.2 AMBULATORY DYSFUNCTION: ICD-10-CM

## 2021-08-20 DIAGNOSIS — M21.70 ACQUIRED LEG LENGTH DISCREPANCY: Primary | ICD-10-CM

## 2021-08-20 PROCEDURE — 97110 THERAPEUTIC EXERCISES: CPT | Performed by: PHYSICAL THERAPIST

## 2021-08-27 ENCOUNTER — OFFICE VISIT (OUTPATIENT)
Dept: PHYSICAL THERAPY | Age: 61
End: 2021-08-27
Payer: COMMERCIAL

## 2021-08-27 DIAGNOSIS — R26.2 AMBULATORY DYSFUNCTION: ICD-10-CM

## 2021-08-27 DIAGNOSIS — M62.81 MUSCLE WEAKNESS OF EXTREMITY: ICD-10-CM

## 2021-08-27 DIAGNOSIS — M21.70 ACQUIRED LEG LENGTH DISCREPANCY: Primary | ICD-10-CM

## 2021-08-27 PROCEDURE — 97110 THERAPEUTIC EXERCISES: CPT

## 2021-08-27 NOTE — PROGRESS NOTES
Daily Note     Today's date: 2021  Patient name: Meagan Castillo  : 1960  MRN: [id number]  Referring provider: Jose Linder DO  Dx:   Encounter Diagnosis     ICD-10-CM    1  Acquired leg length discrepancy  M21 70    2  Ambulatory dysfunction  R26 2    3  Muscle weakness of extremity  M62 81        Start Time: 0800  Stop Time: 0900  Total time in clinic (min): 60 minutes    Subjective: Reports 5/10 pain at his L hip  Objective: See treatment diary below      Assessment: Tolerated treatment well  Compensation noted with S/L clamshells, improved with VC's and decreased ROM noted  Challenged by hip hiking, performed on step with some improvement in form and ROM, some trunk rotation noted that decreases with VCs  Attempted lateral step up on 8'' step, but had pain with first rep and went back to the 6'' step with better tolerance and form  Patient would benefit from continued PT  Plan: Continue per plan of care        Precautions: Left ORIF    Manuals     L LE 8' 8 5 5 5'  5' 5' 5 5'    Leg pull 2' 2 5 5 5'  5' 5' 5 5'                              Neuro Re-Ed                          Biodex balance             Side stepping   3x Blue aboveknee  3x Blue above knee 3x  Blue above knee 3x  4x 4x 4x blue    Hip hike       20x 20x 20x off  step    Step ups   20x 4"x20 8'' on Biodex x20  10'' step x20 20x 20x 20x     Lateral step up   20x  4" 4"x20 6'' x20  6" x20  20x 20x 20x                 Ther Ex             Ball heel slides   30x 30x 30x  30x 30x 30x 30x    SLR 10x 2x10 20x 20x 20x  20x 20x 20x 20x    SL SLR 10x 10x  5x hold 3" lower 3" 15x  A 15x 2x10  2x10       clam 10x 10x 20x 20x 20x  supine BTB 20x  20x 20x 20x    quapruped hydrants 10x 10x 20x 20x 20x 20x  20x 20x 20x    Donkey kicks  10x 10x 10x 10x  10x  10x 20x 20x    LAQ nv 5#/30 8#/30 8# 30x 8# 30x  8# 30x nt 10#/30 NT    Standing hydrant at wall 10x  B 10x ball 10x 10x B W/ ball 10x ea  B w/ ball 10x  20x ea 20x  each 20x ea    bridge  modified 2x10 30x def D/C         NuStep 10'  L5 10' 10' L5 10' L5 10'  L5 x10'  10' 10' 10'    slant  30"x4  L3   L3 30"x4 L3 30''x4  4x 4x 4x    Hip ABD  nv 35#/20 35# 30x 35# 30x 35# 30x 35# 30x 35/30 35# 30x     Hip ADD  nv 45/30x 45# 30x 45# 30x  50# 30x 70# 30x 70/30 70# 30x    Leg Press   85/30x 110# 30x 80# 30x 80# 30x 90# 30x 90/30 90# 30x    SL Leg Press   40#/30 40# 30x 40# 30x  40# 30x 40# 30x 40/30 40# 30x    Calf press         40# 30x     Seated FW flexion hip ER stretch      10''x10  At home       HEP      *3'         Ther Activity                                       Gait Training                                       Modalities             CP      10' 10' 10 10'                 Provided written HEP 7/30/2021: standing hip flexor stretch, modified tamika stretch, supine modified figure 4 stretch, sktc stretch

## 2021-09-02 NOTE — PROGRESS NOTES
Daily Note     Today's date: 9/3/2021  Patient name: Ceci Santiago  : 1960  MRN: [id number]  Referring provider: Marco Osler, DO  Dx:   Encounter Diagnosis     ICD-10-CM    1  Acquired leg length discrepancy  M21 70    2  Ambulatory dysfunction  R26 2    3  Muscle weakness of extremity  M62 81        Start Time: 0800  Stop Time: 0900  Total time in clinic (min): 60 minutes    Subjective: Patient notes wearing shoe throughout the day with benefit  Notes continued "crunching" left lateral hip with turning in bed  Objective: See treatment diary below      Assessment: Tolerated treatment well  Patient would benefit from continued PT      Plan: Continue per plan of care  with prep for discharge over the next 2 visits       Precautions: Left ORIF    Manuals 7/2 7/9 7/16 7/23 7/30 8/6 8/13 8/20 8/27 9/3   L LE 8' 8 5 5 5'  5' 5' 5 5' 5'   Leg pull 2' 2 5 5 5'  5' 5' 5 5' 5'                             Neuro Re-Ed                          Biodex balance             Side stepping   3x Blue aboveknee  3x Blue above knee 3x  Blue above knee 3x  4x 4x 4x blue 4x   Hip hike       20x 20x 20x off  step 20x   Step ups   20x 4"x20 8'' on Biodex x20  10'' step x20 20x 20x 20x  20x   Lateral step up   20x  4" 4"x20 6'' x20  6" x20  20x 20x 20x 20x                Ther Ex             Ball heel slides   30x 30x 30x  30x 30x 30x 30x 30x   SLR 10x 2x10 20x 20x 20x  20x 20x 20x 20x 20x   SL SLR 10x 10x  5x hold 3" lower 3" 15x  A 15x 2x10  2x10       clam 10x 10x 20x 20x 20x  supine BTB 20x  20x 20x 20x 20x   quapruped hydrants 10x 10x 20x 20x 20x 20x  20x 20x 20x 20x   Donkey kicks  10x 10x 10x 10x  10x  10x 20x 20x 20x   LAQ nv 5#/30 8#/30 8# 30x 8# 30x  8# 30x nt 10#/30 NT 10#/  30   Standing hydrant at wall 10x  B 10x ball 10x 10x B W/ ball 10x ea  B w/ ball 10x  20x ea 20x  each 20x ea 20x   bridge  modified 2x10 30x def D/C         NuStep 10'  L5 10' 10' L5 10' L5 10'  L5 x10'  10' 10' 10' 10'   slant  30"x4  L3 L3 30"x4 L3 30''x4  4x 4x 4x 4x   Hip ABD  nv 35#/20 35# 30x 35# 30x 35# 30x 35# 30x 35/30 35# 30x  40#  30   Hip ADD  nv 45/30x 45# 30x 45# 30x  50# 30x 70# 30x 70/30 70# 30x 70/30   Leg Press   85/30x 110# 30x 80# 30x 80# 30x 90# 30x 90/30 90# 30x 90/30   SL Leg Press   40#/30 40# 30x 40# 30x  40# 30x 40# 30x 40/30 40# 30x 40/30   Calf press         40# 30x  40/30   Seated FW flexion hip ER stretch      10''x10  At home       HEP      *3'         Ther Activity                                       Gait Training                                       Modalities             CP      10' 10' 10 10' 10'                Provided written HEP 7/30/2021: standing hip flexor stretch, modified tamika stretch, supine modified figure 4 stretch, sktc stretch

## 2021-09-03 ENCOUNTER — OFFICE VISIT (OUTPATIENT)
Dept: PHYSICAL THERAPY | Age: 61
End: 2021-09-03
Payer: COMMERCIAL

## 2021-09-03 DIAGNOSIS — M21.70 ACQUIRED LEG LENGTH DISCREPANCY: Primary | ICD-10-CM

## 2021-09-03 DIAGNOSIS — M62.81 MUSCLE WEAKNESS OF EXTREMITY: ICD-10-CM

## 2021-09-03 DIAGNOSIS — R26.2 AMBULATORY DYSFUNCTION: ICD-10-CM

## 2021-09-03 PROCEDURE — 97140 MANUAL THERAPY 1/> REGIONS: CPT | Performed by: PHYSICAL THERAPIST

## 2021-09-03 PROCEDURE — 97110 THERAPEUTIC EXERCISES: CPT | Performed by: PHYSICAL THERAPIST

## 2021-09-10 ENCOUNTER — OFFICE VISIT (OUTPATIENT)
Dept: PHYSICAL THERAPY | Age: 61
End: 2021-09-10
Payer: COMMERCIAL

## 2021-09-10 DIAGNOSIS — M21.70 ACQUIRED LEG LENGTH DISCREPANCY: Primary | ICD-10-CM

## 2021-09-10 DIAGNOSIS — R26.2 AMBULATORY DYSFUNCTION: ICD-10-CM

## 2021-09-10 DIAGNOSIS — M62.81 MUSCLE WEAKNESS OF EXTREMITY: ICD-10-CM

## 2021-09-10 PROCEDURE — 97110 THERAPEUTIC EXERCISES: CPT

## 2021-09-10 NOTE — PROGRESS NOTES
Daily Note     Today's date: 9/10/2021  Patient name: Dasha Steward  : 1960  MRN: [id number]  Referring provider: Starr Homans, DO  Dx:   Encounter Diagnosis     ICD-10-CM    1  Acquired leg length discrepancy  M21 70    2  Ambulatory dysfunction  R26 2    3  Muscle weakness of extremity  M62 81        Start Time: 0800  Stop Time: 0855  Total time in clinic (min): 55 minutes    Subjective: Reports feeling increased tightness today, "feels like I need a good stretch "       Objective: See treatment diary below      Assessment: Tolerated treatment well  Added hip thrust to FW step ups with emphasis on quad and glute contraction of stance limb  Added a 1# weight for clamshells today with expected amount of fatigue and challenge  Cues for carryover of exercises and to ensure proper dosage of exercises are performed  Challenged by standing fire hydrant at wall with ball  Increased weight for leg press and single leg press today with good tolerance  Provided patient with a written HEP with self stretching and exercises performed below  Patient would benefit from continued PT  Plan: Continue per plan of care        Precautions: Left ORIF    Manuals 9/10  7/16 7/23 7/30 8/6 8/13 8/20 8/27 9/3   L LE 5' + hip flexor/calf  5 5 5'  5' 5' 5 5' 5'   Leg pull 5'  5 5 5'  5' 5' 5 5' 5'                             Neuro Re-Ed                          Biodex balance             Side stepping 4x blue  3x Blue aboveknee  3x Blue above knee 3x  Blue above knee 3x  4x 4x 4x blue 4x   Hip hike 20x      20x 20x 20x off  step 20x   Step ups: on BIODEX with hip thrust  20x   20x 4"x20 8'' on Biodex x20  10'' step x20 20x 20x 20x  20x   Lateral step up 6'' 20x  20x  4" 4"x20 6'' x20  6" x20  20x 20x 20x 20x                Ther Ex             Ball heel slides 30x   30x 30x 30x  30x 30x 30x 30x 30x   SLR 20x  20x 20x 20x  20x 20x 20x 20x 20x   SL SLR   15x  A 15x 2x10  2x10       clam 20x 1#   20x 20x 20x  supine BTB 20x  20x 20x 20x 20x   quapruped hydrants 20x  20x 20x 20x 20x  20x 20x 20x 20x   Donkey kicks 20x  10x 10x 10x  10x  10x 20x 20x 20x   LAQ   8#/30 8# 30x 8# 30x  8# 30x nt 10#/30 NT 10#/  30   Standing hydrant at wall 20x w/ ball   10x 10x B W/ ball 10x ea  B w/ ball 10x  20x ea 20x  each 20x ea 20x   bridge   30x def D/C         NuStep 10'  10' L5 10' L5 10'  L5 x10'  10' 10' 10' 10'   slant 4x    L3 30"x4 L3 30''x4  4x 4x 4x 4x   Hip ABD 40# 30x  35#/20 35# 30x 35# 30x 35# 30x 35# 30x 35/30 35# 30x  40#  30   Hip ADD 70# 30x  45/30x 45# 30x 45# 30x  50# 30x 70# 30x 70/30 70# 30x 70/30   Leg Press 100# 30x  85/30x 110# 30x 80# 30x 80# 30x 90# 30x 90/30 90# 30x 90/30   SL Leg Press 45# 30x  40#/30 40# 30x 40# 30x  40# 30x 40# 30x 40/30 40# 30x 40/30   Calf press 40# 30x        40# 30x  40/30   Seated FW flexion hip ER stretch      10''x10  At home       HEP      *3'         Ther Activity                                       Gait Training                                       Modalities             CP 10'     10' 10' 10 10' 10'                Provided written HEP 7/30/2021: standing hip flexor stretch, modified tamika stretch, supine modified figure 4 stretch, sktc stretch

## 2021-09-17 ENCOUNTER — OFFICE VISIT (OUTPATIENT)
Dept: PHYSICAL THERAPY | Age: 61
End: 2021-09-17
Payer: COMMERCIAL

## 2021-09-17 DIAGNOSIS — M62.81 MUSCLE WEAKNESS OF EXTREMITY: ICD-10-CM

## 2021-09-17 DIAGNOSIS — M21.70 ACQUIRED LEG LENGTH DISCREPANCY: Primary | ICD-10-CM

## 2021-09-17 DIAGNOSIS — R26.2 AMBULATORY DYSFUNCTION: ICD-10-CM

## 2021-09-17 PROCEDURE — 97110 THERAPEUTIC EXERCISES: CPT

## 2021-09-17 PROCEDURE — 97530 THERAPEUTIC ACTIVITIES: CPT

## 2021-09-17 NOTE — PROGRESS NOTES
Daily Note     Today's date: 2021  Patient name: Nestor Holden  : 1960  MRN: [id number]  Referring provider: Alena Allred DO  Dx:   Encounter Diagnosis     ICD-10-CM    1  Acquired leg length discrepancy  M21 70    2  Ambulatory dysfunction  R26 2    3  Muscle weakness of extremity  M62 81        Start Time: 0800  Stop Time: 0915  Total time in clinic (min): 75 minutes    Subjective: States he has trouble rolling in bed and with the clamshells secondary to pain  Objective: See treatment diary below      Assessment: Tolerated treatment well  Patient is independent with HEP and will continue independently in conjunction with the step down program at this time  Continues to struggle with clamshells through mid-range  Better form and activation of posterior change musculature with VC's when performing FW step ups on LLE with hip thrust        Plan: Patient will be discharged by primary PT        Precautions: Left ORIF    Manuals 9/10 9/17  7/23 7/30 8/6 8/13 8/20 8/27 9/3   L LE 5' + hip flexor/calf 5'   5 5'  5' 5' 5 5' 5'   Leg pull 5' 5'  5 5'  5' 5' 5 5' 5'                             Neuro Re-Ed                          Biodex balance             Side stepping 4x blue 4x blue   Blue aboveknee  3x Blue above knee 3x  Blue above knee 3x  4x 4x 4x blue 4x   Hip hike 20x 20x      20x 20x 20x off  step 20x   Step ups: on BIODEX with hip thrust  20x  20x  4"x20 8'' on Biodex x20  10'' step x20 20x 20x 20x  20x   Lateral step up 6'' 20x 6''x20   4"x20 6'' x20  6" x20  20x 20x 20x 20x                Ther Ex             Ball heel slides 30x  30x  30x 30x  30x 30x 30x 30x 30x   SLR 20x 20x  20x 20x  20x 20x 20x 20x 20x   SL SLR    15x 2x10  2x10       clam 20x 1#  20x 1#   20x 20x  supine BTB 20x  20x 20x 20x 20x   quapruped hydrants 20x 20x  20x 20x 20x  20x 20x 20x 20x   Donkey kicks 20x 20x  10x 10x  10x  10x 20x 20x 20x   LAQ    8# 30x 8# 30x  8# 30x nt 10#/30 NT 10#/  30   Standing hydrant at wall 20x w/ ball  20x w/ ball   10x B W/ ball 10x ea  B w/ ball 10x  20x ea 20x  each 20x ea 20x   bridge    def D/C         NuStep 10' 10'  L5 10' L5 10'  L5 x10'  10' 10' 10' 10'   slant 4x 4x   L3 30"x4 L3 30''x4  4x 4x 4x 4x   Hip ABD 40# 30x 40#   35# 30x 35# 30x 35# 30x 35# 30x 35/30 35# 30x  40#  30   Hip ADD 70# 30x 70# 30x  45# 30x 45# 30x  50# 30x 70# 30x 70/30 70# 30x 70/30   Leg Press 100# 30x 100# 30x  110# 30x 80# 30x 80# 30x 90# 30x 90/30 90# 30x 90/30   SL Leg Press 45# 30x 45# 30x  40# 30x 40# 30x  40# 30x 40# 30x 40/30 40# 30x 40/30   Calf press 40# 30x 40# 30x       40# 30x  40/30   Seated FW flexion hip ER stretch      10''x10  At home       HEP      *3'         Ther Activity                                       Gait Training                                       Modalities             CP 10' 10'    10' 10' 10 10' 10'                Provided written HEP 7/30/2021: standing hip flexor stretch, modified tamika stretch, supine modified figure 4 stretch, sktc stretch

## 2021-09-20 NOTE — PROGRESS NOTES
At this time, patient has achieved their maximum functional benefit from skilled physical therapy services and will be discharged to their [facility]  Patient is in agreement with the plan of care  As a result, patient is discharged from physical therapy

## 2021-09-24 ENCOUNTER — APPOINTMENT (OUTPATIENT)
Dept: RADIOLOGY | Facility: CLINIC | Age: 61
End: 2021-09-24
Payer: COMMERCIAL

## 2021-09-24 ENCOUNTER — HOSPITAL ENCOUNTER (OUTPATIENT)
Dept: RADIOLOGY | Facility: HOSPITAL | Age: 61
Discharge: HOME | End: 2021-09-24
Attending: ORTHOPAEDIC SURGERY
Payer: COMMERCIAL

## 2021-09-24 ENCOUNTER — OFFICE VISIT (OUTPATIENT)
Dept: OBGYN CLINIC | Facility: CLINIC | Age: 61
End: 2021-09-24
Payer: COMMERCIAL

## 2021-09-24 ENCOUNTER — APPOINTMENT (OUTPATIENT)
Dept: PHYSICAL THERAPY | Age: 61
End: 2021-09-24
Payer: COMMERCIAL

## 2021-09-24 VITALS
HEART RATE: 56 BPM | DIASTOLIC BLOOD PRESSURE: 84 MMHG | SYSTOLIC BLOOD PRESSURE: 159 MMHG | BODY MASS INDEX: 22.88 KG/M2 | WEIGHT: 184 LBS | HEIGHT: 75 IN

## 2021-09-24 DIAGNOSIS — S72.22XD CLOSED DISPLACED SUBTROCHANTERIC FRACTURE OF LEFT FEMUR WITH ROUTINE HEALING, SUBSEQUENT ENCOUNTER: ICD-10-CM

## 2021-09-24 DIAGNOSIS — S72.22XD CLOSED DISPLACED SUBTROCHANTERIC FRACTURE OF LEFT FEMUR WITH ROUTINE HEALING, SUBSEQUENT ENCOUNTER: Primary | ICD-10-CM

## 2021-09-24 PROCEDURE — 73552 X-RAY EXAM OF FEMUR 2/>: CPT

## 2021-09-24 PROCEDURE — 99213 OFFICE O/P EST LOW 20 MIN: CPT | Performed by: ORTHOPAEDIC SURGERY

## 2021-09-24 PROCEDURE — 73700 CT LOWER EXTREMITY W/O DYE: CPT

## 2021-09-24 NOTE — PROGRESS NOTES
ASSESSMENT/PLAN:    Assessment:   59 y o  male  9 months s/p left hip TFN, concern for possible nonunion    Plan: Today I had a long discussion with the patient and caregiver regarding the diagnosis and plan moving forward  X-rays reviewed today, he is still not showing any signs of significant healing in the lateral cortex of the femur  No hardware issue seen on x-ray today  Given the lack of healing and that he is still having daily pain would like to obtain a CT scan of the left femur to evaluate healing  We discussed that based on the results of the CT scan he may be a candidate for revision of the left hip TFN versus left total hip arthroplasty as some of his pain may be due to hip osteoarthritis  If this is the case and surgical intervention is indicated, will likely refer him to Dr Jaylen Liriano for further evaluation and management  In the meantime he can continue working with physical therapy  Continue the shoe left as it does appear to help him  He should also keep his appointment for the LLE EMG  Contact the office with any further questions or concerns prior to next follow-up, otherwise we will plan to call him with CT results and coordinate treatment from there  Follow up: Will call with CT scan results    The above diagnosis and plan has been dicussed with the patient and caregiver  They verbalized an understanding and will follow up accordingly  _____________________________________________________    SUBJECTIVE:  Nasrin Medley is a 59 y o  male who presents for follow up 9 months s/p left hip TFN  Since his last visit he has been working with physical therapy and has gotten shoe lift which do help him with his gait however he continues to have constant daily pain in the lateral aspect of the left hip and knee as well as the left groin area  He has not yet obtained his EMG of the LLE to evaluate his gluteal weakness  He presents today for repeat x-rays      PAST MEDICAL HISTORY:  Past Medical History:   Diagnosis Date    Hypertension        PAST SURGICAL HISTORY:  Past Surgical History:   Procedure Laterality Date    COLONOSCOPY      CT GUIDED PERC DRAINAGE CATHETER PLACEMENT  1/11/2019    ELBOW SURGERY      KNEE SURGERY      DE COLONOSCOPY FLX DX W/COLLJ SPEC WHEN PFRMD N/A 2/20/2019    Procedure: COLONOSCOPY;  Surgeon: Blane Garner MD;  Location: [facility]; Service: Colorectal    DE CYSTOSCOPY,INSERT URETERAL STENT N/A 2/21/2019    Procedure: Erroll Rene BILATERAL STENTS URETERAL;  Surgeon: Leo Ivan MD;  Location: [facility];  Service: Urology    DE LAP,SURG,COLECTOMY, PARTIAL, W/ANAST N/A 2/21/2019    Procedure: DIAGNOSTIC LAPAROSCOPY, LAPAROSCOPIC HAND-ASSISTED SIGMOID COLECTOMY, REPAIR OF UNAVOIDABLE SEROSAL ADHESION, INTRAOPERATIVE LASER FLUORSENCE ANGIOGRAPHY ([address] [facility]), FLEXIBLE SIGMOIDOSCOPY;  Surgeon: Blane Garner MD;  [facility]: [facility];  Service: Colorectal    DE OPEN RX FEMUR FX+INTRAMED TRES Left 12/19/2020    Procedure: INSERTION NAIL IM FEMUR ANTEGRADE For Subtrochanteric fracture;  Surgeon: Rudi Buckner DO;  [facility];  Service: Orthopedics       FAMILY HISTORY:  History reviewed  No pertinent family history      SOCIAL HISTORY:  Social History     Tobacco Use    Smoking status: Current Every Day Smoker     Packs/day: 0 50     Types: Cigarettes    Smokeless tobacco: Never Used   Vaping Use    Vaping Use: Never used   Substance Use Topics    Alcohol use: Not Currently    Drug use: Never       MEDICATIONS:    Current Outpatient Medications:     acetaminophen (TYLENOL) 325 mg tablet, OTC (Patient taking differently: 650 mg every 4 (four) hours as needed OTC), Disp: 30 tablet, Rfl: 0    acetaminophen (TYLENOL) 325 mg tablet, Take 2 tablets (650 mg total) by mouth every 6 (six) hours, Disp: , Rfl: 0    ascorbic acid (VITAMIN C) 500 mg tablet, Take 500 mg by mouth daily, Disp: , Rfl:     cholecalciferol (VITAMIN D3) 400 units tablet, Take 400 Units by mouth daily, Disp: , Rfl:     Diovan 160 MG tablet, As directed, Disp: , Rfl:     ibuprofen (ADVIL,MOTRIN) 100 MG tablet, Take 100 mg by mouth every 6 (six) hours as needed for mild pain, Disp: , Rfl:     losartan (COZAAR) 50 mg tablet, Take 50 mg by mouth daily, Disp: , Rfl:     multivitamin (THERAGRAN) TABS, Take 1 tablet by mouth daily, Disp: , Rfl:     multivitamin (THERAGRAN) TABS, Take 1 tablet by mouth daily, Disp: , Rfl:     celecoxib (CeleBREX) 100 mg capsule, Take 1 capsule (100 mg total) by mouth 2 (two) times a day (Patient not taking: Reported on 1/29/2021), Disp: 30 capsule, Rfl: 0    Diclofenac Sodium (VOLTAREN) 1 %, Apply 2 g topically 3 (three) times a day (Patient not taking: Reported on 1/29/2021), Disp: 2 Tube, Rfl: 0    docusate sodium (COLACE) 100 mg capsule, Take 1 capsule (100 mg total) by mouth 2 (two) times a day (Patient not taking: Reported on 1/29/2021), Disp: , Rfl: 0    enoxaparin (LOVENOX) 40 mg/0 4 mL, Inject 0 4 mL (40 mg total) under the skin every 24 hours (Patient not taking: Reported on 1/29/2021), Disp: 17 Syringe, Rfl: 0    methocarbamol (ROBAXIN) 500 mg tablet, Take 1 tablet (500 mg total) by mouth 4 (four) times a day as needed for muscle spasms (Patient not taking: Reported on 5/28/2021), Disp: 45 tablet, Rfl: 0    pantoprazole (PROTONIX) 40 mg tablet, Take 1 tablet (40 mg total) by mouth daily in the early morning (Patient not taking: Reported on 5/28/2021), Disp: 30 tablet, Rfl: 0    traMADol (ULTRAM) 50 mg tablet, Take 0 5 tablets (25 mg total) by mouth every 4 (four) hours as needed for moderate pain or severe pain (Patient not taking: Reported on 5/28/2021), Disp: 40 tablet, Rfl: 0    ALLERGIES:  Allergies   Allergen Reactions    Penicillins     Penicillins Rash       REVIEW OF SYSTEMS:  ROS is negative other than that noted in the HPI  Constitutional: Negative for fatigue and fever  HENT: Negative for sore throat  Respiratory: Negative for shortness of breath  Cardiovascular: Negative for chest pain  Gastrointestinal: Negative for abdominal pain  Endocrine: Negative for cold intolerance and heat intolerance  Genitourinary: Negative for flank pain  Musculoskeletal: Negative for back pain  Skin: Negative for rash  Allergic/Immunologic: Negative for immunocompromised state  Neurological: Negative for dizziness  Psychiatric/Behavioral: Negative for agitation  _____________________________________________________  PHYSICAL EXAMINATION:  General/Constitutional: NAD, well developed, well nourished  HENT: Normocephalic, atraumatic  CV: Intact distal pulses, regular rate  Resp: No respiratory distress or labored breathing  Lymphatic: No lymphadenopathy palpated  Neuro: Alert and Oriented x 3, no focal deficits  Psych: Normal mood, normal affect, normal judgement, normal behavior  Skin: Warm, dry, no rashes, no erythema      MUSCULOSKELETAL EXAMINATION:  Well-healed incision   No erythema   No bony or soft tissue tenderness appreciated  Decreased sensation over the incision  Neurovascularly intact distally  Right leg longer than left  Trendelenburg gait  Positive Trendelenburg sign    LE NV Exam: +2 DP/PT pulses bilaterally  Sensation intact to light touch L2-S1 bilaterally    No calf tenderness to palpation bilaterally    _____________________________________________________  STUDIES REVIEWED:  Imaging studies reviewed by Dr Tran Kimble and demonstrate mainatined alignment of left subtrochanteric fracture  No significant healing in the lateral cortex when compared to previous images  Otherwise fracture has consolidated nicely and the hardware is well positioned  Severe degenerative changes in the hip joint again noted         PROCEDURES PERFORMED:  No Procedures performed today     Scribe Attestation    I,:  Malachi Moffett am acting as a scribe while in the presence of the attending physician :       I,:  Wang Pierce Grace Lipoma, DO personally performed the services described in this documentation    as scribed in my presence :

## 2021-09-29 ENCOUNTER — TELEPHONE (OUTPATIENT)
Dept: OBGYN CLINIC | Facility: HOSPITAL | Age: 61
End: 2021-09-29

## 2021-09-29 NOTE — TELEPHONE ENCOUNTER
Patient called to advise he has his cat scan performed last week  He would like to know if someone could give him a call with the results? Patient can be reached at [phone] or after 2pm [phone]   Thank you

## 2021-10-08 ENCOUNTER — OFFICE VISIT (OUTPATIENT)
Dept: OBGYN CLINIC | Facility: CLINIC | Age: 61
End: 2021-10-08
Payer: COMMERCIAL

## 2021-10-08 VITALS
DIASTOLIC BLOOD PRESSURE: 83 MMHG | HEART RATE: 53 BPM | WEIGHT: 191.2 LBS | SYSTOLIC BLOOD PRESSURE: 147 MMHG | BODY MASS INDEX: 23.77 KG/M2 | HEIGHT: 75 IN

## 2021-10-08 DIAGNOSIS — S72.22XK CLOSED DISPLACED SUBTROCHANTERIC FRACTURE OF LEFT FEMUR WITH NONUNION, SUBSEQUENT ENCOUNTER: Primary | ICD-10-CM

## 2021-10-08 PROCEDURE — 99214 OFFICE O/P EST MOD 30 MIN: CPT | Performed by: ORTHOPAEDIC SURGERY

## 2021-10-13 ENCOUNTER — PREP FOR PROCEDURE (OUTPATIENT)
Dept: OBGYN CLINIC | Facility: HOSPITAL | Age: 61
End: 2021-10-13

## 2021-10-13 ENCOUNTER — TELEPHONE (OUTPATIENT)
Dept: OBGYN CLINIC | Facility: OTHER | Age: 61
End: 2021-10-13

## 2021-10-13 DIAGNOSIS — S72.22XK CLOSED DISPLACED SUBTROCHANTERIC FRACTURE OF LEFT FEMUR WITH NONUNION, SUBSEQUENT ENCOUNTER: Primary | ICD-10-CM

## 2021-10-13 DIAGNOSIS — Z01.818 PRE-OP TESTING: Primary | ICD-10-CM

## 2021-10-13 RX ORDER — CLINDAMYCIN PHOSPHATE 900 MG/50ML
900 INJECTION INTRAVENOUS ONCE
Status: CANCELLED | OUTPATIENT
Start: 2021-10-13 | End: 2021-10-13

## 2021-10-18 ENCOUNTER — TELEPHONE (OUTPATIENT)
Dept: OBGYN CLINIC | Facility: CLINIC | Age: 61
End: 2021-10-18

## 2021-10-26 ENCOUNTER — TELEPHONE (OUTPATIENT)
Dept: OBGYN CLINIC | Facility: CLINIC | Age: 61
End: 2021-10-26

## 2021-10-29 ENCOUNTER — TELEPHONE (OUTPATIENT)
Dept: OBGYN CLINIC | Facility: CLINIC | Age: 61
End: 2021-10-29

## 2021-11-05 ENCOUNTER — TELEPHONE (OUTPATIENT)
Dept: OBGYN CLINIC | Facility: HOSPITAL | Age: 61
End: 2021-11-05

## 2021-11-05 ENCOUNTER — APPOINTMENT (OUTPATIENT)
Dept: RADIOLOGY | Facility: CLINIC | Age: 61
End: 2021-11-05
Payer: COMMERCIAL

## 2021-11-05 ENCOUNTER — APPOINTMENT (OUTPATIENT)
Dept: LAB | Facility: CLINIC | Age: 61
End: 2021-11-05
Payer: COMMERCIAL

## 2021-11-05 DIAGNOSIS — Z01.818 PRE-OP TESTING: ICD-10-CM

## 2021-11-05 DIAGNOSIS — S72.22XK CLOSED DISPLACED SUBTROCHANTERIC FRACTURE OF LEFT FEMUR WITH NONUNION, SUBSEQUENT ENCOUNTER: ICD-10-CM

## 2021-11-05 LAB
25(OH)D3 SERPL-MCNC: 25.6 NG/ML (ref 30–100)
ANION GAP SERPL CALCULATED.3IONS-SCNC: 6 MMOL/L (ref 4–13)
BASOPHILS # BLD AUTO: 0.1 THOUSANDS/ΜL (ref 0–0.1)
BASOPHILS NFR BLD AUTO: 1 % (ref 0–1)
BUN SERPL-MCNC: 15 MG/DL (ref 5–25)
CALCIUM SERPL-MCNC: 10 MG/DL (ref 8.3–10.1)
CHLORIDE SERPL-SCNC: 104 MMOL/L (ref 100–108)
CO2 SERPL-SCNC: 28 MMOL/L (ref 21–32)
CREAT SERPL-MCNC: 0.97 MG/DL (ref 0.6–1.3)
CRP SERPL QL: <3 MG/L
EOSINOPHIL # BLD AUTO: 0.13 THOUSAND/ΜL (ref 0–0.61)
EOSINOPHIL NFR BLD AUTO: 1 % (ref 0–6)
ERYTHROCYTE [DISTWIDTH] IN BLOOD BY AUTOMATED COUNT: 12.4 % (ref 11.6–15.1)
ERYTHROCYTE [SEDIMENTATION RATE] IN BLOOD: <1 MM/HOUR (ref 0–19)
GFR SERPL CREATININE-BSD FRML MDRD: 83 ML/MIN/1.73SQ M
GLUCOSE P FAST SERPL-MCNC: 102 MG/DL (ref 65–99)
HCT VFR BLD AUTO: 45.3 % (ref 36.5–49.3)
HGB BLD-MCNC: 15 G/DL (ref 12–17)
IMM GRANULOCYTES # BLD AUTO: 0.06 THOUSAND/UL (ref 0–0.2)
IMM GRANULOCYTES NFR BLD AUTO: 1 % (ref 0–2)
LYMPHOCYTES # BLD AUTO: 2.01 THOUSANDS/ΜL (ref 0.6–4.47)
LYMPHOCYTES NFR BLD AUTO: 17 % (ref 14–44)
MCH RBC QN AUTO: 32.9 PG (ref 26.8–34.3)
MCHC RBC AUTO-ENTMCNC: 33.1 G/DL (ref 31.4–37.4)
MCV RBC AUTO: 99 FL (ref 82–98)
MONOCYTES # BLD AUTO: 0.87 THOUSAND/ΜL (ref 0.17–1.22)
MONOCYTES NFR BLD AUTO: 8 % (ref 4–12)
NEUTROPHILS # BLD AUTO: 8.39 THOUSANDS/ΜL (ref 1.85–7.62)
NEUTS SEG NFR BLD AUTO: 72 % (ref 43–75)
NRBC BLD AUTO-RTO: 0 /100 WBCS
PLATELET # BLD AUTO: 328 THOUSANDS/UL (ref 149–390)
PMV BLD AUTO: 9.5 FL (ref 8.9–12.7)
POTASSIUM SERPL-SCNC: 4.2 MMOL/L (ref 3.5–5.3)
RBC # BLD AUTO: 4.56 MILLION/UL (ref 3.88–5.62)
SODIUM SERPL-SCNC: 138 MMOL/L (ref 136–145)
TSH SERPL DL<=0.05 MIU/L-ACNC: 1.59 UIU/ML (ref 0.36–3.74)
WBC # BLD AUTO: 11.56 THOUSAND/UL (ref 4.31–10.16)

## 2021-11-05 PROCEDURE — 80048 BASIC METABOLIC PNL TOTAL CA: CPT

## 2021-11-05 PROCEDURE — 85025 COMPLETE CBC W/AUTO DIFF WBC: CPT

## 2021-11-05 PROCEDURE — 86140 C-REACTIVE PROTEIN: CPT

## 2021-11-05 PROCEDURE — 71046 X-RAY EXAM CHEST 2 VIEWS: CPT

## 2021-11-05 PROCEDURE — 82306 VITAMIN D 25 HYDROXY: CPT

## 2021-11-05 PROCEDURE — 36415 COLL VENOUS BLD VENIPUNCTURE: CPT

## 2021-11-05 PROCEDURE — 85652 RBC SED RATE AUTOMATED: CPT

## 2021-11-05 PROCEDURE — 84443 ASSAY THYROID STIM HORMONE: CPT

## 2021-11-10 ENCOUNTER — OFFICE VISIT (OUTPATIENT)
Dept: FAMILY MEDICINE CLINIC | Facility: CLINIC | Age: 61
End: 2021-11-10
Payer: COMMERCIAL

## 2021-11-10 VITALS
HEART RATE: 74 BPM | WEIGHT: 194 LBS | BODY MASS INDEX: 24.12 KG/M2 | OXYGEN SATURATION: 98 % | HEIGHT: 75 IN | TEMPERATURE: 98.7 F | SYSTOLIC BLOOD PRESSURE: 138 MMHG | RESPIRATION RATE: 18 BRPM | DIASTOLIC BLOOD PRESSURE: 70 MMHG

## 2021-11-10 DIAGNOSIS — I10 HYPERTENSION, UNSPECIFIED TYPE: ICD-10-CM

## 2021-11-10 DIAGNOSIS — Z01.818 PREOPERATIVE EXAMINATION: ICD-10-CM

## 2021-11-10 DIAGNOSIS — K57.20 DIVERTICULITIS OF LARGE INTESTINE WITH ABSCESS WITHOUT BLEEDING: ICD-10-CM

## 2021-11-10 DIAGNOSIS — D62 ACUTE BLOOD LOSS ANEMIA: ICD-10-CM

## 2021-11-10 DIAGNOSIS — S72.22XD CLOSED DISPLACED SUBTROCHANTERIC FRACTURE OF LEFT FEMUR WITH ROUTINE HEALING, SUBSEQUENT ENCOUNTER: ICD-10-CM

## 2021-11-10 DIAGNOSIS — Z01.818 PRE-OP TESTING: Primary | ICD-10-CM

## 2021-11-10 PROCEDURE — 99243 OFF/OP CNSLTJ NEW/EST LOW 30: CPT | Performed by: FAMILY MEDICINE

## 2021-11-10 PROCEDURE — 93000 ELECTROCARDIOGRAM COMPLETE: CPT | Performed by: FAMILY MEDICINE

## 2021-11-10 RX ORDER — VALSARTAN 40 MG/1
TABLET ORAL DAILY
COMMUNITY
End: 2021-12-09 | Stop reason: HOSPADM

## 2021-11-11 ENCOUNTER — OFFICE VISIT (OUTPATIENT)
Dept: OBGYN CLINIC | Facility: HOSPITAL | Age: 61
End: 2021-11-11
Payer: COMMERCIAL

## 2021-11-11 ENCOUNTER — HOSPITAL ENCOUNTER (OUTPATIENT)
Dept: RADIOLOGY | Facility: HOSPITAL | Age: 61
Discharge: HOME | End: 2021-11-11
Payer: COMMERCIAL

## 2021-11-11 VITALS
DIASTOLIC BLOOD PRESSURE: 83 MMHG | HEART RATE: 55 BPM | BODY MASS INDEX: 24.27 KG/M2 | WEIGHT: 195.2 LBS | HEIGHT: 75 IN | SYSTOLIC BLOOD PRESSURE: 138 MMHG

## 2021-11-11 DIAGNOSIS — S72.22XK CLOSED DISPLACED SUBTROCHANTERIC FRACTURE OF LEFT FEMUR WITH NONUNION, SUBSEQUENT ENCOUNTER: ICD-10-CM

## 2021-11-11 DIAGNOSIS — S72.22XK CLOSED DISPLACED SUBTROCHANTERIC FRACTURE OF LEFT FEMUR WITH NONUNION, SUBSEQUENT ENCOUNTER: Primary | ICD-10-CM

## 2021-11-11 PROCEDURE — 99213 OFFICE O/P EST LOW 20 MIN: CPT | Performed by: PHYSICIAN ASSISTANT

## 2021-11-11 PROCEDURE — 73552 X-RAY EXAM OF FEMUR 2/>: CPT

## 2021-11-18 ENCOUNTER — ANESTHESIA (OUTPATIENT)
Dept: PERIOP | Facility: HOSPITAL | Age: 61
DRG: 478 | End: 2021-11-18
Payer: COMMERCIAL

## 2021-11-18 ENCOUNTER — HOSPITAL ENCOUNTER (OUTPATIENT)
Dept: RADIOLOGY | Facility: HOSPITAL | Age: 61
Setting detail: SURGERY ADMIT
Discharge: HOME | DRG: 478 | End: 2021-11-18
Payer: COMMERCIAL

## 2021-11-18 ENCOUNTER — ANESTHESIA EVENT (OUTPATIENT)
Dept: PERIOP | Facility: HOSPITAL | Age: 61
DRG: 478 | End: 2021-11-18
Payer: COMMERCIAL

## 2021-11-18 ENCOUNTER — HOSPITAL ENCOUNTER (INPATIENT)
Facility: HOSPITAL | Age: 61
LOS: 5 days | Discharge: HOME HEALTH | DRG: 478 | End: 2021-11-23
Attending: ORTHOPAEDIC SURGERY | Admitting: ORTHOPAEDIC SURGERY
Payer: COMMERCIAL

## 2021-11-18 DIAGNOSIS — S72.22XK CLOSED DISPLACED SUBTROCHANTERIC FRACTURE OF LEFT FEMUR WITH NONUNION, SUBSEQUENT ENCOUNTER: Primary | ICD-10-CM

## 2021-11-18 DIAGNOSIS — S72.22XK CLOSED DISPLACED SUBTROCHANTERIC FRACTURE OF LEFT FEMUR WITH NONUNION, SUBSEQUENT ENCOUNTER: ICD-10-CM

## 2021-11-18 LAB
ABO GROUP BLD: NORMAL
BLD GP AB SCN SERPL QL: NEGATIVE
RH BLD: POSITIVE
SPECIMEN EXPIRATION DATE: NORMAL

## 2021-11-18 PROCEDURE — 82947 ASSAY GLUCOSE BLOOD QUANT: CPT

## 2021-11-18 PROCEDURE — 99254 IP/OBS CNSLTJ NEW/EST MOD 60: CPT | Performed by: INTERNAL MEDICINE

## 2021-11-18 PROCEDURE — 87206 SMEAR FLUORESCENT/ACID STAI: CPT | Performed by: ORTHOPAEDIC SURGERY

## 2021-11-18 PROCEDURE — C1713 ANCHOR/SCREW BN/BN,TIS/BN: HCPCS | Performed by: ORTHOPAEDIC SURGERY

## 2021-11-18 PROCEDURE — 88304 TISSUE EXAM BY PATHOLOGIST: CPT | Performed by: PATHOLOGY

## 2021-11-18 PROCEDURE — 86850 RBC ANTIBODY SCREEN: CPT | Performed by: ORTHOPAEDIC SURGERY

## 2021-11-18 PROCEDURE — 0QU707Z SUPPLEMENT LEFT UPPER FEMUR WITH AUTOLOGOUS TISSUE SUBSTITUTE, OPEN APPROACH: ICD-10-PCS | Performed by: ORTHOPAEDIC SURGERY

## 2021-11-18 PROCEDURE — 87070 CULTURE OTHR SPECIMN AEROBIC: CPT | Performed by: ORTHOPAEDIC SURGERY

## 2021-11-18 PROCEDURE — 87075 CULTR BACTERIA EXCEPT BLOOD: CPT | Performed by: ORTHOPAEDIC SURGERY

## 2021-11-18 PROCEDURE — 84295 ASSAY OF SERUM SODIUM: CPT

## 2021-11-18 PROCEDURE — 87205 SMEAR GRAM STAIN: CPT | Performed by: ORTHOPAEDIC SURGERY

## 2021-11-18 PROCEDURE — NC001 PR NO CHARGE: Performed by: ORTHOPAEDIC SURGERY

## 2021-11-18 PROCEDURE — 88331 PATH CONSLTJ SURG 1 BLK 1SPC: CPT | Performed by: PATHOLOGY

## 2021-11-18 PROCEDURE — 27472 REPAIR/GRAFT OF THIGH: CPT | Performed by: ORTHOPAEDIC SURGERY

## 2021-11-18 PROCEDURE — 86901 BLOOD TYPING SEROLOGIC RH(D): CPT | Performed by: ORTHOPAEDIC SURGERY

## 2021-11-18 PROCEDURE — 84132 ASSAY OF SERUM POTASSIUM: CPT

## 2021-11-18 PROCEDURE — 87116 MYCOBACTERIA CULTURE: CPT | Performed by: ORTHOPAEDIC SURGERY

## 2021-11-18 PROCEDURE — 85014 HEMATOCRIT: CPT

## 2021-11-18 PROCEDURE — 0QS704Z REPOSITION LEFT UPPER FEMUR WITH INTERNAL FIXATION DEVICE, OPEN APPROACH: ICD-10-PCS | Performed by: ORTHOPAEDIC SURGERY

## 2021-11-18 PROCEDURE — 86923 COMPATIBILITY TEST ELECTRIC: CPT

## 2021-11-18 PROCEDURE — 88311 DECALCIFY TISSUE: CPT | Performed by: PATHOLOGY

## 2021-11-18 PROCEDURE — C1769 GUIDE WIRE: HCPCS | Performed by: ORTHOPAEDIC SURGERY

## 2021-11-18 PROCEDURE — 0QP704Z REMOVAL OF INTERNAL FIXATION DEVICE FROM LEFT UPPER FEMUR, OPEN APPROACH: ICD-10-PCS | Performed by: ORTHOPAEDIC SURGERY

## 2021-11-18 PROCEDURE — 82330 ASSAY OF CALCIUM: CPT

## 2021-11-18 PROCEDURE — 86900 BLOOD TYPING SEROLOGIC ABO: CPT | Performed by: ORTHOPAEDIC SURGERY

## 2021-11-18 PROCEDURE — 0QB70ZX EXCISION OF LEFT UPPER FEMUR, OPEN APPROACH, DIAGNOSTIC: ICD-10-PCS | Performed by: ORTHOPAEDIC SURGERY

## 2021-11-18 PROCEDURE — 73552 X-RAY EXAM OF FEMUR 2/>: CPT

## 2021-11-18 PROCEDURE — 87102 FUNGUS ISOLATION CULTURE: CPT | Performed by: ORTHOPAEDIC SURGERY

## 2021-11-18 DEVICE — 5.0MM CANNULATED LOCKING SCREW 80MM: Type: IMPLANTABLE DEVICE | Site: LEG | Status: FUNCTIONAL

## 2021-11-18 DEVICE — 4.5MM CORTEX SCREW SELF-TAPPING 40MM: Type: IMPLANTABLE DEVICE | Site: LEG | Status: FUNCTIONAL

## 2021-11-18 DEVICE — 4.5MM CORTEX SCREW SELF-TAPPING 38MM: Type: IMPLANTABLE DEVICE | Site: LEG | Status: FUNCTIONAL

## 2021-11-18 DEVICE — 7.3MM CANNULATED LOCKING SCREW 105MM: Type: IMPLANTABLE DEVICE | Site: LEG | Status: FUNCTIONAL

## 2021-11-18 DEVICE — 4.5MM CORTEX SCREW SELF-TAPPING 42MM: Type: IMPLANTABLE DEVICE | Site: LEG | Status: FUNCTIONAL

## 2021-11-18 DEVICE — 7.3MM CANNULATED LOCKING SCREW 95MM: Type: IMPLANTABLE DEVICE | Site: LEG | Status: FUNCTIONAL

## 2021-11-18 DEVICE — 4.5MM LCP® PROXIMAL FEMUR PLATE 6 HOLES/211MM-LEFT
Type: IMPLANTABLE DEVICE | Site: LEG | Status: FUNCTIONAL
Brand: LCP

## 2021-11-18 RX ORDER — SENNOSIDES 8.6 MG
1 TABLET ORAL DAILY
Status: DISCONTINUED | OUTPATIENT
Start: 2021-11-19 | End: 2021-11-23

## 2021-11-18 RX ORDER — METHOCARBAMOL 500 MG/1
500 TABLET, FILM COATED ORAL EVERY 6 HOURS SCHEDULED
Status: DISCONTINUED | OUTPATIENT
Start: 2021-11-19 | End: 2021-11-19

## 2021-11-18 RX ORDER — NEOSTIGMINE METHYLSULFATE 1 MG/ML
INJECTION INTRAVENOUS AS NEEDED
Status: DISCONTINUED | OUTPATIENT
Start: 2021-11-18 | End: 2021-11-18

## 2021-11-18 RX ORDER — CLINDAMYCIN PHOSPHATE 600 MG/50ML
600 INJECTION INTRAVENOUS EVERY 8 HOURS
Status: COMPLETED | OUTPATIENT
Start: 2021-11-18 | End: 2021-11-19

## 2021-11-18 RX ORDER — ONDANSETRON 2 MG/ML
INJECTION INTRAMUSCULAR; INTRAVENOUS AS NEEDED
Status: DISCONTINUED | OUTPATIENT
Start: 2021-11-18 | End: 2021-11-18

## 2021-11-18 RX ORDER — DIPHENHYDRAMINE HYDROCHLORIDE 50 MG/ML
12.5 INJECTION INTRAMUSCULAR; INTRAVENOUS ONCE AS NEEDED
Status: DISCONTINUED | OUTPATIENT
Start: 2021-11-18 | End: 2021-11-18 | Stop reason: HOSPADM

## 2021-11-18 RX ORDER — CLINDAMYCIN PHOSPHATE 900 MG/50ML
900 INJECTION INTRAVENOUS ONCE
Status: COMPLETED | OUTPATIENT
Start: 2021-11-18 | End: 2021-11-18

## 2021-11-18 RX ORDER — FENTANYL CITRATE/PF 50 MCG/ML
50 SYRINGE (ML) INJECTION
Status: DISCONTINUED | OUTPATIENT
Start: 2021-11-18 | End: 2021-11-18 | Stop reason: HOSPADM

## 2021-11-18 RX ORDER — ACETAMINOPHEN 325 MG/1
975 TABLET ORAL EVERY 8 HOURS SCHEDULED
Status: DISCONTINUED | OUTPATIENT
Start: 2021-11-18 | End: 2021-11-23 | Stop reason: HOSPADM

## 2021-11-18 RX ORDER — PROPOFOL 10 MG/ML
INJECTION, EMULSION INTRAVENOUS AS NEEDED
Status: DISCONTINUED | OUTPATIENT
Start: 2021-11-18 | End: 2021-11-18

## 2021-11-18 RX ORDER — CALCIUM CARBONATE 200(500)MG
1000 TABLET,CHEWABLE ORAL DAILY PRN
Status: DISCONTINUED | OUTPATIENT
Start: 2021-11-18 | End: 2021-11-23 | Stop reason: HOSPADM

## 2021-11-18 RX ORDER — DEXAMETHASONE SODIUM PHOSPHATE 10 MG/ML
INJECTION, SOLUTION INTRAMUSCULAR; INTRAVENOUS AS NEEDED
Status: DISCONTINUED | OUTPATIENT
Start: 2021-11-18 | End: 2021-11-18

## 2021-11-18 RX ORDER — ROCURONIUM BROMIDE 10 MG/ML
INJECTION, SOLUTION INTRAVENOUS AS NEEDED
Status: DISCONTINUED | OUTPATIENT
Start: 2021-11-18 | End: 2021-11-18

## 2021-11-18 RX ORDER — SODIUM CHLORIDE, SODIUM LACTATE, POTASSIUM CHLORIDE, CALCIUM CHLORIDE 600; 310; 30; 20 MG/100ML; MG/100ML; MG/100ML; MG/100ML
1.5 INJECTION, SOLUTION INTRAVENOUS CONTINUOUS
Status: DISCONTINUED | OUTPATIENT
Start: 2021-11-18 | End: 2021-11-21

## 2021-11-18 RX ORDER — ONDANSETRON 2 MG/ML
4 INJECTION INTRAMUSCULAR; INTRAVENOUS EVERY 6 HOURS PRN
Status: DISCONTINUED | OUTPATIENT
Start: 2021-11-18 | End: 2021-11-23 | Stop reason: HOSPADM

## 2021-11-18 RX ORDER — ONDANSETRON 2 MG/ML
4 INJECTION INTRAMUSCULAR; INTRAVENOUS ONCE AS NEEDED
Status: DISCONTINUED | OUTPATIENT
Start: 2021-11-18 | End: 2021-11-18 | Stop reason: HOSPADM

## 2021-11-18 RX ORDER — SODIUM CHLORIDE 9 MG/ML
INJECTION, SOLUTION INTRAVENOUS CONTINUOUS PRN
Status: DISCONTINUED | OUTPATIENT
Start: 2021-11-18 | End: 2021-11-18

## 2021-11-18 RX ORDER — FENTANYL CITRATE/PF 50 MCG/ML
25 SYRINGE (ML) INJECTION
Status: DISCONTINUED | OUTPATIENT
Start: 2021-11-18 | End: 2021-11-18 | Stop reason: HOSPADM

## 2021-11-18 RX ORDER — OXYCODONE HYDROCHLORIDE 10 MG/1
10 TABLET ORAL EVERY 4 HOURS PRN
Status: DISCONTINUED | OUTPATIENT
Start: 2021-11-18 | End: 2021-11-23 | Stop reason: HOSPADM

## 2021-11-18 RX ORDER — MAGNESIUM HYDROXIDE 1200 MG/15ML
LIQUID ORAL AS NEEDED
Status: DISCONTINUED | OUTPATIENT
Start: 2021-11-18 | End: 2021-11-18 | Stop reason: HOSPADM

## 2021-11-18 RX ORDER — ALBUMIN, HUMAN INJ 5% 5 %
SOLUTION INTRAVENOUS CONTINUOUS PRN
Status: DISCONTINUED | OUTPATIENT
Start: 2021-11-18 | End: 2021-11-18

## 2021-11-18 RX ORDER — EPHEDRINE SULFATE 50 MG/ML
INJECTION INTRAVENOUS AS NEEDED
Status: DISCONTINUED | OUTPATIENT
Start: 2021-11-18 | End: 2021-11-18

## 2021-11-18 RX ORDER — OXYCODONE HYDROCHLORIDE 5 MG/1
5 TABLET ORAL EVERY 4 HOURS PRN
Status: DISCONTINUED | OUTPATIENT
Start: 2021-11-18 | End: 2021-11-23 | Stop reason: HOSPADM

## 2021-11-18 RX ORDER — HYDRALAZINE HYDROCHLORIDE 25 MG/1
25 TABLET, FILM COATED ORAL EVERY 8 HOURS PRN
Status: DISCONTINUED | OUTPATIENT
Start: 2021-11-18 | End: 2021-11-23 | Stop reason: HOSPADM

## 2021-11-18 RX ORDER — FENTANYL CITRATE 50 UG/ML
INJECTION, SOLUTION INTRAMUSCULAR; INTRAVENOUS AS NEEDED
Status: DISCONTINUED | OUTPATIENT
Start: 2021-11-18 | End: 2021-11-18

## 2021-11-18 RX ORDER — HYDROMORPHONE HCL/PF 1 MG/ML
0.5 SYRINGE (ML) INJECTION
Status: DISCONTINUED | OUTPATIENT
Start: 2021-11-18 | End: 2021-11-18 | Stop reason: HOSPADM

## 2021-11-18 RX ORDER — SODIUM CHLORIDE, SODIUM LACTATE, POTASSIUM CHLORIDE, CALCIUM CHLORIDE 600; 310; 30; 20 MG/100ML; MG/100ML; MG/100ML; MG/100ML
125 INJECTION, SOLUTION INTRAVENOUS CONTINUOUS
Status: DISCONTINUED | OUTPATIENT
Start: 2021-11-18 | End: 2021-11-21

## 2021-11-18 RX ORDER — HYDROMORPHONE HCL/PF 1 MG/ML
0.5 SYRINGE (ML) INJECTION
Status: DISCONTINUED | OUTPATIENT
Start: 2021-11-18 | End: 2021-11-23 | Stop reason: HOSPADM

## 2021-11-18 RX ORDER — GLYCOPYRROLATE 0.2 MG/ML
INJECTION INTRAMUSCULAR; INTRAVENOUS AS NEEDED
Status: DISCONTINUED | OUTPATIENT
Start: 2021-11-18 | End: 2021-11-18

## 2021-11-18 RX ORDER — DOCUSATE SODIUM 100 MG/1
100 CAPSULE, LIQUID FILLED ORAL 2 TIMES DAILY
Status: DISCONTINUED | OUTPATIENT
Start: 2021-11-18 | End: 2021-11-23 | Stop reason: HOSPADM

## 2021-11-18 RX ORDER — MIDAZOLAM HYDROCHLORIDE 2 MG/2ML
INJECTION, SOLUTION INTRAMUSCULAR; INTRAVENOUS AS NEEDED
Status: DISCONTINUED | OUTPATIENT
Start: 2021-11-18 | End: 2021-11-18

## 2021-11-18 RX ORDER — SODIUM CHLORIDE, SODIUM LACTATE, POTASSIUM CHLORIDE, CALCIUM CHLORIDE 600; 310; 30; 20 MG/100ML; MG/100ML; MG/100ML; MG/100ML
INJECTION, SOLUTION INTRAVENOUS CONTINUOUS PRN
Status: DISCONTINUED | OUTPATIENT
Start: 2021-11-18 | End: 2021-11-18

## 2021-11-18 RX ORDER — HYDROMORPHONE HCL 110MG/55ML
PATIENT CONTROLLED ANALGESIA SYRINGE INTRAVENOUS AS NEEDED
Status: DISCONTINUED | OUTPATIENT
Start: 2021-11-18 | End: 2021-11-18

## 2021-11-18 RX ADMIN — HYDROMORPHONE HYDROCHLORIDE 0.4 MG: 2 INJECTION, SOLUTION INTRAMUSCULAR; INTRAVENOUS; SUBCUTANEOUS at 16:27

## 2021-11-18 RX ADMIN — CLINDAMYCIN PHOSPHATE 600 MG: 600 INJECTION, SOLUTION INTRAVENOUS at 23:52

## 2021-11-18 RX ADMIN — FENTANYL CITRATE 100 MCG: 50 INJECTION INTRAMUSCULAR; INTRAVENOUS at 12:15

## 2021-11-18 RX ADMIN — HYDROMORPHONE HYDROCHLORIDE 0.5 MG: 2 INJECTION, SOLUTION INTRAMUSCULAR; INTRAVENOUS; SUBCUTANEOUS at 20:13

## 2021-11-18 RX ADMIN — ALBUMIN (HUMAN): 12.5 INJECTION, SOLUTION INTRAVENOUS at 15:51

## 2021-11-18 RX ADMIN — ROCURONIUM BROMIDE 30 MG: 50 INJECTION, SOLUTION INTRAVENOUS at 17:32

## 2021-11-18 RX ADMIN — NEOSTIGMINE METHYLSULFATE 3 MG: 1 INJECTION INTRAVENOUS at 20:35

## 2021-11-18 RX ADMIN — ALBUMIN (HUMAN): 12.5 INJECTION, SOLUTION INTRAVENOUS at 18:44

## 2021-11-18 RX ADMIN — OXYCODONE HYDROCHLORIDE 10 MG: 10 TABLET ORAL at 23:49

## 2021-11-18 RX ADMIN — EPHEDRINE SULFATE 5 MG: 50 INJECTION, SOLUTION INTRAVENOUS at 12:52

## 2021-11-18 RX ADMIN — SODIUM CHLORIDE, SODIUM LACTATE, POTASSIUM CHLORIDE, AND CALCIUM CHLORIDE: .6; .31; .03; .02 INJECTION, SOLUTION INTRAVENOUS at 12:03

## 2021-11-18 RX ADMIN — SODIUM CHLORIDE: 0.9 INJECTION, SOLUTION INTRAVENOUS at 15:32

## 2021-11-18 RX ADMIN — PROPOFOL 200 MG: 10 INJECTION, EMULSION INTRAVENOUS at 12:15

## 2021-11-18 RX ADMIN — EPHEDRINE SULFATE 5 MG: 50 INJECTION, SOLUTION INTRAVENOUS at 18:44

## 2021-11-18 RX ADMIN — METHOCARBAMOL 500 MG: 500 TABLET, FILM COATED ORAL at 23:49

## 2021-11-18 RX ADMIN — ROCURONIUM BROMIDE 50 MG: 50 INJECTION, SOLUTION INTRAVENOUS at 12:15

## 2021-11-18 RX ADMIN — CLINDAMYCIN PHOSPHATE 900 MG: 900 INJECTION, SOLUTION INTRAVENOUS at 12:20

## 2021-11-18 RX ADMIN — ROCURONIUM BROMIDE 10 MG: 50 INJECTION, SOLUTION INTRAVENOUS at 19:09

## 2021-11-18 RX ADMIN — HYDROMORPHONE HYDROCHLORIDE 0.2 MG: 2 INJECTION, SOLUTION INTRAMUSCULAR; INTRAVENOUS; SUBCUTANEOUS at 14:11

## 2021-11-18 RX ADMIN — SODIUM CHLORIDE: 0.9 INJECTION, SOLUTION INTRAVENOUS at 12:19

## 2021-11-18 RX ADMIN — ROCURONIUM BROMIDE 20 MG: 50 INJECTION, SOLUTION INTRAVENOUS at 13:09

## 2021-11-18 RX ADMIN — EPHEDRINE SULFATE 5 MG: 50 INJECTION, SOLUTION INTRAVENOUS at 13:50

## 2021-11-18 RX ADMIN — ONDANSETRON 4 MG: 2 INJECTION INTRAMUSCULAR; INTRAVENOUS at 20:08

## 2021-11-18 RX ADMIN — ROCURONIUM BROMIDE 20 MG: 50 INJECTION, SOLUTION INTRAVENOUS at 13:39

## 2021-11-18 RX ADMIN — HYDROMORPHONE HYDROCHLORIDE 0.2 MG: 2 INJECTION, SOLUTION INTRAMUSCULAR; INTRAVENOUS; SUBCUTANEOUS at 16:47

## 2021-11-18 RX ADMIN — HYDROMORPHONE HYDROCHLORIDE 0.2 MG: 2 INJECTION, SOLUTION INTRAMUSCULAR; INTRAVENOUS; SUBCUTANEOUS at 16:15

## 2021-11-18 RX ADMIN — CLINDAMYCIN PHOSPHATE 900 MG: 900 INJECTION, SOLUTION INTRAVENOUS at 18:16

## 2021-11-18 RX ADMIN — ROCURONIUM BROMIDE 20 MG: 50 INJECTION, SOLUTION INTRAVENOUS at 19:22

## 2021-11-18 RX ADMIN — EPHEDRINE SULFATE 5 MG: 50 INJECTION, SOLUTION INTRAVENOUS at 15:25

## 2021-11-18 RX ADMIN — ALBUMIN (HUMAN): 12.5 INJECTION, SOLUTION INTRAVENOUS at 15:23

## 2021-11-18 RX ADMIN — ROCURONIUM BROMIDE 20 MG: 50 INJECTION, SOLUTION INTRAVENOUS at 14:08

## 2021-11-18 RX ADMIN — DEXAMETHASONE SODIUM PHOSPHATE 10 MG: 10 INJECTION INTRAMUSCULAR; INTRAVENOUS at 13:40

## 2021-11-18 RX ADMIN — ROCURONIUM BROMIDE 20 MG: 50 INJECTION, SOLUTION INTRAVENOUS at 16:45

## 2021-11-18 RX ADMIN — ROCURONIUM BROMIDE 20 MG: 50 INJECTION, SOLUTION INTRAVENOUS at 14:33

## 2021-11-18 RX ADMIN — DOCUSATE SODIUM 100 MG: 100 CAPSULE ORAL at 22:42

## 2021-11-18 RX ADMIN — ROCURONIUM BROMIDE 20 MG: 50 INJECTION, SOLUTION INTRAVENOUS at 17:11

## 2021-11-18 RX ADMIN — HYDROMORPHONE HYDROCHLORIDE 0.5 MG: 2 INJECTION, SOLUTION INTRAMUSCULAR; INTRAVENOUS; SUBCUTANEOUS at 20:54

## 2021-11-18 RX ADMIN — ROCURONIUM BROMIDE 20 MG: 50 INJECTION, SOLUTION INTRAVENOUS at 12:55

## 2021-11-18 RX ADMIN — MIDAZOLAM 2 MG: 1 INJECTION INTRAMUSCULAR; INTRAVENOUS at 12:08

## 2021-11-18 RX ADMIN — GLYCOPYRROLATE 0.6 MG: 0.2 INJECTION, SOLUTION INTRAMUSCULAR; INTRAVENOUS at 20:35

## 2021-11-18 RX ADMIN — ROCURONIUM BROMIDE 30 MG: 50 INJECTION, SOLUTION INTRAVENOUS at 15:23

## 2021-11-18 RX ADMIN — SODIUM CHLORIDE, SODIUM LACTATE, POTASSIUM CHLORIDE, AND CALCIUM CHLORIDE: .6; .31; .03; .02 INJECTION, SOLUTION INTRAVENOUS at 18:34

## 2021-11-18 RX ADMIN — ACETAMINOPHEN 975 MG: 325 TABLET, FILM COATED ORAL at 22:42

## 2021-11-18 RX ADMIN — EPHEDRINE SULFATE 5 MG: 50 INJECTION, SOLUTION INTRAVENOUS at 19:09

## 2021-11-18 RX ADMIN — ALBUMIN (HUMAN): 12.5 INJECTION, SOLUTION INTRAVENOUS at 17:24

## 2021-11-19 PROBLEM — S72.002K: Status: ACTIVE | Noted: 2021-11-19

## 2021-11-19 LAB
ANION GAP SERPL CALCULATED.3IONS-SCNC: 8 MMOL/L (ref 4–13)
BUN SERPL-MCNC: 10 MG/DL (ref 5–25)
CA-I BLD-SCNC: 1.13 MMOL/L (ref 1.12–1.32)
CALCIUM SERPL-MCNC: 7.9 MG/DL (ref 8.3–10.1)
CHLORIDE SERPL-SCNC: 108 MMOL/L (ref 100–108)
CO2 SERPL-SCNC: 24 MMOL/L (ref 21–32)
CREAT SERPL-MCNC: 0.8 MG/DL (ref 0.6–1.3)
ERYTHROCYTE [DISTWIDTH] IN BLOOD BY AUTOMATED COUNT: 12.8 % (ref 11.6–15.1)
GFR SERPL CREATININE-BSD FRML MDRD: 95 ML/MIN/1.73SQ M
GLUCOSE SERPL-MCNC: 114 MG/DL (ref 65–140)
GLUCOSE SERPL-MCNC: 149 MG/DL (ref 65–140)
HCT VFR BLD AUTO: 24.5 % (ref 36.5–49.3)
HCT VFR BLD CALC: 30 % (ref 36.5–49.3)
HGB BLD-MCNC: 8.2 G/DL (ref 12–17)
HGB BLDA-MCNC: 10.2 G/DL (ref 12–17)
MCH RBC QN AUTO: 33.3 PG (ref 26.8–34.3)
MCHC RBC AUTO-ENTMCNC: 33.5 G/DL (ref 31.4–37.4)
MCV RBC AUTO: 100 FL (ref 82–98)
PLATELET # BLD AUTO: 187 THOUSANDS/UL (ref 149–390)
PMV BLD AUTO: 9.1 FL (ref 8.9–12.7)
PO2 BLD: 41 MM HG (ref 35–45)
POTASSIUM BLD-SCNC: 3.9 MMOL/L (ref 3.5–5.3)
POTASSIUM SERPL-SCNC: 4 MMOL/L (ref 3.5–5.3)
RBC # BLD AUTO: 2.46 MILLION/UL (ref 3.88–5.62)
SODIUM BLD-SCNC: 141 MMOL/L (ref 136–145)
SODIUM SERPL-SCNC: 140 MMOL/L (ref 136–145)
SPECIMEN SOURCE: ABNORMAL
WBC # BLD AUTO: 11.03 THOUSAND/UL (ref 4.31–10.16)

## 2021-11-19 PROCEDURE — 97166 OT EVAL MOD COMPLEX 45 MIN: CPT

## 2021-11-19 PROCEDURE — 80048 BASIC METABOLIC PNL TOTAL CA: CPT | Performed by: ORTHOPAEDIC SURGERY

## 2021-11-19 PROCEDURE — 97163 PT EVAL HIGH COMPLEX 45 MIN: CPT

## 2021-11-19 PROCEDURE — 85027 COMPLETE CBC AUTOMATED: CPT | Performed by: ORTHOPAEDIC SURGERY

## 2021-11-19 PROCEDURE — NC001 PR NO CHARGE: Performed by: ORTHOPAEDIC SURGERY

## 2021-11-19 RX ORDER — DOCUSATE SODIUM 100 MG/1
100 CAPSULE, LIQUID FILLED ORAL 2 TIMES DAILY
Qty: 20 CAPSULE | Refills: 0 | Status: SHIPPED | OUTPATIENT
Start: 2021-11-19 | End: 2021-12-09 | Stop reason: HOSPADM

## 2021-11-19 RX ORDER — METHOCARBAMOL 750 MG/1
750 TABLET, FILM COATED ORAL EVERY 6 HOURS SCHEDULED
Status: DISCONTINUED | OUTPATIENT
Start: 2021-11-19 | End: 2021-11-23 | Stop reason: HOSPADM

## 2021-11-19 RX ORDER — OXYCODONE HYDROCHLORIDE 5 MG/1
5 TABLET ORAL EVERY 6 HOURS PRN
Qty: 30 TABLET | Refills: 0 | Status: SHIPPED | OUTPATIENT
Start: 2021-11-19 | End: 2021-11-24

## 2021-11-19 RX ORDER — GABAPENTIN 100 MG/1
100 CAPSULE ORAL 3 TIMES DAILY
Status: DISCONTINUED | OUTPATIENT
Start: 2021-11-19 | End: 2021-11-23 | Stop reason: HOSPADM

## 2021-11-19 RX ADMIN — METHOCARBAMOL TABLETS 750 MG: 750 TABLET, COATED ORAL at 12:20

## 2021-11-19 RX ADMIN — ACETAMINOPHEN 975 MG: 325 TABLET, FILM COATED ORAL at 21:42

## 2021-11-19 RX ADMIN — METHOCARBAMOL 500 MG: 500 TABLET, FILM COATED ORAL at 06:45

## 2021-11-19 RX ADMIN — MULTIPLE VITAMINS W/ MINERALS TAB 1 TABLET: TAB ORAL at 08:57

## 2021-11-19 RX ADMIN — DOCUSATE SODIUM 100 MG: 100 CAPSULE ORAL at 08:57

## 2021-11-19 RX ADMIN — ACETAMINOPHEN 975 MG: 325 TABLET, FILM COATED ORAL at 13:19

## 2021-11-19 RX ADMIN — DOCUSATE SODIUM 100 MG: 100 CAPSULE ORAL at 17:17

## 2021-11-19 RX ADMIN — GABAPENTIN 100 MG: 100 CAPSULE ORAL at 12:20

## 2021-11-19 RX ADMIN — OXYCODONE HYDROCHLORIDE 10 MG: 10 TABLET ORAL at 21:42

## 2021-11-19 RX ADMIN — HYDROMORPHONE HYDROCHLORIDE 0.5 MG: 1 INJECTION, SOLUTION INTRAMUSCULAR; INTRAVENOUS; SUBCUTANEOUS at 03:09

## 2021-11-19 RX ADMIN — GABAPENTIN 100 MG: 100 CAPSULE ORAL at 21:42

## 2021-11-19 RX ADMIN — SENNOSIDES 8.6 MG: 8.6 TABLET, FILM COATED ORAL at 08:57

## 2021-11-19 RX ADMIN — IRON SUCROSE 300 MG: 20 INJECTION, SOLUTION INTRAVENOUS at 08:57

## 2021-11-19 RX ADMIN — GABAPENTIN 100 MG: 100 CAPSULE ORAL at 16:02

## 2021-11-19 RX ADMIN — CLINDAMYCIN PHOSPHATE 600 MG: 600 INJECTION, SOLUTION INTRAVENOUS at 16:02

## 2021-11-19 RX ADMIN — METHOCARBAMOL TABLETS 750 MG: 750 TABLET, COATED ORAL at 17:17

## 2021-11-19 RX ADMIN — ACETAMINOPHEN 975 MG: 325 TABLET, FILM COATED ORAL at 06:45

## 2021-11-19 RX ADMIN — CLINDAMYCIN PHOSPHATE 600 MG: 600 INJECTION, SOLUTION INTRAVENOUS at 08:57

## 2021-11-19 RX ADMIN — ENOXAPARIN SODIUM 40 MG: 40 INJECTION SUBCUTANEOUS at 08:57

## 2021-11-20 LAB
ANION GAP SERPL CALCULATED.3IONS-SCNC: 5 MMOL/L (ref 4–13)
BUN SERPL-MCNC: 9 MG/DL (ref 5–25)
CALCIUM SERPL-MCNC: 8.2 MG/DL (ref 8.3–10.1)
CHLORIDE SERPL-SCNC: 109 MMOL/L (ref 100–108)
CO2 SERPL-SCNC: 26 MMOL/L (ref 21–32)
CREAT SERPL-MCNC: 0.84 MG/DL (ref 0.6–1.3)
ERYTHROCYTE [DISTWIDTH] IN BLOOD BY AUTOMATED COUNT: 13.1 % (ref 11.6–15.1)
GFR SERPL CREATININE-BSD FRML MDRD: 93 ML/MIN/1.73SQ M
GLUCOSE SERPL-MCNC: 112 MG/DL (ref 65–140)
HCT VFR BLD AUTO: 26.2 % (ref 36.5–49.3)
HGB BLD-MCNC: 8.6 G/DL (ref 12–17)
MCH RBC QN AUTO: 33 PG (ref 26.8–34.3)
MCHC RBC AUTO-ENTMCNC: 32.8 G/DL (ref 31.4–37.4)
MCV RBC AUTO: 100 FL (ref 82–98)
PLATELET # BLD AUTO: 215 THOUSANDS/UL (ref 149–390)
PMV BLD AUTO: 9.8 FL (ref 8.9–12.7)
POTASSIUM SERPL-SCNC: 3.8 MMOL/L (ref 3.5–5.3)
RBC # BLD AUTO: 2.61 MILLION/UL (ref 3.88–5.62)
SODIUM SERPL-SCNC: 140 MMOL/L (ref 136–145)
WBC # BLD AUTO: 17.13 THOUSAND/UL (ref 4.31–10.16)

## 2021-11-20 PROCEDURE — 80048 BASIC METABOLIC PNL TOTAL CA: CPT | Performed by: ORTHOPAEDIC SURGERY

## 2021-11-20 PROCEDURE — 99024 POSTOP FOLLOW-UP VISIT: CPT | Performed by: PHYSICIAN ASSISTANT

## 2021-11-20 PROCEDURE — 99232 SBSQ HOSP IP/OBS MODERATE 35: CPT | Performed by: INTERNAL MEDICINE

## 2021-11-20 PROCEDURE — 85027 COMPLETE CBC AUTOMATED: CPT | Performed by: ORTHOPAEDIC SURGERY

## 2021-11-20 RX ADMIN — ACETAMINOPHEN 975 MG: 325 TABLET, FILM COATED ORAL at 13:29

## 2021-11-20 RX ADMIN — IRON SUCROSE 300 MG: 20 INJECTION, SOLUTION INTRAVENOUS at 09:38

## 2021-11-20 RX ADMIN — GABAPENTIN 100 MG: 100 CAPSULE ORAL at 15:41

## 2021-11-20 RX ADMIN — ENOXAPARIN SODIUM 40 MG: 40 INJECTION SUBCUTANEOUS at 09:34

## 2021-11-20 RX ADMIN — GABAPENTIN 100 MG: 100 CAPSULE ORAL at 22:21

## 2021-11-20 RX ADMIN — METHOCARBAMOL TABLETS 750 MG: 750 TABLET, COATED ORAL at 13:29

## 2021-11-20 RX ADMIN — OXYCODONE HYDROCHLORIDE 5 MG: 5 TABLET ORAL at 15:41

## 2021-11-20 RX ADMIN — ACETAMINOPHEN 975 MG: 325 TABLET, FILM COATED ORAL at 05:55

## 2021-11-20 RX ADMIN — GABAPENTIN 100 MG: 100 CAPSULE ORAL at 09:34

## 2021-11-20 RX ADMIN — ACETAMINOPHEN 975 MG: 325 TABLET, FILM COATED ORAL at 20:04

## 2021-11-20 RX ADMIN — OXYCODONE HYDROCHLORIDE 10 MG: 10 TABLET ORAL at 09:48

## 2021-11-20 RX ADMIN — DOCUSATE SODIUM 100 MG: 100 CAPSULE ORAL at 17:28

## 2021-11-20 RX ADMIN — METHOCARBAMOL TABLETS 750 MG: 750 TABLET, COATED ORAL at 05:56

## 2021-11-20 RX ADMIN — METHOCARBAMOL TABLETS 750 MG: 750 TABLET, COATED ORAL at 22:20

## 2021-11-20 RX ADMIN — SENNOSIDES 8.6 MG: 8.6 TABLET, FILM COATED ORAL at 09:34

## 2021-11-20 RX ADMIN — METHOCARBAMOL TABLETS 750 MG: 750 TABLET, COATED ORAL at 00:07

## 2021-11-20 RX ADMIN — METHOCARBAMOL TABLETS 750 MG: 750 TABLET, COATED ORAL at 17:28

## 2021-11-20 RX ADMIN — OXYCODONE HYDROCHLORIDE 10 MG: 10 TABLET ORAL at 05:56

## 2021-11-20 RX ADMIN — MULTIPLE VITAMINS W/ MINERALS TAB 1 TABLET: TAB ORAL at 09:34

## 2021-11-20 RX ADMIN — OXYCODONE HYDROCHLORIDE 10 MG: 10 TABLET ORAL at 20:05

## 2021-11-20 RX ADMIN — DOCUSATE SODIUM 100 MG: 100 CAPSULE ORAL at 09:34

## 2021-11-21 LAB
ABO GROUP BLD BPU: NORMAL
ANION GAP SERPL CALCULATED.3IONS-SCNC: 8 MMOL/L (ref 4–13)
BACTERIA SPEC ANAEROBE CULT: NO GROWTH
BACTERIA TISS AEROBE CULT: NO GROWTH
BPU ID: NORMAL
BUN SERPL-MCNC: 7 MG/DL (ref 5–25)
CALCIUM SERPL-MCNC: 8.6 MG/DL (ref 8.3–10.1)
CHLORIDE SERPL-SCNC: 107 MMOL/L (ref 100–108)
CO2 SERPL-SCNC: 24 MMOL/L (ref 21–32)
CREAT SERPL-MCNC: 0.8 MG/DL (ref 0.6–1.3)
CROSSMATCH: NORMAL
ERYTHROCYTE [DISTWIDTH] IN BLOOD BY AUTOMATED COUNT: 12.9 % (ref 11.6–15.1)
GFR SERPL CREATININE-BSD FRML MDRD: 95 ML/MIN/1.73SQ M
GLUCOSE SERPL-MCNC: 113 MG/DL (ref 65–140)
GRAM STN SPEC: NORMAL
GRAM STN SPEC: NORMAL
HCT VFR BLD AUTO: 23.7 % (ref 36.5–49.3)
HGB BLD-MCNC: 7.9 G/DL (ref 12–17)
MCH RBC QN AUTO: 33.5 PG (ref 26.8–34.3)
MCHC RBC AUTO-ENTMCNC: 33.3 G/DL (ref 31.4–37.4)
MCV RBC AUTO: 100 FL (ref 82–98)
PLATELET # BLD AUTO: 202 THOUSANDS/UL (ref 149–390)
PMV BLD AUTO: 9.9 FL (ref 8.9–12.7)
POTASSIUM SERPL-SCNC: 3.9 MMOL/L (ref 3.5–5.3)
RBC # BLD AUTO: 2.36 MILLION/UL (ref 3.88–5.62)
SODIUM SERPL-SCNC: 139 MMOL/L (ref 136–145)
UNIT DISPENSE STATUS: NORMAL
UNIT PRODUCT CODE: NORMAL
UNIT PRODUCT VOLUME: 350 ML
UNIT RH: NORMAL
WBC # BLD AUTO: 12.89 THOUSAND/UL (ref 4.31–10.16)

## 2021-11-21 PROCEDURE — 99024 POSTOP FOLLOW-UP VISIT: CPT | Performed by: PHYSICIAN ASSISTANT

## 2021-11-21 PROCEDURE — 85027 COMPLETE CBC AUTOMATED: CPT | Performed by: ORTHOPAEDIC SURGERY

## 2021-11-21 PROCEDURE — 80048 BASIC METABOLIC PNL TOTAL CA: CPT | Performed by: ORTHOPAEDIC SURGERY

## 2021-11-21 PROCEDURE — 99232 SBSQ HOSP IP/OBS MODERATE 35: CPT | Performed by: INTERNAL MEDICINE

## 2021-11-21 PROCEDURE — 97164 PT RE-EVAL EST PLAN CARE: CPT

## 2021-11-21 RX ADMIN — DOCUSATE SODIUM 100 MG: 100 CAPSULE ORAL at 17:17

## 2021-11-21 RX ADMIN — ACETAMINOPHEN 975 MG: 325 TABLET, FILM COATED ORAL at 21:46

## 2021-11-21 RX ADMIN — ENOXAPARIN SODIUM 40 MG: 40 INJECTION SUBCUTANEOUS at 08:44

## 2021-11-21 RX ADMIN — OXYCODONE HYDROCHLORIDE 5 MG: 5 TABLET ORAL at 13:02

## 2021-11-21 RX ADMIN — ACETAMINOPHEN 975 MG: 325 TABLET, FILM COATED ORAL at 05:48

## 2021-11-21 RX ADMIN — METHOCARBAMOL TABLETS 750 MG: 750 TABLET, COATED ORAL at 21:45

## 2021-11-21 RX ADMIN — DOCUSATE SODIUM 100 MG: 100 CAPSULE ORAL at 08:43

## 2021-11-21 RX ADMIN — METHOCARBAMOL TABLETS 750 MG: 750 TABLET, COATED ORAL at 05:48

## 2021-11-21 RX ADMIN — OXYCODONE HYDROCHLORIDE 10 MG: 10 TABLET ORAL at 05:48

## 2021-11-21 RX ADMIN — ACETAMINOPHEN 975 MG: 325 TABLET, FILM COATED ORAL at 13:02

## 2021-11-21 RX ADMIN — MULTIPLE VITAMINS W/ MINERALS TAB 1 TABLET: TAB ORAL at 08:43

## 2021-11-21 RX ADMIN — GABAPENTIN 100 MG: 100 CAPSULE ORAL at 17:17

## 2021-11-21 RX ADMIN — METHOCARBAMOL TABLETS 750 MG: 750 TABLET, COATED ORAL at 13:02

## 2021-11-21 RX ADMIN — GABAPENTIN 100 MG: 100 CAPSULE ORAL at 08:43

## 2021-11-21 RX ADMIN — GABAPENTIN 100 MG: 100 CAPSULE ORAL at 21:46

## 2021-11-21 RX ADMIN — METHOCARBAMOL TABLETS 750 MG: 750 TABLET, COATED ORAL at 17:17

## 2021-11-21 RX ADMIN — OXYCODONE HYDROCHLORIDE 5 MG: 5 TABLET ORAL at 17:17

## 2021-11-21 RX ADMIN — SENNOSIDES 8.6 MG: 8.6 TABLET, FILM COATED ORAL at 08:43

## 2021-11-21 RX ADMIN — OXYCODONE HYDROCHLORIDE 10 MG: 10 TABLET ORAL at 21:45

## 2021-11-22 LAB
ANION GAP SERPL CALCULATED.3IONS-SCNC: 5 MMOL/L (ref 4–13)
BASOPHILS # BLD AUTO: 0.06 THOUSANDS/ΜL (ref 0–0.1)
BASOPHILS NFR BLD AUTO: 1 % (ref 0–1)
BUN SERPL-MCNC: 7 MG/DL (ref 5–25)
CALCIUM SERPL-MCNC: 8.8 MG/DL (ref 8.3–10.1)
CHLORIDE SERPL-SCNC: 106 MMOL/L (ref 100–108)
CO2 SERPL-SCNC: 28 MMOL/L (ref 21–32)
CREAT SERPL-MCNC: 0.84 MG/DL (ref 0.6–1.3)
EOSINOPHIL # BLD AUTO: 0.22 THOUSAND/ΜL (ref 0–0.61)
EOSINOPHIL NFR BLD AUTO: 3 % (ref 0–6)
ERYTHROCYTE [DISTWIDTH] IN BLOOD BY AUTOMATED COUNT: 12.9 % (ref 11.6–15.1)
GFR SERPL CREATININE-BSD FRML MDRD: 93 ML/MIN/1.73SQ M
GLUCOSE SERPL-MCNC: 102 MG/DL (ref 65–140)
HCT VFR BLD AUTO: 24.2 % (ref 36.5–49.3)
HGB BLD-MCNC: 7.8 G/DL (ref 12–17)
IMM GRANULOCYTES # BLD AUTO: 0.11 THOUSAND/UL (ref 0–0.2)
IMM GRANULOCYTES NFR BLD AUTO: 1 % (ref 0–2)
LYMPHOCYTES # BLD AUTO: 1.42 THOUSANDS/ΜL (ref 0.6–4.47)
LYMPHOCYTES NFR BLD AUTO: 17 % (ref 14–44)
MCH RBC QN AUTO: 32.8 PG (ref 26.8–34.3)
MCHC RBC AUTO-ENTMCNC: 32.2 G/DL (ref 31.4–37.4)
MCV RBC AUTO: 102 FL (ref 82–98)
MONOCYTES # BLD AUTO: 1.01 THOUSAND/ΜL (ref 0.17–1.22)
MONOCYTES NFR BLD AUTO: 12 % (ref 4–12)
NEUTROPHILS # BLD AUTO: 5.81 THOUSANDS/ΜL (ref 1.85–7.62)
NEUTS SEG NFR BLD AUTO: 66 % (ref 43–75)
NRBC BLD AUTO-RTO: 0 /100 WBCS
PLATELET # BLD AUTO: 242 THOUSANDS/UL (ref 149–390)
PMV BLD AUTO: 9.7 FL (ref 8.9–12.7)
POTASSIUM SERPL-SCNC: 3.7 MMOL/L (ref 3.5–5.3)
RBC # BLD AUTO: 2.38 MILLION/UL (ref 3.88–5.62)
SODIUM SERPL-SCNC: 139 MMOL/L (ref 136–145)
WBC # BLD AUTO: 8.63 THOUSAND/UL (ref 4.31–10.16)

## 2021-11-22 PROCEDURE — 80048 BASIC METABOLIC PNL TOTAL CA: CPT | Performed by: PHYSICIAN ASSISTANT

## 2021-11-22 PROCEDURE — 85025 COMPLETE CBC W/AUTO DIFF WBC: CPT | Performed by: PHYSICIAN ASSISTANT

## 2021-11-22 PROCEDURE — NC001 PR NO CHARGE: Performed by: ORTHOPAEDIC SURGERY

## 2021-11-22 PROCEDURE — 99232 SBSQ HOSP IP/OBS MODERATE 35: CPT | Performed by: INTERNAL MEDICINE

## 2021-11-22 RX ORDER — POLYETHYLENE GLYCOL 3350 17 G/17G
17 POWDER, FOR SOLUTION ORAL DAILY
Status: DISCONTINUED | OUTPATIENT
Start: 2021-11-22 | End: 2021-11-23 | Stop reason: HOSPADM

## 2021-11-22 RX ADMIN — DOCUSATE SODIUM 100 MG: 100 CAPSULE ORAL at 17:41

## 2021-11-22 RX ADMIN — ACETAMINOPHEN 975 MG: 325 TABLET, FILM COATED ORAL at 22:17

## 2021-11-22 RX ADMIN — OXYCODONE HYDROCHLORIDE 10 MG: 10 TABLET ORAL at 17:41

## 2021-11-22 RX ADMIN — POLYETHYLENE GLYCOL 3350 17 G: 17 POWDER, FOR SOLUTION ORAL at 09:30

## 2021-11-22 RX ADMIN — MULTIPLE VITAMINS W/ MINERALS TAB 1 TABLET: TAB ORAL at 09:30

## 2021-11-22 RX ADMIN — METHOCARBAMOL TABLETS 750 MG: 750 TABLET, COATED ORAL at 17:41

## 2021-11-22 RX ADMIN — METHOCARBAMOL TABLETS 750 MG: 750 TABLET, COATED ORAL at 12:55

## 2021-11-22 RX ADMIN — ACETAMINOPHEN 975 MG: 325 TABLET, FILM COATED ORAL at 13:34

## 2021-11-22 RX ADMIN — ACETAMINOPHEN 975 MG: 325 TABLET, FILM COATED ORAL at 05:33

## 2021-11-22 RX ADMIN — DOCUSATE SODIUM 100 MG: 100 CAPSULE ORAL at 09:30

## 2021-11-22 RX ADMIN — GABAPENTIN 100 MG: 100 CAPSULE ORAL at 16:38

## 2021-11-22 RX ADMIN — OXYCODONE HYDROCHLORIDE 10 MG: 10 TABLET ORAL at 13:34

## 2021-11-22 RX ADMIN — OXYCODONE HYDROCHLORIDE 10 MG: 10 TABLET ORAL at 09:34

## 2021-11-22 RX ADMIN — SENNOSIDES 8.6 MG: 8.6 TABLET, FILM COATED ORAL at 09:30

## 2021-11-22 RX ADMIN — OXYCODONE HYDROCHLORIDE 10 MG: 10 TABLET ORAL at 22:17

## 2021-11-22 RX ADMIN — METHOCARBAMOL TABLETS 750 MG: 750 TABLET, COATED ORAL at 22:17

## 2021-11-22 RX ADMIN — GABAPENTIN 100 MG: 100 CAPSULE ORAL at 09:30

## 2021-11-22 RX ADMIN — METHOCARBAMOL TABLETS 750 MG: 750 TABLET, COATED ORAL at 05:33

## 2021-11-22 RX ADMIN — GABAPENTIN 100 MG: 100 CAPSULE ORAL at 22:17

## 2021-11-22 RX ADMIN — OXYCODONE HYDROCHLORIDE 10 MG: 10 TABLET ORAL at 05:33

## 2021-11-22 RX ADMIN — ENOXAPARIN SODIUM 40 MG: 40 INJECTION SUBCUTANEOUS at 09:30

## 2021-11-23 VITALS
SYSTOLIC BLOOD PRESSURE: 169 MMHG | HEART RATE: 70 BPM | WEIGHT: 194 LBS | TEMPERATURE: 97.6 F | HEIGHT: 75 IN | BODY MASS INDEX: 24.12 KG/M2 | OXYGEN SATURATION: 97 % | DIASTOLIC BLOOD PRESSURE: 88 MMHG | RESPIRATION RATE: 21 BRPM

## 2021-11-23 LAB
DME PARACHUTE DELIVERY DATE ACTUAL: NORMAL
DME PARACHUTE DELIVERY DATE REQUESTED: NORMAL
DME PARACHUTE DELIVERY NOTE: NORMAL
DME PARACHUTE ITEM DESCRIPTION: NORMAL
DME PARACHUTE ORDER STATUS: NORMAL
DME PARACHUTE SUPPLIER NAME: NORMAL
DME PARACHUTE SUPPLIER PHONE: NORMAL

## 2021-11-23 PROCEDURE — 99232 SBSQ HOSP IP/OBS MODERATE 35: CPT | Performed by: INTERNAL MEDICINE

## 2021-11-23 PROCEDURE — NC001 PR NO CHARGE: Performed by: ORTHOPAEDIC SURGERY

## 2021-11-23 PROCEDURE — 90682 RIV4 VACC RECOMBINANT DNA IM: CPT

## 2021-11-23 PROCEDURE — 90471 IMMUNIZATION ADMIN: CPT

## 2021-11-23 RX ORDER — SENNOSIDES 8.6 MG
2 TABLET ORAL DAILY
Status: DISCONTINUED | OUTPATIENT
Start: 2021-11-23 | End: 2021-11-23 | Stop reason: HOSPADM

## 2021-11-23 RX ORDER — MAGNESIUM CARB/ALUMINUM HYDROX 105-160MG
296 TABLET,CHEWABLE ORAL ONCE
Status: COMPLETED | OUTPATIENT
Start: 2021-11-23 | End: 2021-11-23

## 2021-11-23 RX ADMIN — ENOXAPARIN SODIUM 40 MG: 40 INJECTION SUBCUTANEOUS at 08:41

## 2021-11-23 RX ADMIN — INFLUENZA A VIRUS A/WISCONSIN/588/2019 (H1N1) RECOMBINANT HEMAGGLUTININ ANTIGEN, INFLUENZA A VIRUS A/TASMANIA/503/2020 (H3N2) RECOMBINANT HEMAGGLUTININ ANTIGEN, INFLUENZA B VIRUS B/WASHINGTON/02/2019 RECOMBINANT HEMAGGLUTININ ANTIGEN, AND INFLUENZA B VIRUS B/PHUKET/3073/2013 RECOMBINANT HEMAGGLUTININ ANTIGEN 0.5 ML: 45; 45; 45; 45 INJECTION INTRAMUSCULAR at 11:38

## 2021-11-23 RX ADMIN — OXYCODONE HYDROCHLORIDE 5 MG: 5 TABLET ORAL at 04:37

## 2021-11-23 RX ADMIN — GABAPENTIN 100 MG: 100 CAPSULE ORAL at 08:41

## 2021-11-23 RX ADMIN — Medication 296 ML: at 11:39

## 2021-11-23 RX ADMIN — OXYCODONE HYDROCHLORIDE 5 MG: 5 TABLET ORAL at 08:47

## 2021-11-23 RX ADMIN — METHOCARBAMOL TABLETS 750 MG: 750 TABLET, COATED ORAL at 04:36

## 2021-11-23 RX ADMIN — MULTIPLE VITAMINS W/ MINERALS TAB 1 TABLET: TAB ORAL at 08:41

## 2021-11-23 RX ADMIN — METHOCARBAMOL TABLETS 750 MG: 750 TABLET, COATED ORAL at 11:38

## 2021-11-23 RX ADMIN — ACETAMINOPHEN 975 MG: 325 TABLET, FILM COATED ORAL at 13:34

## 2021-11-23 RX ADMIN — POLYETHYLENE GLYCOL 3350 17 G: 17 POWDER, FOR SOLUTION ORAL at 08:41

## 2021-11-23 RX ADMIN — OXYCODONE HYDROCHLORIDE 5 MG: 5 TABLET ORAL at 13:34

## 2021-11-23 RX ADMIN — ACETAMINOPHEN 975 MG: 325 TABLET, FILM COATED ORAL at 04:37

## 2021-11-23 RX ADMIN — DOCUSATE SODIUM 100 MG: 100 CAPSULE ORAL at 08:41

## 2021-11-23 RX ADMIN — SENNOSIDES 17.2 MG: 8.6 TABLET, FILM COATED ORAL at 08:41

## 2021-11-24 ENCOUNTER — TELEPHONE (OUTPATIENT)
Dept: OBGYN CLINIC | Facility: HOSPITAL | Age: 61
End: 2021-11-24

## 2021-11-24 ENCOUNTER — TRANSITIONAL CARE MANAGEMENT (OUTPATIENT)
Dept: FAMILY MEDICINE CLINIC | Facility: CLINIC | Age: 61
End: 2021-11-24

## 2021-11-24 DIAGNOSIS — S72.22XK CLOSED DISPLACED SUBTROCHANTERIC FRACTURE OF LEFT FEMUR WITH NONUNION, SUBSEQUENT ENCOUNTER: Primary | ICD-10-CM

## 2021-11-24 RX ORDER — TRAMADOL HYDROCHLORIDE 50 MG/1
50 TABLET ORAL EVERY 6 HOURS PRN
Qty: 40 TABLET | Refills: 0 | Status: SHIPPED | OUTPATIENT
Start: 2021-11-24 | End: 2021-12-09 | Stop reason: HOSPADM

## 2021-11-26 ENCOUNTER — TELEPHONE (OUTPATIENT)
Dept: OBGYN CLINIC | Facility: HOSPITAL | Age: 61
End: 2021-11-26

## 2021-11-29 ENCOUNTER — TELEPHONE (OUTPATIENT)
Dept: FAMILY MEDICINE CLINIC | Facility: CLINIC | Age: 61
End: 2021-11-29

## 2021-11-29 ENCOUNTER — OFFICE VISIT (OUTPATIENT)
Dept: FAMILY MEDICINE CLINIC | Facility: CLINIC | Age: 61
End: 2021-11-29
Payer: COMMERCIAL

## 2021-11-29 VITALS — TEMPERATURE: 97.6 F | OXYGEN SATURATION: 98 % | HEART RATE: 86 BPM

## 2021-11-29 DIAGNOSIS — I10 HYPERTENSION, UNSPECIFIED TYPE: ICD-10-CM

## 2021-11-29 DIAGNOSIS — D62 ACUTE BLOOD LOSS ANEMIA: ICD-10-CM

## 2021-11-29 DIAGNOSIS — S72.22XD CLOSED DISPLACED SUBTROCHANTERIC FRACTURE OF LEFT FEMUR WITH ROUTINE HEALING, SUBSEQUENT ENCOUNTER: ICD-10-CM

## 2021-11-29 DIAGNOSIS — R05.9 COUGH: Primary | ICD-10-CM

## 2021-11-29 PROCEDURE — U0005 INFEC AGEN DETEC AMPLI PROBE: HCPCS | Performed by: FAMILY MEDICINE

## 2021-11-29 PROCEDURE — U0003 INFECTIOUS AGENT DETECTION BY NUCLEIC ACID (DNA OR RNA); SEVERE ACUTE RESPIRATORY SYNDROME CORONAVIRUS 2 (SARS-COV-2) (CORONAVIRUS DISEASE [COVID-19]), AMPLIFIED PROBE TECHNIQUE, MAKING USE OF HIGH THROUGHPUT TECHNOLOGIES AS DESCRIBED BY CMS-2020-01-R: HCPCS | Performed by: FAMILY MEDICINE

## 2021-11-29 PROCEDURE — 99495 TRANSJ CARE MGMT MOD F2F 14D: CPT | Performed by: FAMILY MEDICINE

## 2021-11-30 DIAGNOSIS — S72.22XK CLOSED DISPLACED SUBTROCHANTERIC FRACTURE OF LEFT FEMUR WITH NONUNION, SUBSEQUENT ENCOUNTER: Primary | ICD-10-CM

## 2021-11-30 LAB — SARS-COV-2 RNA RESP QL NAA+PROBE: NEGATIVE

## 2021-12-05 DIAGNOSIS — S72.22XK CLOSED DISPLACED SUBTROCHANTERIC FRACTURE OF LEFT FEMUR WITH NONUNION, SUBSEQUENT ENCOUNTER: Primary | ICD-10-CM

## 2021-12-07 ENCOUNTER — OFFICE VISIT (OUTPATIENT)
Dept: OBGYN CLINIC | Facility: CLINIC | Age: 61
End: 2021-12-07

## 2021-12-07 ENCOUNTER — APPOINTMENT (OUTPATIENT)
Dept: RADIOLOGY | Facility: CLINIC | Age: 61
End: 2021-12-07
Payer: COMMERCIAL

## 2021-12-07 VITALS
SYSTOLIC BLOOD PRESSURE: 131 MMHG | DIASTOLIC BLOOD PRESSURE: 75 MMHG | WEIGHT: 194 LBS | HEART RATE: 70 BPM | BODY MASS INDEX: 24.12 KG/M2 | HEIGHT: 75 IN

## 2021-12-07 DIAGNOSIS — S72.22XD CLOSED DISPLACED SUBTROCHANTERIC FRACTURE OF LEFT FEMUR WITH ROUTINE HEALING, SUBSEQUENT ENCOUNTER: Primary | ICD-10-CM

## 2021-12-07 DIAGNOSIS — S72.22XK CLOSED DISPLACED SUBTROCHANTERIC FRACTURE OF LEFT FEMUR WITH NONUNION, SUBSEQUENT ENCOUNTER: ICD-10-CM

## 2021-12-07 PROCEDURE — 99024 POSTOP FOLLOW-UP VISIT: CPT | Performed by: ORTHOPAEDIC SURGERY

## 2021-12-07 PROCEDURE — 73502 X-RAY EXAM HIP UNI 2-3 VIEWS: CPT

## 2021-12-07 PROCEDURE — 73552 X-RAY EXAM OF FEMUR 2/>: CPT

## 2021-12-08 ENCOUNTER — IMMUNIZATIONS (OUTPATIENT)
Dept: FAMILY MEDICINE CLINIC | Facility: HOSPITAL | Age: 61
End: 2021-12-08

## 2021-12-08 DIAGNOSIS — Z23 ENCOUNTER FOR IMMUNIZATION: Primary | ICD-10-CM

## 2021-12-08 PROCEDURE — 91306 COVID-19 MODERNA VACC 0.25 ML BOOSTER: CPT

## 2021-12-08 PROCEDURE — 0064A COVID-19 MODERNA VACC 0.25 ML BOOSTER: CPT

## 2021-12-09 ENCOUNTER — OFFICE VISIT (OUTPATIENT)
Dept: FAMILY MEDICINE CLINIC | Facility: CLINIC | Age: 61
End: 2021-12-09
Payer: COMMERCIAL

## 2021-12-09 VITALS
OXYGEN SATURATION: 100 % | BODY MASS INDEX: 24.37 KG/M2 | HEART RATE: 58 BPM | SYSTOLIC BLOOD PRESSURE: 126 MMHG | DIASTOLIC BLOOD PRESSURE: 80 MMHG | WEIGHT: 196 LBS | HEIGHT: 75 IN

## 2021-12-09 DIAGNOSIS — D62 ACUTE BLOOD LOSS ANEMIA: ICD-10-CM

## 2021-12-09 DIAGNOSIS — H61.23 HEARING LOSS DUE TO CERUMEN IMPACTION, BILATERAL: ICD-10-CM

## 2021-12-09 DIAGNOSIS — H60.331 ACUTE SWIMMER'S EAR OF RIGHT SIDE: Primary | ICD-10-CM

## 2021-12-09 PROCEDURE — 99213 OFFICE O/P EST LOW 20 MIN: CPT | Performed by: FAMILY MEDICINE

## 2021-12-09 PROCEDURE — 69210 REMOVE IMPACTED EAR WAX UNI: CPT | Performed by: FAMILY MEDICINE

## 2021-12-09 RX ORDER — ACETAMINOPHEN 500 MG
TABLET ORAL
COMMUNITY

## 2021-12-17 ENCOUNTER — EVALUATION (OUTPATIENT)
Dept: PHYSICAL THERAPY | Age: 61
End: 2021-12-17
Payer: COMMERCIAL

## 2021-12-17 DIAGNOSIS — S72.22XK CLOSED DISPLACED SUBTROCHANTERIC FRACTURE OF LEFT FEMUR WITH NONUNION: ICD-10-CM

## 2021-12-17 DIAGNOSIS — M25.552 LEFT HIP PAIN: Primary | ICD-10-CM

## 2021-12-17 PROCEDURE — 97110 THERAPEUTIC EXERCISES: CPT | Performed by: PHYSICAL THERAPIST

## 2021-12-17 PROCEDURE — 97162 PT EVAL MOD COMPLEX 30 MIN: CPT | Performed by: PHYSICAL THERAPIST

## 2021-12-20 LAB — FUNGUS SPEC CULT: NORMAL

## 2021-12-21 ENCOUNTER — OFFICE VISIT (OUTPATIENT)
Dept: PHYSICAL THERAPY | Age: 61
End: 2021-12-21
Payer: COMMERCIAL

## 2021-12-21 DIAGNOSIS — M25.552 LEFT HIP PAIN: Primary | ICD-10-CM

## 2021-12-21 DIAGNOSIS — S72.22XK CLOSED DISPLACED SUBTROCHANTERIC FRACTURE OF LEFT FEMUR WITH NONUNION: ICD-10-CM

## 2021-12-21 PROCEDURE — 97110 THERAPEUTIC EXERCISES: CPT | Performed by: PHYSICAL THERAPIST

## 2021-12-26 DIAGNOSIS — S72.22XD CLOSED DISPLACED SUBTROCHANTERIC FRACTURE OF LEFT FEMUR WITH ROUTINE HEALING, SUBSEQUENT ENCOUNTER: Primary | ICD-10-CM

## 2021-12-28 ENCOUNTER — APPOINTMENT (OUTPATIENT)
Dept: PHYSICAL THERAPY | Age: 61
End: 2021-12-28
Payer: COMMERCIAL

## 2021-12-28 ENCOUNTER — APPOINTMENT (OUTPATIENT)
Dept: RADIOLOGY | Facility: CLINIC | Age: 61
End: 2021-12-28
Payer: COMMERCIAL

## 2021-12-28 ENCOUNTER — OFFICE VISIT (OUTPATIENT)
Dept: OBGYN CLINIC | Facility: CLINIC | Age: 61
End: 2021-12-28

## 2021-12-28 VITALS
HEART RATE: 62 BPM | BODY MASS INDEX: 24.37 KG/M2 | SYSTOLIC BLOOD PRESSURE: 150 MMHG | WEIGHT: 196 LBS | HEIGHT: 75 IN | DIASTOLIC BLOOD PRESSURE: 92 MMHG

## 2021-12-28 DIAGNOSIS — S72.22XD CLOSED DISPLACED SUBTROCHANTERIC FRACTURE OF LEFT FEMUR WITH ROUTINE HEALING, SUBSEQUENT ENCOUNTER: ICD-10-CM

## 2021-12-28 DIAGNOSIS — Z87.81 S/P ORIF (OPEN REDUCTION INTERNAL FIXATION) FRACTURE: Primary | ICD-10-CM

## 2021-12-28 DIAGNOSIS — Z98.890 S/P ORIF (OPEN REDUCTION INTERNAL FIXATION) FRACTURE: Primary | ICD-10-CM

## 2021-12-28 PROCEDURE — 99024 POSTOP FOLLOW-UP VISIT: CPT | Performed by: ORTHOPAEDIC SURGERY

## 2021-12-28 PROCEDURE — 73502 X-RAY EXAM HIP UNI 2-3 VIEWS: CPT

## 2021-12-28 PROCEDURE — 73552 X-RAY EXAM OF FEMUR 2/>: CPT

## 2021-12-29 PROBLEM — Z98.890 S/P ORIF (OPEN REDUCTION INTERNAL FIXATION) FRACTURE: Status: ACTIVE | Noted: 2021-12-29

## 2021-12-29 PROBLEM — Z87.81 S/P ORIF (OPEN REDUCTION INTERNAL FIXATION) FRACTURE: Status: ACTIVE | Noted: 2021-12-29

## 2022-01-04 LAB
MYCOBACTERIUM SPEC CULT: NORMAL
RHODAMINE-AURAMINE STN SPEC: NORMAL

## 2022-01-07 ENCOUNTER — OFFICE VISIT (OUTPATIENT)
Dept: PHYSICAL THERAPY | Age: 62
End: 2022-01-07
Payer: COMMERCIAL

## 2022-01-07 DIAGNOSIS — S72.22XK CLOSED DISPLACED SUBTROCHANTERIC FRACTURE OF LEFT FEMUR WITH NONUNION: ICD-10-CM

## 2022-01-07 DIAGNOSIS — M25.552 LEFT HIP PAIN: Primary | ICD-10-CM

## 2022-01-07 PROCEDURE — 97110 THERAPEUTIC EXERCISES: CPT | Performed by: PHYSICAL THERAPIST

## 2022-01-07 NOTE — PROGRESS NOTES
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  S72  22XK        Start Time: 0800  Stop Time: 0845  Total time in clinic (min): 45 minutes    Subjective: Pt now permitted WBAT and transition to cane  No rest pain  Pt notes sharp pain only with hip flexion ER with       Objective: See treatment diary below      Assessment: Discomfort produced with gentle ER stretch, performed to tolerance  Poor left glute activation, can not extend leg off plinth in prone 2° to weakness  Pt can not tolerate bridges due to back pain  Correction for standing posture to prevent forward trunk lean during hip ext  Plan: Continue per plan of care        Precautions: TTWB, AROM -see script      Manuals           Left hip   8                                                 TherEx             Ball heelslide 20x 30x 30x          glute squeeze 20x 30x 30x          Ball ADD   30x          Hip Abd slide with towel 20x 30x 30x Tband          SLR  3x10 3x10          Bent knee fall out ER  3x10 3x10          SAQ   3#/30          Clam shell 20x 3x10 3x10          prone hip ext 20x 3x10 3x10          Prone knee flexion  30x standing 30x          LAQ  3#/ 30 3#/ 30          Standing hip ext 20x 30x 30x          Standing ABD 20x 30x 30x          march 20x 30x 30x          Heel raises   30x          Nustep   6                                                                           Ther Activity                                       Gait Training                                       Modalities             ice 10'  10

## 2022-01-14 ENCOUNTER — OFFICE VISIT (OUTPATIENT)
Dept: PHYSICAL THERAPY | Age: 62
End: 2022-01-14
Payer: COMMERCIAL

## 2022-01-14 DIAGNOSIS — M25.552 LEFT HIP PAIN: ICD-10-CM

## 2022-01-14 DIAGNOSIS — S72.22XK CLOSED DISPLACED SUBTROCHANTERIC FRACTURE OF LEFT FEMUR WITH NONUNION: Primary | ICD-10-CM

## 2022-01-14 PROCEDURE — 97110 THERAPEUTIC EXERCISES: CPT

## 2022-01-14 NOTE — PROGRESS NOTES
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  S72  22XK    2  Left hip pain  M25 552                   Subjective: Soreness and stiffness noted in L hip, had a 10 hour work day yesterday  Stiffness present when initiating gait and transitioning positions  Objective: See treatment diary below      Assessment: Improved glut activation this visit, however significant weakness present  Mild discomfort when performing clamshells, resolved at completion of exercise  Good tolerance overall, fatigue demonstrated post session  Plan: Continue per plan of care        Precautions: TTWB, AROM -see script      Manuals          Left hip   8 10'                                                TherEx             Ball heelslide 20x 30x 30x 30x         glute squeeze 20x 30x 30x 30x         Ball ADD   30x 30x         Hip Abd slide with towel 20x 30x 30x Tband 30x tband         SLR  3x10 3x10 3x10         Bent knee fall out ER  3x10 3x10 3x10         SAQ   3#/30 3#/30         Clam shell 20x 3x10 3x10 3x10         prone hip ext 20x 3x10 3x10 3x10         Prone knee flexion  30x standing 30x standing 30x         LAQ  3#/ 30 3#/ 30 3#/30         Standing hip ext 20x 30x 30x 30x         Standing ABD 20x 30x 30x 30x         march 20x 30x 30x 30x         Heel raises   30x 30x         Nustep   6' 6'                                                                          Ther Activity                                       Gait Training                                       Modalities             ice 10'  10

## 2022-01-21 ENCOUNTER — OFFICE VISIT (OUTPATIENT)
Dept: PHYSICAL THERAPY | Age: 62
End: 2022-01-21
Payer: COMMERCIAL

## 2022-01-21 DIAGNOSIS — M25.552 LEFT HIP PAIN: ICD-10-CM

## 2022-01-21 DIAGNOSIS — S72.22XK CLOSED DISPLACED SUBTROCHANTERIC FRACTURE OF LEFT FEMUR WITH NONUNION: Primary | ICD-10-CM

## 2022-01-21 PROCEDURE — 97110 THERAPEUTIC EXERCISES: CPT | Performed by: PHYSICAL THERAPIST

## 2022-01-21 PROCEDURE — 97140 MANUAL THERAPY 1/> REGIONS: CPT | Performed by: PHYSICAL THERAPIST

## 2022-01-21 NOTE — PROGRESS NOTES
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  S72  22XK    2  Left hip pain  M25 552        Start Time: 0800  Stop Time: 0900  Total time in clinic (min): 60 minutes    Subjective: Minimal pain 2/10  Objective: See treatment diary below      Assessment: Pt now walking with sp cane  Noted length discrepancy with gait  Pt is not wearing shoe lift but does have an external lift on a different pair of shoes  Less pain with ER  Added step ups and lateral step ups  Plan: Continue per plan of care        Precautions: TTWB, AROM -see script      Manuals         Left hip   8 10' 10                                               TherEx             Ball heelslide 20x 30x 30x 30x 30x        glute squeeze 20x 30x 30x 30x         Ball ADD   30x 30x 30x        Hip Abd slide with towel 20x 30x 30x Tband 30x tband 30x BTB        SLR  3x10 3x10 3x10 3x10        Bent knee fall out ER  3x10 3x10 3x10 30x        SAQ   3#/30 3#/30 4#/ 30        Clam shell 20x 3x10 3x10 3x10 3x10        prone hip ext 20x 3x10 3x10 3x10 3x10        Prone knee flexion  30x standing 30x standing 30x 4#/ 30        LAQ  3#/ 30 3#/ 30 3#/30 4#  30        Standing hip ext 20x 30x 30x 30x 30x        Standing ABD 20x 30x 30x 30x 30x        march 20x 30x 30x 30x 30x        Heel raises   30x 30x 30x        Nustep   6' 6' 8                                                                         Ther Activity             Step up     20x        Lateral step     20x        Gait Training                                       Modalities             ice 10'  10  10

## 2022-01-28 ENCOUNTER — OFFICE VISIT (OUTPATIENT)
Dept: PHYSICAL THERAPY | Age: 62
End: 2022-01-28
Payer: COMMERCIAL

## 2022-01-28 DIAGNOSIS — M25.552 LEFT HIP PAIN: ICD-10-CM

## 2022-01-28 DIAGNOSIS — S72.22XK CLOSED DISPLACED SUBTROCHANTERIC FRACTURE OF LEFT FEMUR WITH NONUNION: Primary | ICD-10-CM

## 2022-01-28 PROCEDURE — 97530 THERAPEUTIC ACTIVITIES: CPT | Performed by: PHYSICAL THERAPIST

## 2022-01-28 PROCEDURE — 97110 THERAPEUTIC EXERCISES: CPT | Performed by: PHYSICAL THERAPIST

## 2022-01-28 NOTE — PROGRESS NOTES
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  S72  22XK    2  Left hip pain  M25 552        Start Time: 0800  Stop Time: 0845  Total time in clinic (min): 45 minutes    Subjective: Minimal pain today  Objective: See treatment diary below      Assessment: Added step downs and partial squats  Pt did need UE support for step downs  Good form with partial sqaut but need verbal cues to widen stance  ER improving in hooklying  Plan: Continue per plan of care        Precautions: TTWB, AROM -see script      Manuals        Left hip   8 10' 10 10                                              TherEx             Ball heelslide 20x 30x 30x 30x 30x 30x       glute squeeze 20x 30x 30x 30x  30x       Ball ADD   30x 30x 30x 30x       Hip Abd slide with towel 20x 30x 30x Tband 30x tband 30x BTB 30x BTB       SLR  3x10 3x10 3x10 3x10 3x10       Bent knee fall out ER  3x10 3x10 3x10 30x 30x       SAQ   3#/30 3#/30 4#/ 30 4#       Clam shell 20x 3x10 3x10 3x10 3x10 30x       prone hip ext 20x 3x10 3x10 3x10 3x10 30x       Prone knee flexion  30x standing 30x standing 30x 4#/ 30 4# stand       LAQ  3#/ 30 3#/ 30 3#/30 4#  30 4#/ 30       Standing hip ext 20x 30x 30x 30x 30x 30x       Standing ABD 20x 30x 30x 30x 30x 30x       march 20x 30x 30x 30x 30x 30x       Heel raises   30x 30x 30x 30x       Nustep   6' 6' 8 10'                                                                        Ther Activity             Step up     20x 20x       Lateral step     20x 20x       Step downs      20x       Partial squat      20x                    Modalities             ice 10'  10  10 10

## 2022-02-04 ENCOUNTER — APPOINTMENT (OUTPATIENT)
Dept: LAB | Facility: CLINIC | Age: 62
End: 2022-02-04
Payer: COMMERCIAL

## 2022-02-04 ENCOUNTER — OFFICE VISIT (OUTPATIENT)
Dept: PHYSICAL THERAPY | Age: 62
End: 2022-02-04
Payer: COMMERCIAL

## 2022-02-04 DIAGNOSIS — Z01.818 PREOPERATIVE EXAMINATION: ICD-10-CM

## 2022-02-04 DIAGNOSIS — D62 ACUTE BLOOD LOSS ANEMIA: ICD-10-CM

## 2022-02-04 DIAGNOSIS — R05.9 COUGH: ICD-10-CM

## 2022-02-04 DIAGNOSIS — K57.20 DIVERTICULITIS OF LARGE INTESTINE WITH ABSCESS WITHOUT BLEEDING: ICD-10-CM

## 2022-02-04 DIAGNOSIS — Z01.818 PRE-OP TESTING: ICD-10-CM

## 2022-02-04 DIAGNOSIS — S72.22XK CLOSED DISPLACED SUBTROCHANTERIC FRACTURE OF LEFT FEMUR WITH NONUNION: Primary | ICD-10-CM

## 2022-02-04 DIAGNOSIS — M25.552 LEFT HIP PAIN: ICD-10-CM

## 2022-02-04 DIAGNOSIS — Z98.890 S/P ORIF (OPEN REDUCTION INTERNAL FIXATION) FRACTURE: Primary | ICD-10-CM

## 2022-02-04 DIAGNOSIS — S72.22XD CLOSED DISPLACED SUBTROCHANTERIC FRACTURE OF LEFT FEMUR WITH ROUTINE HEALING, SUBSEQUENT ENCOUNTER: ICD-10-CM

## 2022-02-04 DIAGNOSIS — I10 HYPERTENSION, UNSPECIFIED TYPE: ICD-10-CM

## 2022-02-04 DIAGNOSIS — Z87.81 S/P ORIF (OPEN REDUCTION INTERNAL FIXATION) FRACTURE: Primary | ICD-10-CM

## 2022-02-04 LAB
ALBUMIN SERPL BCP-MCNC: 4.3 G/DL (ref 3.5–5)
ALP SERPL-CCNC: 93 U/L (ref 46–116)
ALT SERPL W P-5'-P-CCNC: 24 U/L (ref 12–78)
ANION GAP SERPL CALCULATED.3IONS-SCNC: 3 MMOL/L (ref 4–13)
AST SERPL W P-5'-P-CCNC: 16 U/L (ref 5–45)
BASOPHILS # BLD AUTO: 0.09 THOUSANDS/ΜL (ref 0–0.1)
BASOPHILS NFR BLD AUTO: 1 % (ref 0–1)
BILIRUB SERPL-MCNC: 1.11 MG/DL (ref 0.2–1)
BUN SERPL-MCNC: 18 MG/DL (ref 5–25)
CALCIUM SERPL-MCNC: 9.6 MG/DL (ref 8.3–10.1)
CHLORIDE SERPL-SCNC: 108 MMOL/L (ref 100–108)
CHOLEST SERPL-MCNC: 208 MG/DL
CO2 SERPL-SCNC: 27 MMOL/L (ref 21–32)
CREAT SERPL-MCNC: 0.99 MG/DL (ref 0.6–1.3)
EOSINOPHIL # BLD AUTO: 0.19 THOUSAND/ΜL (ref 0–0.61)
EOSINOPHIL NFR BLD AUTO: 2 % (ref 0–6)
ERYTHROCYTE [DISTWIDTH] IN BLOOD BY AUTOMATED COUNT: 13.9 % (ref 11.6–15.1)
GFR SERPL CREATININE-BSD FRML MDRD: 80 ML/MIN/1.73SQ M
GLUCOSE P FAST SERPL-MCNC: 100 MG/DL (ref 65–99)
HCT VFR BLD AUTO: 43.5 % (ref 36.5–49.3)
HDLC SERPL-MCNC: 79 MG/DL
HGB BLD-MCNC: 14.4 G/DL (ref 12–17)
IMM GRANULOCYTES # BLD AUTO: 0.05 THOUSAND/UL (ref 0–0.2)
IMM GRANULOCYTES NFR BLD AUTO: 1 % (ref 0–2)
IRON SERPL-MCNC: 97 UG/DL (ref 65–175)
LDLC SERPL CALC-MCNC: 114 MG/DL (ref 0–100)
LYMPHOCYTES # BLD AUTO: 2.33 THOUSANDS/ΜL (ref 0.6–4.47)
LYMPHOCYTES NFR BLD AUTO: 26 % (ref 14–44)
MCH RBC QN AUTO: 31 PG (ref 26.8–34.3)
MCHC RBC AUTO-ENTMCNC: 33.1 G/DL (ref 31.4–37.4)
MCV RBC AUTO: 94 FL (ref 82–98)
MONOCYTES # BLD AUTO: 0.64 THOUSAND/ΜL (ref 0.17–1.22)
MONOCYTES NFR BLD AUTO: 7 % (ref 4–12)
NEUTROPHILS # BLD AUTO: 5.55 THOUSANDS/ΜL (ref 1.85–7.62)
NEUTS SEG NFR BLD AUTO: 63 % (ref 43–75)
NONHDLC SERPL-MCNC: 129 MG/DL
NRBC BLD AUTO-RTO: 0 /100 WBCS
PLATELET # BLD AUTO: 294 THOUSANDS/UL (ref 149–390)
PMV BLD AUTO: 9.4 FL (ref 8.9–12.7)
POTASSIUM SERPL-SCNC: 4.6 MMOL/L (ref 3.5–5.3)
PROT SERPL-MCNC: 7 G/DL (ref 6.4–8.2)
RBC # BLD AUTO: 4.65 MILLION/UL (ref 3.88–5.62)
SODIUM SERPL-SCNC: 138 MMOL/L (ref 136–145)
TRIGL SERPL-MCNC: 77 MG/DL
WBC # BLD AUTO: 8.85 THOUSAND/UL (ref 4.31–10.16)

## 2022-02-04 PROCEDURE — 84153 ASSAY OF PSA TOTAL: CPT

## 2022-02-04 PROCEDURE — 83540 ASSAY OF IRON: CPT

## 2022-02-04 PROCEDURE — 84154 ASSAY OF PSA FREE: CPT

## 2022-02-04 PROCEDURE — 85025 COMPLETE CBC W/AUTO DIFF WBC: CPT

## 2022-02-04 PROCEDURE — 97530 THERAPEUTIC ACTIVITIES: CPT | Performed by: PHYSICAL THERAPIST

## 2022-02-04 PROCEDURE — 97110 THERAPEUTIC EXERCISES: CPT | Performed by: PHYSICAL THERAPIST

## 2022-02-04 PROCEDURE — 80053 COMPREHEN METABOLIC PANEL: CPT

## 2022-02-04 PROCEDURE — 36415 COLL VENOUS BLD VENIPUNCTURE: CPT

## 2022-02-04 PROCEDURE — 80061 LIPID PANEL: CPT

## 2022-02-04 NOTE — PROGRESS NOTES
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  S72  22XK    2  Left hip pain  M25 552        Start Time: 0755  Stop Time: 0845  Total time in clinic (min): 50 minutes    Subjective: Pt reports his right knee/thigh are sore today and have been for several days  Pt is also walking with his cane much less which may be contributing  Objective: See treatment diary below      Assessment: Increased pain today may be due to weather  Increased knee pain and thigh pain, pt may be having increased pain due to decreased use of caen and leg length  Plan: Continue per plan of care        Precautions: TTWB, AROM -see script      Manuals       Left hip   8 10' 10 10 8                                             TherEx             Ball heelslide 20x 30x 30x 30x 30x 30x 30x      glute squeeze 20x 30x 30x 30x  30x 30x      Ball ADD   30x 30x 30x 30x 30x      Hip Abd slide with towel 20x 30x 30x Tband 30x tband 30x BTB 30x BTB 30x Black      SLR  3x10 3x10 3x10 3x10 3x10 3x10      Bent knee fall out ER  3x10 3x10 3x10 30x 30x 30x      SAQ   3#/30 3#/30 4#/ 30 4# 4#/ 30      Clam shell 20x 3x10 3x10 3x10 3x10 30x 30x      prone hip ext 20x 3x10 3x10 3x10 3x10 30x 30x      Prone knee flexion  30x standing 30x standing 30x 4#/ 30 4# stand 4#/30      LAQ  3#/ 30 3#/ 30 3#/30 4#  30 4#/ 30 4#/30      Standing hip ext 20x 30x 30x 30x 30x 30x 30x      Standing ABD 20x 30x 30x 30x 30x 30x 30x      march 20x 30x 30x 30x 30x 30x 30x      Heel raises   30x 30x 30x 30x 30x      Nustep   6' 6' 8 10' 10'                                                                       Ther Activity             Step up     20x 20x       Lateral step     20x 20x       Step downs      20x       Partial squat 20x                    Modalities             ice 10'  10  10 10

## 2022-02-07 LAB
PSA FREE MFR SERPL: 27 %
PSA FREE SERPL-MCNC: 0.27 NG/ML
PSA SERPL-MCNC: 1 NG/ML (ref 0–4)

## 2022-02-08 ENCOUNTER — APPOINTMENT (OUTPATIENT)
Dept: RADIOLOGY | Facility: CLINIC | Age: 62
End: 2022-02-08
Payer: COMMERCIAL

## 2022-02-08 ENCOUNTER — OFFICE VISIT (OUTPATIENT)
Dept: OBGYN CLINIC | Facility: CLINIC | Age: 62
End: 2022-02-08

## 2022-02-08 VITALS
HEIGHT: 75 IN | WEIGHT: 205 LBS | BODY MASS INDEX: 25.49 KG/M2 | SYSTOLIC BLOOD PRESSURE: 154 MMHG | HEART RATE: 50 BPM | DIASTOLIC BLOOD PRESSURE: 96 MMHG

## 2022-02-08 DIAGNOSIS — M25.562 ACUTE PAIN OF LEFT KNEE: ICD-10-CM

## 2022-02-08 DIAGNOSIS — M25.562 ACUTE PAIN OF LEFT KNEE: Primary | ICD-10-CM

## 2022-02-08 DIAGNOSIS — Z98.890 S/P ORIF (OPEN REDUCTION INTERNAL FIXATION) FRACTURE: ICD-10-CM

## 2022-02-08 DIAGNOSIS — Z87.81 S/P ORIF (OPEN REDUCTION INTERNAL FIXATION) FRACTURE: ICD-10-CM

## 2022-02-08 PROCEDURE — 73502 X-RAY EXAM HIP UNI 2-3 VIEWS: CPT

## 2022-02-08 PROCEDURE — 99024 POSTOP FOLLOW-UP VISIT: CPT | Performed by: ORTHOPAEDIC SURGERY

## 2022-02-08 PROCEDURE — 73562 X-RAY EXAM OF KNEE 3: CPT

## 2022-02-08 NOTE — PROGRESS NOTES
Orthopaedics Office Visit - Post-op Patient Visit    ASSESSMENT/PLAN:    Assessment:   2 5 months s/p Removal left TFNA, left proximal femur non-union repair with MARKELL graft   DOS 21  Improved pain  Left knee IT band syndrome, had previous IT band issues in past before initial fracture  Persistent LLD, mildly improved after nonunion surgery      Plan:   - the weightbear as tolerated left lower extremity  May return to chiropractor as desired  - continue physical therapy for the left hip, new referral placed today  Advised patient to discuss lab results with PCP although his hgb appears recovered from hospital levels and iron levels normal, may not need to continue iron supplementation at this time  - Over the counter analgesics as needed / directed   - Ice / heat as directed   - follow up 3 months with repeat XR      To Do Next Visit:  XR LEFT HIP     _____________________________________________________  CHIEF COMPLAINT:  Chief Complaint   Patient presents with    Left Leg - Post-op         SUBJECTIVE:  Rosa Rodriguez is a 57 y o  male who presents  2 5 months s/p Removal left TFNA, left proximal femur non-union repair with MARKELL graft   DOS 21  The patient states that symptoms been well overall  Patient states that he has been participating in physical therapy as directed with no complaints  The patient id states his main complaint is left knee pain the becomes worse with prolonged weight-bearing range of motion of the knee  Patient states that his pain has been ongoing since the initial injury  The patient denies any numbness or tingling in his leg  Patient offers no complaints at this time    SOCIAL HISTORY:  Social History     Tobacco Use    Smoking status: Former Smoker     Packs/day: 0 50     Types: Cigarettes     Quit date: 2020     Years since quittin 2    Smokeless tobacco: Never Used   Vaping Use    Vaping Use: Never used   Substance Use Topics    Alcohol use:  Yes Comment: socially    Drug use: Never       MEDICATIONS:    Current Outpatient Medications:     acetaminophen (TYLENOL) 500 mg tablet, , Disp: , Rfl:     ascorbic acid (VITAMIN C) 500 mg tablet, Take 500 mg by mouth daily, Disp: , Rfl:     Ferrous Gluconate 256 (28 Fe) MG TABS, , Disp: , Rfl:     Ibuprofen (IBU-200 PO), , Disp: , Rfl:     multivitamin (THERAGRAN) TABS, Take 1 tablet by mouth daily, Disp: , Rfl:     neomycin-polymyxin-hydrocortisone (CORTISPORIN) otic solution, Administer 4 drops into both ears every 6 (six) hours, Disp: 10 mL, Rfl: 1    enoxaparin (LOVENOX) 40 mg/0 4 mL, Inject 0 4 mL (40 mg total) under the skin daily for 28 days, Disp: 11 2 mL, Rfl: 0    REVIEW OF SYSTEMS:  MSK: left knee pain   Neuro: WNL   Pertinent items are otherwise noted in HPI  A comprehensive review of systems was otherwise negative     _____________________________________________________  PHYSICAL EXAMINATION:  Vital signs: Ht 6' 3" (1 905 m)   Wt 93 kg (205 lb)   BMI 25 62 kg/m²   General: No acute distress, awake and alert  Psychiatric: Mood and affect appear appropriate  HEENT: Trachea Midline, No torticollis, no apparent facial trauma  Cardiovascular: No audible murmurs; Extremities appear perfused  Pulmonary: No audible wheezing or stridor  Skin: No open lesions; see further details (if any) below      MUSCULOSKELETAL EXAMINATION:  Left lower extremity examination:  - Patient sitting comfortably in the office in no acute distress   - the incision site clean dry and intact with no surrounding erythema or ecchymosis  Extremity appears well-perfused overall   - no bony or soft tissue tender palpation in the hip  Pain associated in the lateral joint line the left knee  No other bony or soft tissue tenderness palpation at this time  - full range of motion the left hip noted with no pain    0-100 degrees range of motion left knee noted limited by pain  - NV intact    _____________________________________________________  STUDIES REVIEWED:  I personally reviewed the images and interpretation is as follows:  Left hip x-ray three views:  Callus formation present at nonunion site, hardware intact  Left knee x-ray three views: Moderate patellofemoral and medial joint line arthritis  No acute osseous abnormalities present  PROCEDURES PERFORMED:  No procedures were performed at this time  Nicanor Watts PA-C - assisting    Andre Cox MD              Portions of the record may have been created with voice recognition software  Occasional wrong word or "sound a like" substitutions may have occurred due to the inherent limitations of voice recognition software  Read the chart carefully and recognize, using context, where substitutions have occurred

## 2022-02-11 ENCOUNTER — OFFICE VISIT (OUTPATIENT)
Dept: PHYSICAL THERAPY | Age: 62
End: 2022-02-11
Payer: COMMERCIAL

## 2022-02-11 DIAGNOSIS — S72.22XK CLOSED DISPLACED SUBTROCHANTERIC FRACTURE OF LEFT FEMUR WITH NONUNION: Primary | ICD-10-CM

## 2022-02-11 DIAGNOSIS — M25.552 LEFT HIP PAIN: ICD-10-CM

## 2022-02-11 PROCEDURE — 97530 THERAPEUTIC ACTIVITIES: CPT | Performed by: PHYSICAL THERAPIST

## 2022-02-11 PROCEDURE — 97110 THERAPEUTIC EXERCISES: CPT | Performed by: PHYSICAL THERAPIST

## 2022-02-11 NOTE — PROGRESS NOTES
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  S72  22XK    2  Left hip pain  M25 552        Start Time: 0276  Stop Time: 0850  Total time in clinic (min): 55 minutes    Subjective: Pt saw digna Lee to proceed with strengthening  Healing well  Pt did note cramps in right thigh after working and using shoe lift in left shoe  Discussed wear time and pt may need to change shoes during the day to increase wear time gradually  Objective: See treatment diary below      Assessment: Hip ROM improving  Added resistance for standing PREs  Verbal cues for LE position during squats  Plan: Continue per plan of care        Precautions: TTWB, AROM -see script      Manuals      Left hip   8 10' 10 10 8 8                                            TherEx             Ball heelslide 20x 30x 30x 30x 30x 30x 30x 30x     glute squeeze 20x 30x 30x 30x  30x 30x 30x     Ball ADD   30x 30x 30x 30x 30x 30x     Hip Abd slide with towel 20x 30x 30x Tband 30x tband 30x BTB 30x BTB 30x Black 30x     SLR  3x10 3x10 3x10 3x10 3x10 3x10 3x10     Bent knee fall out ER  3x10 3x10 3x10 30x 30x 30x 30     SAQ   3#/30 3#/30 4#/ 30 4# 4#/ 30 5#     Clam shell 20x 3x10 3x10 3x10 3x10 30x 30x RTB 30x     prone hip ext 20x 3x10 3x10 3x10 3x10 30x 30x 30x     Prone knee flexion  30x standing 30x standing 30x 4#/ 30 4# stand 4#/30 5#/ 30x     LAQ  3#/ 30 3#/ 30 3#/30 4#  30 4#/ 30 4#/30 5#/ 30x     Standing hip ext 20x 30x 30x 30x 30x 30x 30x 30x     Standing ABD 20x 30x 30x 30x 30x 30x 30x 2#/ 30     march 20x 30x 30x 30x 30x 30x 30x 2#/ 30     Heel raises   30x 30x 30x 30x 30x 30x     Nustep   6' 6' 8 10' 10' 10'     squats        3x10 Ther Activity             Step up     20x 20x  20x     Lateral step     20x 20x  20x     Step downs      20x  20x     Partial squat      20x  20x                  Modalities             ice 10'  10  10 10

## 2022-02-18 ENCOUNTER — OFFICE VISIT (OUTPATIENT)
Dept: PHYSICAL THERAPY | Age: 62
End: 2022-02-18
Payer: COMMERCIAL

## 2022-02-18 DIAGNOSIS — S72.22XK CLOSED DISPLACED SUBTROCHANTERIC FRACTURE OF LEFT FEMUR WITH NONUNION: Primary | ICD-10-CM

## 2022-02-18 DIAGNOSIS — M25.552 LEFT HIP PAIN: ICD-10-CM

## 2022-02-18 PROCEDURE — 97530 THERAPEUTIC ACTIVITIES: CPT

## 2022-02-18 PROCEDURE — 97110 THERAPEUTIC EXERCISES: CPT

## 2022-02-18 NOTE — PROGRESS NOTES
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  S72  22XK    2  Left hip pain  M25 552        Start Time: 0800  Stop Time: 0900  Total time in clinic (min): 60 minutes    Subjective: Reports no new concerns  Objective: See treatment diary below      Assessment: Tolerated treatment well  Progressed hip abd and add to cybex machines with good tolerance  Some discomfort noted when transitioning between stretches that subsides at rest  Cues for carryover of exercises and to ensure proper quantity of exercises are performed  Slow, steady gains in ROM, greatest restrictions noted with hip extension and ER  Patient would benefit from continued PT      Plan: Continue per plan of care        Precautions: TTWB, AROM -see script      Manuals     Left hip   8 10' 10 10 8 8 8'                                           TherEx             Ball heelslide 20x 30x 30x 30x 30x 30x 30x 30x 30x    glute squeeze 20x 30x 30x 30x  30x 30x 30x 30x     Ball ADD   30x 30x 30x 30x 30x 30x NP cybex    Hip Abd slide with towel 20x 30x 30x Tband 30x tband 30x BTB 30x BTB 30x Black 30x NP cybex     SLR  3x10 3x10 3x10 3x10 3x10 3x10 3x10 3x10     Bent knee fall out ER  3x10 3x10 3x10 30x 30x 30x 30 30x    SAQ   3#/30 3#/30 4#/ 30 4# 4#/ 30 5# 5# 30x     Clam shell 20x 3x10 3x10 3x10 3x10 30x 30x RTB 30x RTB 30x     prone hip ext 20x 3x10 3x10 3x10 3x10 30x 30x 30x 30x     Prone knee flexion  30x standing 30x standing 30x 4#/ 30 4# stand 4#/30 5#/ 30x 5# 30x     LAQ  3#/ 30 3#/ 30 3#/30 4#  30 4#/ 30 4#/30 5#/ 30x 5# 30x     Standing hip ext 20x 30x 30x 30x 30x 30x 30x 30x 30x    Standing ABD 20x 30x 30x 30x 30x 30x 30x 2#/ 30 2# 30x    march 20x 30x 30x 30x 30x 30x 30x 2#/ 30 2# 30x Heel raises   30x 30x 30x 30x 30x 30x 30x     Nustep   6' 6' 8 10' 10' 10' 10'    squats        3x10 3x10     CYBEX hip abd         35# 30x    CYBEX hip add         50# 30x                               Ther Activity             Step up     20x 20x  20x 6'' 20x    Lateral step     20x 20x  20x 6'' 20x     Step downs      20x  20x 4'' 20x    Partial squat      20x  20x                  Modalities             ice 10'  10  10 10   NT

## 2022-02-25 ENCOUNTER — APPOINTMENT (OUTPATIENT)
Dept: PHYSICAL THERAPY | Age: 62
End: 2022-02-25
Payer: COMMERCIAL

## 2022-03-04 ENCOUNTER — OFFICE VISIT (OUTPATIENT)
Dept: PHYSICAL THERAPY | Age: 62
End: 2022-03-04
Payer: COMMERCIAL

## 2022-03-04 DIAGNOSIS — S72.22XK CLOSED DISPLACED SUBTROCHANTERIC FRACTURE OF LEFT FEMUR WITH NONUNION: Primary | ICD-10-CM

## 2022-03-04 DIAGNOSIS — M25.552 LEFT HIP PAIN: ICD-10-CM

## 2022-03-04 PROCEDURE — 97110 THERAPEUTIC EXERCISES: CPT

## 2022-03-04 PROCEDURE — 97530 THERAPEUTIC ACTIVITIES: CPT

## 2022-03-04 NOTE — PROGRESS NOTES
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  S72  22XK    2  Left hip pain  M25 552        Start Time: 0800  Stop Time: 0915  Total time in clinic (min): 75 minutes    Subjective: Denies pain       Objective: See treatment diary below      Assessment: Tolerated treatment well  Cues for proper weight shifting when performing squats to avoid knees going over toes, also cues for wider stance  Struggles with lateral step downs with heel tap to floor, but was able to complete 20 reps  Cues for carryover of exercises and to ensure proper quantity of exercises are completed  Patient would benefit from continued PT  Plan: Continue per plan of care        Precautions: WBAT, add strengthening per MD       Manuals 12/17 12/21 1/7 1/14 1/20 1/26 2/4 2/11 2/18 3/4   Left hip   8 10' 10 10 8 8 8' 8'                                          TherEx             Ball heelslide 20x 30x 30x 30x 30x 30x 30x 30x 30x 30x   glute squeeze 20x 30x 30x 30x  30x 30x 30x 30x  D/C    Bridges           30x    Ball ADD   30x 30x 30x 30x 30x 30x NP cybex    Hip Abd slide with towel 20x 30x 30x Tband 30x tband 30x BTB 30x BTB 30x Black 30x NP cybex     SLR  3x10 3x10 3x10 3x10 3x10 3x10 3x10 3x10  3x10    Bent knee fall out ER  3x10 3x10 3x10 30x 30x 30x 30 30x 30x   SAQ   3#/30 3#/30 4#/ 30 4# 4#/ 30 5# 5# 30x  5# 30x   Clam shell 20x 3x10 3x10 3x10 3x10 30x 30x RTB 30x RTB 30x  RTB 30x    prone hip ext 20x 3x10 3x10 3x10 3x10 30x 30x 30x 30x  30x    Prone knee flexion  30x standing 30x standing 30x 4#/ 30 4# stand 4#/30 5#/ 30x 5# 30x  5# 30x    LAQ  3#/ 30 3#/ 30 3#/30 4#  30 4#/ 30 4#/30 5#/ 30x 5# 30x  5# 30x    Standing hip ext 20x 30x 30x 30x 30x 30x 30x 30x 30x 2# 30x    Standing ABD 20x 30x 30x 30x 30x 30x 30x 2#/ 30 2# 30x 2# 30x   march 20x 30x 30x 30x 30x 30x 30x 2#/ 30 2# 30x  2# 30x    Heel raises   30x 30x 30x 30x 30x 30x 30x  30x    Nustep   6' 6' 8 10' 10' 10' 10' 10'   squats        3x10 3x10  3x10    CYBEX hip abd         35# 30x 35# 30x    CYBEX hip add         50# 30x  50# 30x                              Ther Activity             Step up     20x 20x  20x 6'' 20x 6'' 30x    Lateral step     20x 20x  20x 6'' 20x  6'' 20x    Step downs      20x  20x 4'' 20x 4'' 20x    Partial squat      20x  20x                  Modalities             ice 10'  10  10 10   NT  10'

## 2022-03-11 ENCOUNTER — EVALUATION (OUTPATIENT)
Dept: PHYSICAL THERAPY | Age: 62
End: 2022-03-11
Payer: COMMERCIAL

## 2022-03-11 DIAGNOSIS — S72.22XK CLOSED DISPLACED SUBTROCHANTERIC FRACTURE OF LEFT FEMUR WITH NONUNION: Primary | ICD-10-CM

## 2022-03-11 DIAGNOSIS — M25.552 LEFT HIP PAIN: ICD-10-CM

## 2022-03-11 PROCEDURE — 97110 THERAPEUTIC EXERCISES: CPT | Performed by: PHYSICAL THERAPIST

## 2022-03-11 NOTE — PROGRESS NOTES
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  S72  22XK    2  Left hip pain  M25 552        Start Time: 0800  Stop Time: 0848  Total time in clinic (min): 48 minutes    Assessment  Assessment details: Dbera Ly continues to receive physical therapy 1x per week for revision of left hip ORIF  His work schedule limits participation  He has returned to work FT  His hip ROM has improved and ER is no longer painful  His strength is gradually improving  He is able to walk without a cane most of the day but will use it at night when he is tired  He is working full time  At this time, Christine Aguilar would benefit from skilled services to continue to progress strengthening and function  Impairments: abnormal gait, abnormal or restricted ROM, activity intolerance, impaired physical strength, lacks appropriate home exercise program, pain with function and weight-bearing intolerance  Functional limitations: limited standing/walking tolerance; can not do yardwork/house hold activities due to limited [address] of Dx/Px/POC: good   Prognosis: good    Goals  ST-6 WEEKS  1  Progress WB when permissible by physician  Achieved, full WB  2  Increase AROM by > 5 deg in all deficients planes  Achieved to [facility]  3  Improve glute activation to prepare for change in WB status  AChieved  4  Independent HEP  LT WEEKS  1  Patient to be independent with iadls  performing light iadls  2  Pt able to walk safely with sp cane outdoors and no AD indoors/ in home  Pt able to walk indoor at times with out AD  3  Pt able to perfrom yard work/housework by discharge      Plan  Patient would benefit from: skilled physical therapy  Planned modality interventions: cryotherapy  Planned therapy interventions: aquatic therapy, flexibility, functional ROM exercises, home exercise program, neuromuscular re-education, patient education, strengthening, stretching, therapeutic activities and therapeutic exercise  Frequency: 2x week  Duration in visits: 20  Duration in weeks: 12  Plan of Care beginning date: 2021  Plan of Care expiration date: 2021  Treatment plan discussed with: patient        Subjective Evaluation    History of Present Illness  Date of surgery: 2021  Mechanism of injury: Pt fell in driveway  and sustained a left hip fracture  Pt underwent ORIF  After 11 months the fracture did not heal properly and he underwent revision fixation  revision   Pt reports his constant pain has been eliminated  He has returned to work as a dentist  He is TTWB with walker  He is able to drive and he independent with ADLs  His greatest concern is being able to walk again and complete yard work and home activities  3/11  Pt notes he has much less pain in knee and no pain in hip  He is using a shoe lift  Shoe lift is near 1 inch  Pt now walking without cane except at end of day when fatigued  Pain  Current pain ratin  At worst pain ratin  Quality: dull ache  Progression: improved    Social Support  Lives in: multiple-level home  Lives with: spouse    Treatments  Previous treatment: physical therapy  Patient Goals  Patient goals for therapy: independence with ADLs/IADLs, increased strength, return to sport/leisure activities, increased motion and decreased pain  Patient goal: To be able to walk without AD; retunr to yard work/house activities        Objective     Observations   Left Hip  Positive for incision       Additional Observation Details  incision closed, no noted drainage, no redness or tenderness    Active Range of Motion   Left Hip   Flexion: 100 degrees   Abduction: 45 degrees   External rotation (90/90): 35 degrees   Internal rotation (90/90): 20 degrees     Right Hip   Flexion: 100 degrees   Abduction: 45 degrees   External rotation (90/90): 45 degrees   Internal rotation (90/90): 25 degrees     Strength/Myotome Testing     Left Hip   Planes of Motion   Flexion: 4-  Abduction: 4-  Adduction: 4+    Isolated Muscles   Gluteus an: 3+  Gluteus medius: 4-    Right Hip   Normal muscle strength    Left Knee   Flexion: 3  Extension: 3    Right Knee   Normal strength    Left Ankle/Foot   Dorsiflexion: 4  Plantar flexion: 4  Inversion: 4  Eversion: 4    Right Ankle/Foot   Normal strength    Ambulation   Weight-Bearing Status   Weight-Bearing Status (Left): toe-touch weight-bearing   Assistive device used: wheeled walker    Ambulation: Stairs   Pattern: creeps up  Pattern: creeps down    General Comments:      Hip Comments   Leg length:  L 98cm  R101cm              Precautions: WBAT, add strengthening per MD       Manuals 12/17 12/21 1/7 1/14 1/20 1/26 2/4 2/11 2/18 3/4   Left hip   8 10' 10 10 8 8 8' 8'                                          TherEx             Ball heelslide 20x 30x 30x 30x 30x 30x 30x 30x 30x 30x   glute squeeze 20x 30x 30x 30x  30x 30x 30x 30x  D/C    Bridges           30x    Ball ADD   30x 30x 30x 30x 30x 30x NP cybex    Hip Abd slide with towel 20x 30x 30x Tband 30x tband 30x BTB 30x BTB 30x Black 30x NP cybex     SLR  3x10 3x10 3x10 3x10 3x10 3x10 3x10 3x10  3x10    Bent knee fall out ER  3x10 3x10 3x10 30x 30x 30x 30 30x 30x   SAQ   3#/30 3#/30 4#/ 30 4# 4#/ 30 5# 5# 30x  5# 30x   Clam shell 20x 3x10 3x10 3x10 3x10 30x 30x RTB 30x RTB 30x  RTB 30x    prone hip ext 20x 3x10 3x10 3x10 3x10 30x 30x 30x 30x  30x    Prone knee flexion  30x standing 30x standing 30x 4#/ 30 4# stand 4#/30 5#/ 30x 5# 30x  5# 30x    LAQ  3#/ 30 3#/ 30 3#/30 4#  30 4#/ 30 4#/30 5#/ 30x 5# 30x  5# 30x    Standing hip ext 20x 30x 30x 30x 30x 30x 30x 30x 30x 2# 30x    Standing ABD 20x 30x 30x 30x 30x 30x 30x 2#/ 30 2# 30x 2# 30x   march 20x 30x 30x 30x 30x 30x 30x 2#/ 30 2# 30x  2# 30x    Heel raises   30x 30x 30x 30x 30x 30x 30x  30x    Nustep   6' 6' 8 10' 10' 10' 10' 10'   squats        3x10 3x10  3x10    CYBEX hip abd         35# 30x 35# 30x    CYBEX hip add         50# 30x  50# 30x                              Ther Activity             Step up     20x 20x  20x 6'' 20x 6'' 30x    Lateral step     20x 20x  20x 6'' 20x  6'' 20x    Step downs      20x  20x 4'' 20x 4'' 20x    Partial squat      20x  20x                  Modalities             ice 10'  10  10 10   NT  10'

## 2022-03-18 ENCOUNTER — OFFICE VISIT (OUTPATIENT)
Dept: PHYSICAL THERAPY | Age: 62
End: 2022-03-18
Payer: COMMERCIAL

## 2022-03-18 DIAGNOSIS — S72.22XK CLOSED DISPLACED SUBTROCHANTERIC FRACTURE OF LEFT FEMUR WITH NONUNION: Primary | ICD-10-CM

## 2022-03-18 DIAGNOSIS — M25.552 LEFT HIP PAIN: ICD-10-CM

## 2022-03-18 PROCEDURE — 97110 THERAPEUTIC EXERCISES: CPT

## 2022-03-18 NOTE — PROGRESS NOTES
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  S72  22XK    2  Left hip pain  M25 552                   Subjective: Patient repots stiffness overall  Cc is knee discomfort  Objective: See treatment diary below      Assessment: Challenged with lateral step downs  Occasional cuing for hip hinging with squatting, emphasis on posterior chain utilization  Modified TE program secondary to time, resume full program NV  Patient would benefit from continued PT  Plan: Cont per plan of care        Precautions: WBAT, add strengthening per MD       Manuals 3/18 12/21 1/7 1/14 1/20 1/26 2/4 2/11 2/18 3   Left hip   8 10' 10 10 8 8 8' 8'                                          TherEx             Ball heelslide 20x 30x 30x 30x 30x 30x 30x 30x 30x 30x   glute squeeze  30x 30x 30x  30x 30x 30x 30x  D/C    Bridges           30x    Ball ADD   30x 30x 30x 30x 30x 30x NP cybex    Hip Abd slide with towel  30x 30x Tband 30x tband 30x BTB 30x BTB 30x Black 30x NP cybex     SLR  3x10 3x10 3x10 3x10 3x10 3x10 3x10 3x10  3x10    Bent knee fall out ER  3x10 3x10 3x10 30x 30x 30x 30 30x 30x   SAQ 5# 20x  3#/30 3#/30 4#/ 30 4# 4#/ 30 5# 5# 30x  5# 30x   Clam shell RTB 30x 3x10 3x10 3x10 3x10 30x 30x RTB 30x RTB 30x  RTB 30x    prone hip ext  3x10 3x10 3x10 3x10 30x 30x 30x 30x  30x    Prone knee flexion  30x standing 30x standing 30x 4#/ 30 4# stand 4#/30 5#/ 30x 5# 30x  5# 30x    LAQ 5#/30x 3#/ 30 3#/ 30 3#/30 4#  30 4#/ 30 4#/30 5#/ 30x 5# 30x  5# 30x    Standing hip ext 2# 30x 30x 30x 30x 30x 30x 30x 30x 30x 2# 30x    Standing ABD 2# 20x 30x 30x 30x 30x 30x 30x 2#/ 30 2# 30x 2# 30x   march 2# 30x 30x 30x 30x 30x 30x 30x 2#/ 30 2# 30x  2# 30x    Heel raises 30x  30x 30x 30x 30x 30x 30x 30x  30x    Nustep 10'  6' 6' 8 10' 10' 10' 10' 10'   squats 3x10       3x10 3x10  3x10    CYBEX hip abd 35#/30        35# 30x 35# 30x    CYBEX hip add 50#/30        50# 30x  50# 30x                              Ther Activity             Step up 6" 30x    20x 20x  20x 6'' 20x 6'' 30x    Lateral step 6" 20x    20x 20x  20x 6'' 20x  6'' 20x    Step downs 4" 20x     20x  20x 4'' 20x 4'' 20x    Partial squat 20x     20x  20x                  Modalities             ice nt  10  10 10   NT  10'

## 2022-03-25 ENCOUNTER — OFFICE VISIT (OUTPATIENT)
Dept: PHYSICAL THERAPY | Age: 62
End: 2022-03-25
Payer: COMMERCIAL

## 2022-03-25 DIAGNOSIS — S72.22XK CLOSED DISPLACED SUBTROCHANTERIC FRACTURE OF LEFT FEMUR WITH NONUNION: Primary | ICD-10-CM

## 2022-03-25 DIAGNOSIS — M25.552 LEFT HIP PAIN: ICD-10-CM

## 2022-03-25 PROCEDURE — 97530 THERAPEUTIC ACTIVITIES: CPT | Performed by: PHYSICAL THERAPIST

## 2022-03-25 PROCEDURE — 97110 THERAPEUTIC EXERCISES: CPT | Performed by: PHYSICAL THERAPIST

## 2022-03-25 NOTE — PROGRESS NOTES
Daily Note     Today's date: 3/25/2022  Patient name: Shalonda Ortega  : 1960  MRN: [id number]  Referring provider: Aishwarya Munroe MD  Dx:   Encounter Diagnosis     ICD-10-CM    1  Closed displaced subtrochanteric fracture of left femur with nonunion  S72  22XK    2  Left hip pain  M25 552        Start Time: 0800  Stop Time: 0900  Total time in clinic (min): 60 minutes    Subjective: Left knee pain persists  Pt is using shoe lift but leg length may still contribute  Objective: See treatment diary below      Assessment: Added leg press with double and single leg  Mild groin pain initially then dissipated after 1-2 reps  Plan: Continue per plan of care        Precautions: WBAT, add strengthening per MD       Manuals 3/18 3/25 1/7 1/14 1/20 1/26 2/4 2/11 2/18 3/4   Left hip   8 10' 10 10 8 8 8' 8'                                          TherEx             Ball heelslide 20x 30x 30x 30x 30x 30x 30x 30x 30x 30x   glute squeeze   30x 30x  30x 30x 30x 30x  D/C    Bridges           30x    Ball ADD   30x 30x 30x 30x 30x 30x NP cybex    Hip Abd slide with towel D/c  30x Tband 30x tband 30x BTB 30x BTB 30x Black 30x NP cybex     SLR  2#/30x 3x10 3x10 3x10 3x10 3x10 3x10 3x10  3x10    Bent knee fall out ER  30x 3x10 3x10 30x 30x 30x 30 30x 30x   SAQ 5# 20x 6#/ 3#/30 3#/30 4#/ 30 4# 4#/ 30 5# 5# 30x  5# 30x   Clam shell RTB 30x GTB 30x 3x10 3x10 3x10 30x 30x RTB 30x RTB 30x  RTB 30x    prone hip ext  3x10 3x10 3x10 3x10 30x 30x 30x 30x  30x    Prone knee flexion   standing 30x standing 30x 4#/ 30 4# stand 4#/30 5#/ 30x 5# 30x  5# 30x    LAQ 5#/30x 6#/ 30 3#/ 30 3#/30 4#  30 4#/ 30 4#/30 5#/ 30x 5# 30x  5# 30x    Standing hip ext 2# 30x 3#/30x 30x 30x 30x 30x 30x 30x 30x 2# 30x    Standing ABD 2# 20x 3#/30x 30x 30x 30x 30x 30x 2#/ 30 2# 30x 2# 30x   march 2# 30x 3#/30x 30x 30x 30x 30x 30x 2#/ 30 2# 30x  2# 30x    Heel raises 30x 30x 30x 30x 30x 30x 30x 30x 30x  30x    Nustep 10' 10' ' 6' 8 10' 10' 10' 10' 10'   squats 3x10       3x10 3x10  3x10    CYBEX hip abd 35#/30 35#/ 30       35# 30x 35# 30x    CYBEX hip add 50#/30 50#/ 30       50# 30x  50# 30x    Leg press                          Ther Activity             Step up 6" 30x 30x   20x 20x  20x 6'' 20x 6'' 30x    Lateral step 6" 20x 30x   20x 20x  20x 6'' 20x  6'' 20x    Step downs 4" 20x 30x    20x  20x 4'' 20x 4'' 20x    Partial squat 20x     20x  20x                  Modalities             ice nt  10  10 10   NT  10'

## 2022-04-01 ENCOUNTER — OFFICE VISIT (OUTPATIENT)
Dept: PHYSICAL THERAPY | Age: 62
End: 2022-04-01
Payer: COMMERCIAL

## 2022-04-01 DIAGNOSIS — M25.552 LEFT HIP PAIN: ICD-10-CM

## 2022-04-01 DIAGNOSIS — S72.22XK CLOSED DISPLACED SUBTROCHANTERIC FRACTURE OF LEFT FEMUR WITH NONUNION: Primary | ICD-10-CM

## 2022-04-01 PROCEDURE — 97110 THERAPEUTIC EXERCISES: CPT | Performed by: PHYSICAL THERAPIST

## 2022-04-01 NOTE — PROGRESS NOTES
Daily Note     Today's date: 2022  Patient name: Mir Woody  : 1960  MRN: [id number]  Referring provider: Nicolás Reed MD  Dx:   Encounter Diagnosis     ICD-10-CM    1  Closed displaced subtrochanteric fracture of left femur with nonunion  S72  22XK    2  Left hip pain  M25 552        Start Time: 0800  Stop Time: 0845  Total time in clinic (min): 45 minutes    Subjective: Pt reports some increased stiffness today in his left leg, but not increased pain  Objective: See treatment diary below      Assessment: Pt able to increase PREs, pt requested increase and able to complete without difficulty  Plan: Continue per plan of care        Precautions: WBAT, add strengthening per MD       Manuals 3/18 3/25 4/1 1/14 1/20 1/26 2/4 2/11 2/18 3/4   Left hip   8 10' 10 10 8 8 8' 8'                                          TherEx             Ball heelslide 20x 30x 30x 30x 30x 30x 30x 30x 30x 30x   glute squeeze    30x  30x 30x 30x 30x  D/C    Bridges           30x    Ball ADD    30x 30x 30x 30x 30x NP cybex    Hip Abd slide with towel D/c   30x tband 30x BTB 30x BTB 30x Black 30x NP cybex     SLR  2#/30x 2#/ 30 3x10 3x10 3x10 3x10 3x10 3x10  3x10    Bent knee fall out ER  30x 30x 3x10 30x 30x 30x 30 30x 30x   SAQ 5# 20x 6#/ 6#/ 30 3#/30 4#/ 30 4# 4#/ 30 5# 5# 30x  5# 30x   Clam shell RTB 30x GTB 30x GTB 30x 3x10 3x10 30x 30x RTB 30x RTB 30x  RTB 30x    prone hip ext  3x10 30x 3x10 3x10 30x 30x 30x 30x  30x    Prone knee flexion   standing 30x standing 30x 4#/ 30 4# stand 4#/30 5#/ 30x 5# 30x  5# 30x    LAQ 5#/30x 6#/ 30 6#/ 30 3#/30 4#  30 4#/ 30 4#/30 5#/ 30x 5# 30x  5# 30x    Standing hip ext 2# 30x 3#/30x 3#/ 30x 30x 30x 30x 30x 30x 30x 2# 30x    Standing ABD 2# 20x 3#/30x 3#/30x 30x 30x 30x 30x 2#/ 30 2# 30x 2# 30x   march 2# 30x 3#/30x 3#/30x 30x 30x 30x 30x 2#/ 30 2# 30x  2# 30x    Heel raises 30x 30x 30x 30x 30x 30x 30x 30x 30x  30x    Nustep 10' 10' 10'  L5 6' 8 10' 10' 10' 10' 10' squats 3x10       3x10 3x10  3x10    CYBEX hip abd 35#/30 35#/ 30 35#/ 30      35# 30x 35# 30x    CYBEX hip add 50#/30 50#/ 30 90#/ 30 pt request      50# 30x  50# 30x    Leg press   105#/ 30          SL leg press   70#/30          Ther Activity             Step up 6" 30x 30x 30x  20x 20x  20x 6'' 20x 6'' 30x    Lateral step 6" 20x 30x 30x  20x 20x  20x 6'' 20x  6'' 20x    Step downs 4" 20x 30x 30x   20x  20x 4'' 20x 4'' 20x    Partial squat 20x     20x  20x                  Modalities             ice nt  10  10 10   NT  10'

## 2022-04-05 ENCOUNTER — TELEPHONE (OUTPATIENT)
Dept: OBGYN CLINIC | Facility: CLINIC | Age: 62
End: 2022-04-05

## 2022-04-05 NOTE — TELEPHONE ENCOUNTER
Tom Davis  RE: Orthotics   #:[phone]        Patient had surgery on 11/18/21    Patient called into the office he is in need of a script for custom Orthotics to be faxed to [facility]  He states the script needs to be specific and states that one of the issues is limb length  He has a f/u on 5/10/22    Please call patient back once faxed      Fax for [facility]: [phone]

## 2022-04-06 ENCOUNTER — TELEPHONE (OUTPATIENT)
Dept: OBGYN CLINIC | Facility: HOSPITAL | Age: 62
End: 2022-04-06

## 2022-04-06 NOTE — TELEPHONE ENCOUNTER
Script faxed to [facility] V[facility] Text: The defect edges were debeveled with a #15 scalpel blade.  The primary defect was closed partially with a complex linear closure.  Given the location of the remaining defect, shape of the defect and the proximity to free margins a V-Y plasty was deemed most appropriate for complete closure of the defect.  Using a sterile surgical marker, an appropriate advancement flap was drawn incorporating the defect and placing the expected incisions within the relaxed skin tension lines where possible.    The area thus outlined was incised deep to adipose tissue with a #15 scalpel blade.  The skin margins were undermined to an appropriate distance in all directions utilizing iris scissors.

## 2022-04-06 NOTE — TELEPHONE ENCOUNTER
Pt requesting  Script for orthotic shoe be faxed to [facility]  @ [phone]

## 2022-04-08 NOTE — TELEPHONE ENCOUNTER
Patient is calling stating Robert did not get the script        He is requesting it be faxed to [phone]

## 2022-05-07 DIAGNOSIS — Z98.890 S/P ORIF (OPEN REDUCTION INTERNAL FIXATION) FRACTURE: Primary | ICD-10-CM

## 2022-05-07 DIAGNOSIS — Z87.81 S/P ORIF (OPEN REDUCTION INTERNAL FIXATION) FRACTURE: Primary | ICD-10-CM

## 2022-05-10 ENCOUNTER — OFFICE VISIT (OUTPATIENT)
Dept: OBGYN CLINIC | Facility: CLINIC | Age: 62
End: 2022-05-10
Payer: COMMERCIAL

## 2022-05-10 ENCOUNTER — APPOINTMENT (OUTPATIENT)
Dept: RADIOLOGY | Facility: CLINIC | Age: 62
End: 2022-05-10
Payer: COMMERCIAL

## 2022-05-10 VITALS
HEART RATE: 58 BPM | SYSTOLIC BLOOD PRESSURE: 137 MMHG | BODY MASS INDEX: 24.79 KG/M2 | DIASTOLIC BLOOD PRESSURE: 79 MMHG | WEIGHT: 199.4 LBS | HEIGHT: 75 IN

## 2022-05-10 DIAGNOSIS — M16.12 ARTHRITIS OF LEFT HIP: ICD-10-CM

## 2022-05-10 DIAGNOSIS — Z87.81 S/P ORIF (OPEN REDUCTION INTERNAL FIXATION) FRACTURE: Primary | ICD-10-CM

## 2022-05-10 DIAGNOSIS — Z98.890 S/P ORIF (OPEN REDUCTION INTERNAL FIXATION) FRACTURE: Primary | ICD-10-CM

## 2022-05-10 DIAGNOSIS — Z87.81 S/P ORIF (OPEN REDUCTION INTERNAL FIXATION) FRACTURE: ICD-10-CM

## 2022-05-10 DIAGNOSIS — Z98.890 S/P ORIF (OPEN REDUCTION INTERNAL FIXATION) FRACTURE: ICD-10-CM

## 2022-05-10 DIAGNOSIS — S72.22XK CLOSED DISPLACED SUBTROCHANTERIC FRACTURE OF LEFT FEMUR WITH NONUNION, SUBSEQUENT ENCOUNTER: ICD-10-CM

## 2022-05-10 PROCEDURE — 73502 X-RAY EXAM HIP UNI 2-3 VIEWS: CPT

## 2022-05-10 PROCEDURE — 99213 OFFICE O/P EST LOW 20 MIN: CPT | Performed by: ORTHOPAEDIC SURGERY

## 2022-05-10 NOTE — PROGRESS NOTES
Orthopaedics Office Visit - follow up Patient Visit    ASSESSMENT/PLAN:    Assessment:   6 months s/p removal left TFNA, left proximal femur non-union repair with MARKELL graft, DOS 11/18/21  Non-union appears healed on x-ray   Left hip OA, likely causing pain , significant pain intra-articular location with internal rotation today  Left knee arthritis, possible medial meniscus tear  Given medial tenderness to palpation  Persistent LLD, mildly improved after nonunion surgery    Plan:   · X-rays were performed in the office and reviewed, it was discussed with [facility] that his non-union site appears to be healed on x-ray  He does have left hip arthritis, which is likely contributing to his pain   · Left knee pain is likely due to arthritis and possible a meniscus tear   · A FL guided left hip intra-articular CSI was discussed today, which would be diagnostic and likely therapeutic in nature  If the CSI is beneficial for him, he may benefit from a LTHA, a referral would be placed to Dr Amy Crump for the injection   · Discussed a left knee MRI vs left knee CSI to help with knee pain, a left knee MRI would be performed to evaluate for a meniscus tear   · Continue WBAT, Activities to tolerance   · Continue Tylenol and Ibuprofen as needed for pain control   · [facility] will take some time to think about his options and discuss these with his wife, he will call if he wishes to proceed with a left hip FL guided CSI with Dr Amy Crump, left knee CSI may be performed in the office or a MRI order may be placed for left knee     To Do Next Visit:  Re-evaluation     _____________________________________________________  CHIEF COMPLAINT:  No chief complaint on file  SUBJECTIVE:  Annemarie Urrutia is a 57 y o  male who presents to the office 6 months s/p Removal left TFNA, left proximal femur non-union repair with MARKELL graft, DOS 11/18/21  He finished formal therapy aprox  3 weeks ago   He was able to obtain the shoe lift/inserts from [facility], but feel the inserts aren't correct  He notes pain mainly to his left groin at night as well as to bilateral knees, left being worse then right  He notes difficulty getting going after sitting for longer periods of time  He denies any pain of difficulty when bending to pick something up  He does have to modify how he gets in an out of a car  He will take 2 Tylenol before bed and 500 MG in the AM  He will take Ibuprofen on an as needed  PAST MEDICAL HISTORY:  Past Medical History:   Diagnosis Date    Diverticulitis of intestine with perforation     Hypertension        PAST SURGICAL HISTORY:  Past Surgical History:   Procedure Laterality Date    COLONOSCOPY      CT GUIDED PERC DRAINAGE CATHETER PLACEMENT  1/11/2019    ELBOW SURGERY      KNEE SURGERY      FL COLONOSCOPY FLX DX W/COLLJ SPEC WHEN PFRMD N/A 2/20/2019    Procedure: COLONOSCOPY;  Surgeon: Bola Hines MD;  [facility];   Service: Colorectal    FL CYSTOSCOPY,INSERT URETERAL STENT N/A 2/21/2019    Procedure: Ja Husseinaman BILATERAL STENTS URETERAL;  Surgeon: Radhames Lomeli MD;  [facility];  Service: Urology    FL LAP,SURG,COLECTOMY, PARTIAL, W/ANAST N/A 2/21/2019    Procedure: DIAGNOSTIC LAPAROSCOPY, LAPAROSCOPIC HAND-ASSISTED SIGMOID COLECTOMY, REPAIR OF UNAVOIDABLE SEROSAL ADHESION, INTRAOPERATIVE LASER FLUORSENCE ANGIOGRAPHY ([address]), FLEXIBLE SIGMOIDOSCOPY;  Surgeon: Bola Hines MD;  [facility];  Service: Colorectal    FL OPEN RX FEMUR FX+INTRAMED TRES Left 12/19/2020    Procedure: INSERTION NAIL IM FEMUR ANTEGRADE For Subtrochanteric fracture;  Surgeon: Norma Dixon DO;  [facility];  Service: Orthopedics    FL OPEN [address] END HEAD Left 11/18/2021    Procedure: LEFT PROXIMAL FEMUR NONUNION REPAIR WITH MARKELL GRAFT;  Surgeon: Gela Gomez MD;  [facility];  Service: Orthopedics    FL REMOVAL DEEP IMPLANT Left 11/18/2021    Procedure: REMOVAL HARDWARE HIP LEFT TFNA;  Surgeon: Merced Ramos MD;  [facility];  Service: Orthopedics       FAMILY HISTORY:  No family history on file  SOCIAL HISTORY:  Social History     Tobacco Use    Smoking status: Former Smoker     Packs/day: 0 50     Types: Cigarettes     Quit date: 2020     Years since quittin 4    Smokeless tobacco: Never Used   Vaping Use    Vaping Use: Never used   Substance Use Topics    Alcohol use: Yes     Comment: socially    Drug use: Never       MEDICATIONS:    Current Outpatient Medications:     acetaminophen (TYLENOL) 500 mg tablet, , Disp: , Rfl:     ascorbic acid (VITAMIN C) 500 mg tablet, Take 500 mg by mouth daily, Disp: , Rfl:     enoxaparin (LOVENOX) 40 mg/0 4 mL, Inject 0 4 mL (40 mg total) under the skin daily for 28 days, Disp: 11 2 mL, Rfl: 0    Ferrous Gluconate 256 (28 Fe) MG TABS, , Disp: , Rfl:     Ibuprofen (IBU-200 PO), , Disp: , Rfl:     multivitamin (THERAGRAN) TABS, Take 1 tablet by mouth daily, Disp: , Rfl:     neomycin-polymyxin-hydrocortisone (CORTISPORIN) otic solution, Administer 4 drops into both ears every 6 (six) hours, Disp: 10 mL, Rfl: 1    ALLERGIES:  Allergies   Allergen Reactions    Penicillins Rash       REVIEW OF SYSTEMS:  MSK: as noted in HPI  Neuro: WNL  Pertinent items are otherwise noted in HPI  A comprehensive review of systems was otherwise negative  LABS:  HgA1c:   Lab Results   Component Value Date    HGBA1C 5 8 2019     BMP:   Lab Results   Component Value Date    GLUCOSE 149 (H) 2021    CALCIUM 9 6 2022    K 4 6 2022    CO2 27 2022     2022    BUN 18 2022    CREATININE 0 99 2022     CBC: No components found for: CBC    _____________________________________________________  PHYSICAL EXAMINATION:  Vital signs: There were no vitals taken for this visit    General: No acute distress, awake and alert  Psychiatric: Mood and affect appear appropriate  HEENT: Trachea Midline, No torticollis, no apparent facial trauma  Cardiovascular: No audible murmurs; Extremities appear perfused  Pulmonary: No audible wheezing or stridor  Skin: No open lesions; see further details (if any) below    MUSCULOSKELETAL EXAMINATION:    Extremities:  Left lower   No erythema, ecchymosis or edema  Well healed surgical incision   Pain with hip internal rotation   Mild pain with hip flexion   Hip flexion strength 5/5   Medial joint line tenderness left knee  Full knee ROM   Extremity appears warm and well perfused     _____________________________________________________  STUDIES REVIEWED:  I personally reviewed the images and interpretation is as follows:  X-ray left hip demonstrates increased callus formation at nonunion site since previous x-ray, hardware intact         PROCEDURES PERFORMED:  Procedures      Scribe Attestation    I,:  Juan Pablo Edwards am acting as a scribe while in the presence of the attending physician :       I,:  Richard Song MD personally performed the services described in this documentation    as scribed in my presence :

## 2022-05-20 ENCOUNTER — OFFICE VISIT (OUTPATIENT)
Dept: FAMILY MEDICINE CLINIC | Facility: CLINIC | Age: 62
End: 2022-05-20
Payer: COMMERCIAL

## 2022-05-20 VITALS
WEIGHT: 200.2 LBS | DIASTOLIC BLOOD PRESSURE: 82 MMHG | RESPIRATION RATE: 18 BRPM | BODY MASS INDEX: 24.89 KG/M2 | TEMPERATURE: 98 F | HEIGHT: 75 IN | HEART RATE: 55 BPM | OXYGEN SATURATION: 97 % | SYSTOLIC BLOOD PRESSURE: 126 MMHG

## 2022-05-20 DIAGNOSIS — S72.22XD CLOSED DISPLACED SUBTROCHANTERIC FRACTURE OF LEFT FEMUR WITH ROUTINE HEALING, SUBSEQUENT ENCOUNTER: ICD-10-CM

## 2022-05-20 DIAGNOSIS — Z12.11 SCREENING FOR COLON CANCER: ICD-10-CM

## 2022-05-20 DIAGNOSIS — Z98.890 S/P ORIF (OPEN REDUCTION INTERNAL FIXATION) FRACTURE: Primary | ICD-10-CM

## 2022-05-20 DIAGNOSIS — Z87.81 S/P ORIF (OPEN REDUCTION INTERNAL FIXATION) FRACTURE: Primary | ICD-10-CM

## 2022-05-20 DIAGNOSIS — I10 HYPERTENSION, UNSPECIFIED TYPE: ICD-10-CM

## 2022-05-20 DIAGNOSIS — R73.9 HYPERGLYCEMIA: ICD-10-CM

## 2022-05-20 DIAGNOSIS — E78.2 MIXED HYPERLIPIDEMIA: ICD-10-CM

## 2022-05-20 PROCEDURE — 99396 PREV VISIT EST AGE 40-64: CPT | Performed by: FAMILY MEDICINE

## 2022-05-20 NOTE — PROGRESS NOTES
[facility]    NAME: Michelle Licona  AGE: 57 y o  SEX: male  : 1960     DATE: 2022     Assessment and Plan:     Problem List Items Addressed This Visit        Cardiovascular and Mediastinum    Hypertension     Hypertension stable continue off medication currently avoiding sodium work on increasing exercise as tolerated              Musculoskeletal and Integument    Closed left subtrochanteric femur fracture (HCC)     Post revision surgery 6 months ago patient now doing well followed up by Orthopedics continuing with a home exercise program recovered and physical therapy outpatient completed              Other    S/P ORIF (open reduction internal fixation) fracture - Primary     Patient is doing relatively well now weight-bearing 6 months post revision surgery for left hip arthroplasty he was seen by Orthopedics recently and the orthopedic evaluation note was reviewed at this time  He is considering other intervention and options           Hyperglycemia     Stable watch diet and check A1C  Relevant Orders    Hemoglobin A1C    Mixed hyperlipidemia     Follow heart healthy diet  Relevant Orders    Lipid panel    Comprehensive metabolic panel      Other Visit Diagnoses     Screening for colon cancer        Relevant Orders    Ambulatory Referral to Gastroenterology          Immunizations and preventive care screenings were discussed with patient today  Appropriate education was printed on patient's after visit summary  Counseling:  Alcohol/drug use: discussed moderation in alcohol intake, the recommendations for healthy alcohol use, and avoidance of illicit drug use  Dental Health: discussed importance of regular tooth brushing, flossing, and dental visits    Injury prevention: discussed safety/seat belts, safety helmets, smoke detectors, carbon dioxide detectors, and smoking near bedding or upholstery  · Exercise: the importance of regular exercise/physical activity was discussed  Recommend exercise 3-5 times per week for at least 30 minutes  Return in about 1 year (around 5/20/2023) for Annual physical      Chief Complaint:     Chief Complaint   Patient presents with    Physical Exam     4 month follow up      History of Present Illness:     Adult Annual Physical   Patient here for a comprehensive physical exam  The patient reports no problems  Diet and Physical Activity  · Diet/Nutrition: well balanced diet  · Exercise: no formal exercise  Depression Screening  PHQ-2/9 Depression Screening    Little interest or pleasure in doing things: 0 - not at all  Feeling down, depressed, or hopeless: 0 - not at all  PHQ-2 Score: 0  PHQ-2 Interpretation: Negative depression screen       General Health  · Sleep: sleeps well  · Hearing: normal - bilateral   · Vision: no vision problems  · Dental: regular dental visits   Health  · Symptoms include: none     Review of Systems:     Review of Systems   Constitutional: Negative for chills, fatigue and fever  HENT: Negative for congestion, nosebleeds, rhinorrhea, sinus pressure and sore throat  Eyes: Negative for discharge and redness  Respiratory: Negative for cough and shortness of breath  Cardiovascular: Negative for chest pain, palpitations and leg swelling  Gastrointestinal: Negative for abdominal pain, blood in stool and nausea  Endocrine: Negative for cold intolerance, heat intolerance and polyuria  Genitourinary: Negative for dysuria and frequency  Musculoskeletal: Positive for arthralgias and myalgias  Negative for back pain  Skin: Negative for rash  Neurological: Negative for dizziness, weakness and headaches  Hematological: Negative for adenopathy  Psychiatric/Behavioral: Negative for behavioral problems and sleep disturbance  The patient is not nervous/anxious         Past Medical History:     Past Medical History:   Diagnosis Date    Diverticulitis of intestine with perforation     Hypertension       Past Surgical History:     Past Surgical History:   Procedure Laterality Date    COLONOSCOPY      CT GUIDED PERC DRAINAGE CATHETER PLACEMENT  1/11/2019    ELBOW SURGERY      KNEE SURGERY      SD COLONOSCOPY FLX DX W/COLLJ SPEC WHEN PFRMD N/A 2/20/2019    Procedure: COLONOSCOPY;  Surgeon: Anju Suero MD;  [facility]; Service: Colorectal    SD CYSTOSCOPY,INSERT URETERAL STENT N/A 2/21/2019    Procedure: Lydia Andrew BILATERAL STENTS URETERAL;  Surgeon: Mag Costa MD;  [facility];  Service: Urology    SD LAP,SURG,COLECTOMY, PARTIAL, W/ANAST N/A 2/21/2019    Procedure: DIAGNOSTIC LAPAROSCOPY, LAPAROSCOPIC HAND-ASSISTED SIGMOID COLECTOMY, REPAIR OF UNAVOIDABLE SEROSAL ADHESION, INTRAOPERATIVE LASER FLU[facility]ENCE [facility]IOGRAPHY [facility] [facility] FLEXIBLE SIGMOIDOSCOPY;  Surgeon: Anju Suero MD;  [facility];  Service: Colorectal    SD OPEN RX FEMUR FX+INTRAMED TRES Left 12/19/2020    Procedure: INSERTION NAIL IM FEMUR ANTEGRADE For Subtrochanteric fracture;  Surgeon: Doug Shrestha DO;  [facility];  Service: Orthopedics    SD OPEN [address] END HEAD Left 11/18/2021    Procedure: LEFT PROXIMAL FEMUR NONUNION REPAIR WITH MARKELL GRAFT;  Surgeon: Glenn Kim MD;  [facility];  Service: Orthopedics    SD REMOVAL DEEP IMPLANT Left 11/18/2021    Procedure: REMOVAL HARDWARE HIP LEFT TFNA;  Surgeon: Glenn Kim MD;  [facility];  Service: Orthopedics      Family History:     History reviewed  No pertinent family history     Social History:     Social History     Socioeconomic History    Marital status: /Civil Union     Spouse name: None    Number of children: None    Years of education: None    Highest education level: None   Occupational History    None   Tobacco Use    Smoking status: Former Smoker Packs/day: 0 50     Types: Cigarettes     Quit date: 2020     Years since quittin 4    Smokeless tobacco: Never Used   Vaping Use    Vaping Use: Never used   Substance and Sexual Activity    Alcohol use: Yes     Comment: socially    Drug use: Never    Sexual activity: None   Other Topics Concern    None   Social History Narrative    ** Merged History Encounter **          Social Determinants of Health     Financial Resource Strain: Not on file   Food Insecurity: Not on file   Transportation Needs: Not on file   Physical Activity: Not on file   Stress: Not on file   Social Connections: Not on file   Intimate Partner Violence: Not on file   Housing Stability: Not on file      Current Medications:     Current Outpatient Medications   Medication Sig Dispense Refill    acetaminophen (TYLENOL) 500 mg tablet       ascorbic acid (VITAMIN C) 500 mg tablet Take 500 mg by mouth daily      multivitamin (THERAGRAN) TABS Take 1 tablet by mouth daily      enoxaparin (LOVENOX) 40 mg/0 4 mL Inject 0 4 mL (40 mg total) under the skin daily for 28 days 11 2 mL 0    Ferrous Gluconate 256 (28 Fe) MG TABS  (Patient not taking: Reported on 2022)      Ibuprofen (IBU-200 PO)  (Patient not taking: Reported on 2022)      neomycin-polymyxin-hydrocortisone (CORTISPORIN) otic solution Administer 4 drops into both ears every 6 (six) hours (Patient not taking: Reported on 2022) 10 mL 1     No current facility-administered medications for this visit  Allergies: Allergies   Allergen Reactions    Penicillins Rash      Physical Exam:     /82   Pulse 55   Temp 98 °F (36 7 °C)   Resp 18   Ht 6' 3" (1 905 m)   Wt 90 8 kg (200 lb 3 2 oz)   SpO2 97%   BMI 25 02 kg/m²     Physical Exam  Vitals and nursing note reviewed  Constitutional:       Appearance: Normal appearance  He is well-developed and normal weight  HENT:      Head: Normocephalic and atraumatic        Right Ear: Tympanic membrane, ear canal and external ear normal       Left Ear: Tympanic membrane, ear canal and external ear normal       Nose: Nose normal       Mouth/Throat:      Mouth: Mucous membranes are moist       Pharynx: Oropharynx is clear  Eyes:      Extraocular Movements: Extraocular movements intact  Conjunctiva/sclera: Conjunctivae normal       Pupils: Pupils are equal, round, and reactive to light  Cardiovascular:      Rate and Rhythm: Normal rate and regular rhythm  Pulses: Normal pulses  Heart sounds: Normal heart sounds  No murmur heard  Pulmonary:      Effort: Pulmonary effort is normal  No respiratory distress  Breath sounds: Normal breath sounds  Abdominal:      General: Bowel sounds are normal       Palpations: Abdomen is soft  Tenderness: There is no abdominal tenderness  Musculoskeletal:      Cervical back: Normal range of motion and neck supple  Skin:     General: Skin is warm and dry  Capillary Refill: Capillary refill takes less than 2 seconds  Neurological:      General: No focal deficit present  Mental Status: He is alert and oriented to person, place, and time  Mental status is at baseline  Psychiatric:         Mood and Affect: Mood normal          Behavior: Behavior normal          Thought Content: Thought content normal          Judgment: Judgment normal           Trice Arita DO  [address] Counseling: Body mass index is 25 02 kg/m²  The BMI is above normal  Nutrition recommendations include reducing portion sizes, decreasing overall calorie intake, 3-5 servings of fruits/vegetables daily, consuming healthier snacks, decreasing soda and/or juice intake, increasing intake of lean protein and reducing intake of cholesterol  Exercise recommendations include exercising 3-5 times per week

## 2022-05-20 NOTE — ASSESSMENT & PLAN NOTE
Hypertension stable continue off medication currently avoiding sodium work on increasing exercise as tolerated

## 2022-05-20 NOTE — PATIENT INSTRUCTIONS
Heart Healthy Diet   WHAT YOU NEED TO KNOW:   A heart healthy diet is an eating plan low in unhealthy fats and sodium (salt)  The plan is high in healthy fats and fiber  A heart healthy diet helps improve your cholesterol levels and lowers your risk for heart disease and stroke  A dietitian will teach you how to read and understand food labels  DISCHARGE INSTRUCTIONS:   Heart healthy diet guidelines to follow:   Choose foods that contain healthy fats  Unsaturated fats  include monounsaturated and polyunsaturated fats  Unsaturated fat is found in foods such as soybean, canola, olive, corn, and safflower oils  It is also found in soft tub margarine that is made with liquid vegetable oil  Omega-3 fat  is found in certain fish, such as salmon, tuna, and trout, and in walnuts and flaxseed  Eat fish high in omega-3 fats at least 2 times a week  Get 20 to 30 grams of fiber each day  Fruits, vegetables, whole-grain foods, and legumes (cooked beans) are good sources of fiber  Limit or do not have unhealthy fats  Cholesterol  is found in animal foods, such as eggs and lobster, and in dairy products made from whole milk  Limit cholesterol to less than 200 mg each day  Saturated fat  is found in meats, such as rankin and hamburger  It is also found in chicken or turkey skin, whole milk, and butter  Limit saturated fat to less than 7% of your total daily calories  Trans fat  is found in packaged foods, such as potato chips and cookies  It is also in hard margarine, some fried foods, and shortening  Do not eat foods that contain trans fats  Limit sodium as directed  You may be told to limit sodium to 2,000 to 2,300 mg each day  Choose low-sodium or no-salt-added foods  Add little or no salt to food you prepare  Use herbs and spices in place of salt         Include the following in your heart healthy plan:  Ask your dietitian or healthcare provider how many servings to have from each of the following food groups:  Grains:      Whole-wheat breads, cereals, and pastas, and brown rice    Low-fat, low-sodium crackers and chips    Vegetables:      Broccoli, green beans, green peas, and spinach    Collards, kale, and lima beans    Carrots, sweet potatoes, tomatoes, and peppers    Canned vegetables with no salt added    Fruits:      Bananas, peaches, pears, and pineapple    Grapes, raisins, and dates    Oranges, tangerines, grapefruit, orange juice, and grapefruit juice    Apricots, mangoes, melons, and papaya    Raspberries and strawberries    Canned fruit with no added sugar    Low-fat dairy:      Nonfat (skim) milk, 1% milk, and low-fat almond, cashew, or soy milks fortified with calcium    Low-fat cheese, regular or frozen yogurt, and cottage cheese    Meats and proteins:      Lean cuts of beef and pork (loin, leg, round), skinless chicken and turkey    Legumes, soy products, egg whites, or nuts    Limit or do not include the following in your heart healthy plan:   Unhealthy fats and oils:      Whole or 2% milk, cream cheese, sour cream, or cheese    High-fat cuts of beef (T-bone steaks, ribs), chicken or turkey with skin, and organ meats such as liver    Butter, stick margarine, shortening, and cooking oils such as coconut or palm oil    Foods and liquids high in sodium:      Packaged foods, such as frozen dinners, cookies, macaroni and cheese, and cereals with more than 300 mg of sodium per serving    Vegetables with added sodium, such as instant potatoes, vegetables with added sauces, or regular canned vegetables    Cured or smoked meats, such as hot dogs, rankin, and sausage    High-sodium ketchup, barbecue sauce, salad dressing, pickles, olives, soy sauce, or miso    Foods and liquids high in sugar:      Candy, cake, cookies, pies, or doughnuts    Soft drinks (soda), sports drinks, or sweetened tea    Canned or dry mixes for cakes, soups, sauces, or gravies    Other healthy heart guidelines:   Do not smoke   Nicotine and other chemicals in cigarettes and cigars can cause lung and heart damage  Ask your healthcare provider for information if you currently smoke and need help to quit  E-cigarettes or smokeless tobacco still contain nicotine  Talk to your healthcare provider before you use these products  Limit or do not drink alcohol as directed  Alcohol can damage your heart and raise your blood pressure  Your healthcare provider may give you specific daily and weekly limits  The general recommended limit is 1 drink a day for women 21 or older and for men 72 or older  Do not have more than 3 drinks in a day or 7 in a week  The recommended limit is 2 drinks a day for men 24to 59years of age  Do not have more than 4 drinks in a day or 14 in a week  A drink of alcohol is 12 ounces of beer, 5 ounces of wine, or 1½ ounces of liquor  Exercise regularly  Exercise can help you maintain a healthy weight and improve your blood pressure and cholesterol levels  Regular exercise can also decrease your risk for heart problems  Ask your healthcare provider about the best exercise plan for you  Do not start an exercise program without asking your healthcare provider  Follow up with your doctor or cardiologist as directed:  Write down your questions so you remember to ask them during your visits  © Copyright [employer] 2022 Information is for End User's use only and may not be sold, redistributed or otherwise used for commercial purposes  All illustrations and images included in CareNotes® are the copyrighted property of [employer]  or [employer]   The above information is an  only  It is not intended as medical advice for individual conditions or treatments  Talk to your doctor, nurse or pharmacist before following any medical regimen to see if it is safe and effective for you

## 2022-05-20 NOTE — ASSESSMENT & PLAN NOTE
Post revision surgery 6 months ago patient now doing well followed up by Orthopedics continuing with a home exercise program recovered and physical therapy outpatient completed

## 2022-05-20 NOTE — ASSESSMENT & PLAN NOTE
Patient is doing relatively well now weight-bearing 6 months post revision surgery for left hip arthroplasty he was seen by Orthopedics recently and the orthopedic evaluation note was reviewed at this time    He is considering other intervention and options

## 2022-06-14 NOTE — PROGRESS NOTES
Pt ha not been seen since 4/1/2022  He discharged self  He has been working without difficulty  Continued pain in left knee, hip  Significant LL discrepancy  Discharge PT at this time

## 2022-06-18 DIAGNOSIS — Z98.890 S/P ORIF (OPEN REDUCTION INTERNAL FIXATION) FRACTURE: Primary | ICD-10-CM

## 2022-06-18 DIAGNOSIS — Z87.81 S/P ORIF (OPEN REDUCTION INTERNAL FIXATION) FRACTURE: Primary | ICD-10-CM

## 2022-06-21 ENCOUNTER — OFFICE VISIT (OUTPATIENT)
Dept: OBGYN CLINIC | Facility: CLINIC | Age: 62
End: 2022-06-21
Payer: COMMERCIAL

## 2022-06-21 ENCOUNTER — APPOINTMENT (OUTPATIENT)
Dept: RADIOLOGY | Facility: CLINIC | Age: 62
End: 2022-06-21
Payer: COMMERCIAL

## 2022-06-21 VITALS
SYSTOLIC BLOOD PRESSURE: 119 MMHG | WEIGHT: 200.4 LBS | BODY MASS INDEX: 24.92 KG/M2 | HEIGHT: 75 IN | HEART RATE: 56 BPM | DIASTOLIC BLOOD PRESSURE: 76 MMHG

## 2022-06-21 DIAGNOSIS — Z87.81 S/P ORIF (OPEN REDUCTION INTERNAL FIXATION) FRACTURE: ICD-10-CM

## 2022-06-21 DIAGNOSIS — M16.12 ARTHRITIS OF LEFT HIP: ICD-10-CM

## 2022-06-21 DIAGNOSIS — Z98.890 S/P ORIF (OPEN REDUCTION INTERNAL FIXATION) FRACTURE: ICD-10-CM

## 2022-06-21 DIAGNOSIS — M25.572 PAIN, JOINT, ANKLE AND FOOT, LEFT: Primary | ICD-10-CM

## 2022-06-21 DIAGNOSIS — S72.002K: ICD-10-CM

## 2022-06-21 PROCEDURE — 99214 OFFICE O/P EST MOD 30 MIN: CPT | Performed by: ORTHOPAEDIC SURGERY

## 2022-06-21 PROCEDURE — 73502 X-RAY EXAM HIP UNI 2-3 VIEWS: CPT

## 2022-06-21 NOTE — PROGRESS NOTES
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  · Patient advised to use heel cup and is shown items he can acquire  · Patient can use Voltaren gel over painful area  · Consider referral to [facility] or podiatry if further workup/treatment requested    Follow up as needed per patient's request   Patient welcome to return for any new or old issues  To do next visit:  Return if symptoms worsen or fail to improve  The above stated was discussed in layman's terms and the patient expressed understanding  All questions were answered to the patient's satisfaction  Scribe Attestation    I,:  Arabella Camacho am acting as a scribe while in the presence of the attending physician :       I,:  Glenn Kim MD personally performed the services described in this documentation    as scribed in my presence :             Subjective:   Elsa Vaca is a 57 y o  male who presents 7 months s/p left TFNA removal for proximal femur nonunion, 11/18/21  He is doing well  Today he complains of improving left generalized hip pain, left generalized knee pain and new onset of left Achilles located pain  OVerall, patient states his left hip symptoms are much improved since last visit  He only has occasional pain in the morning but performs duties of job and ADLs without significant difficulty  Denies motor/sensory deficits   He uses shoe lift in shoes for LLD    Review of systems negative unless otherwise specified in HPI    Past Medical History:   Diagnosis Date    Diverticulitis of intestine with perforation     Hypertension        Past Surgical History:   Procedure Laterality Date    COLONOSCOPY      CT GUIDED PERC DRAINAGE CATHETER PLACEMENT  2019    ELBOW SURGERY      HIP SURGERY      KNEE SURGERY      [place] COLONOSCOPY FLX DX W/COLLJ SPEC WHEN PFRMD N/A 2019    Procedure: COLONOSCOPY;  Surgeon: Ritika Clark MD;  [facility]; Service: Colorectal    MN CYSTOSCOPY,INSERT URETERAL STENT N/A 2019    Procedure: Laren Casa BILATERAL STENTS URETERAL;  Surgeon: Jeff Stokes MD;  [facility];  Service: Urology    MN LAP,SURG,COLECTOMY, PARTIAL, W/ANAST N/A 2019    Procedure: DIAGNOSTIC LAPAROSCOPY, LAPAROSCOPIC HAND-ASSISTED SIGMOID COLECTOMY, REPAIR OF UNAVOIDABLE SEROSAL ADHESION, INTRAOPERATIVE LASER FLUORSENCE ANGIOGRAPHY ([address] [facility]), FLEXIBLE SIGMOIDOSCOPY;  Surgeon: Ritika Clark MD;  [facility];  Service: Colorectal    MN OPEN RX FEMUR FX+INTRAMED TRES Left 2020    Procedure: INSERTION NAIL IM FEMUR ANTEGRADE For Subtrochanteric fracture;  Surgeon: Marissa Knight DO;  [facility];  Service: Orthopedics    MN OPEN [address] END HEAD Left 2021    Procedure: LEFT PROXIMAL FEMUR NONUNION REPAIR WITH MARKELL GRAFT;  Surgeon: Patricia Yu MD;  [facility];  Service: Orthopedics    MN REMOVAL DEEP IMPLANT Left 2021    Procedure: REMOVAL HARDWARE HIP LEFT TFNA;  Surgeon: Patricia Yu MD;  [facility];  Service: Orthopedics       History reviewed  No pertinent family history      Social History     Occupational History    Not on file   Tobacco Use    Smoking status: Former Smoker     Packs/day: 0 50     Types: Cigarettes     Quit date: 2020     Years since quittin 5    Smokeless tobacco: Never Used   Vaping Use    Vaping Use: Never used   Substance and Sexual Activity    Alcohol use: Yes     Comment: socially    Drug use: Never    Sexual activity: Not on file         Current Outpatient Medications:     acetaminophen (TYLENOL) 500 mg tablet, , Disp: , Rfl:     ascorbic acid (VITAMIN C) 500 mg tablet, Take 500 mg by mouth daily, Disp: , Rfl:     multivitamin (THERAGRAN) TABS, Take 1 tablet by mouth daily, Disp: , Rfl:     enoxaparin (LOVENOX) 40 mg/0 4 mL, Inject 0 4 mL (40 mg total) under the skin daily for 28 days, Disp: 11 2 mL, Rfl: 0    Ferrous Gluconate 256 (28 Fe) MG TABS, , Disp: , Rfl:     Ibuprofen (IBU-200 PO), , Disp: , Rfl:     neomycin-polymyxin-hydrocortisone (CORTISPORIN) otic solution, Administer 4 drops into both ears every 6 (six) hours (Patient not taking: No sig reported), Disp: 10 mL, Rfl: 1    Allergies   Allergen Reactions    Penicillins Rash            Vitals:    06/21/22 0904   BP: 119/76   Pulse: 56       Objective:  Physical exam  · General: Awake, Alert, Oriented  · Eyes: Pupils equal, round and reactive to light  · Heart: regular rate and rhythm  · Lungs: No audible wheezing  · Abdomen: soft                    Ortho Exam  Left hip:  Patient sits comfortably on exam table with hip and knee flexed at 90 degrees  Mild laterally based thigh pain with ROM of hip  5/5 strength with hip flexion, negative Stinchfield test  Sensation intact    Left ankle:  Small prominence over posterior heel  TTP over posterior heel and AChilles insertion  5/5 resisted plantarflexion strength    Diagnostics, reviewed and taken today if performed as documented: The attending physician has personally reviewed the pertinent films in PACS and interpretation is as follows:  Left hip x-ray:  Significant callus at nonunion site  Well placed surgical plate and screws with no evidence of hardware failure  Left ankle x-ray:  Small master's deformity, no fracture or dislocation    Procedures, if performed today:    Procedures    None performed      Portions of the record may have been created with voice recognition software  Occasional wrong word or "sound a like" substitutions may have occurred due to the inherent limitations of voice recognition software  Read the chart carefully and recognize, using context, where substitutions have occurred      Edna Chavez MD

## 2022-08-26 ENCOUNTER — OFFICE VISIT (OUTPATIENT)
Dept: FAMILY MEDICINE CLINIC | Facility: CLINIC | Age: 62
End: 2022-08-26
Payer: COMMERCIAL

## 2022-08-26 VITALS — HEART RATE: 66 BPM | RESPIRATION RATE: 18 BRPM | TEMPERATURE: 98.9 F | OXYGEN SATURATION: 92 %

## 2022-08-26 DIAGNOSIS — R05.9 COUGH: ICD-10-CM

## 2022-08-26 DIAGNOSIS — J06.9 ACUTE URI: Primary | ICD-10-CM

## 2022-08-26 LAB
SARS-COV-2 AG UPPER RESP QL IA: NEGATIVE
VALID CONTROL: NORMAL

## 2022-08-26 PROCEDURE — 99213 OFFICE O/P EST LOW 20 MIN: CPT | Performed by: NURSE PRACTITIONER

## 2022-08-26 PROCEDURE — 87811 SARS-COV-2 COVID19 W/OPTIC: CPT | Performed by: NURSE PRACTITIONER

## 2022-08-26 RX ORDER — AZITHROMYCIN 250 MG/1
TABLET, FILM COATED ORAL
Qty: 6 TABLET | Refills: 0 | Status: SHIPPED | OUTPATIENT
Start: 2022-08-26 | End: 2022-08-30

## 2022-08-26 NOTE — ASSESSMENT & PLAN NOTE
Will start Zithromax at this time, known penicillin allergy  He can start this medication today, follow directions as prescribed    If symptoms are persistent into next week he will be able to come in for an office appointment

## 2022-08-26 NOTE — PROGRESS NOTES
OFFICE VISIT  Alexander Hill 57 y o  male MRN: [id number]          Assessment / Plan:  Problem List Items Addressed This Visit        Respiratory    Acute URI - Primary     Will start Zithromax at this time, known penicillin allergy  He can start this medication today, follow directions as prescribed  If symptoms are persistent into next week he will be able to come in for an office appointment         Relevant Medications    azithromycin (ZITHROMAX) 250 mg tablet       Other    Cough    Relevant Orders    POCT Rapid Covid Ag            Reason For Visit / Chief Complaint  Chief Complaint   Patient presents with    Cough    Nasal Congestion    Fatigue    Headache     Has 2 vaccines and 2 boosters sick since July 31 on and off         HPI:  Alexander Hill is a 57 y o  male who presents today for acute sick visit  He reports not feeling well for a few weeks, he reports nasal congestion, cough fatigue headache  He has been taking over-the-counter cough medication with some relief  Unsure fever chills  No sick contacts at home  Recently back from vacation  Historical Information   Past Medical History:   Diagnosis Date    Diverticulitis of intestine with perforation     Hypertension      Past Surgical History:   Procedure Laterality Date    COLONOSCOPY      CT GUIDED PERC DRAINAGE CATHETER PLACEMENT  01/11/2019    ELBOW SURGERY      HIP SURGERY      KNEE SURGERY      [place] COLONOSCOPY FLX DX W/COLLJ SPEC WHEN PFRMD N/A 02/20/2019    Procedure: COLONOSCOPY;  Surgeon: Mary Levin MD;  [facility];   Service: Colorectal    [place] CYSTOSCOPY,INSERT URETERAL STENT N/A 02/21/2019    Procedure: Valente Muniz BILATERAL STENTS URETERAL;  Surgeon: Nic Weir MD;  [facility];  Service: Urology    [place] LAP,SURG,COLECTOMY, PARTIAL, W/ANAST N/A 02/21/2019    Procedure: DIAGNOSTIC LAPAROSCOPY, LAPAROSCOPIC HAND-ASSISTED SIGMOID COLECTOMY, REPAIR OF UNAVOIDABLE SEROSAL ADHESION, INTRAOPERATIVE LASER FLUORSENCE ANGIOGRAPHY ([address] [facility]), FLEXIBLE SIGMOIDOSCOPY;  Surgeon: Katherine Rnedon MD;  [facility];  Service: Colorectal    [place] OPEN RX FEMUR FX+INTRAMED TRES Left 2020    Procedure: INSERTION NAIL IM FEMUR ANTEGRADE For Subtrochanteric fracture;  Surgeon: Chivo Anders DO;  [facility];  Service: Orthopedics    [place] OPEN [address] END HEAD Left 2021    Procedure: LEFT PROXIMAL FEMUR NONUNION REPAIR WITH MARKELL GRAFT;  Surgeon: Ronnie Bullard MD;  [facility];  Service: Orthopedics    [place] REMOVAL DEEP IMPLANT Left 2021    Procedure: REMOVAL HARDWARE HIP LEFT TFNA;  Surgeon: Ronnie Bullard MD;  [facility];  Service: Orthopedics     Social History   Social History     Substance and Sexual Activity   Alcohol Use Yes    Comment: socially     Social History     Substance and Sexual Activity   Drug Use Never     Social History     Tobacco Use   Smoking Status Former Smoker    Packs/day: 0 50    Types: Cigarettes    Quit date: 2020    Years since quittin 7   Smokeless Tobacco Never Used     History reviewed  No pertinent family history      Meds/Allergies   Allergies   Allergen Reactions    Penicillins Rash       Meds:    Current Outpatient Medications:     azithromycin (ZITHROMAX) 250 mg tablet, Take 2 tablets today then 1 tablet daily x 4 days, Disp: 6 tablet, Rfl: 0    acetaminophen (TYLENOL) 500 mg tablet, , Disp: , Rfl:     ascorbic acid (VITAMIN C) 500 mg tablet, Take 500 mg by mouth daily, Disp: , Rfl:     enoxaparin (LOVENOX) 40 mg/0 4 mL, Inject 0 4 mL (40 mg total) under the skin daily for 28 days, Disp: 11 2 mL, Rfl: 0    Ferrous Gluconate 256 (28 Fe) MG TABS, , Disp: , Rfl:     Ibuprofen (IBU-200 PO), , Disp: , Rfl:     multivitamin (THERAGRAN) TABS, Take 1 tablet by mouth daily, Disp: , Rfl:     neomycin-polymyxin-hydrocortisone (CORTISPORIN) otic solution, Administer 4 drops into both ears every 6 (six) hours (Patient not taking: No sig reported), Disp: 10 mL, Rfl: 1      REVIEW OF SYSTEMS  Review of Systems   Constitutional: Positive for activity change and fatigue  Negative for chills and fever  HENT: Positive for congestion and rhinorrhea  Negative for ear discharge, ear pain, sinus pressure, sinus pain, sore throat, tinnitus and trouble swallowing  Eyes: Negative for photophobia, pain, discharge, itching and visual disturbance  Respiratory: Positive for cough  Negative for chest tightness, shortness of breath and wheezing  Cardiovascular: Negative for chest pain and leg swelling  Gastrointestinal: Negative for abdominal distention, abdominal pain, constipation, diarrhea, nausea and vomiting  Endocrine: Negative for polydipsia, polyphagia and polyuria  Genitourinary: Negative for dysuria and frequency  Musculoskeletal: Negative for arthralgias, myalgias, neck pain and neck stiffness  Skin: Negative for color change  Neurological: Negative for dizziness, syncope, weakness, numbness and headaches  Hematological: Does not bruise/bleed easily  Psychiatric/Behavioral: Negative for behavioral problems, confusion, self-injury, sleep disturbance and suicidal ideas  The patient is not nervous/anxious  Current Vitals:   Pulse: 66 (08/26/22 1031)  Temperature: 98 9 °F (37 2 °C) (08/26/22 1031)  Respirations: 18 (08/26/22 1031)  SpO2: 92 % (08/26/22 1031)  [unfilled]    PHYSICAL EXAMS:  Physical Exam  Constitutional:       Appearance: Normal appearance  He is not ill-appearing  HENT:      Head: Normocephalic and atraumatic  Pulmonary:      Effort: Pulmonary effort is normal    Neurological:      General: No focal deficit present  Mental Status: He is alert and oriented to person, place, and time     Psychiatric:         Mood and Affect: Mood normal          Behavior: Behavior normal              Lab, imaging and other studies: I have personally reviewed pertinent reports  Mario Downs

## 2022-10-12 PROBLEM — R05.9 COUGH: Status: RESOLVED | Noted: 2021-11-29 | Resolved: 2022-10-12

## 2022-10-12 PROBLEM — H60.331 ACUTE SWIMMER'S EAR OF RIGHT SIDE: Status: RESOLVED | Noted: 2021-12-09 | Resolved: 2022-10-12

## 2022-10-12 PROBLEM — H61.23 HEARING LOSS DUE TO CERUMEN IMPACTION, BILATERAL: Status: RESOLVED | Noted: 2021-12-09 | Resolved: 2022-10-12

## 2022-10-25 PROBLEM — J06.9 ACUTE URI: Status: RESOLVED | Noted: 2022-08-26 | Resolved: 2022-10-25

## 2022-10-28 ENCOUNTER — OFFICE VISIT (OUTPATIENT)
Dept: INTERNAL MEDICINE CLINIC | Facility: OTHER | Age: 62
End: 2022-10-28

## 2022-10-28 VITALS
SYSTOLIC BLOOD PRESSURE: 138 MMHG | OXYGEN SATURATION: 98 % | WEIGHT: 210.2 LBS | BODY MASS INDEX: 26.14 KG/M2 | TEMPERATURE: 98.6 F | RESPIRATION RATE: 18 BRPM | DIASTOLIC BLOOD PRESSURE: 88 MMHG | HEART RATE: 52 BPM | HEIGHT: 75 IN

## 2022-10-28 DIAGNOSIS — R41.3 IMPAIRED MEMORY: ICD-10-CM

## 2022-10-28 DIAGNOSIS — E78.2 MIXED HYPERLIPIDEMIA: Primary | ICD-10-CM

## 2022-10-28 DIAGNOSIS — Z90.49 HISTORY OF COLON RESECTION: ICD-10-CM

## 2022-10-28 DIAGNOSIS — S72.22XK CLOSED DISPLACED SUBTROCHANTERIC FRACTURE OF LEFT FEMUR WITH NONUNION, SUBSEQUENT ENCOUNTER: ICD-10-CM

## 2022-10-28 DIAGNOSIS — Z87.891 FORMER TOBACCO USE: ICD-10-CM

## 2022-10-28 DIAGNOSIS — Z12.5 PROSTATE CANCER SCREENING: ICD-10-CM

## 2022-10-28 DIAGNOSIS — Z98.890 S/P ORIF (OPEN REDUCTION INTERNAL FIXATION) FRACTURE: ICD-10-CM

## 2022-10-28 DIAGNOSIS — F51.01 PRIMARY INSOMNIA: ICD-10-CM

## 2022-10-28 DIAGNOSIS — Z87.81 S/P ORIF (OPEN REDUCTION INTERNAL FIXATION) FRACTURE: ICD-10-CM

## 2022-10-28 DIAGNOSIS — Z12.11 SCREENING FOR COLON CANCER: ICD-10-CM

## 2022-10-28 DIAGNOSIS — R19.4 CHANGE IN BOWEL HABIT: ICD-10-CM

## 2022-10-28 DIAGNOSIS — R10.9 ABDOMINAL PAIN, UNSPECIFIED ABDOMINAL LOCATION: ICD-10-CM

## 2022-10-28 DIAGNOSIS — L02.91 ABSCESS: ICD-10-CM

## 2022-10-28 DIAGNOSIS — I10 HYPERTENSION, UNSPECIFIED TYPE: ICD-10-CM

## 2022-10-28 PROBLEM — K57.20 DIVERTICULITIS OF LARGE INTESTINE WITH ABSCESS WITHOUT BLEEDING: Status: RESOLVED | Noted: 2019-01-28 | Resolved: 2022-10-28

## 2022-10-28 PROBLEM — G89.11 ACUTE PAIN DUE TO TRAUMA: Status: RESOLVED | Noted: 2020-12-19 | Resolved: 2022-10-28

## 2022-10-28 PROBLEM — Z01.818 PRE-OP TESTING: Status: RESOLVED | Noted: 2021-11-10 | Resolved: 2022-10-28

## 2022-10-28 PROBLEM — S72.002K: Status: RESOLVED | Noted: 2021-11-19 | Resolved: 2022-10-28

## 2022-10-28 PROBLEM — R73.9 HYPERGLYCEMIA: Status: RESOLVED | Noted: 2022-05-20 | Resolved: 2022-10-28

## 2022-10-28 PROBLEM — Z01.818 PREOPERATIVE EXAMINATION: Status: RESOLVED | Noted: 2021-11-10 | Resolved: 2022-10-28

## 2022-10-28 PROBLEM — D62 ACUTE BLOOD LOSS ANEMIA: Status: RESOLVED | Noted: 2020-12-20 | Resolved: 2022-10-28

## 2022-10-28 PROBLEM — K65.1 INTRA-ABDOMINAL ABSCESS (HCC): Status: RESOLVED | Noted: 2019-01-28 | Resolved: 2022-10-28
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
## 2022-10-28 PROBLEM — M21.70 LEG LENGTH DISCREPANCY: Status: ACTIVE | Noted: 2022-10-28

## 2022-10-28 PROBLEM — S72.22XA CLOSED LEFT SUBTROCHANTERIC FEMUR FRACTURE (HCC): Status: RESOLVED | Noted: 2020-12-18 | Resolved: 2022-10-28

## 2022-10-28 RX ORDER — VALSARTAN 40 MG/1
40 TABLET ORAL DAILY
COMMUNITY
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
## 2022-10-28 NOTE — ASSESSMENT & PLAN NOTE
Will refer to Sleep Medicine  I feel his memory concerns may be due to insomnia verses anxiety  Will follow-up sleep study

## 2022-11-22 ENCOUNTER — TELEPHONE (OUTPATIENT)
Dept: SLEEP CENTER | Facility: CLINIC | Age: 62
End: 2022-11-22

## 2022-11-22 NOTE — TELEPHONE ENCOUNTER
----- Message from Lolly Marroquin MD sent at 11/21/2022  4:45 PM EST -----  Approved    ----- Message -----  From: Tashia Blake  Sent: 11/17/2022  10:28 AM EST  To: Sleep Medicine Maryann Allen Provider    This Diagnostic sleep study needs approval      If approved please sign and return to clerical pool  If denied please include reasons why  Also provide alternative testing if warranted  Please sign and return to clerical pool

## 2023-01-23 LAB — TSH SERPL DL<=0.05 MIU/L-ACNC: 1.25 UIU/ML (ref 0.45–4.5)

## 2023-03-13 NOTE — PROGRESS NOTES
12/26/20 1030   Pain Assessment   Pain Assessment Tool 0-10   Pain Score 6   Restrictions/Precautions   Precautions Fall Risk;Pain;Supervision on toilet/commode   RLE Weight Bearing Per Order FWB   Roll Left and Right   Type of Assistance Needed Supervision;Verbal cues   Roll Left and Right CARE Score 4   Sit to Lying   Type of Assistance Needed Physical assistance   Amount of Physical Assistance Provided Less than 25%   Comment instructed leg     Sit to Lying CARE Score 3   Lying to Sitting on Side of Bed   Type of Assistance Needed Physical assistance   Amount of Physical Assistance Provided Less than 25%   Comment instructed leg     Lying to Sitting on Side of Bed CARE Score 3   Sit to Stand   Type of Assistance Needed Supervision   Sit to Stand CARE Score 4   Bed-Chair Transfer   Type of Assistance Needed Supervision; Adaptive equipment   Comment RW    Chair/Bed-to-Chair Transfer CARE Score 4   Transfer Bed/Chair/Wheelchair   Adaptive Equipment Roller Walker   Walk 10 Feet   Type of Assistance Needed Supervision; Adaptive equipment   Walk 10 Feet CARE Score 4   Walk 50 Feet with Two Turns   Type of Assistance Needed Incidental touching; Adaptive equipment   Walk 50 Feet with Two Turns CARE Score 4   Walk 150 Feet   Type of Assistance Needed Incidental touching; Adaptive equipment   Walk 150 Feet CARE Score 4   Ambulation   Does the patient walk? 2  Yes   Primary Mode of Locomotion Prior to Admission Walk   Distance Walked (feet) 165 ft  (x2)   Assist Device exoro system   Curb or Single Stair   Style negotiated Single stair   Type of Assistance Needed Physical assistance; Adaptive equipment   Amount of Physical Assistance Provided Less than 25%   1 Step (Curb) CARE Score 3   4 Steps   Type of Assistance Needed Physical assistance; Adaptive equipment   Amount of Physical Assistance Provided Less than 25%   4 Steps CARE Score 3   12 Steps   Type of Assistance Needed Physical assistance; Adaptive equipment Amount of Physical Assistance Provided Less than 25%   12 Steps CARE Score 3   Stairs   Type Stairs   # of Steps 12   Weight Bearing Precautions Fall Risk   Assist Devices Bilateral Rail   Findings  steps 6 inch    Therapeutic Interventions   Strengthening Seated LAQ AP standing Marching x20 seated LLE heelslide     gluts x15    Flexibility manual stretch BL LE    Balance standing marching with RW and walking BWD to inc balance awareness    Equipment Use   NuStep lvl 1 for 10 min    Assessment   Treatment Assessment pt focus on 60 min of skilled PT to inc ambulation with RW and improve WB status with functional mobility  pt also perform thex ex's for BL LE strengthening and conditioning , LLE cont to noted swelling at this time , making MM contaction weak  Pt improving with  steps with BL LE inc step count , pt will cont toward DC goals , trail FF this PM session Cont POC ,  Pt barriers cont to be LLE weakness, inc swelling and pain   Barriers to Discharge Inaccessible home environment;Decreased caregiver support   Plan   Progress Progressing toward goals   PT Therapy Minutes   PT Time In 1030   PT Time Out 1130   PT Total Time (minutes) 60   PT Mode of treatment - Individual (minutes) 60   PT Mode of treatment - Concurrent (minutes) 0   PT Mode of treatment - Group (minutes) 0   PT Mode of treatment - Co-treat (minutes) 0   PT Mode of Treatment - Total time(minutes) 60 minutes   PT Cumulative Minutes 330   Therapy Time missed   Time missed?  No Statement Selected

## 2023-04-24 NOTE — PROGRESS NOTES
Asked patient if he will need vna for flushing ir drain and site care, pt states "my wife is a nurse practitioner, she will be able to help me" YOB: 1959  Location: [facility]  Location Address: [address] [place] [address], [facility], [address] [place] [address]  Location Phone: [phone]    Chief Complaint   Patient presents with   • Squamous Cell Carcinoma     Tongue         History of Present Illness  Saul Alcantara Sr. is a 63 y.o. male.  Saul Alcantara Sr. is here for follow up of ENT complaints.  The patient has a history of DL and biopsy of base of tongue on 2014 with positive pathology for scca. The cancer was staged T1M0N0. He has a history of radiation therapy that he finished 2014. Patient is s/p direct laryngoscopy with esophageal dilation 22.     He complains of intermittent dysphagia.   He recently presented to the ER on 4/10/2023 with tongue swelling.    Past Medical History:   Diagnosis Date   • Allergic rhinitis    • Anxiety    • Arthritis    • Cataract    • Chronic sinusitis    • COPD (chronic obstructive pulmonary disease)    • COVID-19 vaccine series completed     Moderna   • Depression    • Deviated nasal septum    • Elevated cholesterol    • Enlarged prostate    • GERD (gastroesophageal reflux disease)    • H/O head and neck radiation 2017   • History of transfusion    • Hyperlipidemia    • Hypertension    • Hypothyroidism    • Laryngopharyngeal reflux    • Lymphoma     Non-Hodgkins   • MRSA infection    • Panic attacks    • Peyronie disease    • Pneumonia    • Primary squamous cell carcinoma of tongue 2016    T1N0M0 SCC S/P XRT/Chemo 2014   • Primary squamous cell carcinoma of tongue 2016    T1N0M0 SCC S/P XRT/Chemo 2014   • Prostate cancer    • Sicca syndrome    • Sinusitis, acute    • Squamous cell carcinoma     Base of Tongue   • Tobacco use disorder    • Tongue irritation    • Visit for monitoring Rituxan therapy     pt getting treatments at [facility] in [place]   • Xerostomia        Past Surgical History:   Procedure Laterality Date   • CATARACT EXTRACTION Bilateral    •  ESOPHAGOSCOPY N/A 11/20/2019    Procedure: Direct laryngoscopy with esophageal Johnson dilation;  Surgeon: Wayne Richmond MD;  Location:  [facility];  Service: ENT   • HAND SURGERY      metal plate    • KNEE SURGERY     • LARYNGOSCOPY N/A 6/26/2020    Procedure: Rigid esophagoscopy Johnson esophageal dilatation;  Surgeon: Wayne Richmond MD;  Location:  [facility];  Service: ENT;  Laterality: N/A;   • LARYNGOSCOPY N/A 8/24/2020    Procedure: MICRODIRECT LARYNGOSCOPY with esophageal dilatation;  Surgeon: Wayne Richmond MD;  Location:  [facility];  Service: ENT;  Laterality: N/A;   • LARYNGOSCOPY N/A 5/24/2021    Procedure: Direct laryngoscopy, esophagoscopy with Johnson dilatation;  Surgeon: Wayne Richmond MD;  Location:  [facility];  Service: ENT;  Laterality: N/A;   • LARYNGOSCOPY N/A 12/1/2021    Procedure: Direct laryngoscopy, esophagoscopy with esophageal dilatation (Johnson dilators);  Surgeon: Wayne Richmond MD;  Location:  [facility];  Service: ENT;  Laterality: N/A;   • LARYNGOSCOPY N/A 1/3/2022    Procedure: Direct laryngoscopy, esophagoscopy with Johnson esophageal dilation;  Surgeon: Wayne Richmond MD;  Location:  [facility];  Service: ENT;  Laterality: N/A;   • LARYNGOSCOPY N/A 4/29/2022    Procedure: Direct laryngoscopy with esophageal dilatation;  Surgeon: Wayne Richmond MD;  Location:  [facility];  Service: ENT;  Laterality: N/A;   • LARYNGOSCOPY N/A 6/24/2022    Procedure: MicroDirect laryngoscopy, esophagoscopy with Johnson dilation;  Surgeon: Wayne Richmond MD;  Location:  [facility];  Service: ENT;  Laterality: N/A;   • LARYNGOSCOPY N/A 9/23/2022    Procedure: DIRECT LARYNGOSCOPY WITH JOHNSON DILATION;  Surgeon: Wayne Richmond MD;  Location:  [facility];  Service: ENT;  Laterality: N/A;   • MULTIPLE TOOTH EXTRACTIONS      CONTINUING TO HAVE BONE REMOVED 1/2021   • OTHER SURGICAL HISTORY  09/10/2014    Microlaryngoscopy with Biopsy - Base of Tongue   •  PANENDOSCOPY N/A 1/22/2021    Procedure: DIRECT LARYNGOSCOPY AND ESOPHAGOSCOPY WITH ESOPHAGEAL DILATION;  Surgeon: Wayne Richmond MD;  Location:  [facility];  Service: ENT;  Laterality: N/A;   • PANENDOSCOPY N/A 10/11/2021    Procedure: Direct laryngoscopy, esophagoscopy with Johnson dilatation;  Surgeon: Wayne Richmond MD;  Location:  [facility];  Service: ENT;  Laterality: N/A;   • PROSTATE ULTRASOUND BIOPSY N/A 12/1/2021    Procedure: PROSTATE ULTRASOUND BIOPSY. NOT A URONAV;  Surgeon: Oscar Bazan MD;  Location:  [facility];  Service: Urology;  Laterality: N/A;   • SPLENECTOMY     • SQUAMOUS CELL CARCINOMA EXCISION  09/10/2014    Tongue   • TONSILLECTOMY         Outpatient Medications Marked as Taking for the 4/25/23 encounter (Office Visit) with Wayne Richmond MD   Medication Sig Dispense Refill   • albuterol (PROVENTIL) (2.5 MG/3ML) 0.083% nebulizer solution Take 2.5 mg by nebulization Every 4 (Four) Hours As Needed for Wheezing. 120 mL 5   • ALPRAZolam (XANAX) 2 MG tablet Take 1 tablet by mouth 3 (Three) Times a Day.     • cefdinir (OMNICEF) 300 MG capsule Take 1 capsule by mouth Every 12 (Twelve) Hours.     • chlorhexidine (Peridex) 0.12 % solution Apply 15 mL to the mouth or throat 2 (Two) Times a Day. 240 mL 0   • cyclobenzaprine (FLEXERIL) 10 MG tablet Take 1 tablet by mouth As Needed.     • fluticasone (FLONASE) 50 MCG/ACT nasal spray 1 spray by Each Nare route Daily.     • glycopyrrolate (ROBINUL) 1 MG tablet Take 0.5 mg by mouth 2 (Two) Times a Day.     • HYDROcodone-acetaminophen (NORCO)  MG per tablet Take 1 tablet by mouth Every 6 (Six) Hours As Needed for Moderate Pain (PER DR RUIZ).     • hydrOXYzine (ATARAX) 25 MG tablet TAKE 1 TABLET BY MOUTH 4 TIMES A DAY AS NEEDED FOR ANXIETY     • levothyroxine (SYNTHROID, LEVOTHROID) 100 MCG tablet Take 1 tablet by mouth Daily.     • Lidocaine Viscous HCl (XYLOCAINE) 2 % solution Take 5 mL by mouth As Needed for Mild Pain. 20 mL 3    • meloxicam (MOBIC) 15 MG tablet Take 1 tablet by mouth Daily.     • Miracle mouthwash oral suspension Swish and spit 10 mL Every 4 (Four) Hours As Needed for Stomatitis for up to 10 days. 250 mL 1   • mirtazapine (REMERON) 15 MG tablet Take 1 tablet by mouth Every Night.     • nystatin (MYCOSTATIN) 408407 UNIT/ML suspension Take 10ml by mouth BID for 28 days (Patient taking differently: Take 2 mL by mouth 4 (Four) Times a Day As Needed. Take 10ml by mouth BID for 28 days) 480 mL 2   • ondansetron (ZOFRAN) 8 MG tablet Take 1 tablet by mouth Every 8 (Eight) Hours As Needed for Nausea.     • pantoprazole (PROTONIX) 40 MG EC tablet TAKE 1 TABLET BY MOUTH TWICE A DAY (Patient taking differently: Take 1 tablet by mouth Daily.) 180 tablet 3   • senna-docusate (PERICOLACE) 8.6-50 MG per tablet Take 2 tablets by mouth Daily.     • sertraline (ZOLOFT) 100 MG tablet Take 1 tablet by mouth every night at bedtime.     • simvastatin (ZOCOR) 20 MG tablet Take 1 tablet by mouth Every Night.  11   • tadalafil (CIALIS) 20 MG tablet Take 1 tablet by mouth As Needed for Erectile Dysfunction for up to 360 days. 1-24 hours before activity 12 tablet 11   • tamsulosin (FLOMAX) 0.4 MG capsule 24 hr capsule Take 1 capsule by mouth Daily.     • tiotropium bromide monohydrate (Spiriva Respimat) 2.5 MCG/ACT aerosol solution inhaler Inhale 2 puffs Daily. 1 each 0   • tiotropium bromide-olodaterol (Stiolto Respimat) 2.5-2.5 MCG/ACT aerosol solution inhaler Inhale 2 puffs Daily. 4 g 11   • [DISCONTINUED] amLODIPine (NORVASC) 10 MG tablet Take 1 tablet by mouth Daily.         Patient has no known allergies.    Family History   Problem Relation Age of Onset   • Cancer Mother    • Courtney's disease Father    • Diabetes Sister    • Cancer Sister    • Aneurysm Brother        Social History     Socioeconomic History   • Marital status:    Tobacco Use   • Smoking status: Some Days     Packs/day: 0.50     Years: 44.00     Pack years: 22.00      Types: Cigarettes     Last attempt to quit:      Years since quittin.3   • Smokeless tobacco: Never   • Tobacco comments:     pack last couple of weeks   Vaping Use   • Vaping Use: Never used   Substance and Sexual Activity   • Alcohol use: No   • Drug use: No   • Sexual activity: Defer     Partners: Female       Review of Systems   Constitutional: Negative.    HENT: Positive for trouble swallowing.         Admits intermittent tongue swelling   Respiratory: Negative.    Cardiovascular: Negative.    Gastrointestinal: Negative.    Genitourinary: Negative.    Neurological: Negative.        Vitals:    23 1048   BP: 161/90   Pulse: 84   Resp: 16   Temp: 98.4 °F (36.9 °C)       Body mass index is 18.6 kg/m².    Objective     Physical Exam  Vitals reviewed.   Constitutional:       Appearance: Normal appearance. He is normal weight.   HENT:      Head: Normocephalic and atraumatic.      Right Ear: Hearing and external ear normal.      Left Ear: Hearing and external ear normal.      Nose: Nose normal.      Mouth/Throat:      Lips: Pink.      Mouth: Mucous membranes are moist.      Comments: See endoscopy notes    Upper dentures     Lower edentulous     Black hairy tongue   Dry mucus membranes     Musculoskeletal:      Cervical back: Full passive range of motion without pain.   Neurological:      Mental Status: He is alert.   Psychiatric:         Behavior: Behavior is cooperative.         Assessment & Plan   Diagnoses and all orders for this visit:    1. Primary squamous cell carcinoma of tongue (Primary)    2. Hx of radiation therapy    3. Laryngopharyngeal reflux    4. Pharyngoesophageal dysphagia    5. Tobacco use disorder      * Surgery not found *  No orders of the defined types were placed in this encounter.    Recommend follow up with Dr. Stubbs to discuss medications and blood pressure management  Continue flonase  Return for problems    Be aware of the signs and symptoms of recurrent head and neck  cancer including neck mass, persistent or recurrent sore throat, ear pain, hemoptysis, weight loss and hoarseness. If any of these symptoms recur and or persist, call for an evaluation.      D/W patient increasing caloric intake and discussed cruciferous vegetables and protein shakes etc to maintain optimal nutrition.  The necessity of abstaining from tobacco products was also discussed particularly with respect to not smoking  Dr. Richmond examined and discussed care with patient and agrees with treatment plan.     Return in about 5 months (around 9/25/2023) for Recheck.       Patient Instructions   Recommend follow up with Dr. Stubbs to discuss medications and blood pressure management  Continue flonase  Return for problems    Be aware of the signs and symptoms of recurrent head and neck cancer including neck mass, persistent or recurrent sore throat, ear pain, hemoptysis, weight loss and hoarseness. If any of these symptoms recur and or persist, call for an evaluation.      D/W patient increasing caloric intake and discussed cruciferous vegetables and protein shakes etc to maintain optimal nutrition.  The necessity of abstaining from tobacco products was also discussed particularly with respect to not smoking     CONTACT INFORMATION:  The main office phone number is [phone]. For emergencies after hours and on weekends, this number will convert over to our answering service and the on call provider will answer. Please try to keep non emergent phone calls/ questions to office hours 9am-5pm Monday through Friday.      [employer]  As an alternative, you can sign up and use the Epic MyChart system for more direct and quicker access for non emergent questions/ problems.  [employer] allows you to send messages to your doctor, view your test results, renew your prescriptions, schedule appointments, and more. To sign up, go to [employer] and click on the Sign Up Now link in the New User? box.  Enter your [employer] Activation Code exactly as it appears below along with the last four digits of your Social Security Number and your Date of Birth () to complete the sign-up process. If you do not sign up before the expiration date, you must request a new code.     [employer] Activation Code: Activation code not generated  Current [employer] Status: Active     If you have questions, you can email [email or web address] or call [phone] to talk to our [employer] staff. Remember, [employer] is NOT to be used for urgent needs. For medical emergencies, dial 911.     IF YOU SMOKE OR USE TOBACCO PLEASE READ THE FOLLOWING:  Why is smoking bad for me?  Smoking increases the risk of heart disease, lung disease, vascular disease, stroke, and cancer. If you smoke, STOP!        IF YOU SMOKE OR USE TOBACCO PLEASE READ THE FOLLOWING:  Why is smoking bad for me?  Smoking increases the risk of heart disease, lung disease, vascular disease, stroke, and cancer. If you smoke, STOP!     For more information:  Quit Now [place]  -QUIT-NOW  [email or web address]

## 2023-07-05 NOTE — SEDATION DOCUMENTATION
Subjective:       Patient ID: Eugenio Judge is a 54 y.o. male.    Chief Complaint: Poison Ivy (Bilateral legs, right hand and arm)    HPI  Review of Systems   Constitutional:  Negative for activity change, appetite change, chills, diaphoresis, fatigue, fever and unexpected weight change.   HENT:  Negative for nasal congestion, dental problem, drooling, ear discharge, ear pain, facial swelling, hearing loss, mouth sores, nosebleeds, postnasal drip, rhinorrhea, sinus pressure/congestion, sneezing, sore throat, tinnitus, trouble swallowing, voice change and goiter.    Eyes:  Negative for photophobia, pain, discharge, redness, itching and visual disturbance.   Respiratory:  Negative for apnea, cough, choking, chest tightness, shortness of breath, wheezing and stridor.    Cardiovascular:  Negative for chest pain, palpitations, leg swelling and claudication.   Gastrointestinal:  Negative for abdominal distention, abdominal pain, anal bleeding, blood in stool, change in bowel habit, constipation, diarrhea, nausea, vomiting, reflux, fecal incontinence and change in bowel habit.   Endocrine: Negative for cold intolerance, heat intolerance, polydipsia, polyphagia and polyuria.   Genitourinary:  Negative for bladder incontinence, decreased urine volume, difficulty urinating, discharge, dysuria, enuresis, erectile dysfunction, flank pain, frequency, genital sores, hematuria, penile pain, testicular pain and urgency.   Musculoskeletal:  Negative for arthralgias, back pain, gait problem, joint swelling, leg pain, myalgias, neck pain, neck stiffness and joint deformity.   Integumentary:  Positive for rash. Negative for pallor, wound and mole/lesion.   Allergic/Immunologic: Negative for environmental allergies, food allergies and frequent infections.   Neurological:  Negative for dizziness, vertigo, tremors, seizures, syncope, facial asymmetry, speech difficulty, weakness, light-headedness, numbness, headaches, coordination  decreased drainage difficulties, memory loss and coordination difficulties.   Hematological:  Negative for adenopathy. Does not bruise/bleed easily.   Psychiatric/Behavioral:  Negative for agitation, behavioral problems, confusion, decreased concentration, dysphoric mood, hallucinations, self-injury, sleep disturbance and suicidal ideas. The patient is not nervous/anxious and is not hyperactive.        Objective:      Physical Exam  Vitals reviewed.   Constitutional:       Appearance: Normal appearance. He is normal weight.   HENT:      Head: Normocephalic and atraumatic.      Right Ear: Tympanic membrane and ear canal normal.      Left Ear: Tympanic membrane, ear canal and external ear normal.      Nose: Nose normal.      Mouth/Throat:      Mouth: Mucous membranes are moist.      Pharynx: Oropharynx is clear.   Eyes:      Extraocular Movements: Extraocular movements intact.      Conjunctiva/sclera: Conjunctivae normal.      Pupils: Pupils are equal, round, and reactive to light.   Cardiovascular:      Rate and Rhythm: Normal rate and regular rhythm.      Pulses: Normal pulses.      Heart sounds: Normal heart sounds.   Pulmonary:      Effort: Pulmonary effort is normal.      Breath sounds: Normal breath sounds.   Abdominal:      General: Abdomen is flat. Bowel sounds are normal.      Palpations: Abdomen is soft.   Musculoskeletal:         General: Normal range of motion.      Cervical back: Normal range of motion and neck supple.   Skin:     General: Skin is warm and dry.      Findings: Rash present.   Neurological:      General: No focal deficit present.      Mental Status: He is alert and oriented to person, place, and time. Mental status is at baseline.   Psychiatric:         Mood and Affect: Mood normal.         Behavior: Behavior normal.         Thought Content: Thought content normal.         Judgment: Judgment normal.       Assessment:       1. Poison ivy        Plan:     Poison ivy  -     dexAMETHasone injection 6 mg  -      predniSONE (DELTASONE) 20 MG tablet; Take 2 tablets (40 mg total) by mouth once daily. for 5 days  Dispense: 10 tablet; Refill: 0

## (undated) DEVICE — 40595 XL TRENDELENBURG POSITIONING KIT: Brand: 40595 XL TRENDELENBURG POSITIONING KIT

## (undated) DEVICE — INSUFLATION TUBING INSUFLOW (LEXION)

## (undated) DEVICE — 3000CC GUARDIAN II: Brand: GUARDIAN

## (undated) DEVICE — SUT VICRYL 0 UR-6 27 IN J603H

## (undated) DEVICE — 2.5MM REAMING ROD WITH BALL TIP/950MM-STERILE

## (undated) DEVICE — GLOVE SRG BIOGEL ECLIPSE 7.5

## (undated) DEVICE — CHLORAPREP HI-LITE 26ML ORANGE

## (undated) DEVICE — DRAPE SHEET THREE QUARTER

## (undated) DEVICE — STERILE ORIF HIP PACK: Brand: CARDINAL HEALTH

## (undated) DEVICE — ENDOSCOPIC CURVED INTRALUMINAL STAPLER (ILS) 24 TITANIUM ADJUSTABLE HEIGHT STAPLES

## (undated) DEVICE — 2.5MM DRILL TIP GUIDE WIRE 200MM

## (undated) DEVICE — URIMETER 2500ML

## (undated) DEVICE — GLOVE INDICATOR PI UNDERGLOVE SZ 8 BLUE

## (undated) DEVICE — DRESSING MEPILEX AG BORDER 4 X 8 IN

## (undated) DEVICE — RIA 2 BONE HARVESTING KIT 520MM STERILE

## (undated) DEVICE — MEDI-VAC YANK SUCT HNDL W/TPRD BULBOUS TIP: Brand: CARDINAL HEALTH

## (undated) DEVICE — 12.0MM REAMER HEAD FOR RIA 2 STERILE

## (undated) DEVICE — CATH URETHERAL CONNECTOR

## (undated) DEVICE — 3.2MM DRILL BIT/QC/145MM

## (undated) DEVICE — 2.0MM NON-COLORED THREADED GUIDE WIRE-SPADE POINT 230MM

## (undated) DEVICE — GLOVE SRG BIOGEL 8

## (undated) DEVICE — PENCIL ELECTROSURG E-Z CLEAN -0035H

## (undated) DEVICE — SPECIMEN CONTAINER STERILE PEEL PACK

## (undated) DEVICE — PACK MAJOR ORTHO W/SPLITS PBDS

## (undated) DEVICE — VISUALIZATION SYSTEM: Brand: CLEARIFY

## (undated) DEVICE — POSITIONER HANA TABLE PACK

## (undated) DEVICE — INTENDED FOR TISSUE SEPARATION, AND OTHER PROCEDURES THAT REQUIRE A SHARP SURGICAL BLADE TO PUNCTURE OR CUT.: Brand: BARD-PARKER SAFETY BLADES SIZE 10, STERILE

## (undated) DEVICE — ELECTRODE BLADE MOD  E-Z CLEAN 6.5IN -0014M

## (undated) DEVICE — DRESSING MEPILEX AG BORDER 4 X 4 IN

## (undated) DEVICE — STERILE CYSTO PACK: Brand: CARDINAL HEALTH

## (undated) DEVICE — DRAPE C-ARMOUR

## (undated) DEVICE — PLUMEPEN PRO 10FT

## (undated) DEVICE — GUIDEWIRE STRGHT TIP 0.035 IN  SOLO PLUS

## (undated) DEVICE — CONTOUR CURVED CUTTER STAPLER RELOAD: Brand: CONTOUR

## (undated) DEVICE — IRRIG ENDO FLO TUBING

## (undated) DEVICE — SUT PDS II 1 CTX 36 IN Z371T

## (undated) DEVICE — 5.0MM CANNULATED DRILL BIT/QC 200MM

## (undated) DEVICE — ACCESS PLATFORM FOR MINIMALLY INVASIVE SURGERY.: Brand: GELPORT® LAPAROSCOPIC  SYSTEM

## (undated) DEVICE — ELECTRODE BLADE E-Z CLEAN 4IN -0014A

## (undated) DEVICE — Device: Brand: DEFENDO AIR/WATER/SUCTION AND BIOPSY VALVE

## (undated) DEVICE — TRAVELKIT CONTAINS FIRST STEP KIT (200ML EP-4 KIT) AND SOILED SCOPE BAG - 1 KIT: Brand: TRAVELKIT CONTAINS FIRST STEP KIT AND SOILED SCOPE BAG

## (undated) DEVICE — SUT VICRYL PLUS 2-0 CTB-1 27 IN VCPB259H

## (undated) DEVICE — SUT VICRYL PLUS 0 CTB-1 27 IN VCPB260H

## (undated) DEVICE — 3M™ TEGADERM™ TRANSPARENT FILM DRESSING FRAME STYLE, 1628, 6 IN X 8 IN (15 CM X 20 CM), 10/CT 8CT/CASE: Brand: 3M™ TEGADERM™

## (undated) DEVICE — GLOVE INDICATOR PI UNDERGLOVE SZ 8.5 BLUE

## (undated) DEVICE — CONTOUR CURVED CUTTER STAPLER: Brand: CONTOUR

## (undated) DEVICE — TROCAR: Brand: KII SLEEVE

## (undated) DEVICE — 3M™ IOBAN™ 2 ANTIMICROBIAL INCISE DRAPE 6640EZ: Brand: IOBAN™ 2

## (undated) DEVICE — PAD GROUNDING ADULT

## (undated) DEVICE — SUT VICRYL 2-0 SH 27 IN UNDYED J417H

## (undated) DEVICE — CATH FOLEY COUNCIL 18FR 5ML 2 WAY LUBRICATH

## (undated) DEVICE — ELECTRODE BLADE MOD E-Z CLEAN 2.5IN 6.4CM -0012M

## (undated) DEVICE — IMPERVIOUS STOCKINETTE: Brand: DEROYAL

## (undated) DEVICE — CO2 AND WATER TUBING/CAP SET FOR OLYMPUS® SCOPES & UCR: Brand: ERBE

## (undated) DEVICE — SUT VICRYL 0 54 IN J207G

## (undated) DEVICE — GLOVE SRG BIOGEL ORTHOPEDIC 8

## (undated) DEVICE — 3.2MM GUIDE WIRE 400MM

## (undated) DEVICE — ADHESIVE SKN CLSR HISTOACRYL FLEX 0.5ML LF

## (undated) DEVICE — INTENDED FOR TISSUE SEPARATION, AND OTHER PROCEDURES THAT REQUIRE A SHARP SURGICAL BLADE TO PUNCTURE OR CUT.: Brand: BARD-PARKER SAFETY BLADES SIZE 15, STERILE

## (undated) DEVICE — DRESSING MEPILEX AG BORDER 4 X 12 IN

## (undated) DEVICE — TIBURON SPLIT SHEET: Brand: CONVERTORS

## (undated) DEVICE — ARTHROSCOPY FLOOR MAT

## (undated) DEVICE — CATH URETERAL 5FR X 70 CM FLEX TIP POLYUR BARD

## (undated) DEVICE — TROCAR: Brand: KII FIOS FIRST ENTRY

## (undated) DEVICE — SUT PROLENE 2-0 SH 36 IN 8523H

## (undated) DEVICE — DRAPE C-ARM X-RAY

## (undated) DEVICE — SUT MONOCRYL 4-0 PS-2 18 IN Y496G

## (undated) DEVICE — 3M™ IOBAN™ 2 ANTIMICROBIAL INCISE DRAPE 6650EZ: Brand: IOBAN™ 2

## (undated) DEVICE — U-DRAPE: Brand: CONVERTORS

## (undated) DEVICE — PACK PBDS STERILE LAP LITHOTOMY RF

## (undated) DEVICE — 4.2MM THREE-FLUTED DRILL BIT QC/NEEDLE POINT/145MM

## (undated) DEVICE — SPONGE STICK WITH PVP-I: Brand: KENDALL

## (undated) DEVICE — ENSEAL LAPAROSCOPIC TISSUE SEALER G2 CURVED JAW FOR USE WITH G2 GENERATOR 5MM DIAMETER 35CM SHAFT LENGTH: Brand: ENSEAL

## (undated) DEVICE — TUBING SMOKE EVAC W/FILTRATION DEVICE PLUMEPORT ACTIV

## (undated) DEVICE — TRAY FOLEY 16FR URIMETER SILICONE SURESTEP

## (undated) DEVICE — GLOVE SRG BIOGEL 7.5

## (undated) DEVICE — 6617 IOBAN II PATIENT ISOLATION DRAPE 5/BX,4BX/CS: Brand: STERI-DRAPE™ IOBAN™ 2

## (undated) DEVICE — SUT VICRYL PLUS 1 CTB-1 36 IN VCPB947H

## (undated) DEVICE — SUT VICRYL 3-0 SH 27 IN J416H

## (undated) DEVICE — 13.0MM REAMER HEAD FOR RIA 2 STERILE